# Patient Record
Sex: FEMALE | Race: WHITE | NOT HISPANIC OR LATINO | Employment: UNEMPLOYED | URBAN - NONMETROPOLITAN AREA
[De-identification: names, ages, dates, MRNs, and addresses within clinical notes are randomized per-mention and may not be internally consistent; named-entity substitution may affect disease eponyms.]

---

## 2017-01-01 ENCOUNTER — HOSPITAL ENCOUNTER (INPATIENT)
Facility: HOSPITAL | Age: 72
LOS: 15 days | Discharge: HOME | End: 2017-09-21
Attending: EMERGENCY MEDICINE | Admitting: INTERNAL MEDICINE

## 2017-01-01 ENCOUNTER — APPOINTMENT (OUTPATIENT)
Dept: NUCLEAR MEDICINE | Facility: HOSPITAL | Age: 72
End: 2017-01-01

## 2017-01-01 ENCOUNTER — ANESTHESIA (OUTPATIENT)
Dept: GASTROENTEROLOGY | Facility: HOSPITAL | Age: 72
End: 2017-01-01

## 2017-01-01 ENCOUNTER — APPOINTMENT (OUTPATIENT)
Dept: CARDIOLOGY | Facility: HOSPITAL | Age: 72
End: 2017-01-01
Attending: INTERNAL MEDICINE

## 2017-01-01 ENCOUNTER — ANESTHESIA EVENT (OUTPATIENT)
Dept: GASTROENTEROLOGY | Facility: HOSPITAL | Age: 72
End: 2017-01-01

## 2017-01-01 ENCOUNTER — APPOINTMENT (OUTPATIENT)
Dept: CT IMAGING | Facility: HOSPITAL | Age: 72
End: 2017-01-01

## 2017-01-01 ENCOUNTER — HOSPITAL ENCOUNTER (INPATIENT)
Facility: HOSPITAL | Age: 72
LOS: 11 days | Discharge: ANOTHER HOSPITAL | End: 2017-12-16
Attending: FAMILY MEDICINE | Admitting: FAMILY MEDICINE

## 2017-01-01 ENCOUNTER — APPOINTMENT (OUTPATIENT)
Dept: CARDIOLOGY | Facility: HOSPITAL | Age: 72
End: 2017-01-01

## 2017-01-01 ENCOUNTER — APPOINTMENT (OUTPATIENT)
Dept: GENERAL RADIOLOGY | Facility: HOSPITAL | Age: 72
End: 2017-01-01

## 2017-01-01 ENCOUNTER — HOSPITAL ENCOUNTER (EMERGENCY)
Facility: HOSPITAL | Age: 72
Discharge: HOME | End: 2017-12-31
Attending: FAMILY MEDICINE | Admitting: FAMILY MEDICINE

## 2017-01-01 VITALS
RESPIRATION RATE: 20 BRPM | SYSTOLIC BLOOD PRESSURE: 129 MMHG | TEMPERATURE: 99.3 F | WEIGHT: 130 LBS | OXYGEN SATURATION: 95 % | DIASTOLIC BLOOD PRESSURE: 77 MMHG | HEART RATE: 94 BPM | BODY MASS INDEX: 23.04 KG/M2 | HEIGHT: 63 IN

## 2017-01-01 VITALS
SYSTOLIC BLOOD PRESSURE: 152 MMHG | WEIGHT: 153 LBS | RESPIRATION RATE: 18 BRPM | DIASTOLIC BLOOD PRESSURE: 78 MMHG | HEART RATE: 66 BPM | HEIGHT: 63 IN | OXYGEN SATURATION: 97 % | BODY MASS INDEX: 27.11 KG/M2 | TEMPERATURE: 98.6 F

## 2017-01-01 VITALS
HEART RATE: 96 BPM | SYSTOLIC BLOOD PRESSURE: 138 MMHG | RESPIRATION RATE: 18 BRPM | BODY MASS INDEX: 22.55 KG/M2 | DIASTOLIC BLOOD PRESSURE: 71 MMHG | OXYGEN SATURATION: 95 % | TEMPERATURE: 98.6 F | WEIGHT: 127.3 LBS | HEIGHT: 63 IN

## 2017-01-01 DIAGNOSIS — Z74.09 IMPAIRED MOBILITY AND ADLS: ICD-10-CM

## 2017-01-01 DIAGNOSIS — R10.30 LOWER ABDOMINAL PAIN: Primary | ICD-10-CM

## 2017-01-01 DIAGNOSIS — Z95.828 PRESENCE OF IVC FILTER: ICD-10-CM

## 2017-01-01 DIAGNOSIS — R10.30 LOWER ABDOMINAL PAIN: ICD-10-CM

## 2017-01-01 DIAGNOSIS — R07.9 CHEST PAIN AT REST: ICD-10-CM

## 2017-01-01 DIAGNOSIS — E87.6 HYPOKALEMIA: ICD-10-CM

## 2017-01-01 DIAGNOSIS — Z74.09 IMPAIRED FUNCTIONAL MOBILITY, BALANCE, GAIT, AND ENDURANCE: ICD-10-CM

## 2017-01-01 DIAGNOSIS — R53.1 WEAKNESS: Primary | ICD-10-CM

## 2017-01-01 DIAGNOSIS — Z78.9 DECREASED ACTIVITIES OF DAILY LIVING (ADL): ICD-10-CM

## 2017-01-01 DIAGNOSIS — R77.8 ELEVATED TROPONIN: ICD-10-CM

## 2017-01-01 DIAGNOSIS — A04.72 C. DIFFICILE COLITIS: ICD-10-CM

## 2017-01-01 DIAGNOSIS — Z78.9 IMPAIRED MOBILITY AND ADLS: ICD-10-CM

## 2017-01-01 DIAGNOSIS — I82.5Z3 CHRONIC DEEP VEIN THROMBOSIS (DVT) OF DISTAL VEIN OF BOTH LOWER EXTREMITIES (HCC): Primary | ICD-10-CM

## 2017-01-01 DIAGNOSIS — R77.8 TROPONIN LEVEL ELEVATED: ICD-10-CM

## 2017-01-01 DIAGNOSIS — R07.9 CHEST PAIN, UNSPECIFIED TYPE: ICD-10-CM

## 2017-01-01 DIAGNOSIS — Z74.09 IMPAIRED PHYSICAL MOBILITY: ICD-10-CM

## 2017-01-01 DIAGNOSIS — I21.4 NSTEMI (NON-ST ELEVATED MYOCARDIAL INFARCTION) (HCC): ICD-10-CM

## 2017-01-01 DIAGNOSIS — K52.9 INFLAMMATORY BOWEL DISEASE: Chronic | ICD-10-CM

## 2017-01-01 DIAGNOSIS — K51.919 ULCERATIVE COLITIS WITH COMPLICATION, UNSPECIFIED LOCATION (HCC): ICD-10-CM

## 2017-01-01 DIAGNOSIS — D72.829 LEUKOCYTOSIS, UNSPECIFIED TYPE: ICD-10-CM

## 2017-01-01 LAB
ADV 40+41 DNA STL QL NAA+NON-PROBE: NOT DETECTED
ADV 40+41 DNA STL QL NAA+NON-PROBE: NOT DETECTED
ALBUMIN SERPL-MCNC: 2.2 G/DL (ref 3.4–4.8)
ALBUMIN SERPL-MCNC: 2.2 G/DL (ref 3.4–4.8)
ALBUMIN SERPL-MCNC: 2.3 G/DL (ref 3.4–4.8)
ALBUMIN SERPL-MCNC: 2.4 G/DL (ref 3.4–4.8)
ALBUMIN SERPL-MCNC: 2.5 G/DL (ref 3.4–4.8)
ALBUMIN SERPL-MCNC: 2.6 G/DL (ref 3.4–4.8)
ALBUMIN SERPL-MCNC: 2.7 G/DL (ref 3.4–4.8)
ALBUMIN SERPL-MCNC: 2.8 G/DL (ref 3.4–4.8)
ALBUMIN SERPL-MCNC: 2.8 G/DL (ref 3.4–4.8)
ALBUMIN SERPL-MCNC: 2.9 G/DL (ref 3.4–4.8)
ALBUMIN SERPL-MCNC: 2.9 G/DL (ref 3.4–4.8)
ALBUMIN SERPL-MCNC: 3 G/DL (ref 3.4–4.8)
ALBUMIN SERPL-MCNC: 3 G/DL (ref 3.4–4.8)
ALBUMIN SERPL-MCNC: 3.4 G/DL (ref 3.4–4.8)
ALBUMIN SERPL-MCNC: 3.4 G/DL (ref 3.4–4.8)
ALBUMIN SERPL-MCNC: 3.9 G/DL (ref 3.4–4.8)
ALBUMIN/GLOB SERPL: 0.8 G/DL (ref 1.1–1.8)
ALBUMIN/GLOB SERPL: 0.9 G/DL (ref 1.1–1.8)
ALBUMIN/GLOB SERPL: 1 G/DL (ref 1.1–1.8)
ALBUMIN/GLOB SERPL: 1.1 G/DL (ref 1.1–1.8)
ALBUMIN/GLOB SERPL: 1.2 G/DL (ref 1.1–1.8)
ALP SERPL-CCNC: 102 U/L (ref 38–126)
ALP SERPL-CCNC: 124 U/L (ref 38–126)
ALP SERPL-CCNC: 134 U/L (ref 38–126)
ALP SERPL-CCNC: 53 U/L (ref 38–126)
ALP SERPL-CCNC: 55 U/L (ref 38–126)
ALP SERPL-CCNC: 56 U/L (ref 38–126)
ALP SERPL-CCNC: 64 U/L (ref 38–126)
ALP SERPL-CCNC: 64 U/L (ref 38–126)
ALP SERPL-CCNC: 66 U/L (ref 38–126)
ALP SERPL-CCNC: 67 U/L (ref 38–126)
ALP SERPL-CCNC: 70 U/L (ref 38–126)
ALP SERPL-CCNC: 70 U/L (ref 38–126)
ALP SERPL-CCNC: 72 U/L (ref 38–126)
ALP SERPL-CCNC: 73 U/L (ref 38–126)
ALP SERPL-CCNC: 75 U/L (ref 38–126)
ALP SERPL-CCNC: 78 U/L (ref 38–126)
ALP SERPL-CCNC: 82 U/L (ref 38–126)
ALP SERPL-CCNC: 83 U/L (ref 38–126)
ALP SERPL-CCNC: 84 U/L (ref 38–126)
ALP SERPL-CCNC: 93 U/L (ref 38–126)
ALT SERPL W P-5'-P-CCNC: 18 U/L (ref 9–52)
ALT SERPL W P-5'-P-CCNC: 26 U/L (ref 9–52)
ALT SERPL W P-5'-P-CCNC: 28 U/L (ref 9–52)
ALT SERPL W P-5'-P-CCNC: 30 U/L (ref 9–52)
ALT SERPL W P-5'-P-CCNC: 32 U/L (ref 9–52)
ALT SERPL W P-5'-P-CCNC: 33 U/L (ref 9–52)
ALT SERPL W P-5'-P-CCNC: 34 U/L (ref 9–52)
ALT SERPL W P-5'-P-CCNC: 34 U/L (ref 9–52)
ALT SERPL W P-5'-P-CCNC: 35 U/L (ref 9–52)
ALT SERPL W P-5'-P-CCNC: 36 U/L (ref 9–52)
ALT SERPL W P-5'-P-CCNC: 37 U/L (ref 9–52)
ALT SERPL W P-5'-P-CCNC: 37 U/L (ref 9–52)
ALT SERPL W P-5'-P-CCNC: 39 U/L (ref 9–52)
ALT SERPL W P-5'-P-CCNC: 41 U/L (ref 9–52)
ANION GAP SERPL CALCULATED.3IONS-SCNC: 10 MMOL/L (ref 5–15)
ANION GAP SERPL CALCULATED.3IONS-SCNC: 10 MMOL/L (ref 5–15)
ANION GAP SERPL CALCULATED.3IONS-SCNC: 11 MMOL/L (ref 5–15)
ANION GAP SERPL CALCULATED.3IONS-SCNC: 12 MMOL/L (ref 5–15)
ANION GAP SERPL CALCULATED.3IONS-SCNC: 16 MMOL/L (ref 5–15)
ANION GAP SERPL CALCULATED.3IONS-SCNC: 4 MMOL/L (ref 5–15)
ANION GAP SERPL CALCULATED.3IONS-SCNC: 4 MMOL/L (ref 5–15)
ANION GAP SERPL CALCULATED.3IONS-SCNC: 5 MMOL/L (ref 5–15)
ANION GAP SERPL CALCULATED.3IONS-SCNC: 5 MMOL/L (ref 5–15)
ANION GAP SERPL CALCULATED.3IONS-SCNC: 6 MMOL/L (ref 5–15)
ANION GAP SERPL CALCULATED.3IONS-SCNC: 7 MMOL/L (ref 5–15)
ANION GAP SERPL CALCULATED.3IONS-SCNC: 8 MMOL/L (ref 5–15)
ANION GAP SERPL CALCULATED.3IONS-SCNC: 9 MMOL/L (ref 5–15)
ANISOCYTOSIS BLD QL: NORMAL
AST SERPL-CCNC: 15 U/L (ref 14–36)
AST SERPL-CCNC: 15 U/L (ref 14–36)
AST SERPL-CCNC: 17 U/L (ref 14–36)
AST SERPL-CCNC: 18 U/L (ref 14–36)
AST SERPL-CCNC: 19 U/L (ref 14–36)
AST SERPL-CCNC: 19 U/L (ref 14–36)
AST SERPL-CCNC: 20 U/L (ref 14–36)
AST SERPL-CCNC: 21 U/L (ref 14–36)
AST SERPL-CCNC: 22 U/L (ref 14–36)
AST SERPL-CCNC: 27 U/L (ref 14–36)
AST SERPL-CCNC: 29 U/L (ref 14–36)
AST SERPL-CCNC: 29 U/L (ref 14–36)
AST SERPL-CCNC: 32 U/L (ref 14–36)
AST SERPL-CCNC: 33 U/L (ref 14–36)
ASTRO TYP 1-8 RNA STL QL NAA+NON-PROBE: NOT DETECTED
ASTRO TYP 1-8 RNA STL QL NAA+NON-PROBE: NOT DETECTED
BACTERIA BLD CULT: ABNORMAL
BACTERIA SPEC AEROBE CULT: ABNORMAL
BACTERIA SPEC AEROBE CULT: NORMAL
BACTERIA UR QL AUTO: ABNORMAL /HPF
BACTERIA UR QL AUTO: ABNORMAL /HPF
BASOPHILS # BLD AUTO: 0 10*3/MM3 (ref 0–0.2)
BASOPHILS # BLD AUTO: 0.01 10*3/MM3 (ref 0–0.2)
BASOPHILS # BLD AUTO: 0.02 10*3/MM3 (ref 0–0.2)
BASOPHILS # BLD AUTO: 0.03 10*3/MM3 (ref 0–0.2)
BASOPHILS # BLD AUTO: 0.04 10*3/MM3 (ref 0–0.2)
BASOPHILS # BLD AUTO: 0.05 10*3/MM3 (ref 0–0.2)
BASOPHILS NFR BLD AUTO: 0 % (ref 0–2)
BASOPHILS NFR BLD AUTO: 0.1 % (ref 0–2)
BASOPHILS NFR BLD AUTO: 0.2 % (ref 0–2)
BASOPHILS NFR BLD AUTO: 0.2 % (ref 0–2)
BASOPHILS NFR BLD AUTO: 0.3 % (ref 0–2)
BASOPHILS NFR BLD AUTO: 0.4 % (ref 0–2)
BASOPHILS NFR BLD AUTO: 0.5 % (ref 0–2)
BASOPHILS NFR BLD AUTO: 0.5 % (ref 0–2)
BH CV ECHO MEAS - % IVS THICK: 3.1 %
BH CV ECHO MEAS - ACS: 2 CM
BH CV ECHO MEAS - AI DEC SLOPE: 90.9 CM/SEC^2
BH CV ECHO MEAS - AI MAX PG: 28.6 MMHG
BH CV ECHO MEAS - AI MAX VEL: 267.3 CM/SEC
BH CV ECHO MEAS - AI P1/2T: 861.7 MSEC
BH CV ECHO MEAS - AO MAX PG (FULL): 1.6 MMHG
BH CV ECHO MEAS - AO MAX PG: 8.1 MMHG
BH CV ECHO MEAS - AO MEAN PG (FULL): 1.8 MMHG
BH CV ECHO MEAS - AO MEAN PG: 4.5 MMHG
BH CV ECHO MEAS - AO ROOT AREA: 6.7 CM^2
BH CV ECHO MEAS - AO ROOT DIAM: 2.9 CM
BH CV ECHO MEAS - AO V2 MAX: 142 CM/SEC
BH CV ECHO MEAS - AO V2 MEAN: 98.8 CM/SEC
BH CV ECHO MEAS - AO V2 VTI: 19.8 CM
BH CV ECHO MEAS - AVA(I,A): 2.3 CM^2
BH CV ECHO MEAS - AVA(I,D): 2.3 CM^2
BH CV ECHO MEAS - AVA(V,A): 2.4 CM^2
BH CV ECHO MEAS - AVA(V,D): 2.4 CM^2
BH CV ECHO MEAS - EDV(CUBED): 75.4 ML
BH CV ECHO MEAS - EDV(TEICH): 79.7 ML
BH CV ECHO MEAS - EF(CUBED): 58.6 %
BH CV ECHO MEAS - EF(TEICH): 50.5 %
BH CV ECHO MEAS - ESV(CUBED): 31.2 ML
BH CV ECHO MEAS - ESV(TEICH): 39.4 ML
BH CV ECHO MEAS - FS: 25.5 %
BH CV ECHO MEAS - IVS/LVPW: 1.2
BH CV ECHO MEAS - IVSD: 1.2 CM
BH CV ECHO MEAS - IVSS: 1.3 CM
BH CV ECHO MEAS - LA DIMENSION: 3.2 CM
BH CV ECHO MEAS - LA/AO: 1.1
BH CV ECHO MEAS - LV MASS(C)D: 165.4 GRAMS
BH CV ECHO MEAS - LV MAX PG: 6.4 MMHG
BH CV ECHO MEAS - LV MEAN PG: 2.7 MMHG
BH CV ECHO MEAS - LV V1 MAX: 126.7 CM/SEC
BH CV ECHO MEAS - LV V1 MEAN: 73.6 CM/SEC
BH CV ECHO MEAS - LV V1 VTI: 16.6 CM
BH CV ECHO MEAS - LVIDD: 4.2 CM
BH CV ECHO MEAS - LVIDS: 3.1 CM
BH CV ECHO MEAS - LVOT AREA (M): 2.8 CM^2
BH CV ECHO MEAS - LVOT AREA: 2.7 CM^2
BH CV ECHO MEAS - LVOT DIAM: 1.9 CM
BH CV ECHO MEAS - LVPWD: 1 CM
BH CV ECHO MEAS - MR MAX PG: 54.1 MMHG
BH CV ECHO MEAS - MR MAX VEL: 366 CM/SEC
BH CV ECHO MEAS - MV A MAX VEL: 138.9 CM/SEC
BH CV ECHO MEAS - MV DEC SLOPE: 542.6 CM/SEC^2
BH CV ECHO MEAS - MV E MAX VEL: 94.6 CM/SEC
BH CV ECHO MEAS - MV E/A: 0.68
BH CV ECHO MEAS - MV MAX PG: 12.1 MMHG
BH CV ECHO MEAS - MV MEAN PG: 3.7 MMHG
BH CV ECHO MEAS - MV P1/2T MAX VEL: 104.5 CM/SEC
BH CV ECHO MEAS - MV P1/2T: 56.4 MSEC
BH CV ECHO MEAS - MV V2 MAX: 173.7 CM/SEC
BH CV ECHO MEAS - MV V2 MEAN: 85.5 CM/SEC
BH CV ECHO MEAS - MV V2 VTI: 18.7 CM
BH CV ECHO MEAS - MVA P1/2T LCG: 2.1 CM^2
BH CV ECHO MEAS - MVA(P1/2T): 3.9 CM^2
BH CV ECHO MEAS - MVA(VTI): 2.4 CM^2
BH CV ECHO MEAS - PA MAX PG: 8 MMHG
BH CV ECHO MEAS - PA V2 MAX: 141.2 CM/SEC
BH CV ECHO MEAS - RAP SYSTOLE: 10 MMHG
BH CV ECHO MEAS - RVDD: 3 CM
BH CV ECHO MEAS - RVSP: 38.2 MMHG
BH CV ECHO MEAS - SV(AO): 132.8 ML
BH CV ECHO MEAS - SV(CUBED): 44.2 ML
BH CV ECHO MEAS - SV(LVOT): 45.2 ML
BH CV ECHO MEAS - SV(TEICH): 40.3 ML
BH CV ECHO MEAS - TR MAX VEL: 265.5 CM/SEC
BH CV STRESS BP STAGE 1: NORMAL
BH CV STRESS COMMENTS STAGE 1: NORMAL
BH CV STRESS DOSE REGADENOSON STAGE 1: 0.4
BH CV STRESS DURATION MIN STAGE 1: 0
BH CV STRESS DURATION SEC STAGE 1: 10
BH CV STRESS HR STAGE 1: 111
BH CV STRESS PROTOCOL 1: NORMAL
BH CV STRESS RECOVERY BP: NORMAL MMHG
BH CV STRESS RECOVERY HR: 112 BPM
BH CV STRESS STAGE 1: 1
BILIRUB SERPL-MCNC: 0.2 MG/DL (ref 0.2–1.3)
BILIRUB SERPL-MCNC: 0.3 MG/DL (ref 0.2–1.3)
BILIRUB SERPL-MCNC: 0.4 MG/DL (ref 0.2–1.3)
BILIRUB SERPL-MCNC: 0.5 MG/DL (ref 0.2–1.3)
BILIRUB SERPL-MCNC: 0.5 MG/DL (ref 0.2–1.3)
BILIRUB SERPL-MCNC: 0.6 MG/DL (ref 0.2–1.3)
BILIRUB SERPL-MCNC: 0.7 MG/DL (ref 0.2–1.3)
BILIRUB SERPL-MCNC: 0.7 MG/DL (ref 0.2–1.3)
BILIRUB SERPL-MCNC: 0.9 MG/DL (ref 0.2–1.3)
BILIRUB UR QL STRIP: ABNORMAL
BILIRUB UR QL STRIP: ABNORMAL
BUN BLD-MCNC: 10 MG/DL (ref 7–21)
BUN BLD-MCNC: 10 MG/DL (ref 7–21)
BUN BLD-MCNC: 11 MG/DL (ref 7–21)
BUN BLD-MCNC: 12 MG/DL (ref 7–21)
BUN BLD-MCNC: 15 MG/DL (ref 7–21)
BUN BLD-MCNC: 16 MG/DL (ref 7–21)
BUN BLD-MCNC: 17 MG/DL (ref 7–21)
BUN BLD-MCNC: 17 MG/DL (ref 7–21)
BUN BLD-MCNC: 20 MG/DL (ref 7–21)
BUN BLD-MCNC: 3 MG/DL (ref 7–21)
BUN BLD-MCNC: 5 MG/DL (ref 7–21)
BUN BLD-MCNC: 5 MG/DL (ref 7–21)
BUN BLD-MCNC: 7 MG/DL (ref 7–21)
BUN BLD-MCNC: 7 MG/DL (ref 7–21)
BUN BLD-MCNC: 8 MG/DL (ref 7–21)
BUN BLD-MCNC: 9 MG/DL (ref 7–21)
BUN BLD-MCNC: 9 MG/DL (ref 7–21)
BUN/CREAT SERPL: 10 (ref 7–25)
BUN/CREAT SERPL: 10.1 (ref 7–25)
BUN/CREAT SERPL: 11.3 (ref 7–25)
BUN/CREAT SERPL: 13.6 (ref 7–25)
BUN/CREAT SERPL: 14.3 (ref 7–25)
BUN/CREAT SERPL: 15.6 (ref 7–25)
BUN/CREAT SERPL: 16.7 (ref 7–25)
BUN/CREAT SERPL: 17.7 (ref 7–25)
BUN/CREAT SERPL: 18.5 (ref 7–25)
BUN/CREAT SERPL: 18.5 (ref 7–25)
BUN/CREAT SERPL: 18.6 (ref 7–25)
BUN/CREAT SERPL: 20.8 (ref 7–25)
BUN/CREAT SERPL: 21.1 (ref 7–25)
BUN/CREAT SERPL: 22.2 (ref 7–25)
BUN/CREAT SERPL: 23.4 (ref 7–25)
BUN/CREAT SERPL: 23.9 (ref 7–25)
BUN/CREAT SERPL: 24.2 (ref 7–25)
BUN/CREAT SERPL: 26.3 (ref 7–25)
BUN/CREAT SERPL: 27.9 (ref 7–25)
BUN/CREAT SERPL: 28.3 (ref 7–25)
BUN/CREAT SERPL: 3.9 (ref 7–25)
BUN/CREAT SERPL: 5.8 (ref 7–25)
BUN/CREAT SERPL: 7.4 (ref 7–25)
BUN/CREAT SERPL: 7.6 (ref 7–25)
BUN/CREAT SERPL: 8.9 (ref 7–25)
BUN/CREAT SERPL: 9.3 (ref 7–25)
BUN/CREAT SERPL: 9.4 (ref 7–25)
C CAYETANENSIS DNA STL QL NAA+NON-PROBE: NOT DETECTED
C CAYETANENSIS DNA STL QL NAA+NON-PROBE: NOT DETECTED
C DIFF TOX GENS STL QL NAA+PROBE: NOT DETECTED
C DIFF TOX GENS STL QL NAA+PROBE: NOT DETECTED
C DIFF TOX GENS STL QL NAA+PROBE: POSITIVE
CALCIUM SPEC-SCNC: 7.3 MG/DL (ref 8.4–10.2)
CALCIUM SPEC-SCNC: 7.3 MG/DL (ref 8.4–10.2)
CALCIUM SPEC-SCNC: 7.5 MG/DL (ref 8.4–10.2)
CALCIUM SPEC-SCNC: 7.5 MG/DL (ref 8.4–10.2)
CALCIUM SPEC-SCNC: 7.6 MG/DL (ref 8.4–10.2)
CALCIUM SPEC-SCNC: 7.7 MG/DL (ref 8.4–10.2)
CALCIUM SPEC-SCNC: 7.7 MG/DL (ref 8.4–10.2)
CALCIUM SPEC-SCNC: 7.8 MG/DL (ref 8.4–10.2)
CALCIUM SPEC-SCNC: 7.9 MG/DL (ref 8.4–10.2)
CALCIUM SPEC-SCNC: 8 MG/DL (ref 8.4–10.2)
CALCIUM SPEC-SCNC: 8 MG/DL (ref 8.4–10.2)
CALCIUM SPEC-SCNC: 8.1 MG/DL (ref 8.4–10.2)
CALCIUM SPEC-SCNC: 8.2 MG/DL (ref 8.4–10.2)
CALCIUM SPEC-SCNC: 8.2 MG/DL (ref 8.4–10.2)
CALCIUM SPEC-SCNC: 8.3 MG/DL (ref 8.4–10.2)
CALCIUM SPEC-SCNC: 8.4 MG/DL (ref 8.4–10.2)
CALCIUM SPEC-SCNC: 8.5 MG/DL (ref 8.4–10.2)
CALCIUM SPEC-SCNC: 8.9 MG/DL (ref 8.4–10.2)
CALCIUM SPEC-SCNC: 8.9 MG/DL (ref 8.4–10.2)
CAMPY SP DNA.DIARRHEA STL QL NAA+PROBE: NOT DETECTED
CAMPY SP DNA.DIARRHEA STL QL NAA+PROBE: NOT DETECTED
CHLORIDE SERPL-SCNC: 100 MMOL/L (ref 95–110)
CHLORIDE SERPL-SCNC: 100 MMOL/L (ref 95–110)
CHLORIDE SERPL-SCNC: 101 MMOL/L (ref 95–110)
CHLORIDE SERPL-SCNC: 101 MMOL/L (ref 95–110)
CHLORIDE SERPL-SCNC: 102 MMOL/L (ref 95–110)
CHLORIDE SERPL-SCNC: 102 MMOL/L (ref 95–110)
CHLORIDE SERPL-SCNC: 104 MMOL/L (ref 95–110)
CHLORIDE SERPL-SCNC: 104 MMOL/L (ref 95–110)
CHLORIDE SERPL-SCNC: 105 MMOL/L (ref 95–110)
CHLORIDE SERPL-SCNC: 106 MMOL/L (ref 95–110)
CHLORIDE SERPL-SCNC: 106 MMOL/L (ref 95–110)
CHLORIDE SERPL-SCNC: 108 MMOL/L (ref 95–110)
CHLORIDE SERPL-SCNC: 109 MMOL/L (ref 95–110)
CHLORIDE SERPL-SCNC: 109 MMOL/L (ref 95–110)
CHLORIDE SERPL-SCNC: 110 MMOL/L (ref 95–110)
CHLORIDE SERPL-SCNC: 111 MMOL/L (ref 95–110)
CHLORIDE SERPL-SCNC: 111 MMOL/L (ref 95–110)
CHLORIDE SERPL-SCNC: 91 MMOL/L (ref 95–110)
CHLORIDE SERPL-SCNC: 93 MMOL/L (ref 95–110)
CHLORIDE SERPL-SCNC: 97 MMOL/L (ref 95–110)
CHLORIDE SERPL-SCNC: 98 MMOL/L (ref 95–110)
CHLORIDE SERPL-SCNC: 99 MMOL/L (ref 95–110)
CLARITY UR: CLEAR
CLARITY UR: CLEAR
CMV IGG SERPL IA-ACNC: >10 U/ML (ref 0–0.59)
CMV IGM SERPL IA-ACNC: <30 AU/ML (ref 0–29.9)
CO2 SERPL-SCNC: 20 MMOL/L (ref 22–31)
CO2 SERPL-SCNC: 21 MMOL/L (ref 22–31)
CO2 SERPL-SCNC: 21 MMOL/L (ref 22–31)
CO2 SERPL-SCNC: 22 MMOL/L (ref 22–31)
CO2 SERPL-SCNC: 22 MMOL/L (ref 22–31)
CO2 SERPL-SCNC: 23 MMOL/L (ref 22–31)
CO2 SERPL-SCNC: 24 MMOL/L (ref 22–31)
CO2 SERPL-SCNC: 25 MMOL/L (ref 22–31)
CO2 SERPL-SCNC: 26 MMOL/L (ref 22–31)
CO2 SERPL-SCNC: 27 MMOL/L (ref 22–31)
CO2 SERPL-SCNC: 28 MMOL/L (ref 22–31)
CO2 SERPL-SCNC: 29 MMOL/L (ref 22–31)
CO2 SERPL-SCNC: 32 MMOL/L (ref 22–31)
CO2 SERPL-SCNC: 33 MMOL/L (ref 22–31)
COLOR UR: ABNORMAL
COLOR UR: YELLOW
CREAT BLD-MCNC: 0.54 MG/DL (ref 0.5–1)
CREAT BLD-MCNC: 0.54 MG/DL (ref 0.5–1)
CREAT BLD-MCNC: 0.56 MG/DL (ref 0.5–1)
CREAT BLD-MCNC: 0.57 MG/DL (ref 0.5–1)
CREAT BLD-MCNC: 0.59 MG/DL (ref 0.5–1)
CREAT BLD-MCNC: 0.6 MG/DL (ref 0.5–1)
CREAT BLD-MCNC: 0.61 MG/DL (ref 0.5–1)
CREAT BLD-MCNC: 0.62 MG/DL (ref 0.5–1)
CREAT BLD-MCNC: 0.62 MG/DL (ref 0.5–1)
CREAT BLD-MCNC: 0.64 MG/DL (ref 0.5–1)
CREAT BLD-MCNC: 0.64 MG/DL (ref 0.5–1)
CREAT BLD-MCNC: 0.66 MG/DL (ref 0.5–1)
CREAT BLD-MCNC: 0.66 MG/DL (ref 0.5–1)
CREAT BLD-MCNC: 0.67 MG/DL (ref 0.5–1)
CREAT BLD-MCNC: 0.68 MG/DL (ref 0.5–1)
CREAT BLD-MCNC: 0.69 MG/DL (ref 0.5–1)
CREAT BLD-MCNC: 0.71 MG/DL (ref 0.5–1)
CREAT BLD-MCNC: 0.71 MG/DL (ref 0.5–1)
CREAT BLD-MCNC: 0.72 MG/DL (ref 0.5–1)
CREAT BLD-MCNC: 0.76 MG/DL (ref 0.5–1)
CREAT BLD-MCNC: 0.79 MG/DL (ref 0.5–1)
CREAT BLD-MCNC: 0.86 MG/DL (ref 0.5–1)
CREAT BLD-MCNC: 0.86 MG/DL (ref 0.5–1)
CREAT BLD-MCNC: 0.96 MG/DL (ref 0.5–1)
CREAT BLD-MCNC: 1.08 MG/DL (ref 0.5–1)
CREAT BLD-MCNC: 1.1 MG/DL (ref 0.5–1)
CREAT BLD-MCNC: 1.44 MG/DL (ref 0.5–1)
CRP SERPL-MCNC: 20.6 MG/DL (ref 0–1)
CRYPTOSP STL CULT: NOT DETECTED
CRYPTOSP STL CULT: NOT DETECTED
D-LACTATE SERPL-SCNC: 2.3 MMOL/L (ref 0.5–2)
D-LACTATE SERPL-SCNC: 2.3 MMOL/L (ref 0.5–2)
DEPRECATED RDW RBC AUTO: 43.8 FL (ref 36.4–46.3)
DEPRECATED RDW RBC AUTO: 45 FL (ref 36.4–46.3)
DEPRECATED RDW RBC AUTO: 45.1 FL (ref 36.4–46.3)
DEPRECATED RDW RBC AUTO: 45.9 FL (ref 36.4–46.3)
DEPRECATED RDW RBC AUTO: 45.9 FL (ref 36.4–46.3)
DEPRECATED RDW RBC AUTO: 46.2 FL (ref 36.4–46.3)
DEPRECATED RDW RBC AUTO: 46.6 FL (ref 36.4–46.3)
DEPRECATED RDW RBC AUTO: 47.4 FL (ref 36.4–46.3)
DEPRECATED RDW RBC AUTO: 47.5 FL (ref 36.4–46.3)
DEPRECATED RDW RBC AUTO: 47.8 FL (ref 36.4–46.3)
DEPRECATED RDW RBC AUTO: 47.8 FL (ref 36.4–46.3)
DEPRECATED RDW RBC AUTO: 48 FL (ref 36.4–46.3)
DEPRECATED RDW RBC AUTO: 48.1 FL (ref 36.4–46.3)
DEPRECATED RDW RBC AUTO: 48.4 FL (ref 36.4–46.3)
DEPRECATED RDW RBC AUTO: 48.4 FL (ref 36.4–46.3)
DEPRECATED RDW RBC AUTO: 49.3 FL (ref 36.4–46.3)
DEPRECATED RDW RBC AUTO: 49.7 FL (ref 36.4–46.3)
DEPRECATED RDW RBC AUTO: 49.8 FL (ref 36.4–46.3)
DEPRECATED RDW RBC AUTO: 49.9 FL (ref 36.4–46.3)
DEPRECATED RDW RBC AUTO: 50.4 FL (ref 36.4–46.3)
DEPRECATED RDW RBC AUTO: 51 FL (ref 36.4–46.3)
DEPRECATED RDW RBC AUTO: 51 FL (ref 36.4–46.3)
DEPRECATED RDW RBC AUTO: 51.3 FL (ref 36.4–46.3)
DEPRECATED RDW RBC AUTO: 51.5 FL (ref 36.4–46.3)
DEPRECATED RDW RBC AUTO: 51.6 FL (ref 36.4–46.3)
DEPRECATED RDW RBC AUTO: 51.7 FL (ref 36.4–46.3)
DEPRECATED RDW RBC AUTO: 53.2 FL (ref 36.4–46.3)
E COLI DNA SPEC QL NAA+PROBE: NOT DETECTED
E COLI DNA SPEC QL NAA+PROBE: NOT DETECTED
E HISTOLYT AG STL-ACNC: NOT DETECTED
E HISTOLYT AG STL-ACNC: NOT DETECTED
EAEC PAA PLAS AGGR+AATA ST NAA+NON-PRB: NOT DETECTED
EAEC PAA PLAS AGGR+AATA ST NAA+NON-PRB: NOT DETECTED
EC STX1+STX2 GENES STL QL NAA+NON-PROBE: NOT DETECTED
EC STX1+STX2 GENES STL QL NAA+NON-PROBE: NOT DETECTED
EOSINOPHIL # BLD AUTO: 0 10*3/MM3 (ref 0–0.7)
EOSINOPHIL # BLD AUTO: 0.01 10*3/MM3 (ref 0–0.7)
EOSINOPHIL # BLD AUTO: 0.02 10*3/MM3 (ref 0–0.7)
EOSINOPHIL # BLD AUTO: 0.04 10*3/MM3 (ref 0–0.7)
EOSINOPHIL # BLD AUTO: 0.08 10*3/MM3 (ref 0–0.7)
EOSINOPHIL # BLD AUTO: 0.1 10*3/MM3 (ref 0–0.7)
EOSINOPHIL # BLD AUTO: 0.11 10*3/MM3 (ref 0–0.7)
EOSINOPHIL # BLD AUTO: 0.14 10*3/MM3 (ref 0–0.7)
EOSINOPHIL # BLD AUTO: 0.21 10*3/MM3 (ref 0–0.7)
EOSINOPHIL # BLD AUTO: 0.24 10*3/MM3 (ref 0–0.7)
EOSINOPHIL # BLD AUTO: 0.25 10*3/MM3 (ref 0–0.7)
EOSINOPHIL NFR BLD AUTO: 0 % (ref 0–7)
EOSINOPHIL NFR BLD AUTO: 0.1 % (ref 0–7)
EOSINOPHIL NFR BLD AUTO: 0.2 % (ref 0–7)
EOSINOPHIL NFR BLD AUTO: 0.2 % (ref 0–7)
EOSINOPHIL NFR BLD AUTO: 0.3 % (ref 0–7)
EOSINOPHIL NFR BLD AUTO: 0.9 % (ref 0–7)
EOSINOPHIL NFR BLD AUTO: 1.3 % (ref 0–7)
EOSINOPHIL NFR BLD AUTO: 1.4 % (ref 0–7)
EOSINOPHIL NFR BLD AUTO: 1.9 % (ref 0–7)
EOSINOPHIL NFR BLD AUTO: 2.8 % (ref 0–7)
EOSINOPHIL NFR BLD AUTO: 3.1 % (ref 0–7)
EOSINOPHIL NFR BLD AUTO: 3.7 % (ref 0–7)
EPEC EAE GENE STL QL NAA+NON-PROBE: NOT DETECTED
EPEC EAE GENE STL QL NAA+NON-PROBE: NOT DETECTED
ERYTHROCYTE [DISTWIDTH] IN BLOOD BY AUTOMATED COUNT: 13.6 % (ref 11.5–14.5)
ERYTHROCYTE [DISTWIDTH] IN BLOOD BY AUTOMATED COUNT: 13.8 % (ref 11.5–14.5)
ERYTHROCYTE [DISTWIDTH] IN BLOOD BY AUTOMATED COUNT: 14 % (ref 11.5–14.5)
ERYTHROCYTE [DISTWIDTH] IN BLOOD BY AUTOMATED COUNT: 14.1 % (ref 11.5–14.5)
ERYTHROCYTE [DISTWIDTH] IN BLOOD BY AUTOMATED COUNT: 14.2 % (ref 11.5–14.5)
ERYTHROCYTE [DISTWIDTH] IN BLOOD BY AUTOMATED COUNT: 14.2 % (ref 11.5–14.5)
ERYTHROCYTE [DISTWIDTH] IN BLOOD BY AUTOMATED COUNT: 14.3 % (ref 11.5–14.5)
ERYTHROCYTE [DISTWIDTH] IN BLOOD BY AUTOMATED COUNT: 14.3 % (ref 11.5–14.5)
ERYTHROCYTE [DISTWIDTH] IN BLOOD BY AUTOMATED COUNT: 14.4 % (ref 11.5–14.5)
ERYTHROCYTE [DISTWIDTH] IN BLOOD BY AUTOMATED COUNT: 14.5 % (ref 11.5–14.5)
ERYTHROCYTE [DISTWIDTH] IN BLOOD BY AUTOMATED COUNT: 14.6 % (ref 11.5–14.5)
ERYTHROCYTE [DISTWIDTH] IN BLOOD BY AUTOMATED COUNT: 14.9 % (ref 11.5–14.5)
ERYTHROCYTE [DISTWIDTH] IN BLOOD BY AUTOMATED COUNT: 15 % (ref 11.5–14.5)
ERYTHROCYTE [DISTWIDTH] IN BLOOD BY AUTOMATED COUNT: 15.1 % (ref 11.5–14.5)
ERYTHROCYTE [DISTWIDTH] IN BLOOD BY AUTOMATED COUNT: 15.1 % (ref 11.5–14.5)
ERYTHROCYTE [DISTWIDTH] IN BLOOD BY AUTOMATED COUNT: 15.2 % (ref 11.5–14.5)
ERYTHROCYTE [DISTWIDTH] IN BLOOD BY AUTOMATED COUNT: 15.3 % (ref 11.5–14.5)
ERYTHROCYTE [DISTWIDTH] IN BLOOD BY AUTOMATED COUNT: 15.7 % (ref 11.5–14.5)
ERYTHROCYTE [DISTWIDTH] IN BLOOD BY AUTOMATED COUNT: 15.7 % (ref 11.5–14.5)
ERYTHROCYTE [SEDIMENTATION RATE] IN BLOOD: 47 MM/HR (ref 0–20)
ETEC LTA+ST1A+ST1B TOX ST NAA+NON-PROBE: NOT DETECTED
ETEC LTA+ST1A+ST1B TOX ST NAA+NON-PROBE: NOT DETECTED
FLUAV AG NPH QL: NEGATIVE
FLUBV AG NPH QL IA: NEGATIVE
G LAMBLIA DNA SPEC QL NAA+PROBE: NOT DETECTED
G LAMBLIA DNA SPEC QL NAA+PROBE: NOT DETECTED
GFR SERPL CREATININE-BSD FRML MDRD: 100 ML/MIN/1.73 (ref 39–90)
GFR SERPL CREATININE-BSD FRML MDRD: 104 ML/MIN/1.73 (ref 60–90)
GFR SERPL CREATININE-BSD FRML MDRD: 106 ML/MIN/1.73 (ref 60–90)
GFR SERPL CREATININE-BSD FRML MDRD: 111 ML/MIN/1.73 (ref 39–90)
GFR SERPL CREATININE-BSD FRML MDRD: 111 ML/MIN/1.73 (ref 60–90)
GFR SERPL CREATININE-BSD FRML MDRD: 36 ML/MIN/1.73 (ref 60–90)
GFR SERPL CREATININE-BSD FRML MDRD: 49 ML/MIN/1.73 (ref 39–90)
GFR SERPL CREATININE-BSD FRML MDRD: 50 ML/MIN/1.73 (ref 39–90)
GFR SERPL CREATININE-BSD FRML MDRD: 57 ML/MIN/1.73 (ref 60–90)
GFR SERPL CREATININE-BSD FRML MDRD: 65 ML/MIN/1.73 (ref 60–90)
GFR SERPL CREATININE-BSD FRML MDRD: 65 ML/MIN/1.73 (ref 60–90)
GFR SERPL CREATININE-BSD FRML MDRD: 72 ML/MIN/1.73
GFR SERPL CREATININE-BSD FRML MDRD: 75 ML/MIN/1.73 (ref 60–90)
GFR SERPL CREATININE-BSD FRML MDRD: 80 ML/MIN/1.73 (ref 39–90)
GFR SERPL CREATININE-BSD FRML MDRD: 81 ML/MIN/1.73 (ref 60–90)
GFR SERPL CREATININE-BSD FRML MDRD: 81 ML/MIN/1.73 (ref 60–90)
GFR SERPL CREATININE-BSD FRML MDRD: 84 ML/MIN/1.73 (ref 60–90)
GFR SERPL CREATININE-BSD FRML MDRD: 85 ML/MIN/1.73 (ref 39–90)
GFR SERPL CREATININE-BSD FRML MDRD: 87 ML/MIN/1.73 (ref 60–90)
GFR SERPL CREATININE-BSD FRML MDRD: 88 ML/MIN/1.73 (ref 39–90)
GFR SERPL CREATININE-BSD FRML MDRD: 88 ML/MIN/1.73 (ref 60–90)
GFR SERPL CREATININE-BSD FRML MDRD: 91 ML/MIN/1.73 (ref 39–90)
GFR SERPL CREATININE-BSD FRML MDRD: 91 ML/MIN/1.73 (ref 39–90)
GFR SERPL CREATININE-BSD FRML MDRD: 95 ML/MIN/1.73 (ref 39–90)
GFR SERPL CREATININE-BSD FRML MDRD: 95 ML/MIN/1.73 (ref 60–90)
GFR SERPL CREATININE-BSD FRML MDRD: 96 ML/MIN/1.73 (ref 39–90)
GFR SERPL CREATININE-BSD FRML MDRD: 98 ML/MIN/1.73 (ref 60–90)
GLOBULIN UR ELPH-MCNC: 2.6 GM/DL (ref 2.3–3.5)
GLOBULIN UR ELPH-MCNC: 2.6 GM/DL (ref 2.3–3.5)
GLOBULIN UR ELPH-MCNC: 2.7 GM/DL (ref 2.3–3.5)
GLOBULIN UR ELPH-MCNC: 2.8 GM/DL (ref 2.3–3.5)
GLOBULIN UR ELPH-MCNC: 2.9 GM/DL (ref 2.3–3.5)
GLOBULIN UR ELPH-MCNC: 3 GM/DL (ref 2.3–3.5)
GLOBULIN UR ELPH-MCNC: 3.2 GM/DL (ref 2.3–3.5)
GLOBULIN UR ELPH-MCNC: 3.6 GM/DL (ref 2.3–3.5)
GLOBULIN UR ELPH-MCNC: 3.7 GM/DL (ref 2.3–3.5)
GLOBULIN UR ELPH-MCNC: 4.1 GM/DL (ref 2.3–3.5)
GLUCOSE BLD-MCNC: 102 MG/DL (ref 60–100)
GLUCOSE BLD-MCNC: 108 MG/DL (ref 60–100)
GLUCOSE BLD-MCNC: 110 MG/DL (ref 60–100)
GLUCOSE BLD-MCNC: 112 MG/DL (ref 60–100)
GLUCOSE BLD-MCNC: 115 MG/DL (ref 60–100)
GLUCOSE BLD-MCNC: 115 MG/DL (ref 60–100)
GLUCOSE BLD-MCNC: 116 MG/DL (ref 60–100)
GLUCOSE BLD-MCNC: 120 MG/DL (ref 60–100)
GLUCOSE BLD-MCNC: 125 MG/DL (ref 60–100)
GLUCOSE BLD-MCNC: 130 MG/DL (ref 60–100)
GLUCOSE BLD-MCNC: 130 MG/DL (ref 60–100)
GLUCOSE BLD-MCNC: 133 MG/DL (ref 60–100)
GLUCOSE BLD-MCNC: 138 MG/DL (ref 60–100)
GLUCOSE BLD-MCNC: 150 MG/DL (ref 60–100)
GLUCOSE BLD-MCNC: 153 MG/DL (ref 60–100)
GLUCOSE BLD-MCNC: 157 MG/DL (ref 60–100)
GLUCOSE BLD-MCNC: 159 MG/DL (ref 60–100)
GLUCOSE BLD-MCNC: 162 MG/DL (ref 60–100)
GLUCOSE BLD-MCNC: 164 MG/DL (ref 60–100)
GLUCOSE BLD-MCNC: 183 MG/DL (ref 60–100)
GLUCOSE BLD-MCNC: 190 MG/DL (ref 60–100)
GLUCOSE BLD-MCNC: 84 MG/DL (ref 60–100)
GLUCOSE BLD-MCNC: 86 MG/DL (ref 60–100)
GLUCOSE BLD-MCNC: 88 MG/DL (ref 60–100)
GLUCOSE BLD-MCNC: 89 MG/DL (ref 60–100)
GLUCOSE BLD-MCNC: 96 MG/DL (ref 60–100)
GLUCOSE BLD-MCNC: 97 MG/DL (ref 60–100)
GLUCOSE UR STRIP-MCNC: NEGATIVE MG/DL
GLUCOSE UR STRIP-MCNC: NEGATIVE MG/DL
GRAM STN SPEC: ABNORMAL
HCT VFR BLD AUTO: 23.2 % (ref 35–45)
HCT VFR BLD AUTO: 24.4 % (ref 35–45)
HCT VFR BLD AUTO: 24.7 % (ref 35–45)
HCT VFR BLD AUTO: 25 % (ref 35–45)
HCT VFR BLD AUTO: 25 % (ref 35–45)
HCT VFR BLD AUTO: 25.7 % (ref 35–45)
HCT VFR BLD AUTO: 26.8 % (ref 35–45)
HCT VFR BLD AUTO: 26.8 % (ref 35–45)
HCT VFR BLD AUTO: 27.2 % (ref 35–45)
HCT VFR BLD AUTO: 27.5 % (ref 35–45)
HCT VFR BLD AUTO: 28 % (ref 35–45)
HCT VFR BLD AUTO: 28.3 % (ref 35–45)
HCT VFR BLD AUTO: 29.3 % (ref 35–45)
HCT VFR BLD AUTO: 29.3 % (ref 35–45)
HCT VFR BLD AUTO: 29.5 % (ref 35–45)
HCT VFR BLD AUTO: 29.7 % (ref 35–45)
HCT VFR BLD AUTO: 30.2 % (ref 35–45)
HCT VFR BLD AUTO: 30.5 % (ref 35–45)
HCT VFR BLD AUTO: 30.6 % (ref 35–45)
HCT VFR BLD AUTO: 31.1 % (ref 35–45)
HCT VFR BLD AUTO: 31.3 % (ref 35–45)
HCT VFR BLD AUTO: 32.3 % (ref 35–45)
HCT VFR BLD AUTO: 33 % (ref 35–45)
HCT VFR BLD AUTO: 35.2 % (ref 35–45)
HCT VFR BLD AUTO: 35.4 % (ref 35–45)
HCT VFR BLD AUTO: 35.5 % (ref 35–45)
HCT VFR BLD AUTO: 36.7 % (ref 35–45)
HCV AB SER DONR QL: NEGATIVE
HGB BLD-MCNC: 10.2 G/DL (ref 12–15.5)
HGB BLD-MCNC: 10.3 G/DL (ref 12–15.5)
HGB BLD-MCNC: 10.4 G/DL (ref 12–15.5)
HGB BLD-MCNC: 10.6 G/DL (ref 12–15.5)
HGB BLD-MCNC: 10.8 G/DL (ref 12–15.5)
HGB BLD-MCNC: 10.8 G/DL (ref 12–15.5)
HGB BLD-MCNC: 11.5 G/DL (ref 12–15.5)
HGB BLD-MCNC: 11.6 G/DL (ref 12–15.5)
HGB BLD-MCNC: 12.1 G/DL (ref 12–15.5)
HGB BLD-MCNC: 12.4 G/DL (ref 12–15.5)
HGB BLD-MCNC: 7.6 G/DL (ref 12–15.5)
HGB BLD-MCNC: 7.8 G/DL (ref 12–15.5)
HGB BLD-MCNC: 8 G/DL (ref 12–15.5)
HGB BLD-MCNC: 8.2 G/DL (ref 12–15.5)
HGB BLD-MCNC: 8.3 G/DL (ref 12–15.5)
HGB BLD-MCNC: 8.4 G/DL (ref 12–15.5)
HGB BLD-MCNC: 8.6 G/DL (ref 12–15.5)
HGB BLD-MCNC: 9 G/DL (ref 12–15.5)
HGB BLD-MCNC: 9 G/DL (ref 12–15.5)
HGB BLD-MCNC: 9.1 G/DL (ref 12–15.5)
HGB BLD-MCNC: 9.1 G/DL (ref 12–15.5)
HGB BLD-MCNC: 9.4 G/DL (ref 12–15.5)
HGB BLD-MCNC: 9.5 G/DL (ref 12–15.5)
HGB BLD-MCNC: 9.8 G/DL (ref 12–15.5)
HGB BLD-MCNC: 9.9 G/DL (ref 12–15.5)
HGB UR QL STRIP.AUTO: ABNORMAL
HGB UR QL STRIP.AUTO: ABNORMAL
HOLD SPECIMEN: NORMAL
HYALINE CASTS UR QL AUTO: ABNORMAL /LPF
HYALINE CASTS UR QL AUTO: ABNORMAL /LPF
HYPOCHROMIA BLD QL: NORMAL
IMM GRANULOCYTES # BLD: 0.07 10*3/MM3 (ref 0–0.02)
IMM GRANULOCYTES # BLD: 0.09 10*3/MM3 (ref 0–0.02)
IMM GRANULOCYTES # BLD: 0.1 10*3/MM3 (ref 0–0.02)
IMM GRANULOCYTES # BLD: 0.1 10*3/MM3 (ref 0–0.02)
IMM GRANULOCYTES # BLD: 0.11 10*3/MM3 (ref 0–0.02)
IMM GRANULOCYTES # BLD: 0.14 10*3/MM3 (ref 0–0.02)
IMM GRANULOCYTES # BLD: 0.14 10*3/MM3 (ref 0–0.02)
IMM GRANULOCYTES # BLD: 0.15 10*3/MM3 (ref 0–0.02)
IMM GRANULOCYTES # BLD: 0.18 10*3/MM3 (ref 0–0.02)
IMM GRANULOCYTES # BLD: 0.19 10*3/MM3 (ref 0–0.02)
IMM GRANULOCYTES # BLD: 0.21 10*3/MM3 (ref 0–0.02)
IMM GRANULOCYTES # BLD: 0.24 10*3/MM3 (ref 0–0.02)
IMM GRANULOCYTES # BLD: 0.29 10*3/MM3 (ref 0–0.02)
IMM GRANULOCYTES # BLD: 0.76 10*3/MM3 (ref 0–0.02)
IMM GRANULOCYTES NFR BLD: 0.7 % (ref 0–0.5)
IMM GRANULOCYTES NFR BLD: 0.8 % (ref 0–0.5)
IMM GRANULOCYTES NFR BLD: 0.8 % (ref 0–0.5)
IMM GRANULOCYTES NFR BLD: 0.9 % (ref 0–0.5)
IMM GRANULOCYTES NFR BLD: 1 % (ref 0–0.5)
IMM GRANULOCYTES NFR BLD: 1.2 % (ref 0–0.5)
IMM GRANULOCYTES NFR BLD: 1.4 % (ref 0–0.5)
IMM GRANULOCYTES NFR BLD: 1.5 % (ref 0–0.5)
IMM GRANULOCYTES NFR BLD: 1.7 % (ref 0–0.5)
IMM GRANULOCYTES NFR BLD: 1.9 % (ref 0–0.5)
IMM GRANULOCYTES NFR BLD: 2.1 % (ref 0–0.5)
IMM GRANULOCYTES NFR BLD: 2.2 % (ref 0–0.5)
IMM GRANULOCYTES NFR BLD: 2.3 % (ref 0–0.5)
IMM GRANULOCYTES NFR BLD: 2.6 % (ref 0–0.5)
IMM GRANULOCYTES NFR BLD: 3.5 % (ref 0–0.5)
IMM GRANULOCYTES NFR BLD: 3.7 % (ref 0–0.5)
IMM GRANULOCYTES NFR BLD: 4.3 % (ref 0–0.5)
IRON 24H UR-MRATE: 91 MCG/DL (ref 37–170)
IRON SATN MFR SERPL: 39 % (ref 15–50)
KETONES UR QL STRIP: ABNORMAL
KETONES UR QL STRIP: ABNORMAL
LAB AP CASE REPORT: NORMAL
LEUKOCYTE ESTERASE UR QL STRIP.AUTO: ABNORMAL
LEUKOCYTE ESTERASE UR QL STRIP.AUTO: ABNORMAL
LIPASE SERPL-CCNC: 42 U/L (ref 23–300)
LIPASE SERPL-CCNC: 65 U/L (ref 23–300)
LV EF NUC BP: 81 %
LYMPHOCYTES # BLD AUTO: 0.54 10*3/MM3 (ref 0.6–4.2)
LYMPHOCYTES # BLD AUTO: 0.65 10*3/MM3 (ref 0.6–4.2)
LYMPHOCYTES # BLD AUTO: 0.69 10*3/MM3 (ref 0.6–4.2)
LYMPHOCYTES # BLD AUTO: 0.69 10*3/MM3 (ref 0.6–4.2)
LYMPHOCYTES # BLD AUTO: 0.72 10*3/MM3 (ref 0.6–4.2)
LYMPHOCYTES # BLD AUTO: 0.75 10*3/MM3 (ref 0.6–4.2)
LYMPHOCYTES # BLD AUTO: 0.89 10*3/MM3 (ref 0.6–4.2)
LYMPHOCYTES # BLD AUTO: 0.93 10*3/MM3 (ref 0.6–4.2)
LYMPHOCYTES # BLD AUTO: 0.95 10*3/MM3 (ref 0.6–4.2)
LYMPHOCYTES # BLD AUTO: 1.06 10*3/MM3 (ref 0.6–4.2)
LYMPHOCYTES # BLD AUTO: 1.12 10*3/MM3 (ref 0.6–4.2)
LYMPHOCYTES # BLD AUTO: 1.15 10*3/MM3 (ref 0.6–4.2)
LYMPHOCYTES # BLD AUTO: 1.17 10*3/MM3 (ref 0.6–4.2)
LYMPHOCYTES # BLD AUTO: 1.21 10*3/MM3 (ref 0.6–4.2)
LYMPHOCYTES # BLD AUTO: 1.26 10*3/MM3 (ref 0.6–4.2)
LYMPHOCYTES # BLD AUTO: 1.56 10*3/MM3 (ref 0.6–4.2)
LYMPHOCYTES # BLD AUTO: 1.57 10*3/MM3 (ref 0.6–4.2)
LYMPHOCYTES # BLD AUTO: 1.62 10*3/MM3 (ref 0.6–4.2)
LYMPHOCYTES # BLD AUTO: 2.54 10*3/MM3 (ref 0.6–4.2)
LYMPHOCYTES NFR BLD AUTO: 12.2 % (ref 10–50)
LYMPHOCYTES NFR BLD AUTO: 12.8 % (ref 10–50)
LYMPHOCYTES NFR BLD AUTO: 14 % (ref 10–50)
LYMPHOCYTES NFR BLD AUTO: 15.8 % (ref 10–50)
LYMPHOCYTES NFR BLD AUTO: 16.7 % (ref 10–50)
LYMPHOCYTES NFR BLD AUTO: 17 % (ref 10–50)
LYMPHOCYTES NFR BLD AUTO: 17.8 % (ref 10–50)
LYMPHOCYTES NFR BLD AUTO: 19.4 % (ref 10–50)
LYMPHOCYTES NFR BLD AUTO: 20.4 % (ref 10–50)
LYMPHOCYTES NFR BLD AUTO: 20.8 % (ref 10–50)
LYMPHOCYTES NFR BLD AUTO: 5.8 % (ref 10–50)
LYMPHOCYTES NFR BLD AUTO: 7 % (ref 10–50)
LYMPHOCYTES NFR BLD AUTO: 8.7 % (ref 10–50)
LYMPHOCYTES NFR BLD AUTO: 9.1 % (ref 10–50)
LYMPHOCYTES NFR BLD AUTO: 9.1 % (ref 10–50)
LYMPHOCYTES NFR BLD AUTO: 9.3 % (ref 10–50)
LYMPHOCYTES NFR BLD AUTO: 9.6 % (ref 10–50)
LYMPHOCYTES NFR BLD AUTO: 9.7 % (ref 10–50)
LYMPHOCYTES NFR BLD AUTO: 9.9 % (ref 10–50)
Lab: NORMAL
MAGNESIUM SERPL-MCNC: 1.7 MG/DL (ref 1.6–2.3)
MAGNESIUM SERPL-MCNC: 1.8 MG/DL (ref 1.6–2.3)
MAGNESIUM SERPL-MCNC: 1.8 MG/DL (ref 1.6–2.3)
MAGNESIUM SERPL-MCNC: 1.9 MG/DL (ref 1.6–2.3)
MAGNESIUM SERPL-MCNC: 2 MG/DL (ref 1.6–2.3)
MAGNESIUM SERPL-MCNC: 2.1 MG/DL (ref 1.6–2.3)
MAGNESIUM SERPL-MCNC: 2.3 MG/DL (ref 1.6–2.3)
MAXIMAL PREDICTED HEART RATE: 148 BPM
MAXIMAL PREDICTED HEART RATE: 148 BPM
MCH RBC QN AUTO: 29.2 PG (ref 26.5–34)
MCH RBC QN AUTO: 29.2 PG (ref 26.5–34)
MCH RBC QN AUTO: 29.3 PG (ref 26.5–34)
MCH RBC QN AUTO: 29.5 PG (ref 26.5–34)
MCH RBC QN AUTO: 29.5 PG (ref 26.5–34)
MCH RBC QN AUTO: 29.6 PG (ref 26.5–34)
MCH RBC QN AUTO: 29.7 PG (ref 26.5–34)
MCH RBC QN AUTO: 29.7 PG (ref 26.5–34)
MCH RBC QN AUTO: 29.8 PG (ref 26.5–34)
MCH RBC QN AUTO: 29.8 PG (ref 26.5–34)
MCH RBC QN AUTO: 29.9 PG (ref 26.5–34)
MCH RBC QN AUTO: 30 PG (ref 26.5–34)
MCH RBC QN AUTO: 30.1 PG (ref 26.5–34)
MCH RBC QN AUTO: 30.2 PG (ref 26.5–34)
MCH RBC QN AUTO: 30.3 PG (ref 26.5–34)
MCH RBC QN AUTO: 30.4 PG (ref 26.5–34)
MCH RBC QN AUTO: 30.4 PG (ref 26.5–34)
MCH RBC QN AUTO: 30.5 PG (ref 26.5–34)
MCH RBC QN AUTO: 30.7 PG (ref 26.5–34)
MCHC RBC AUTO-ENTMCNC: 31.6 G/DL (ref 31.4–36)
MCHC RBC AUTO-ENTMCNC: 31.8 G/DL (ref 31.4–36)
MCHC RBC AUTO-ENTMCNC: 31.9 G/DL (ref 31.4–36)
MCHC RBC AUTO-ENTMCNC: 32 G/DL (ref 31.4–36)
MCHC RBC AUTO-ENTMCNC: 32.4 G/DL (ref 31.4–36)
MCHC RBC AUTO-ENTMCNC: 32.5 G/DL (ref 31.4–36)
MCHC RBC AUTO-ENTMCNC: 32.5 G/DL (ref 31.4–36)
MCHC RBC AUTO-ENTMCNC: 32.7 G/DL (ref 31.4–36)
MCHC RBC AUTO-ENTMCNC: 32.7 G/DL (ref 31.4–36)
MCHC RBC AUTO-ENTMCNC: 32.8 G/DL (ref 31.4–36)
MCHC RBC AUTO-ENTMCNC: 32.9 G/DL (ref 31.4–36)
MCHC RBC AUTO-ENTMCNC: 33 G/DL (ref 31.4–36)
MCHC RBC AUTO-ENTMCNC: 33.1 G/DL (ref 31.4–36)
MCHC RBC AUTO-ENTMCNC: 33.2 G/DL (ref 31.4–36)
MCHC RBC AUTO-ENTMCNC: 33.3 G/DL (ref 31.4–36)
MCHC RBC AUTO-ENTMCNC: 33.3 G/DL (ref 31.4–36)
MCHC RBC AUTO-ENTMCNC: 33.6 G/DL (ref 31.4–36)
MCHC RBC AUTO-ENTMCNC: 33.8 G/DL (ref 31.4–36)
MCHC RBC AUTO-ENTMCNC: 33.9 G/DL (ref 31.4–36)
MCHC RBC AUTO-ENTMCNC: 34 G/DL (ref 31.4–36)
MCHC RBC AUTO-ENTMCNC: 34.1 G/DL (ref 31.4–36)
MCHC RBC AUTO-ENTMCNC: 34.1 G/DL (ref 31.4–36)
MCHC RBC AUTO-ENTMCNC: 34.7 G/DL (ref 31.4–36)
MCV RBC AUTO: 87.6 FL (ref 80–98)
MCV RBC AUTO: 88.4 FL (ref 80–98)
MCV RBC AUTO: 88.7 FL (ref 80–98)
MCV RBC AUTO: 88.9 FL (ref 80–98)
MCV RBC AUTO: 89 FL (ref 80–98)
MCV RBC AUTO: 89.4 FL (ref 80–98)
MCV RBC AUTO: 89.5 FL (ref 80–98)
MCV RBC AUTO: 90.2 FL (ref 80–98)
MCV RBC AUTO: 90.3 FL (ref 80–98)
MCV RBC AUTO: 90.7 FL (ref 80–98)
MCV RBC AUTO: 90.9 FL (ref 80–98)
MCV RBC AUTO: 91.1 FL (ref 80–98)
MCV RBC AUTO: 91.2 FL (ref 80–98)
MCV RBC AUTO: 91.3 FL (ref 80–98)
MCV RBC AUTO: 91.7 FL (ref 80–98)
MCV RBC AUTO: 91.8 FL (ref 80–98)
MCV RBC AUTO: 91.8 FL (ref 80–98)
MCV RBC AUTO: 91.9 FL (ref 80–98)
MCV RBC AUTO: 91.9 FL (ref 80–98)
MCV RBC AUTO: 92.1 FL (ref 80–98)
MCV RBC AUTO: 92.2 FL (ref 80–98)
MCV RBC AUTO: 92.2 FL (ref 80–98)
MCV RBC AUTO: 92.3 FL (ref 80–98)
MCV RBC AUTO: 92.4 FL (ref 80–98)
MCV RBC AUTO: 92.5 FL (ref 80–98)
MONOCYTES # BLD AUTO: 0.12 10*3/MM3 (ref 0–0.9)
MONOCYTES # BLD AUTO: 0.19 10*3/MM3 (ref 0–0.9)
MONOCYTES # BLD AUTO: 0.22 10*3/MM3 (ref 0–0.9)
MONOCYTES # BLD AUTO: 0.3 10*3/MM3 (ref 0–0.9)
MONOCYTES # BLD AUTO: 0.37 10*3/MM3 (ref 0–0.9)
MONOCYTES # BLD AUTO: 0.4 10*3/MM3 (ref 0–0.9)
MONOCYTES # BLD AUTO: 0.45 10*3/MM3 (ref 0–0.9)
MONOCYTES # BLD AUTO: 0.46 10*3/MM3 (ref 0–0.9)
MONOCYTES # BLD AUTO: 0.48 10*3/MM3 (ref 0–0.9)
MONOCYTES # BLD AUTO: 0.49 10*3/MM3 (ref 0–0.9)
MONOCYTES # BLD AUTO: 0.49 10*3/MM3 (ref 0–0.9)
MONOCYTES # BLD AUTO: 0.64 10*3/MM3 (ref 0–0.9)
MONOCYTES # BLD AUTO: 0.64 10*3/MM3 (ref 0–0.9)
MONOCYTES # BLD AUTO: 0.66 10*3/MM3 (ref 0–0.9)
MONOCYTES # BLD AUTO: 0.67 10*3/MM3 (ref 0–0.9)
MONOCYTES # BLD AUTO: 0.71 10*3/MM3 (ref 0–0.9)
MONOCYTES # BLD AUTO: 0.91 10*3/MM3 (ref 0–0.9)
MONOCYTES # BLD AUTO: 1.01 10*3/MM3 (ref 0–0.9)
MONOCYTES # BLD AUTO: 1.5 10*3/MM3 (ref 0–0.9)
MONOCYTES NFR BLD AUTO: 1.8 % (ref 0–12)
MONOCYTES NFR BLD AUTO: 10.3 % (ref 0–12)
MONOCYTES NFR BLD AUTO: 11.3 % (ref 0–12)
MONOCYTES NFR BLD AUTO: 12.1 % (ref 0–12)
MONOCYTES NFR BLD AUTO: 2.4 % (ref 0–12)
MONOCYTES NFR BLD AUTO: 2.6 % (ref 0–12)
MONOCYTES NFR BLD AUTO: 3.4 % (ref 0–12)
MONOCYTES NFR BLD AUTO: 3.8 % (ref 0–12)
MONOCYTES NFR BLD AUTO: 4 % (ref 0–12)
MONOCYTES NFR BLD AUTO: 4.7 % (ref 0–12)
MONOCYTES NFR BLD AUTO: 5 % (ref 0–12)
MONOCYTES NFR BLD AUTO: 6.4 % (ref 0–12)
MONOCYTES NFR BLD AUTO: 7 % (ref 0–12)
MONOCYTES NFR BLD AUTO: 7.1 % (ref 0–12)
MONOCYTES NFR BLD AUTO: 7.3 % (ref 0–12)
MONOCYTES NFR BLD AUTO: 7.6 % (ref 0–12)
MONOCYTES NFR BLD AUTO: 7.7 % (ref 0–12)
MONOCYTES NFR BLD AUTO: 8.1 % (ref 0–12)
MONOCYTES NFR BLD AUTO: 9.3 % (ref 0–12)
NEUTROPHILS # BLD AUTO: 11.78 10*3/MM3 (ref 2–8.6)
NEUTROPHILS # BLD AUTO: 11.79 10*3/MM3 (ref 2–8.6)
NEUTROPHILS # BLD AUTO: 14.68 10*3/MM3 (ref 2–8.6)
NEUTROPHILS # BLD AUTO: 3.74 10*3/MM3 (ref 2–8.6)
NEUTROPHILS # BLD AUTO: 3.97 10*3/MM3 (ref 2–8.6)
NEUTROPHILS # BLD AUTO: 4.32 10*3/MM3 (ref 2–8.6)
NEUTROPHILS # BLD AUTO: 4.8 10*3/MM3 (ref 2–8.6)
NEUTROPHILS # BLD AUTO: 5.29 10*3/MM3 (ref 2–8.6)
NEUTROPHILS # BLD AUTO: 5.68 10*3/MM3 (ref 2–8.6)
NEUTROPHILS # BLD AUTO: 6 10*3/MM3 (ref 2–8.6)
NEUTROPHILS # BLD AUTO: 6.08 10*3/MM3 (ref 2–8.6)
NEUTROPHILS # BLD AUTO: 6.23 10*3/MM3 (ref 2–8.6)
NEUTROPHILS # BLD AUTO: 6.26 10*3/MM3 (ref 2–8.6)
NEUTROPHILS # BLD AUTO: 6.67 10*3/MM3 (ref 2–8.6)
NEUTROPHILS # BLD AUTO: 6.78 10*3/MM3 (ref 2–8.6)
NEUTROPHILS # BLD AUTO: 6.89 10*3/MM3 (ref 2–8.6)
NEUTROPHILS # BLD AUTO: 7.11 10*3/MM3 (ref 2–8.6)
NEUTROPHILS # BLD AUTO: 7.46 10*3/MM3 (ref 2–8.6)
NEUTROPHILS # BLD AUTO: 8.36 10*3/MM3 (ref 2–8.6)
NEUTROPHILS NFR BLD AUTO: 64.2 % (ref 37–80)
NEUTROPHILS NFR BLD AUTO: 66.6 % (ref 37–80)
NEUTROPHILS NFR BLD AUTO: 68 % (ref 37–80)
NEUTROPHILS NFR BLD AUTO: 69.1 % (ref 37–80)
NEUTROPHILS NFR BLD AUTO: 69.7 % (ref 37–80)
NEUTROPHILS NFR BLD AUTO: 71 % (ref 37–80)
NEUTROPHILS NFR BLD AUTO: 72.5 % (ref 37–80)
NEUTROPHILS NFR BLD AUTO: 73 % (ref 37–80)
NEUTROPHILS NFR BLD AUTO: 75.8 % (ref 37–80)
NEUTROPHILS NFR BLD AUTO: 77.8 % (ref 37–80)
NEUTROPHILS NFR BLD AUTO: 81.1 % (ref 37–80)
NEUTROPHILS NFR BLD AUTO: 82.2 % (ref 37–80)
NEUTROPHILS NFR BLD AUTO: 84.2 % (ref 37–80)
NEUTROPHILS NFR BLD AUTO: 84.3 % (ref 37–80)
NEUTROPHILS NFR BLD AUTO: 86.2 % (ref 37–80)
NEUTROPHILS NFR BLD AUTO: 86.4 % (ref 37–80)
NEUTROPHILS NFR BLD AUTO: 86.6 % (ref 37–80)
NEUTROPHILS NFR BLD AUTO: 88.7 % (ref 37–80)
NEUTROPHILS NFR BLD AUTO: 90.2 % (ref 37–80)
NITRITE UR QL STRIP: NEGATIVE
NITRITE UR QL STRIP: NEGATIVE
NOROVIRUS GI+II RNA STL QL NAA+NON-PROBE: NOT DETECTED
NOROVIRUS GI+II RNA STL QL NAA+NON-PROBE: NOT DETECTED
NRBC BLD MANUAL-RTO: 0 /100 WBC (ref 0–0)
OSMOLALITY UR: 504 MOSM/KG (ref 38–1400)
P SHIGELLOIDES DNA STL QL NAA+NON-PROBE: NOT DETECTED
P SHIGELLOIDES DNA STL QL NAA+NON-PROBE: NOT DETECTED
PATH REPORT.FINAL DX SPEC: NORMAL
PATH REPORT.GROSS SPEC: NORMAL
PERCENT MAX PREDICTED HR: 81.08 %
PH UR STRIP.AUTO: 5.5 [PH] (ref 5–9)
PH UR STRIP.AUTO: 5.5 [PH] (ref 5–9)
PLATELET # BLD AUTO: 116 10*3/MM3 (ref 150–450)
PLATELET # BLD AUTO: 140 10*3/MM3 (ref 150–450)
PLATELET # BLD AUTO: 140 10*3/MM3 (ref 150–450)
PLATELET # BLD AUTO: 149 10*3/MM3 (ref 150–450)
PLATELET # BLD AUTO: 157 10*3/MM3 (ref 150–450)
PLATELET # BLD AUTO: 163 10*3/MM3 (ref 150–450)
PLATELET # BLD AUTO: 172 10*3/MM3 (ref 150–450)
PLATELET # BLD AUTO: 173 10*3/MM3 (ref 150–450)
PLATELET # BLD AUTO: 180 10*3/MM3 (ref 150–450)
PLATELET # BLD AUTO: 181 10*3/MM3 (ref 150–450)
PLATELET # BLD AUTO: 186 10*3/MM3 (ref 150–450)
PLATELET # BLD AUTO: 187 10*3/MM3 (ref 150–450)
PLATELET # BLD AUTO: 206 10*3/MM3 (ref 150–450)
PLATELET # BLD AUTO: 210 10*3/MM3 (ref 150–450)
PLATELET # BLD AUTO: 214 10*3/MM3 (ref 150–450)
PLATELET # BLD AUTO: 216 10*3/MM3 (ref 150–450)
PLATELET # BLD AUTO: 219 10*3/MM3 (ref 150–450)
PLATELET # BLD AUTO: 226 10*3/MM3 (ref 150–450)
PLATELET # BLD AUTO: 229 10*3/MM3 (ref 150–450)
PLATELET # BLD AUTO: 236 10*3/MM3 (ref 150–450)
PLATELET # BLD AUTO: 247 10*3/MM3 (ref 150–450)
PLATELET # BLD AUTO: 248 10*3/MM3 (ref 150–450)
PLATELET # BLD AUTO: 261 10*3/MM3 (ref 150–450)
PLATELET # BLD AUTO: 263 10*3/MM3 (ref 150–450)
PLATELET # BLD AUTO: 270 10*3/MM3 (ref 150–450)
PLATELET # BLD AUTO: 273 10*3/MM3 (ref 150–450)
PLATELET # BLD AUTO: 313 10*3/MM3 (ref 150–450)
PMV BLD AUTO: 10 FL (ref 8–12)
PMV BLD AUTO: 10.1 FL (ref 8–12)
PMV BLD AUTO: 8.4 FL (ref 8–12)
PMV BLD AUTO: 8.9 FL (ref 8–12)
PMV BLD AUTO: 9 FL (ref 8–12)
PMV BLD AUTO: 9.1 FL (ref 8–12)
PMV BLD AUTO: 9.2 FL (ref 8–12)
PMV BLD AUTO: 9.2 FL (ref 8–12)
PMV BLD AUTO: 9.3 FL (ref 8–12)
PMV BLD AUTO: 9.4 FL (ref 8–12)
PMV BLD AUTO: 9.5 FL (ref 8–12)
PMV BLD AUTO: 9.6 FL (ref 8–12)
PMV BLD AUTO: 9.7 FL (ref 8–12)
PMV BLD AUTO: 9.7 FL (ref 8–12)
PMV BLD AUTO: 9.8 FL (ref 8–12)
POLYCHROMASIA BLD QL SMEAR: NORMAL
POTASSIUM BLD-SCNC: 2.5 MMOL/L (ref 3.5–5.1)
POTASSIUM BLD-SCNC: 2.6 MMOL/L (ref 3.5–5.1)
POTASSIUM BLD-SCNC: 3.1 MMOL/L (ref 3.5–5.1)
POTASSIUM BLD-SCNC: 3.1 MMOL/L (ref 3.5–5.1)
POTASSIUM BLD-SCNC: 3.2 MMOL/L (ref 3.5–5.1)
POTASSIUM BLD-SCNC: 3.2 MMOL/L (ref 3.5–5.1)
POTASSIUM BLD-SCNC: 3.3 MMOL/L (ref 3.5–5.1)
POTASSIUM BLD-SCNC: 3.3 MMOL/L (ref 3.5–5.1)
POTASSIUM BLD-SCNC: 3.4 MMOL/L (ref 3.5–5.1)
POTASSIUM BLD-SCNC: 3.4 MMOL/L (ref 3.5–5.1)
POTASSIUM BLD-SCNC: 3.5 MMOL/L (ref 3.5–5.1)
POTASSIUM BLD-SCNC: 3.7 MMOL/L (ref 3.5–5.1)
POTASSIUM BLD-SCNC: 3.7 MMOL/L (ref 3.5–5.1)
POTASSIUM BLD-SCNC: 3.8 MMOL/L (ref 3.5–5.1)
POTASSIUM BLD-SCNC: 3.9 MMOL/L (ref 3.5–5.1)
POTASSIUM BLD-SCNC: 3.9 MMOL/L (ref 3.5–5.1)
POTASSIUM BLD-SCNC: 4 MMOL/L (ref 3.5–5.1)
POTASSIUM BLD-SCNC: 4.2 MMOL/L (ref 3.5–5.1)
POTASSIUM BLD-SCNC: 4.2 MMOL/L (ref 3.5–5.1)
POTASSIUM BLD-SCNC: 4.3 MMOL/L (ref 3.5–5.1)
POTASSIUM BLD-SCNC: 4.3 MMOL/L (ref 3.5–5.1)
POTASSIUM BLD-SCNC: 4.4 MMOL/L (ref 3.5–5.1)
POTASSIUM BLD-SCNC: 4.5 MMOL/L (ref 3.5–5.1)
POTASSIUM BLD-SCNC: 4.6 MMOL/L (ref 3.5–5.1)
POTASSIUM BLD-SCNC: 4.7 MMOL/L (ref 3.5–5.1)
POTASSIUM BLD-SCNC: 4.9 MMOL/L (ref 3.5–5.1)
PROT SERPL-MCNC: 4.9 G/DL (ref 6.3–8.6)
PROT SERPL-MCNC: 4.9 G/DL (ref 6.3–8.6)
PROT SERPL-MCNC: 5.1 G/DL (ref 6.3–8.6)
PROT SERPL-MCNC: 5.2 G/DL (ref 6.3–8.6)
PROT SERPL-MCNC: 5.2 G/DL (ref 6.3–8.6)
PROT SERPL-MCNC: 5.3 G/DL (ref 6.3–8.6)
PROT SERPL-MCNC: 5.4 G/DL (ref 6.3–8.6)
PROT SERPL-MCNC: 5.4 G/DL (ref 6.3–8.6)
PROT SERPL-MCNC: 5.6 G/DL (ref 6.3–8.6)
PROT SERPL-MCNC: 5.8 G/DL (ref 6.3–8.6)
PROT SERPL-MCNC: 6 G/DL (ref 6.3–8.6)
PROT SERPL-MCNC: 6.1 G/DL (ref 6.3–8.6)
PROT SERPL-MCNC: 7 G/DL (ref 6.3–8.6)
PROT SERPL-MCNC: 7.1 G/DL (ref 6.3–8.6)
PROT SERPL-MCNC: 8 G/DL (ref 6.3–8.6)
PROT UR QL STRIP: ABNORMAL
PROT UR QL STRIP: ABNORMAL
RBC # BLD AUTO: 2.53 10*6/MM3 (ref 3.77–5.16)
RBC # BLD AUTO: 2.64 10*6/MM3 (ref 3.77–5.16)
RBC # BLD AUTO: 2.68 10*6/MM3 (ref 3.77–5.16)
RBC # BLD AUTO: 2.74 10*6/MM3 (ref 3.77–5.16)
RBC # BLD AUTO: 2.75 10*6/MM3 (ref 3.77–5.16)
RBC # BLD AUTO: 2.8 10*6/MM3 (ref 3.77–5.16)
RBC # BLD AUTO: 2.95 10*6/MM3 (ref 3.77–5.16)
RBC # BLD AUTO: 2.97 10*6/MM3 (ref 3.77–5.16)
RBC # BLD AUTO: 3.02 10*6/MM3 (ref 3.77–5.16)
RBC # BLD AUTO: 3.02 10*6/MM3 (ref 3.77–5.16)
RBC # BLD AUTO: 3.08 10*6/MM3 (ref 3.77–5.16)
RBC # BLD AUTO: 3.13 10*6/MM3 (ref 3.77–5.16)
RBC # BLD AUTO: 3.18 10*6/MM3 (ref 3.77–5.16)
RBC # BLD AUTO: 3.19 10*6/MM3 (ref 3.77–5.16)
RBC # BLD AUTO: 3.21 10*6/MM3 (ref 3.77–5.16)
RBC # BLD AUTO: 3.29 10*6/MM3 (ref 3.77–5.16)
RBC # BLD AUTO: 3.29 10*6/MM3 (ref 3.77–5.16)
RBC # BLD AUTO: 3.39 10*6/MM3 (ref 3.77–5.16)
RBC # BLD AUTO: 3.44 10*6/MM3 (ref 3.77–5.16)
RBC # BLD AUTO: 3.48 10*6/MM3 (ref 3.77–5.16)
RBC # BLD AUTO: 3.52 10*6/MM3 (ref 3.77–5.16)
RBC # BLD AUTO: 3.56 10*6/MM3 (ref 3.77–5.16)
RBC # BLD AUTO: 3.59 10*6/MM3 (ref 3.77–5.16)
RBC # BLD AUTO: 3.9 10*6/MM3 (ref 3.77–5.16)
RBC # BLD AUTO: 3.92 10*6/MM3 (ref 3.77–5.16)
RBC # BLD AUTO: 3.97 10*6/MM3 (ref 3.77–5.16)
RBC # BLD AUTO: 4.13 10*6/MM3 (ref 3.77–5.16)
RBC # UR: ABNORMAL /HPF
RBC # UR: ABNORMAL /HPF
REF LAB TEST METHOD: ABNORMAL
REF LAB TEST METHOD: ABNORMAL
RV RNA STL NAA+PROBE: NOT DETECTED
RV RNA STL NAA+PROBE: NOT DETECTED
SALMONELLA DNA SPEC QL NAA+PROBE: NOT DETECTED
SALMONELLA DNA SPEC QL NAA+PROBE: NOT DETECTED
SAPO I+II+IV+V RNA STL QL NAA+NON-PROBE: NOT DETECTED
SAPO I+II+IV+V RNA STL QL NAA+NON-PROBE: NOT DETECTED
SHIGELLA SP+EIEC IPAH ST NAA+NON-PROBE: NOT DETECTED
SHIGELLA SP+EIEC IPAH ST NAA+NON-PROBE: NOT DETECTED
SMALL PLATELETS BLD QL SMEAR: ADEQUATE
SODIUM BLD-SCNC: 128 MMOL/L (ref 137–145)
SODIUM BLD-SCNC: 129 MMOL/L (ref 137–145)
SODIUM BLD-SCNC: 133 MMOL/L (ref 137–145)
SODIUM BLD-SCNC: 135 MMOL/L (ref 137–145)
SODIUM BLD-SCNC: 135 MMOL/L (ref 137–145)
SODIUM BLD-SCNC: 136 MMOL/L (ref 137–145)
SODIUM BLD-SCNC: 136 MMOL/L (ref 137–145)
SODIUM BLD-SCNC: 137 MMOL/L (ref 137–145)
SODIUM BLD-SCNC: 138 MMOL/L (ref 137–145)
SODIUM BLD-SCNC: 139 MMOL/L (ref 137–145)
SODIUM BLD-SCNC: 139 MMOL/L (ref 137–145)
SODIUM BLD-SCNC: 140 MMOL/L (ref 137–145)
SODIUM BLD-SCNC: 140 MMOL/L (ref 137–145)
SODIUM BLD-SCNC: 141 MMOL/L (ref 137–145)
SODIUM UR-SCNC: 28 MMOL/L (ref 30–90)
SP GR UR STRIP: 1.03 (ref 1–1.03)
SP GR UR STRIP: 1.07 (ref 1–1.03)
SQUAMOUS #/AREA URNS HPF: ABNORMAL /HPF
SQUAMOUS #/AREA URNS HPF: ABNORMAL /HPF
STRESS BASELINE BP: NORMAL MMHG
STRESS BASELINE HR: 103 BPM
STRESS PERCENT HR: 95 %
STRESS POST ESTIMATED WORKLOAD: 1 METS
STRESS POST PEAK BP: NORMAL MMHG
STRESS POST PEAK HR: 120 BPM
STRESS TARGET HR: 126 BPM
STRESS TARGET HR: 126 BPM
TIBC SERPL-MCNC: 233 MCG/DL (ref 265–497)
TOXIC GRANULATION: NORMAL
TROPONIN I SERPL-MCNC: 0.06 NG/ML
TROPONIN I SERPL-MCNC: 0.06 NG/ML
TROPONIN I SERPL-MCNC: 0.07 NG/ML
TROPONIN I SERPL-MCNC: 0.2 NG/ML
TSH SERPL DL<=0.05 MIU/L-ACNC: 0.52 MIU/ML (ref 0.46–4.68)
URATE SERPL-MCNC: 4.9 MG/DL (ref 2.5–8.5)
UROBILINOGEN UR QL STRIP: ABNORMAL
UROBILINOGEN UR QL STRIP: ABNORMAL
V CHOLERAE DNA SPEC QL NAA+PROBE: NOT DETECTED
V CHOLERAE DNA SPEC QL NAA+PROBE: NOT DETECTED
VIBRIO DNA SPEC NAA+PROBE: NOT DETECTED
VIBRIO DNA SPEC NAA+PROBE: NOT DETECTED
WBC NRBC COR # BLD: 10.64 10*3/MM3 (ref 3.2–9.8)
WBC NRBC COR # BLD: 10.97 10*3/MM3 (ref 3.2–9.8)
WBC NRBC COR # BLD: 13.29 10*3/MM3 (ref 3.2–9.8)
WBC NRBC COR # BLD: 16.12 10*3/MM3 (ref 3.2–9.8)
WBC NRBC COR # BLD: 17.84 10*3/MM3 (ref 3.2–9.8)
WBC NRBC COR # BLD: 5.69 10*3/MM3 (ref 3.2–9.8)
WBC NRBC COR # BLD: 5.83 10*3/MM3 (ref 3.2–9.8)
WBC NRBC COR # BLD: 6.48 10*3/MM3 (ref 3.2–9.8)
WBC NRBC COR # BLD: 6.58 10*3/MM3 (ref 3.2–9.8)
WBC NRBC COR # BLD: 6.76 10*3/MM3 (ref 3.2–9.8)
WBC NRBC COR # BLD: 7.21 10*3/MM3 (ref 3.2–9.8)
WBC NRBC COR # BLD: 7.66 10*3/MM3 (ref 3.2–9.8)
WBC NRBC COR # BLD: 7.92 10*3/MM3 (ref 3.2–9.8)
WBC NRBC COR # BLD: 7.95 10*3/MM3 (ref 3.2–9.8)
WBC NRBC COR # BLD: 7.97 10*3/MM3 (ref 3.2–9.8)
WBC NRBC COR # BLD: 8.04 10*3/MM3 (ref 3.2–9.8)
WBC NRBC COR # BLD: 8.24 10*3/MM3 (ref 3.2–9.8)
WBC NRBC COR # BLD: 8.27 10*3/MM3 (ref 3.2–9.8)
WBC NRBC COR # BLD: 8.38 10*3/MM3 (ref 3.2–9.8)
WBC NRBC COR # BLD: 8.71 10*3/MM3 (ref 3.2–9.8)
WBC NRBC COR # BLD: 8.83 10*3/MM3 (ref 3.2–9.8)
WBC NRBC COR # BLD: 8.9 10*3/MM3 (ref 3.2–9.8)
WBC NRBC COR # BLD: 9.02 10*3/MM3 (ref 3.2–9.8)
WBC NRBC COR # BLD: 9.15 10*3/MM3 (ref 3.2–9.8)
WBC NRBC COR # BLD: 9.19 10*3/MM3 (ref 3.2–9.8)
WBC NRBC COR # BLD: 9.27 10*3/MM3 (ref 3.2–9.8)
WBC NRBC COR # BLD: 9.42 10*3/MM3 (ref 3.2–9.8)
WBC UR QL AUTO: ABNORMAL /HPF
WBC UR QL AUTO: ABNORMAL /HPF
WHOLE BLOOD HOLD SPECIMEN: NORMAL
YERSINIA STL CULT: NOT DETECTED
YERSINIA STL CULT: NOT DETECTED

## 2017-01-01 PROCEDURE — 85025 COMPLETE CBC W/AUTO DIFF WBC: CPT | Performed by: INTERNAL MEDICINE

## 2017-01-01 PROCEDURE — 80048 BASIC METABOLIC PNL TOTAL CA: CPT | Performed by: INTERNAL MEDICINE

## 2017-01-01 PROCEDURE — 97116 GAIT TRAINING THERAPY: CPT

## 2017-01-01 PROCEDURE — 25010000002 METHYLPREDNISOLONE PER 40 MG: Performed by: INTERNAL MEDICINE

## 2017-01-01 PROCEDURE — 97110 THERAPEUTIC EXERCISES: CPT

## 2017-01-01 PROCEDURE — 97530 THERAPEUTIC ACTIVITIES: CPT

## 2017-01-01 PROCEDURE — G8987 SELF CARE CURRENT STATUS: HCPCS

## 2017-01-01 PROCEDURE — 84132 ASSAY OF SERUM POTASSIUM: CPT | Performed by: INTERNAL MEDICINE

## 2017-01-01 PROCEDURE — 25010000002 VANCOMYCIN PER 500 MG: Performed by: INTERNAL MEDICINE

## 2017-01-01 PROCEDURE — 25010000002 MIDAZOLAM PER 1 MG: Performed by: INTERNAL MEDICINE

## 2017-01-01 PROCEDURE — 85027 COMPLETE CBC AUTOMATED: CPT | Performed by: FAMILY MEDICINE

## 2017-01-01 PROCEDURE — 80053 COMPREHEN METABOLIC PANEL: CPT | Performed by: FAMILY MEDICINE

## 2017-01-01 PROCEDURE — 87804 INFLUENZA ASSAY W/OPTIC: CPT | Performed by: PHYSICIAN ASSISTANT

## 2017-01-01 PROCEDURE — 87086 URINE CULTURE/COLONY COUNT: CPT | Performed by: PHYSICIAN ASSISTANT

## 2017-01-01 PROCEDURE — 87493 C DIFF AMPLIFIED PROBE: CPT | Performed by: EMERGENCY MEDICINE

## 2017-01-01 PROCEDURE — 86803 HEPATITIS C AB TEST: CPT | Performed by: INTERNAL MEDICINE

## 2017-01-01 PROCEDURE — 25010000003 POTASSIUM CHLORIDE 10 MEQ/100ML SOLUTION: Performed by: EMERGENCY MEDICINE

## 2017-01-01 PROCEDURE — 25010000002 METHYLPREDNISOLONE PER 125 MG: Performed by: INTERNAL MEDICINE

## 2017-01-01 PROCEDURE — 99284 EMERGENCY DEPT VISIT MOD MDM: CPT

## 2017-01-01 PROCEDURE — 25010000002 LEVOFLOXACIN PER 250 MG: Performed by: INTERNAL MEDICINE

## 2017-01-01 PROCEDURE — 84132 ASSAY OF SERUM POTASSIUM: CPT | Performed by: HOSPITALIST

## 2017-01-01 PROCEDURE — 87999 UNLISTED MICROBIOLOGY PX: CPT | Performed by: FAMILY MEDICINE

## 2017-01-01 PROCEDURE — 97535 SELF CARE MNGMENT TRAINING: CPT

## 2017-01-01 PROCEDURE — 83935 ASSAY OF URINE OSMOLALITY: CPT | Performed by: FAMILY MEDICINE

## 2017-01-01 PROCEDURE — G8978 MOBILITY CURRENT STATUS: HCPCS

## 2017-01-01 PROCEDURE — 87999 UNLISTED MICROBIOLOGY PX: CPT | Performed by: EMERGENCY MEDICINE

## 2017-01-01 PROCEDURE — 83540 ASSAY OF IRON: CPT | Performed by: INTERNAL MEDICINE

## 2017-01-01 PROCEDURE — 84550 ASSAY OF BLOOD/URIC ACID: CPT | Performed by: INTERNAL MEDICINE

## 2017-01-01 PROCEDURE — 88305 TISSUE EXAM BY PATHOLOGIST: CPT | Performed by: PATHOLOGY

## 2017-01-01 PROCEDURE — 83735 ASSAY OF MAGNESIUM: CPT | Performed by: EMERGENCY MEDICINE

## 2017-01-01 PROCEDURE — 36415 COLL VENOUS BLD VENIPUNCTURE: CPT

## 2017-01-01 PROCEDURE — 80053 COMPREHEN METABOLIC PANEL: CPT | Performed by: INTERNAL MEDICINE

## 2017-01-01 PROCEDURE — 0DBN8ZX EXCISION OF SIGMOID COLON, VIA NATURAL OR ARTIFICIAL OPENING ENDOSCOPIC, DIAGNOSTIC: ICD-10-PCS | Performed by: INTERNAL MEDICINE

## 2017-01-01 PROCEDURE — 83735 ASSAY OF MAGNESIUM: CPT | Performed by: INTERNAL MEDICINE

## 2017-01-01 PROCEDURE — 25010000002 INFLUENZA VAC SPLIT QUAD SUSPENSION: Performed by: INTERNAL MEDICINE

## 2017-01-01 PROCEDURE — G0378 HOSPITAL OBSERVATION PER HR: HCPCS

## 2017-01-01 PROCEDURE — 87040 BLOOD CULTURE FOR BACTERIA: CPT | Performed by: EMERGENCY MEDICINE

## 2017-01-01 PROCEDURE — 84300 ASSAY OF URINE SODIUM: CPT | Performed by: FAMILY MEDICINE

## 2017-01-01 PROCEDURE — 25010000002 MORPHINE PER 10 MG: Performed by: INTERNAL MEDICINE

## 2017-01-01 PROCEDURE — 97166 OT EVAL MOD COMPLEX 45 MIN: CPT

## 2017-01-01 PROCEDURE — 81001 URINALYSIS AUTO W/SCOPE: CPT | Performed by: PHYSICIAN ASSISTANT

## 2017-01-01 PROCEDURE — 86140 C-REACTIVE PROTEIN: CPT | Performed by: INTERNAL MEDICINE

## 2017-01-01 PROCEDURE — 97162 PT EVAL MOD COMPLEX 30 MIN: CPT

## 2017-01-01 PROCEDURE — 86644 CMV ANTIBODY: CPT | Performed by: INTERNAL MEDICINE

## 2017-01-01 PROCEDURE — 87040 BLOOD CULTURE FOR BACTERIA: CPT | Performed by: PHYSICIAN ASSISTANT

## 2017-01-01 PROCEDURE — 93005 ELECTROCARDIOGRAM TRACING: CPT | Performed by: PHYSICIAN ASSISTANT

## 2017-01-01 PROCEDURE — 84443 ASSAY THYROID STIM HORMONE: CPT | Performed by: INTERNAL MEDICINE

## 2017-01-01 PROCEDURE — 80053 COMPREHEN METABOLIC PANEL: CPT | Performed by: PHYSICIAN ASSISTANT

## 2017-01-01 PROCEDURE — G0009 ADMIN PNEUMOCOCCAL VACCINE: HCPCS | Performed by: INTERNAL MEDICINE

## 2017-01-01 PROCEDURE — 81001 URINALYSIS AUTO W/SCOPE: CPT | Performed by: EMERGENCY MEDICINE

## 2017-01-01 PROCEDURE — 25010000002 LEVOFLOXACIN PER 250 MG: Performed by: EMERGENCY MEDICINE

## 2017-01-01 PROCEDURE — A9500 TC99M SESTAMIBI: HCPCS | Performed by: INTERNAL MEDICINE

## 2017-01-01 PROCEDURE — 25810000003 SODIUM CHLORIDE 0.9 % WITH KCL 20 MEQ 20-0.9 MEQ/L-% SOLUTION: Performed by: INTERNAL MEDICINE

## 2017-01-01 PROCEDURE — G8979 MOBILITY GOAL STATUS: HCPCS

## 2017-01-01 PROCEDURE — 87150 DNA/RNA AMPLIFIED PROBE: CPT | Performed by: EMERGENCY MEDICINE

## 2017-01-01 PROCEDURE — 25010000002 VANCOMYCIN PER 500 MG: Performed by: FAMILY MEDICINE

## 2017-01-01 PROCEDURE — 84295 ASSAY OF SERUM SODIUM: CPT | Performed by: INTERNAL MEDICINE

## 2017-01-01 PROCEDURE — 25010000002 HYDRALAZINE PER 20 MG: Performed by: INTERNAL MEDICINE

## 2017-01-01 PROCEDURE — 83690 ASSAY OF LIPASE: CPT | Performed by: PHYSICIAN ASSISTANT

## 2017-01-01 PROCEDURE — 74176 CT ABD & PELVIS W/O CONTRAST: CPT

## 2017-01-01 PROCEDURE — 25010000002 PNEUMOCOCCAL VAC POLYVALENT PER 0.5 ML: Performed by: INTERNAL MEDICINE

## 2017-01-01 PROCEDURE — 99232 SBSQ HOSP IP/OBS MODERATE 35: CPT | Performed by: NURSE PRACTITIONER

## 2017-01-01 PROCEDURE — 25010000002 OCTREOTIDE PER 25 MCG: Performed by: INTERNAL MEDICINE

## 2017-01-01 PROCEDURE — 25010000003 POTASSIUM CHLORIDE 10 MEQ/100ML SOLUTION: Performed by: INTERNAL MEDICINE

## 2017-01-01 PROCEDURE — G8988 SELF CARE GOAL STATUS: HCPCS

## 2017-01-01 PROCEDURE — 25010000002 METHYLPREDNISOLONE PER 40 MG: Performed by: FAMILY MEDICINE

## 2017-01-01 PROCEDURE — 97165 OT EVAL LOW COMPLEX 30 MIN: CPT

## 2017-01-01 PROCEDURE — 74177 CT ABD & PELVIS W/CONTRAST: CPT

## 2017-01-01 PROCEDURE — 90732 PPSV23 VACC 2 YRS+ SUBQ/IM: CPT | Performed by: INTERNAL MEDICINE

## 2017-01-01 PROCEDURE — 83605 ASSAY OF LACTIC ACID: CPT | Performed by: PHYSICIAN ASSISTANT

## 2017-01-01 PROCEDURE — 84484 ASSAY OF TROPONIN QUANT: CPT | Performed by: FAMILY MEDICINE

## 2017-01-01 PROCEDURE — 83735 ASSAY OF MAGNESIUM: CPT | Performed by: FAMILY MEDICINE

## 2017-01-01 PROCEDURE — 25010000002 PROPOFOL 10 MG/ML EMULSION: Performed by: NURSE ANESTHETIST, CERTIFIED REGISTERED

## 2017-01-01 PROCEDURE — 86645 CMV ANTIBODY IGM: CPT | Performed by: INTERNAL MEDICINE

## 2017-01-01 PROCEDURE — 85025 COMPLETE CBC W/AUTO DIFF WBC: CPT | Performed by: EMERGENCY MEDICINE

## 2017-01-01 PROCEDURE — 85651 RBC SED RATE NONAUTOMATED: CPT | Performed by: INTERNAL MEDICINE

## 2017-01-01 PROCEDURE — 0 TECHNETIUM SESTAMIBI: Performed by: INTERNAL MEDICINE

## 2017-01-01 PROCEDURE — 87086 URINE CULTURE/COLONY COUNT: CPT | Performed by: EMERGENCY MEDICINE

## 2017-01-01 PROCEDURE — 85007 BL SMEAR W/DIFF WBC COUNT: CPT | Performed by: INTERNAL MEDICINE

## 2017-01-01 PROCEDURE — 78452 HT MUSCLE IMAGE SPECT MULT: CPT

## 2017-01-01 PROCEDURE — 25010000002 REGADENOSON 0.4 MG/5ML SOLUTION: Performed by: INTERNAL MEDICINE

## 2017-01-01 PROCEDURE — 71010 HC CHEST PA OR AP: CPT

## 2017-01-01 PROCEDURE — 80053 COMPREHEN METABOLIC PANEL: CPT | Performed by: EMERGENCY MEDICINE

## 2017-01-01 PROCEDURE — 84484 ASSAY OF TROPONIN QUANT: CPT | Performed by: PHYSICIAN ASSISTANT

## 2017-01-01 PROCEDURE — 25010000002 ONDANSETRON PER 1 MG: Performed by: EMERGENCY MEDICINE

## 2017-01-01 PROCEDURE — 93306 TTE W/DOPPLER COMPLETE: CPT

## 2017-01-01 PROCEDURE — 93312 ECHO TRANSESOPHAGEAL: CPT

## 2017-01-01 PROCEDURE — 83550 IRON BINDING TEST: CPT | Performed by: INTERNAL MEDICINE

## 2017-01-01 PROCEDURE — 93017 CV STRESS TEST TRACING ONLY: CPT

## 2017-01-01 PROCEDURE — 90682 RIV4 VACC RECOMBINANT DNA IM: CPT | Performed by: INTERNAL MEDICINE

## 2017-01-01 PROCEDURE — 88305 TISSUE EXAM BY PATHOLOGIST: CPT | Performed by: INTERNAL MEDICINE

## 2017-01-01 PROCEDURE — 84132 ASSAY OF SERUM POTASSIUM: CPT | Performed by: FAMILY MEDICINE

## 2017-01-01 PROCEDURE — 83690 ASSAY OF LIPASE: CPT | Performed by: EMERGENCY MEDICINE

## 2017-01-01 PROCEDURE — 0 IOPAMIDOL 61 % SOLUTION: Performed by: EMERGENCY MEDICINE

## 2017-01-01 PROCEDURE — 93325 DOPPLER ECHO COLOR FLOW MAPG: CPT

## 2017-01-01 PROCEDURE — 93005 ELECTROCARDIOGRAM TRACING: CPT | Performed by: FAMILY MEDICINE

## 2017-01-01 PROCEDURE — 93010 ELECTROCARDIOGRAM REPORT: CPT | Performed by: INTERNAL MEDICINE

## 2017-01-01 PROCEDURE — 25010000002 MIDAZOLAM PER 1 MG: Performed by: NURSE ANESTHETIST, CERTIFIED REGISTERED

## 2017-01-01 PROCEDURE — 85025 COMPLETE CBC W/AUTO DIFF WBC: CPT | Performed by: PHYSICIAN ASSISTANT

## 2017-01-01 PROCEDURE — G0008 ADMIN INFLUENZA VIRUS VAC: HCPCS | Performed by: INTERNAL MEDICINE

## 2017-01-01 PROCEDURE — 87086 URINE CULTURE/COLONY COUNT: CPT | Performed by: FAMILY MEDICINE

## 2017-01-01 RX ORDER — OCTREOTIDE ACETATE 100 UG/ML
100 INJECTION, SOLUTION INTRAVENOUS; SUBCUTANEOUS 3 TIMES DAILY
Status: DISCONTINUED | OUTPATIENT
Start: 2017-01-01 | End: 2017-01-01 | Stop reason: RX

## 2017-01-01 RX ORDER — METHYLPREDNISOLONE SODIUM SUCCINATE 40 MG/ML
40 INJECTION, POWDER, LYOPHILIZED, FOR SOLUTION INTRAMUSCULAR; INTRAVENOUS EVERY 6 HOURS
Status: DISCONTINUED | OUTPATIENT
Start: 2017-01-01 | End: 2017-01-01

## 2017-01-01 RX ORDER — POTASSIUM CHLORIDE 750 MG/1
40 CAPSULE, EXTENDED RELEASE ORAL ONCE
Status: COMPLETED | OUTPATIENT
Start: 2017-01-01 | End: 2017-01-01

## 2017-01-01 RX ORDER — MESALAMINE 400 MG/1
400 CAPSULE, DELAYED RELEASE ORAL 3 TIMES DAILY
Status: DISCONTINUED | OUTPATIENT
Start: 2017-01-01 | End: 2017-01-01

## 2017-01-01 RX ORDER — HYDROCODONE BITARTRATE AND ACETAMINOPHEN 5; 325 MG/1; MG/1
1 TABLET ORAL EVERY 6 HOURS PRN
Qty: 300 TABLET | Refills: 0 | Status: SHIPPED | OUTPATIENT
Start: 2017-01-01 | End: 2017-01-01

## 2017-01-01 RX ORDER — HYDROCODONE BITARTRATE AND ACETAMINOPHEN 5; 325 MG/1; MG/1
1 TABLET ORAL EVERY 6 HOURS PRN
Status: DISPENSED | OUTPATIENT
Start: 2017-01-01 | End: 2017-01-01

## 2017-01-01 RX ORDER — FERROUS SULFATE TAB EC 324 MG (65 MG FE EQUIVALENT) 324 (65 FE) MG
324 TABLET DELAYED RESPONSE ORAL 2 TIMES DAILY WITH MEALS
Qty: 60 TABLET | Refills: 1 | Status: SHIPPED | OUTPATIENT
Start: 2017-01-01

## 2017-01-01 RX ORDER — METRONIDAZOLE 500 MG/1
500 TABLET ORAL EVERY 8 HOURS SCHEDULED
Status: DISCONTINUED | OUTPATIENT
Start: 2017-01-01 | End: 2017-01-01

## 2017-01-01 RX ORDER — L. ACIDOPHILUS/L.BULGARICUS 1MM CELL
1 TABLET ORAL 3 TIMES DAILY
Qty: 90 TABLET | Refills: 1 | Status: SHIPPED | OUTPATIENT
Start: 2017-01-01

## 2017-01-01 RX ORDER — POTASSIUM CHLORIDE 750 MG/1
40 CAPSULE, EXTENDED RELEASE ORAL AS NEEDED
Status: DISCONTINUED | OUTPATIENT
Start: 2017-01-01 | End: 2017-01-01 | Stop reason: HOSPADM

## 2017-01-01 RX ORDER — POTASSIUM CHLORIDE 7.45 MG/ML
10 INJECTION INTRAVENOUS ONCE
Status: DISCONTINUED | OUTPATIENT
Start: 2017-01-01 | End: 2017-01-01

## 2017-01-01 RX ORDER — MIDAZOLAM HYDROCHLORIDE 1 MG/ML
INJECTION INTRAMUSCULAR; INTRAVENOUS AS NEEDED
Status: DISCONTINUED | OUTPATIENT
Start: 2017-01-01 | End: 2017-01-01 | Stop reason: SURG

## 2017-01-01 RX ORDER — L.ACID,PARA/B.BIFIDUM/S.THERM 8B CELL
1 CAPSULE ORAL DAILY
Status: DISCONTINUED | OUTPATIENT
Start: 2017-01-01 | End: 2017-01-01 | Stop reason: SDUPTHER

## 2017-01-01 RX ORDER — POTASSIUM CHLORIDE 1.5 G/1.77G
40 POWDER, FOR SOLUTION ORAL AS NEEDED
Status: DISCONTINUED | OUTPATIENT
Start: 2017-01-01 | End: 2017-01-01 | Stop reason: HOSPADM

## 2017-01-01 RX ORDER — HYDRALAZINE HYDROCHLORIDE 20 MG/ML
10 INJECTION INTRAMUSCULAR; INTRAVENOUS EVERY 6 HOURS PRN
Status: DISCONTINUED | OUTPATIENT
Start: 2017-01-01 | End: 2017-01-01 | Stop reason: HOSPADM

## 2017-01-01 RX ORDER — SODIUM CHLORIDE 0.9 % (FLUSH) 0.9 %
1-10 SYRINGE (ML) INJECTION AS NEEDED
Status: DISCONTINUED | OUTPATIENT
Start: 2017-01-01 | End: 2017-01-01 | Stop reason: HOSPADM

## 2017-01-01 RX ORDER — MESALAMINE 400 MG/1
400 CAPSULE, DELAYED RELEASE ORAL 3 TIMES DAILY
Qty: 90 CAPSULE | Refills: 1 | Status: SHIPPED | OUTPATIENT
Start: 2017-01-01

## 2017-01-01 RX ORDER — MAGNESIUM SULFATE HEPTAHYDRATE 40 MG/ML
4 INJECTION, SOLUTION INTRAVENOUS AS NEEDED
Status: DISCONTINUED | OUTPATIENT
Start: 2017-01-01 | End: 2017-01-01 | Stop reason: HOSPADM

## 2017-01-01 RX ORDER — MESALAMINE 400 MG/1
800 CAPSULE, DELAYED RELEASE ORAL 3 TIMES DAILY
Status: DISCONTINUED | OUTPATIENT
Start: 2017-01-01 | End: 2017-01-01 | Stop reason: HOSPADM

## 2017-01-01 RX ORDER — METHYLPREDNISOLONE SODIUM SUCCINATE 40 MG/ML
20 INJECTION, POWDER, LYOPHILIZED, FOR SOLUTION INTRAMUSCULAR; INTRAVENOUS EVERY 6 HOURS
Status: DISCONTINUED | OUTPATIENT
Start: 2017-01-01 | End: 2017-01-01

## 2017-01-01 RX ORDER — PROPOFOL 10 MG/ML
VIAL (ML) INTRAVENOUS AS NEEDED
Status: DISCONTINUED | OUTPATIENT
Start: 2017-01-01 | End: 2017-01-01 | Stop reason: SURG

## 2017-01-01 RX ORDER — FERROUS SULFATE TAB EC 324 MG (65 MG FE EQUIVALENT) 324 (65 FE) MG
324 TABLET DELAYED RESPONSE ORAL 2 TIMES DAILY WITH MEALS
Status: DISCONTINUED | OUTPATIENT
Start: 2017-01-01 | End: 2017-01-01 | Stop reason: HOSPADM

## 2017-01-01 RX ORDER — METRONIDAZOLE 500 MG/1
500 TABLET ORAL EVERY 6 HOURS SCHEDULED
Status: DISCONTINUED | OUTPATIENT
Start: 2017-01-01 | End: 2017-01-01

## 2017-01-01 RX ORDER — FAMOTIDINE 40 MG/1
40 TABLET, FILM COATED ORAL DAILY
Status: DISCONTINUED | OUTPATIENT
Start: 2017-01-01 | End: 2017-01-01 | Stop reason: HOSPADM

## 2017-01-01 RX ORDER — PREDNISONE 20 MG/1
20 TABLET ORAL 2 TIMES DAILY
Qty: 60 TABLET | Refills: 1 | Status: SHIPPED | OUTPATIENT
Start: 2017-01-01

## 2017-01-01 RX ORDER — HYDROCODONE BITARTRATE AND ACETAMINOPHEN 5; 325 MG/1; MG/1
1 TABLET ORAL EVERY 6 HOURS PRN
Status: DISCONTINUED | OUTPATIENT
Start: 2017-01-01 | End: 2017-01-01 | Stop reason: HOSPADM

## 2017-01-01 RX ORDER — POTASSIUM CHLORIDE 7.45 MG/ML
10 INJECTION INTRAVENOUS ONCE
Status: COMPLETED | OUTPATIENT
Start: 2017-01-01 | End: 2017-01-01

## 2017-01-01 RX ORDER — LOPERAMIDE HYDROCHLORIDE 2 MG/1
2 CAPSULE ORAL 4 TIMES DAILY PRN
Status: DISCONTINUED | OUTPATIENT
Start: 2017-01-01 | End: 2017-01-01

## 2017-01-01 RX ORDER — TRAMADOL HYDROCHLORIDE 50 MG/1
50 TABLET ORAL EVERY 6 HOURS PRN
COMMUNITY

## 2017-01-01 RX ORDER — HYDROCODONE BITARTRATE AND ACETAMINOPHEN 10; 325 MG/1; MG/1
1 TABLET ORAL EVERY 6 HOURS PRN
Status: DISPENSED | OUTPATIENT
Start: 2017-01-01 | End: 2017-01-01

## 2017-01-01 RX ORDER — SODIUM CHLORIDE AND POTASSIUM CHLORIDE 150; 900 MG/100ML; MG/100ML
75 INJECTION, SOLUTION INTRAVENOUS CONTINUOUS
Status: DISCONTINUED | OUTPATIENT
Start: 2017-01-01 | End: 2017-01-01

## 2017-01-01 RX ORDER — SODIUM CHLORIDE 9 MG/ML
50 INJECTION, SOLUTION INTRAVENOUS CONTINUOUS
Status: DISCONTINUED | OUTPATIENT
Start: 2017-01-01 | End: 2017-01-01

## 2017-01-01 RX ORDER — MAGNESIUM SULFATE HEPTAHYDRATE 40 MG/ML
2 INJECTION, SOLUTION INTRAVENOUS AS NEEDED
Status: DISCONTINUED | OUTPATIENT
Start: 2017-01-01 | End: 2017-01-01 | Stop reason: HOSPADM

## 2017-01-01 RX ORDER — SODIUM CHLORIDE AND POTASSIUM CHLORIDE 150; 900 MG/100ML; MG/100ML
100 INJECTION, SOLUTION INTRAVENOUS CONTINUOUS
Status: DISCONTINUED | OUTPATIENT
Start: 2017-01-01 | End: 2017-01-01

## 2017-01-01 RX ORDER — ONDANSETRON 2 MG/ML
4 INJECTION INTRAMUSCULAR; INTRAVENOUS ONCE
Status: COMPLETED | OUTPATIENT
Start: 2017-01-01 | End: 2017-01-01

## 2017-01-01 RX ORDER — METHYLPREDNISOLONE SODIUM SUCCINATE 125 MG/2ML
60 INJECTION, POWDER, LYOPHILIZED, FOR SOLUTION INTRAMUSCULAR; INTRAVENOUS DAILY
Status: DISCONTINUED | OUTPATIENT
Start: 2017-01-01 | End: 2017-01-01 | Stop reason: HOSPADM

## 2017-01-01 RX ORDER — 0.9 % SODIUM CHLORIDE 0.9 %
10 VIAL (ML) INJECTION AS NEEDED
Status: DISCONTINUED | OUTPATIENT
Start: 2017-01-01 | End: 2017-01-01 | Stop reason: HOSPADM

## 2017-01-01 RX ORDER — METHYLPREDNISOLONE SODIUM SUCCINATE 125 MG/2ML
60 INJECTION, POWDER, LYOPHILIZED, FOR SOLUTION INTRAMUSCULAR; INTRAVENOUS EVERY 8 HOURS
Status: DISCONTINUED | OUTPATIENT
Start: 2017-01-01 | End: 2017-01-01

## 2017-01-01 RX ORDER — NALOXONE HCL 0.4 MG/ML
0.4 VIAL (ML) INJECTION
Status: DISCONTINUED | OUTPATIENT
Start: 2017-01-01 | End: 2017-01-01 | Stop reason: CLARIF

## 2017-01-01 RX ORDER — DIPHENOXYLATE HYDROCHLORIDE AND ATROPINE SULFATE 2.5; .025 MG/1; MG/1
1 TABLET ORAL
Status: DISCONTINUED | OUTPATIENT
Start: 2017-01-01 | End: 2017-01-01

## 2017-01-01 RX ORDER — METHYLPREDNISOLONE SODIUM SUCCINATE 40 MG/ML
20 INJECTION, POWDER, LYOPHILIZED, FOR SOLUTION INTRAMUSCULAR; INTRAVENOUS EVERY 12 HOURS
Status: DISCONTINUED | OUTPATIENT
Start: 2017-01-01 | End: 2017-01-01 | Stop reason: HOSPADM

## 2017-01-01 RX ORDER — PANTOPRAZOLE SODIUM 40 MG/1
40 TABLET, DELAYED RELEASE ORAL
Status: DISCONTINUED | OUTPATIENT
Start: 2017-01-01 | End: 2017-01-01

## 2017-01-01 RX ORDER — SODIUM CHLORIDE 9 MG/ML
25 INJECTION, SOLUTION INTRAVENOUS CONTINUOUS
Status: DISCONTINUED | OUTPATIENT
Start: 2017-01-01 | End: 2017-01-01

## 2017-01-01 RX ORDER — METHYLPREDNISOLONE SODIUM SUCCINATE 40 MG/ML
40 INJECTION, POWDER, LYOPHILIZED, FOR SOLUTION INTRAMUSCULAR; INTRAVENOUS DAILY
Status: DISCONTINUED | OUTPATIENT
Start: 2017-01-01 | End: 2017-01-01

## 2017-01-01 RX ORDER — L. ACIDOPHILUS/L.BULGARICUS 1MM CELL
1 TABLET ORAL 3 TIMES DAILY
Status: DISCONTINUED | OUTPATIENT
Start: 2017-01-01 | End: 2017-01-01 | Stop reason: HOSPADM

## 2017-01-01 RX ORDER — MESALAMINE 400 MG/1
400 CAPSULE, DELAYED RELEASE ORAL 3 TIMES DAILY
Status: DISCONTINUED | OUTPATIENT
Start: 2017-01-01 | End: 2017-01-01 | Stop reason: HOSPADM

## 2017-01-01 RX ORDER — HYDROMORPHONE HCL 110MG/55ML
0.5 PATIENT CONTROLLED ANALGESIA SYRINGE INTRAVENOUS
Status: ACTIVE | OUTPATIENT
Start: 2017-01-01 | End: 2017-01-01

## 2017-01-01 RX ORDER — ASPIRIN 81 MG/1
324 TABLET, CHEWABLE ORAL ONCE
Status: COMPLETED | OUTPATIENT
Start: 2017-01-01 | End: 2017-01-01

## 2017-01-01 RX ORDER — MORPHINE SULFATE 1 MG/ML
2 INJECTION, SOLUTION EPIDURAL; INTRATHECAL; INTRAVENOUS
Status: DISPENSED | OUTPATIENT
Start: 2017-01-01 | End: 2017-01-01

## 2017-01-01 RX ORDER — SODIUM CHLORIDE 9 MG/ML
1000 INJECTION, SOLUTION INTRAVENOUS ONCE
Status: COMPLETED | OUTPATIENT
Start: 2017-01-01 | End: 2017-01-01

## 2017-01-01 RX ORDER — FAMOTIDINE 40 MG/1
40 TABLET, FILM COATED ORAL DAILY
Qty: 30 TABLET | Refills: 1 | Status: SHIPPED | OUTPATIENT
Start: 2017-01-01

## 2017-01-01 RX ORDER — HYDROCODONE BITARTRATE AND ACETAMINOPHEN 7.5; 325 MG/1; MG/1
1 TABLET ORAL EVERY 6 HOURS PRN
Status: DISCONTINUED | OUTPATIENT
Start: 2017-01-01 | End: 2017-01-01 | Stop reason: HOSPADM

## 2017-01-01 RX ORDER — MIDAZOLAM HYDROCHLORIDE 1 MG/ML
INJECTION INTRAMUSCULAR; INTRAVENOUS
Status: COMPLETED | OUTPATIENT
Start: 2017-01-01 | End: 2017-01-01

## 2017-01-01 RX ORDER — SODIUM CHLORIDE 0.9 % (FLUSH) 0.9 %
10 SYRINGE (ML) INJECTION AS NEEDED
Status: DISCONTINUED | OUTPATIENT
Start: 2017-01-01 | End: 2017-01-01 | Stop reason: HOSPADM

## 2017-01-01 RX ORDER — METHYLPREDNISOLONE SODIUM SUCCINATE 125 MG/2ML
60 INJECTION, POWDER, LYOPHILIZED, FOR SOLUTION INTRAMUSCULAR; INTRAVENOUS EVERY 6 HOURS
Status: DISCONTINUED | OUTPATIENT
Start: 2017-01-01 | End: 2017-01-01

## 2017-01-01 RX ORDER — MEGESTROL ACETATE 20 MG/1
20 TABLET ORAL DAILY
Status: DISCONTINUED | OUTPATIENT
Start: 2017-01-01 | End: 2017-01-01

## 2017-01-01 RX ORDER — ACETAMINOPHEN 325 MG/1
650 TABLET ORAL EVERY 4 HOURS PRN
Status: DISCONTINUED | OUTPATIENT
Start: 2017-01-01 | End: 2017-01-01 | Stop reason: HOSPADM

## 2017-01-01 RX ORDER — METHYLPREDNISOLONE SODIUM SUCCINATE 40 MG/ML
20 INJECTION, POWDER, LYOPHILIZED, FOR SOLUTION INTRAMUSCULAR; INTRAVENOUS EVERY 8 HOURS
Status: DISCONTINUED | OUTPATIENT
Start: 2017-01-01 | End: 2017-01-01

## 2017-01-01 RX ORDER — LEVOFLOXACIN 5 MG/ML
500 INJECTION, SOLUTION INTRAVENOUS EVERY 24 HOURS
Status: DISCONTINUED | OUTPATIENT
Start: 2017-01-01 | End: 2017-01-01

## 2017-01-01 RX ORDER — OCTREOTIDE ACETATE 50 UG/ML
50 INJECTION, SOLUTION INTRAVENOUS; SUBCUTANEOUS 3 TIMES DAILY
Status: DISCONTINUED | OUTPATIENT
Start: 2017-01-01 | End: 2017-01-01

## 2017-01-01 RX ORDER — NALOXONE HCL 0.4 MG/ML
0.4 VIAL (ML) INJECTION
Status: DISCONTINUED | OUTPATIENT
Start: 2017-01-01 | End: 2017-01-01 | Stop reason: HOSPADM

## 2017-01-01 RX ORDER — POTASSIUM CHLORIDE 7.45 MG/ML
10 INJECTION INTRAVENOUS
Status: DISCONTINUED | OUTPATIENT
Start: 2017-01-01 | End: 2017-01-01 | Stop reason: HOSPADM

## 2017-01-01 RX ORDER — HYDRALAZINE HYDROCHLORIDE 20 MG/ML
10 INJECTION INTRAMUSCULAR; INTRAVENOUS EVERY 6 HOURS PRN
Status: DISCONTINUED | OUTPATIENT
Start: 2017-01-01 | End: 2017-01-01

## 2017-01-01 RX ORDER — ONDANSETRON 4 MG/1
4 TABLET, FILM COATED ORAL EVERY 6 HOURS PRN
Status: DISCONTINUED | OUTPATIENT
Start: 2017-01-01 | End: 2017-01-01 | Stop reason: HOSPADM

## 2017-01-01 RX ORDER — OCTREOTIDE ACETATE 50 UG/ML
100 INJECTION, SOLUTION INTRAVENOUS; SUBCUTANEOUS 3 TIMES DAILY
Status: DISCONTINUED | OUTPATIENT
Start: 2017-01-01 | End: 2017-01-01 | Stop reason: HOSPADM

## 2017-01-01 RX ORDER — METRONIDAZOLE 500 MG/1
500 TABLET ORAL EVERY 8 HOURS
Status: COMPLETED | OUTPATIENT
Start: 2017-01-01 | End: 2017-01-01

## 2017-01-01 RX ORDER — METHYLPREDNISOLONE SODIUM SUCCINATE 40 MG/ML
40 INJECTION, POWDER, LYOPHILIZED, FOR SOLUTION INTRAMUSCULAR; INTRAVENOUS EVERY 12 HOURS
Status: DISCONTINUED | OUTPATIENT
Start: 2017-01-01 | End: 2017-01-01

## 2017-01-01 RX ORDER — DIPHENOXYLATE HYDROCHLORIDE AND ATROPINE SULFATE 2.5; .025 MG/1; MG/1
1 TABLET ORAL
Status: DISCONTINUED | OUTPATIENT
Start: 2017-01-01 | End: 2017-01-01 | Stop reason: HOSPADM

## 2017-01-01 RX ADMIN — DIPHENOXYLATE HYDROCHLORIDE AND ATROPINE SULFATE 1 TABLET: 2.5; .025 TABLET ORAL at 06:17

## 2017-01-01 RX ADMIN — LACTOBACILLUS TAB 1 TABLET: TAB at 16:42

## 2017-01-01 RX ADMIN — MESALAMINE 800 MG: 400 CAPSULE, DELAYED RELEASE ORAL at 21:52

## 2017-01-01 RX ADMIN — HYDROCODONE BITARTRATE AND ACETAMINOPHEN 1 TABLET: 5; 325 TABLET ORAL at 08:50

## 2017-01-01 RX ADMIN — POTASSIUM CHLORIDE 10 MEQ: 7.46 INJECTION, SOLUTION INTRAVENOUS at 19:54

## 2017-01-01 RX ADMIN — MESALAMINE 400 MG: 400 CAPSULE, DELAYED RELEASE ORAL at 18:47

## 2017-01-01 RX ADMIN — PNEUMOCOCCAL VACCINE POLYVALENT 0.5 ML
25; 25; 25; 25; 25; 25; 25; 25; 25; 25; 25; 25; 25; 25; 25; 25; 25; 25; 25; 25; 25; 25; 25 INJECTION, SOLUTION INTRAMUSCULAR; SUBCUTANEOUS at 08:12

## 2017-01-01 RX ADMIN — HYDROCODONE BITARTRATE AND ACETAMINOPHEN 1 TABLET: 10; 325 TABLET ORAL at 17:32

## 2017-01-01 RX ADMIN — HYDROCODONE BITARTRATE AND ACETAMINOPHEN 1 TABLET: 10; 325 TABLET ORAL at 14:08

## 2017-01-01 RX ADMIN — METRONIDAZOLE 500 MG: 500 SOLUTION INTRAVENOUS at 01:03

## 2017-01-01 RX ADMIN — METRONIDAZOLE 500 MG: 500 INJECTION, SOLUTION INTRAVENOUS at 12:46

## 2017-01-01 RX ADMIN — HYDROCODONE BITARTRATE AND ACETAMINOPHEN 1 TABLET: 10; 325 TABLET ORAL at 18:46

## 2017-01-01 RX ADMIN — METHYLPREDNISOLONE SODIUM SUCCINATE 60 MG: 125 INJECTION, POWDER, FOR SOLUTION INTRAMUSCULAR; INTRAVENOUS at 16:13

## 2017-01-01 RX ADMIN — METHYLPREDNISOLONE SODIUM SUCCINATE 40 MG: 40 INJECTION, POWDER, FOR SOLUTION INTRAMUSCULAR; INTRAVENOUS at 12:45

## 2017-01-01 RX ADMIN — HYDROCODONE BITARTRATE AND ACETAMINOPHEN 1 TABLET: 10; 325 TABLET ORAL at 08:37

## 2017-01-01 RX ADMIN — HYDROCODONE BITARTRATE AND ACETAMINOPHEN 1 TABLET: 10; 325 TABLET ORAL at 00:17

## 2017-01-01 RX ADMIN — MESALAMINE 800 MG: 400 CAPSULE, DELAYED RELEASE ORAL at 15:41

## 2017-01-01 RX ADMIN — IOPAMIDOL 80 ML: 612 INJECTION, SOLUTION INTRAVENOUS at 11:53

## 2017-01-01 RX ADMIN — LEVOFLOXACIN 500 MG: 5 INJECTION, SOLUTION INTRAVENOUS at 21:13

## 2017-01-01 RX ADMIN — OCTREOTIDE ACETATE 100 MCG: 100 INJECTION, SOLUTION INTRAVENOUS; SUBCUTANEOUS at 17:29

## 2017-01-01 RX ADMIN — INFLUENZA A VIRUS A/MICHIGAN/45/2015 X-275 (H1N1) ANTIGEN (FORMALDEHYDE INACTIVATED), INFLUENZA A VIRUS A/HONG KONG/4801/2014 X-263B (H3N2) ANTIGEN (FORMALDEHYDE INACTIVATED), INFLUENZA B VIRUS B/PHUKET/3073/2013 ANTIGEN (FORMALDEHYDE INACTIVATED), AND INFLUENZA B VIRUS B/BRISBANE/60/2008 ANTIGEN (FORMALDEHYDE INACTIVATED) 0.5 ML: 15; 15; 15; 15 INJECTION, SUSPENSION INTRAMUSCULAR at 08:11

## 2017-01-01 RX ADMIN — DIPHENOXYLATE HYDROCHLORIDE AND ATROPINE SULFATE 1 TABLET: 2.5; .025 TABLET ORAL at 14:08

## 2017-01-01 RX ADMIN — POTASSIUM CHLORIDE 40 MEQ: 750 CAPSULE, EXTENDED RELEASE ORAL at 19:10

## 2017-01-01 RX ADMIN — METRONIDAZOLE 500 MG: 500 SOLUTION INTRAVENOUS at 15:31

## 2017-01-01 RX ADMIN — METRONIDAZOLE 500 MG: 500 INJECTION, SOLUTION INTRAVENOUS at 21:00

## 2017-01-01 RX ADMIN — LACTOBACILLUS TAB 1 TABLET: TAB at 21:45

## 2017-01-01 RX ADMIN — LACTOBACILLUS TAB 1 TABLET: TAB at 08:10

## 2017-01-01 RX ADMIN — POTASSIUM CHLORIDE 40 MEQ: 750 CAPSULE, EXTENDED RELEASE ORAL at 11:13

## 2017-01-01 RX ADMIN — MESALAMINE 800 MG: 400 CAPSULE, DELAYED RELEASE ORAL at 17:11

## 2017-01-01 RX ADMIN — DIPHENOXYLATE HYDROCHLORIDE AND ATROPINE SULFATE 1 TABLET: 2.5; .025 TABLET ORAL at 11:59

## 2017-01-01 RX ADMIN — VANCOMYCIN HYDROCHLORIDE 250 MG: 1 INJECTION, POWDER, LYOPHILIZED, FOR SOLUTION INTRAVENOUS at 00:17

## 2017-01-01 RX ADMIN — LACTOBACILLUS TAB 1 TABLET: TAB at 09:41

## 2017-01-01 RX ADMIN — LACTOBACILLUS TAB 1 TABLET: TAB at 20:55

## 2017-01-01 RX ADMIN — METHYLPREDNISOLONE SODIUM SUCCINATE 60 MG: 125 INJECTION, POWDER, FOR SOLUTION INTRAMUSCULAR; INTRAVENOUS at 08:28

## 2017-01-01 RX ADMIN — METHYLPREDNISOLONE SODIUM SUCCINATE 60 MG: 125 INJECTION, POWDER, FOR SOLUTION INTRAMUSCULAR; INTRAVENOUS at 22:01

## 2017-01-01 RX ADMIN — METRONIDAZOLE 500 MG: 500 TABLET ORAL at 11:54

## 2017-01-01 RX ADMIN — HYDROCODONE BITARTRATE AND ACETAMINOPHEN 1 TABLET: 10; 325 TABLET ORAL at 10:22

## 2017-01-01 RX ADMIN — SODIUM CHLORIDE 50 ML/HR: 900 INJECTION, SOLUTION INTRAVENOUS at 11:45

## 2017-01-01 RX ADMIN — HYDROCODONE BITARTRATE AND ACETAMINOPHEN 1 TABLET: 5; 325 TABLET ORAL at 18:41

## 2017-01-01 RX ADMIN — MEGESTROL ACETATE 20 MG: 20 TABLET ORAL at 08:10

## 2017-01-01 RX ADMIN — METHYLPREDNISOLONE SODIUM SUCCINATE 40 MG: 40 INJECTION, POWDER, FOR SOLUTION INTRAMUSCULAR; INTRAVENOUS at 06:00

## 2017-01-01 RX ADMIN — SODIUM CHLORIDE 1000 ML: 900 INJECTION, SOLUTION INTRAVENOUS at 20:55

## 2017-01-01 RX ADMIN — MESALAMINE 400 MG: 400 CAPSULE, DELAYED RELEASE ORAL at 21:29

## 2017-01-01 RX ADMIN — HYDROCODONE BITARTRATE AND ACETAMINOPHEN 1 TABLET: 5; 325 TABLET ORAL at 05:56

## 2017-01-01 RX ADMIN — VANCOMYCIN HYDROCHLORIDE 125 MG: 1 INJECTION, POWDER, LYOPHILIZED, FOR SOLUTION INTRAVENOUS at 05:44

## 2017-01-01 RX ADMIN — MESALAMINE 800 MG: 400 CAPSULE, DELAYED RELEASE ORAL at 21:11

## 2017-01-01 RX ADMIN — METHYLPREDNISOLONE SODIUM SUCCINATE 40 MG: 40 INJECTION, POWDER, FOR SOLUTION INTRAMUSCULAR; INTRAVENOUS at 17:36

## 2017-01-01 RX ADMIN — MEGESTROL ACETATE 20 MG: 20 TABLET ORAL at 08:00

## 2017-01-01 RX ADMIN — METRONIDAZOLE 500 MG: 500 TABLET ORAL at 14:40

## 2017-01-01 RX ADMIN — DIPHENOXYLATE HYDROCHLORIDE AND ATROPINE SULFATE 1 TABLET: 2.5; .025 TABLET ORAL at 15:15

## 2017-01-01 RX ADMIN — HYDROCODONE BITARTRATE AND ACETAMINOPHEN 1 TABLET: 10; 325 TABLET ORAL at 22:04

## 2017-01-01 RX ADMIN — HYDROCODONE BITARTRATE AND ACETAMINOPHEN 1 TABLET: 10; 325 TABLET ORAL at 15:52

## 2017-01-01 RX ADMIN — DIPHENOXYLATE HYDROCHLORIDE AND ATROPINE SULFATE 1 TABLET: 2.5; .025 TABLET ORAL at 22:07

## 2017-01-01 RX ADMIN — OCTREOTIDE ACETATE 100 MCG: 100 INJECTION, SOLUTION INTRAVENOUS; SUBCUTANEOUS at 09:34

## 2017-01-01 RX ADMIN — METRONIDAZOLE 500 MG: 500 TABLET ORAL at 14:13

## 2017-01-01 RX ADMIN — HYDRALAZINE HYDROCHLORIDE 10 MG: 20 INJECTION INTRAMUSCULAR; INTRAVENOUS at 05:06

## 2017-01-01 RX ADMIN — DIPHENOXYLATE HYDROCHLORIDE AND ATROPINE SULFATE 1 TABLET: 2.5; .025 TABLET ORAL at 03:18

## 2017-01-01 RX ADMIN — METRONIDAZOLE 500 MG: 500 SOLUTION INTRAVENOUS at 09:42

## 2017-01-01 RX ADMIN — METHYLPREDNISOLONE SODIUM SUCCINATE 60 MG: 125 INJECTION, POWDER, FOR SOLUTION INTRAMUSCULAR; INTRAVENOUS at 05:44

## 2017-01-01 RX ADMIN — METRONIDAZOLE 500 MG: 500 TABLET ORAL at 01:02

## 2017-01-01 RX ADMIN — LACTOBACILLUS TAB 1 TABLET: TAB at 15:55

## 2017-01-01 RX ADMIN — OCTREOTIDE ACETATE 100 MCG: 100 INJECTION, SOLUTION INTRAVENOUS; SUBCUTANEOUS at 21:11

## 2017-01-01 RX ADMIN — METHYLPREDNISOLONE SODIUM SUCCINATE 20 MG: 40 INJECTION, POWDER, FOR SOLUTION INTRAMUSCULAR; INTRAVENOUS at 12:18

## 2017-01-01 RX ADMIN — SODIUM CHLORIDE 50 ML/HR: 900 INJECTION, SOLUTION INTRAVENOUS at 16:47

## 2017-01-01 RX ADMIN — VANCOMYCIN HYDROCHLORIDE 250 MG: 1 INJECTION, POWDER, LYOPHILIZED, FOR SOLUTION INTRAVENOUS at 18:12

## 2017-01-01 RX ADMIN — METHYLPREDNISOLONE SODIUM SUCCINATE 40 MG: 40 INJECTION, POWDER, FOR SOLUTION INTRAMUSCULAR; INTRAVENOUS at 00:22

## 2017-01-01 RX ADMIN — LACTOBACILLUS TAB 1 TABLET: TAB at 08:00

## 2017-01-01 RX ADMIN — HYDROCODONE BITARTRATE AND ACETAMINOPHEN 1 TABLET: 10; 325 TABLET ORAL at 17:12

## 2017-01-01 RX ADMIN — METHYLPREDNISOLONE SODIUM SUCCINATE 40 MG: 40 INJECTION, POWDER, FOR SOLUTION INTRAMUSCULAR; INTRAVENOUS at 17:48

## 2017-01-01 RX ADMIN — SODIUM CHLORIDE 100 ML/HR: 9 INJECTION, SOLUTION INTRAVENOUS at 20:36

## 2017-01-01 RX ADMIN — HYDROCODONE BITARTRATE AND ACETAMINOPHEN 1 TABLET: 10; 325 TABLET ORAL at 11:59

## 2017-01-01 RX ADMIN — DIPHENOXYLATE HYDROCHLORIDE AND ATROPINE SULFATE 1 TABLET: 2.5; .025 TABLET ORAL at 09:36

## 2017-01-01 RX ADMIN — METRONIDAZOLE 500 MG: 500 SOLUTION INTRAVENOUS at 08:56

## 2017-01-01 RX ADMIN — VANCOMYCIN HYDROCHLORIDE 250 MG: 1 INJECTION, POWDER, LYOPHILIZED, FOR SOLUTION INTRAVENOUS at 18:00

## 2017-01-01 RX ADMIN — MESALAMINE 400 MG: 400 CAPSULE, DELAYED RELEASE ORAL at 20:17

## 2017-01-01 RX ADMIN — ASPIRIN 81 MG 324 MG: 81 TABLET ORAL at 12:57

## 2017-01-01 RX ADMIN — OCTREOTIDE ACETATE 100 MCG: 100 INJECTION, SOLUTION INTRAVENOUS; SUBCUTANEOUS at 21:35

## 2017-01-01 RX ADMIN — METRONIDAZOLE 500 MG: 500 TABLET ORAL at 21:10

## 2017-01-01 RX ADMIN — OCTREOTIDE ACETATE 50 MCG: 50 INJECTION, SOLUTION INTRAVENOUS; SUBCUTANEOUS at 10:00

## 2017-01-01 RX ADMIN — MESALAMINE 800 MG: 400 CAPSULE, DELAYED RELEASE ORAL at 21:26

## 2017-01-01 RX ADMIN — OCTREOTIDE ACETATE 100 MCG: 100 INJECTION, SOLUTION INTRAVENOUS; SUBCUTANEOUS at 09:56

## 2017-01-01 RX ADMIN — VANCOMYCIN HYDROCHLORIDE 125 MG: 1 INJECTION, POWDER, LYOPHILIZED, FOR SOLUTION INTRAVENOUS at 11:45

## 2017-01-01 RX ADMIN — MEGESTROL ACETATE 20 MG: 20 TABLET ORAL at 17:50

## 2017-01-01 RX ADMIN — DIPHENOXYLATE HYDROCHLORIDE AND ATROPINE SULFATE 1 TABLET: 2.5; .025 TABLET ORAL at 22:16

## 2017-01-01 RX ADMIN — POTASSIUM CHLORIDE AND SODIUM CHLORIDE 100 ML/HR: 900; 150 INJECTION, SOLUTION INTRAVENOUS at 02:37

## 2017-01-01 RX ADMIN — METRONIDAZOLE 500 MG: 500 TABLET ORAL at 12:17

## 2017-01-01 RX ADMIN — DIPHENOXYLATE HYDROCHLORIDE AND ATROPINE SULFATE 1 TABLET: 2.5; .025 TABLET ORAL at 16:53

## 2017-01-01 RX ADMIN — LACTOBACILLUS TAB 1 TABLET: TAB at 08:56

## 2017-01-01 RX ADMIN — ACETAMINOPHEN 650 MG: 325 TABLET ORAL at 21:48

## 2017-01-01 RX ADMIN — LACTOBACILLUS TAB 1 TABLET: TAB at 18:36

## 2017-01-01 RX ADMIN — METRONIDAZOLE 500 MG: 500 INJECTION, SOLUTION INTRAVENOUS at 06:11

## 2017-01-01 RX ADMIN — HYDROCODONE BITARTRATE AND ACETAMINOPHEN 1 TABLET: 10; 325 TABLET ORAL at 11:32

## 2017-01-01 RX ADMIN — MESALAMINE 800 MG: 400 CAPSULE, DELAYED RELEASE ORAL at 09:33

## 2017-01-01 RX ADMIN — DIPHENOXYLATE HYDROCHLORIDE AND ATROPINE SULFATE 1 TABLET: 2.5; .025 TABLET ORAL at 00:50

## 2017-01-01 RX ADMIN — VANCOMYCIN HYDROCHLORIDE 1250 MG: 1 INJECTION, POWDER, LYOPHILIZED, FOR SOLUTION INTRAVENOUS at 02:36

## 2017-01-01 RX ADMIN — METHYLPREDNISOLONE SODIUM SUCCINATE 20 MG: 40 INJECTION, POWDER, FOR SOLUTION INTRAMUSCULAR; INTRAVENOUS at 01:02

## 2017-01-01 RX ADMIN — FAMOTIDINE 40 MG: 40 TABLET ORAL at 08:44

## 2017-01-01 RX ADMIN — METHYLPREDNISOLONE SODIUM SUCCINATE 60 MG: 125 INJECTION, POWDER, FOR SOLUTION INTRAMUSCULAR; INTRAVENOUS at 05:25

## 2017-01-01 RX ADMIN — METRONIDAZOLE 500 MG: 500 TABLET ORAL at 21:13

## 2017-01-01 RX ADMIN — METRONIDAZOLE 500 MG: 500 TABLET ORAL at 05:44

## 2017-01-01 RX ADMIN — HYDROCODONE BITARTRATE AND ACETAMINOPHEN 1 TABLET: 5; 325 TABLET ORAL at 12:41

## 2017-01-01 RX ADMIN — METHYLPREDNISOLONE SODIUM SUCCINATE 20 MG: 40 INJECTION, POWDER, FOR SOLUTION INTRAMUSCULAR; INTRAVENOUS at 06:14

## 2017-01-01 RX ADMIN — FAMOTIDINE 40 MG: 40 TABLET ORAL at 08:30

## 2017-01-01 RX ADMIN — VANCOMYCIN HYDROCHLORIDE 125 MG: 1 INJECTION, POWDER, LYOPHILIZED, FOR SOLUTION INTRAVENOUS at 05:42

## 2017-01-01 RX ADMIN — METHYLPREDNISOLONE SODIUM SUCCINATE 40 MG: 40 INJECTION, POWDER, FOR SOLUTION INTRAMUSCULAR; INTRAVENOUS at 12:16

## 2017-01-01 RX ADMIN — DIPHENOXYLATE HYDROCHLORIDE AND ATROPINE SULFATE 1 TABLET: 2.5; .025 TABLET ORAL at 17:16

## 2017-01-01 RX ADMIN — MEGESTROL ACETATE 20 MG: 20 TABLET ORAL at 08:44

## 2017-01-01 RX ADMIN — OCTREOTIDE ACETATE 100 MCG: 100 INJECTION, SOLUTION INTRAVENOUS; SUBCUTANEOUS at 20:32

## 2017-01-01 RX ADMIN — DIPHENOXYLATE HYDROCHLORIDE AND ATROPINE SULFATE 1 TABLET: 2.5; .025 TABLET ORAL at 21:27

## 2017-01-01 RX ADMIN — FERROUS SULFATE TAB EC 324 MG (65 MG FE EQUIVALENT) 324 MG: 324 (65 FE) TABLET DELAYED RESPONSE at 10:18

## 2017-01-01 RX ADMIN — MESALAMINE 400 MG: 400 CAPSULE, DELAYED RELEASE ORAL at 15:55

## 2017-01-01 RX ADMIN — METHYLPREDNISOLONE SODIUM SUCCINATE 40 MG: 40 INJECTION, POWDER, FOR SOLUTION INTRAMUSCULAR; INTRAVENOUS at 06:12

## 2017-01-01 RX ADMIN — LEVOFLOXACIN 500 MG: 5 INJECTION, SOLUTION INTRAVENOUS at 21:46

## 2017-01-01 RX ADMIN — METRONIDAZOLE 500 MG: 500 TABLET ORAL at 13:10

## 2017-01-01 RX ADMIN — MESALAMINE 800 MG: 400 CAPSULE, DELAYED RELEASE ORAL at 21:57

## 2017-01-01 RX ADMIN — OCTREOTIDE ACETATE 100 MCG: 100 INJECTION, SOLUTION INTRAVENOUS; SUBCUTANEOUS at 20:44

## 2017-01-01 RX ADMIN — HYDRALAZINE HYDROCHLORIDE 10 MG: 20 INJECTION INTRAMUSCULAR; INTRAVENOUS at 03:43

## 2017-01-01 RX ADMIN — VANCOMYCIN HYDROCHLORIDE 250 MG: 1 INJECTION, POWDER, LYOPHILIZED, FOR SOLUTION INTRAVENOUS at 12:46

## 2017-01-01 RX ADMIN — METRONIDAZOLE 500 MG: 500 INJECTION, SOLUTION INTRAVENOUS at 04:40

## 2017-01-01 RX ADMIN — HYDROCODONE BITARTRATE AND ACETAMINOPHEN 1 TABLET: 5; 325 TABLET ORAL at 06:09

## 2017-01-01 RX ADMIN — MESALAMINE 800 MG: 400 CAPSULE, DELAYED RELEASE ORAL at 17:29

## 2017-01-01 RX ADMIN — METHYLPREDNISOLONE SODIUM SUCCINATE 40 MG: 40 INJECTION, POWDER, FOR SOLUTION INTRAMUSCULAR; INTRAVENOUS at 18:30

## 2017-01-01 RX ADMIN — METRONIDAZOLE 500 MG: 500 INJECTION, SOLUTION INTRAVENOUS at 21:21

## 2017-01-01 RX ADMIN — ACETAMINOPHEN 650 MG: 325 TABLET ORAL at 20:57

## 2017-01-01 RX ADMIN — HYDROCODONE BITARTRATE AND ACETAMINOPHEN 1 TABLET: 5; 325 TABLET ORAL at 15:53

## 2017-01-01 RX ADMIN — METHYLPREDNISOLONE SODIUM SUCCINATE 60 MG: 125 INJECTION, POWDER, FOR SOLUTION INTRAMUSCULAR; INTRAVENOUS at 08:40

## 2017-01-01 RX ADMIN — Medication 2 MG: at 18:11

## 2017-01-01 RX ADMIN — VANCOMYCIN HYDROCHLORIDE 250 MG: 1 INJECTION, POWDER, LYOPHILIZED, FOR SOLUTION INTRAVENOUS at 17:02

## 2017-01-01 RX ADMIN — LACTOBACILLUS TAB 1 TABLET: TAB at 08:30

## 2017-01-01 RX ADMIN — MEGESTROL ACETATE 20 MG: 20 TABLET ORAL at 08:19

## 2017-01-01 RX ADMIN — MEGESTROL ACETATE 20 MG: 20 TABLET ORAL at 09:41

## 2017-01-01 RX ADMIN — DIPHENOXYLATE HYDROCHLORIDE AND ATROPINE SULFATE 1 TABLET: 2.5; .025 TABLET ORAL at 14:07

## 2017-01-01 RX ADMIN — SODIUM CHLORIDE 1000 ML: 9 INJECTION, SOLUTION INTRAVENOUS at 17:27

## 2017-01-01 RX ADMIN — Medication 2 MG: at 14:47

## 2017-01-01 RX ADMIN — HYDROCODONE BITARTRATE AND ACETAMINOPHEN 1 TABLET: 5; 325 TABLET ORAL at 21:29

## 2017-01-01 RX ADMIN — POTASSIUM CHLORIDE 10 MEQ: 7.46 INJECTION, SOLUTION INTRAVENOUS at 23:17

## 2017-01-01 RX ADMIN — METHYLPREDNISOLONE SODIUM SUCCINATE 60 MG: 125 INJECTION, POWDER, FOR SOLUTION INTRAMUSCULAR; INTRAVENOUS at 23:12

## 2017-01-01 RX ADMIN — DIPHENOXYLATE HYDROCHLORIDE AND ATROPINE SULFATE 1 TABLET: 2.5; .025 TABLET ORAL at 17:29

## 2017-01-01 RX ADMIN — METHYLPREDNISOLONE SODIUM SUCCINATE 40 MG: 40 INJECTION, POWDER, FOR SOLUTION INTRAMUSCULAR; INTRAVENOUS at 06:07

## 2017-01-01 RX ADMIN — ONDANSETRON 4 MG: 2 INJECTION INTRAMUSCULAR; INTRAVENOUS at 19:53

## 2017-01-01 RX ADMIN — METHYLPREDNISOLONE SODIUM SUCCINATE 40 MG: 40 INJECTION, POWDER, FOR SOLUTION INTRAMUSCULAR; INTRAVENOUS at 06:10

## 2017-01-01 RX ADMIN — LACTOBACILLUS TAB 1 TABLET: TAB at 21:29

## 2017-01-01 RX ADMIN — DIPHENOXYLATE HYDROCHLORIDE AND ATROPINE SULFATE 1 TABLET: 2.5; .025 TABLET ORAL at 17:34

## 2017-01-01 RX ADMIN — METRONIDAZOLE 500 MG: 500 TABLET ORAL at 17:16

## 2017-01-01 RX ADMIN — VANCOMYCIN HYDROCHLORIDE 250 MG: 1 INJECTION, POWDER, LYOPHILIZED, FOR SOLUTION INTRAVENOUS at 06:14

## 2017-01-01 RX ADMIN — SODIUM CHLORIDE 50 ML/HR: 900 INJECTION, SOLUTION INTRAVENOUS at 20:57

## 2017-01-01 RX ADMIN — OCTREOTIDE ACETATE 100 MCG: 50 INJECTION, SOLUTION INTRAVENOUS; SUBCUTANEOUS at 08:29

## 2017-01-01 RX ADMIN — HYDROCODONE BITARTRATE AND ACETAMINOPHEN 1 TABLET: 5; 325 TABLET ORAL at 05:06

## 2017-01-01 RX ADMIN — METHYLPREDNISOLONE SODIUM SUCCINATE 40 MG: 40 INJECTION, POWDER, FOR SOLUTION INTRAMUSCULAR; INTRAVENOUS at 18:13

## 2017-01-01 RX ADMIN — HYDROCODONE BITARTRATE AND ACETAMINOPHEN 1 TABLET: 5; 325 TABLET ORAL at 17:18

## 2017-01-01 RX ADMIN — HYDROCODONE BITARTRATE AND ACETAMINOPHEN 1 TABLET: 10; 325 TABLET ORAL at 22:31

## 2017-01-01 RX ADMIN — VANCOMYCIN HYDROCHLORIDE 250 MG: 1 INJECTION, POWDER, LYOPHILIZED, FOR SOLUTION INTRAVENOUS at 18:18

## 2017-01-01 RX ADMIN — METHYLPREDNISOLONE SODIUM SUCCINATE 60 MG: 125 INJECTION, POWDER, FOR SOLUTION INTRAMUSCULAR; INTRAVENOUS at 05:52

## 2017-01-01 RX ADMIN — VANCOMYCIN HYDROCHLORIDE 250 MG: 1 INJECTION, POWDER, LYOPHILIZED, FOR SOLUTION INTRAVENOUS at 06:25

## 2017-01-01 RX ADMIN — METRONIDAZOLE 500 MG: 500 TABLET ORAL at 17:17

## 2017-01-01 RX ADMIN — MESALAMINE 800 MG: 400 CAPSULE, DELAYED RELEASE ORAL at 22:07

## 2017-01-01 RX ADMIN — VANCOMYCIN HYDROCHLORIDE 125 MG: 1 INJECTION, POWDER, LYOPHILIZED, FOR SOLUTION INTRAVENOUS at 22:37

## 2017-01-01 RX ADMIN — POTASSIUM CHLORIDE 40 MEQ: 750 CAPSULE, EXTENDED RELEASE ORAL at 08:10

## 2017-01-01 RX ADMIN — METHYLPREDNISOLONE SODIUM SUCCINATE 60 MG: 125 INJECTION, POWDER, FOR SOLUTION INTRAMUSCULAR; INTRAVENOUS at 09:34

## 2017-01-01 RX ADMIN — METHYLPREDNISOLONE SODIUM SUCCINATE 40 MG: 40 INJECTION, POWDER, FOR SOLUTION INTRAMUSCULAR; INTRAVENOUS at 11:54

## 2017-01-01 RX ADMIN — VANCOMYCIN HYDROCHLORIDE 250 MG: 1 INJECTION, POWDER, LYOPHILIZED, FOR SOLUTION INTRAVENOUS at 06:12

## 2017-01-01 RX ADMIN — POTASSIUM CHLORIDE AND SODIUM CHLORIDE 100 ML/HR: 900; 150 INJECTION, SOLUTION INTRAVENOUS at 13:22

## 2017-01-01 RX ADMIN — METRONIDAZOLE 500 MG: 500 TABLET ORAL at 23:25

## 2017-01-01 RX ADMIN — METRONIDAZOLE 500 MG: 500 TABLET ORAL at 11:45

## 2017-01-01 RX ADMIN — METRONIDAZOLE 500 MG: 500 INJECTION, SOLUTION INTRAVENOUS at 21:29

## 2017-01-01 RX ADMIN — TECHNETIUM TC-99M SESTAMIBI 1 DOSE: 1 INJECTION INTRAVENOUS at 09:55

## 2017-01-01 RX ADMIN — FAMOTIDINE 40 MG: 40 TABLET ORAL at 08:51

## 2017-01-01 RX ADMIN — MESALAMINE 400 MG: 400 CAPSULE, DELAYED RELEASE ORAL at 21:27

## 2017-01-01 RX ADMIN — POTASSIUM CHLORIDE 40 MEQ: 750 CAPSULE, EXTENDED RELEASE ORAL at 21:45

## 2017-01-01 RX ADMIN — METHYLPREDNISOLONE SODIUM SUCCINATE 40 MG: 40 INJECTION, POWDER, FOR SOLUTION INTRAMUSCULAR; INTRAVENOUS at 00:58

## 2017-01-01 RX ADMIN — Medication 2 MG: at 20:36

## 2017-01-01 RX ADMIN — HYDROCODONE BITARTRATE AND ACETAMINOPHEN 1 TABLET: 5; 325 TABLET ORAL at 16:42

## 2017-01-01 RX ADMIN — LACTOBACILLUS TAB 1 TABLET: TAB at 17:29

## 2017-01-01 RX ADMIN — FAMOTIDINE 40 MG: 40 TABLET ORAL at 22:07

## 2017-01-01 RX ADMIN — METHYLPREDNISOLONE SODIUM SUCCINATE 40 MG: 40 INJECTION, POWDER, FOR SOLUTION INTRAMUSCULAR; INTRAVENOUS at 18:18

## 2017-01-01 RX ADMIN — VANCOMYCIN HYDROCHLORIDE 250 MG: 1 INJECTION, POWDER, LYOPHILIZED, FOR SOLUTION INTRAVENOUS at 00:57

## 2017-01-01 RX ADMIN — MESALAMINE 800 MG: 400 CAPSULE, DELAYED RELEASE ORAL at 16:35

## 2017-01-01 RX ADMIN — LACTOBACILLUS TAB 1 TABLET: TAB at 20:44

## 2017-01-01 RX ADMIN — ACETAMINOPHEN 650 MG: 325 TABLET ORAL at 22:22

## 2017-01-01 RX ADMIN — METHYLPREDNISOLONE SODIUM SUCCINATE 20 MG: 40 INJECTION, POWDER, FOR SOLUTION INTRAMUSCULAR; INTRAVENOUS at 06:00

## 2017-01-01 RX ADMIN — MESALAMINE 400 MG: 400 CAPSULE, DELAYED RELEASE ORAL at 17:14

## 2017-01-01 RX ADMIN — VANCOMYCIN HYDROCHLORIDE 250 MG: 1 INJECTION, POWDER, LYOPHILIZED, FOR SOLUTION INTRAVENOUS at 00:28

## 2017-01-01 RX ADMIN — METHYLPREDNISOLONE SODIUM SUCCINATE 40 MG: 40 INJECTION, POWDER, FOR SOLUTION INTRAMUSCULAR; INTRAVENOUS at 00:38

## 2017-01-01 RX ADMIN — ACETAMINOPHEN 650 MG: 325 TABLET ORAL at 23:52

## 2017-01-01 RX ADMIN — HYDROCODONE BITARTRATE AND ACETAMINOPHEN 1 TABLET: 10; 325 TABLET ORAL at 08:39

## 2017-01-01 RX ADMIN — METHYLPREDNISOLONE SODIUM SUCCINATE 60 MG: 125 INJECTION, POWDER, FOR SOLUTION INTRAMUSCULAR; INTRAVENOUS at 10:25

## 2017-01-01 RX ADMIN — METHYLPREDNISOLONE SODIUM SUCCINATE 60 MG: 125 INJECTION, POWDER, FOR SOLUTION INTRAMUSCULAR; INTRAVENOUS at 15:45

## 2017-01-01 RX ADMIN — DIPHENOXYLATE HYDROCHLORIDE AND ATROPINE SULFATE 1 TABLET: 2.5; .025 TABLET ORAL at 16:19

## 2017-01-01 RX ADMIN — FAMOTIDINE 40 MG: 40 TABLET ORAL at 08:56

## 2017-01-01 RX ADMIN — POTASSIUM CHLORIDE AND SODIUM CHLORIDE 100 ML/HR: 900; 150 INJECTION, SOLUTION INTRAVENOUS at 01:29

## 2017-01-01 RX ADMIN — METRONIDAZOLE 500 MG: 500 TABLET ORAL at 22:24

## 2017-01-01 RX ADMIN — METHYLPREDNISOLONE SODIUM SUCCINATE 40 MG: 40 INJECTION, POWDER, FOR SOLUTION INTRAMUSCULAR; INTRAVENOUS at 00:28

## 2017-01-01 RX ADMIN — METHYLPREDNISOLONE SODIUM SUCCINATE 60 MG: 125 INJECTION, POWDER, FOR SOLUTION INTRAMUSCULAR; INTRAVENOUS at 08:33

## 2017-01-01 RX ADMIN — ACETAMINOPHEN 650 MG: 325 TABLET ORAL at 08:28

## 2017-01-01 RX ADMIN — METRONIDAZOLE 500 MG: 500 TABLET ORAL at 23:48

## 2017-01-01 RX ADMIN — HYDROCODONE BITARTRATE AND ACETAMINOPHEN 1 TABLET: 5; 325 TABLET ORAL at 20:02

## 2017-01-01 RX ADMIN — LACTOBACILLUS TAB 1 TABLET: TAB at 20:57

## 2017-01-01 RX ADMIN — MEGESTROL ACETATE 20 MG: 20 TABLET ORAL at 08:28

## 2017-01-01 RX ADMIN — MESALAMINE 800 MG: 400 CAPSULE, DELAYED RELEASE ORAL at 17:26

## 2017-01-01 RX ADMIN — OCTREOTIDE ACETATE 100 MCG: 50 INJECTION, SOLUTION INTRAVENOUS; SUBCUTANEOUS at 17:28

## 2017-01-01 RX ADMIN — METRONIDAZOLE 500 MG: 500 INJECTION, SOLUTION INTRAVENOUS at 12:16

## 2017-01-01 RX ADMIN — ACETAMINOPHEN 650 MG: 325 TABLET ORAL at 21:27

## 2017-01-01 RX ADMIN — POTASSIUM CHLORIDE 40 MEQ: 750 CAPSULE, EXTENDED RELEASE ORAL at 00:56

## 2017-01-01 RX ADMIN — FAMOTIDINE 40 MG: 40 TABLET ORAL at 09:13

## 2017-01-01 RX ADMIN — POTASSIUM CHLORIDE 40 MEQ: 1.5 POWDER, FOR SOLUTION ORAL at 18:07

## 2017-01-01 RX ADMIN — VANCOMYCIN HYDROCHLORIDE 250 MG: 1 INJECTION, POWDER, LYOPHILIZED, FOR SOLUTION INTRAVENOUS at 00:38

## 2017-01-01 RX ADMIN — ACETAMINOPHEN 650 MG: 325 TABLET ORAL at 21:36

## 2017-01-01 RX ADMIN — VANCOMYCIN HYDROCHLORIDE 250 MG: 1 INJECTION, POWDER, LYOPHILIZED, FOR SOLUTION INTRAVENOUS at 06:00

## 2017-01-01 RX ADMIN — VANCOMYCIN HYDROCHLORIDE 125 MG: 1 INJECTION, POWDER, LYOPHILIZED, FOR SOLUTION INTRAVENOUS at 23:49

## 2017-01-01 RX ADMIN — VANCOMYCIN HYDROCHLORIDE 250 MG: 1 INJECTION, POWDER, LYOPHILIZED, FOR SOLUTION INTRAVENOUS at 00:58

## 2017-01-01 RX ADMIN — METRONIDAZOLE 500 MG: 500 TABLET ORAL at 13:52

## 2017-01-01 RX ADMIN — OCTREOTIDE ACETATE 100 MCG: 100 INJECTION, SOLUTION INTRAVENOUS; SUBCUTANEOUS at 08:39

## 2017-01-01 RX ADMIN — HYDRALAZINE HYDROCHLORIDE 10 MG: 20 INJECTION INTRAMUSCULAR; INTRAVENOUS at 20:45

## 2017-01-01 RX ADMIN — VANCOMYCIN HYDROCHLORIDE 250 MG: 1 INJECTION, POWDER, LYOPHILIZED, FOR SOLUTION INTRAVENOUS at 12:51

## 2017-01-01 RX ADMIN — VANCOMYCIN HYDROCHLORIDE 250 MG: 1 INJECTION, POWDER, LYOPHILIZED, FOR SOLUTION INTRAVENOUS at 06:01

## 2017-01-01 RX ADMIN — SODIUM CHLORIDE 100 ML/HR: 9 INJECTION, SOLUTION INTRAVENOUS at 16:37

## 2017-01-01 RX ADMIN — METRONIDAZOLE 500 MG: 500 SOLUTION INTRAVENOUS at 16:38

## 2017-01-01 RX ADMIN — SODIUM CHLORIDE 50 ML/HR: 900 INJECTION, SOLUTION INTRAVENOUS at 16:28

## 2017-01-01 RX ADMIN — METHYLPREDNISOLONE SODIUM SUCCINATE 40 MG: 40 INJECTION, POWDER, FOR SOLUTION INTRAMUSCULAR; INTRAVENOUS at 17:13

## 2017-01-01 RX ADMIN — POTASSIUM CHLORIDE 40 MEQ: 750 CAPSULE, EXTENDED RELEASE ORAL at 13:14

## 2017-01-01 RX ADMIN — VANCOMYCIN HYDROCHLORIDE 250 MG: 1 INJECTION, POWDER, LYOPHILIZED, FOR SOLUTION INTRAVENOUS at 13:24

## 2017-01-01 RX ADMIN — METRONIDAZOLE 500 MG: 500 TABLET ORAL at 05:42

## 2017-01-01 RX ADMIN — METHYLPREDNISOLONE SODIUM SUCCINATE 40 MG: 40 INJECTION, POWDER, FOR SOLUTION INTRAMUSCULAR; INTRAVENOUS at 05:56

## 2017-01-01 RX ADMIN — DIPHENOXYLATE HYDROCHLORIDE AND ATROPINE SULFATE 1 TABLET: 2.5; .025 TABLET ORAL at 23:26

## 2017-01-01 RX ADMIN — VANCOMYCIN HYDROCHLORIDE 250 MG: 1 INJECTION, POWDER, LYOPHILIZED, FOR SOLUTION INTRAVENOUS at 17:30

## 2017-01-01 RX ADMIN — FAMOTIDINE 40 MG: 40 TABLET ORAL at 08:27

## 2017-01-01 RX ADMIN — POTASSIUM CHLORIDE 40 MEQ: 750 CAPSULE, EXTENDED RELEASE ORAL at 12:17

## 2017-01-01 RX ADMIN — MESALAMINE 800 MG: 400 CAPSULE, DELAYED RELEASE ORAL at 20:12

## 2017-01-01 RX ADMIN — DIPHENOXYLATE HYDROCHLORIDE AND ATROPINE SULFATE 1 TABLET: 2.5; .025 TABLET ORAL at 17:13

## 2017-01-01 RX ADMIN — METHYLPREDNISOLONE SODIUM SUCCINATE 40 MG: 40 INJECTION, POWDER, FOR SOLUTION INTRAMUSCULAR; INTRAVENOUS at 11:35

## 2017-01-01 RX ADMIN — MESALAMINE 400 MG: 400 CAPSULE, DELAYED RELEASE ORAL at 10:56

## 2017-01-01 RX ADMIN — METRONIDAZOLE 500 MG: 500 TABLET ORAL at 05:37

## 2017-01-01 RX ADMIN — Medication 2 MG: at 16:08

## 2017-01-01 RX ADMIN — HYDROCODONE BITARTRATE AND ACETAMINOPHEN 1 TABLET: 5; 325 TABLET ORAL at 12:59

## 2017-01-01 RX ADMIN — POTASSIUM CHLORIDE 40 MEQ: 750 CAPSULE, EXTENDED RELEASE ORAL at 15:53

## 2017-01-01 RX ADMIN — ACETAMINOPHEN 650 MG: 325 TABLET ORAL at 22:15

## 2017-01-01 RX ADMIN — FAMOTIDINE 40 MG: 40 TABLET ORAL at 09:33

## 2017-01-01 RX ADMIN — METRONIDAZOLE 500 MG: 500 INJECTION, SOLUTION INTRAVENOUS at 03:56

## 2017-01-01 RX ADMIN — MESALAMINE 800 MG: 400 CAPSULE, DELAYED RELEASE ORAL at 08:33

## 2017-01-01 RX ADMIN — MESALAMINE 800 MG: 400 CAPSULE, DELAYED RELEASE ORAL at 08:27

## 2017-01-01 RX ADMIN — LEVOFLOXACIN 500 MG: 5 INJECTION, SOLUTION INTRAVENOUS at 19:26

## 2017-01-01 RX ADMIN — METRONIDAZOLE 500 MG: 500 INJECTION, SOLUTION INTRAVENOUS at 04:31

## 2017-01-01 RX ADMIN — OCTREOTIDE ACETATE 100 MCG: 100 INJECTION, SOLUTION INTRAVENOUS; SUBCUTANEOUS at 10:34

## 2017-01-01 RX ADMIN — METHYLPREDNISOLONE SODIUM SUCCINATE 20 MG: 40 INJECTION, POWDER, FOR SOLUTION INTRAMUSCULAR; INTRAVENOUS at 05:56

## 2017-01-01 RX ADMIN — SODIUM CHLORIDE 50 ML/HR: 900 INJECTION, SOLUTION INTRAVENOUS at 15:35

## 2017-01-01 RX ADMIN — OCTREOTIDE ACETATE 100 MCG: 100 INJECTION, SOLUTION INTRAVENOUS; SUBCUTANEOUS at 17:16

## 2017-01-01 RX ADMIN — METHYLPREDNISOLONE SODIUM SUCCINATE 60 MG: 125 INJECTION, POWDER, FOR SOLUTION INTRAMUSCULAR; INTRAVENOUS at 22:36

## 2017-01-01 RX ADMIN — DIPHENOXYLATE HYDROCHLORIDE AND ATROPINE SULFATE 1 TABLET: 2.5; .025 TABLET ORAL at 08:33

## 2017-01-01 RX ADMIN — MESALAMINE 400 MG: 400 CAPSULE, DELAYED RELEASE ORAL at 20:57

## 2017-01-01 RX ADMIN — LACTOBACILLUS TAB 1 TABLET: TAB at 21:20

## 2017-01-01 RX ADMIN — MESALAMINE 400 MG: 400 CAPSULE, DELAYED RELEASE ORAL at 20:44

## 2017-01-01 RX ADMIN — METHYLPREDNISOLONE SODIUM SUCCINATE 60 MG: 125 INJECTION, POWDER, FOR SOLUTION INTRAMUSCULAR; INTRAVENOUS at 09:45

## 2017-01-01 RX ADMIN — MESALAMINE 400 MG: 400 CAPSULE, DELAYED RELEASE ORAL at 20:55

## 2017-01-01 RX ADMIN — ACETAMINOPHEN 650 MG: 325 TABLET ORAL at 13:25

## 2017-01-01 RX ADMIN — OCTREOTIDE ACETATE 100 MCG: 100 INJECTION, SOLUTION INTRAVENOUS; SUBCUTANEOUS at 21:26

## 2017-01-01 RX ADMIN — HYDROCODONE BITARTRATE AND ACETAMINOPHEN 1 TABLET: 10; 325 TABLET ORAL at 22:23

## 2017-01-01 RX ADMIN — METRONIDAZOLE 500 MG: 500 TABLET ORAL at 05:59

## 2017-01-01 RX ADMIN — FAMOTIDINE 40 MG: 40 TABLET ORAL at 14:07

## 2017-01-01 RX ADMIN — FAMOTIDINE 40 MG: 40 TABLET ORAL at 10:00

## 2017-01-01 RX ADMIN — VANCOMYCIN HYDROCHLORIDE 250 MG: 1 INJECTION, POWDER, LYOPHILIZED, FOR SOLUTION INTRAVENOUS at 12:16

## 2017-01-01 RX ADMIN — HYDROCODONE BITARTRATE AND ACETAMINOPHEN 1 TABLET: 5; 325 TABLET ORAL at 22:48

## 2017-01-01 RX ADMIN — METRONIDAZOLE 500 MG: 500 INJECTION, SOLUTION INTRAVENOUS at 15:38

## 2017-01-01 RX ADMIN — MESALAMINE 400 MG: 400 CAPSULE, DELAYED RELEASE ORAL at 08:48

## 2017-01-01 RX ADMIN — DIPHENOXYLATE HYDROCHLORIDE AND ATROPINE SULFATE 1 TABLET: 2.5; .025 TABLET ORAL at 20:35

## 2017-01-01 RX ADMIN — LACTOBACILLUS TAB 1 TABLET: TAB at 08:44

## 2017-01-01 RX ADMIN — HYDROCODONE BITARTRATE AND ACETAMINOPHEN 1 TABLET: 10; 325 TABLET ORAL at 07:28

## 2017-01-01 RX ADMIN — SODIUM CHLORIDE 100 ML/HR: 9 INJECTION, SOLUTION INTRAVENOUS at 21:21

## 2017-01-01 RX ADMIN — VANCOMYCIN HYDROCHLORIDE 125 MG: 1 INJECTION, POWDER, LYOPHILIZED, FOR SOLUTION INTRAVENOUS at 05:59

## 2017-01-01 RX ADMIN — DIPHENOXYLATE HYDROCHLORIDE AND ATROPINE SULFATE 1 TABLET: 2.5; .025 TABLET ORAL at 15:31

## 2017-01-01 RX ADMIN — MESALAMINE 800 MG: 400 CAPSULE, DELAYED RELEASE ORAL at 15:52

## 2017-01-01 RX ADMIN — MESALAMINE 800 MG: 400 CAPSULE, DELAYED RELEASE ORAL at 11:53

## 2017-01-01 RX ADMIN — HYDROCODONE BITARTRATE AND ACETAMINOPHEN 1 TABLET: 5; 325 TABLET ORAL at 12:51

## 2017-01-01 RX ADMIN — POTASSIUM CHLORIDE 10 MEQ: 7.46 INJECTION, SOLUTION INTRAVENOUS at 02:31

## 2017-01-01 RX ADMIN — POTASSIUM CHLORIDE 10 MEQ: 7.46 INJECTION, SOLUTION INTRAVENOUS at 01:29

## 2017-01-01 RX ADMIN — VANCOMYCIN HYDROCHLORIDE 125 MG: 1 INJECTION, POWDER, LYOPHILIZED, FOR SOLUTION INTRAVENOUS at 17:17

## 2017-01-01 RX ADMIN — HYDROCODONE BITARTRATE AND ACETAMINOPHEN 1 TABLET: 5; 325 TABLET ORAL at 20:44

## 2017-01-01 RX ADMIN — HYDROCODONE BITARTRATE AND ACETAMINOPHEN 1 TABLET: 10; 325 TABLET ORAL at 00:55

## 2017-01-01 RX ADMIN — MESALAMINE 800 MG: 400 CAPSULE, DELAYED RELEASE ORAL at 20:32

## 2017-01-01 RX ADMIN — MESALAMINE 800 MG: 400 CAPSULE, DELAYED RELEASE ORAL at 16:19

## 2017-01-01 RX ADMIN — DIPHENOXYLATE HYDROCHLORIDE AND ATROPINE SULFATE 1 TABLET: 2.5; .025 TABLET ORAL at 12:02

## 2017-01-01 RX ADMIN — METRONIDAZOLE 500 MG: 500 INJECTION, SOLUTION INTRAVENOUS at 12:42

## 2017-01-01 RX ADMIN — VANCOMYCIN HYDROCHLORIDE 125 MG: 1 INJECTION, POWDER, LYOPHILIZED, FOR SOLUTION INTRAVENOUS at 12:17

## 2017-01-01 RX ADMIN — HYDROCODONE BITARTRATE AND ACETAMINOPHEN 1 TABLET: 10; 325 TABLET ORAL at 04:14

## 2017-01-01 RX ADMIN — MESALAMINE 800 MG: 400 CAPSULE, DELAYED RELEASE ORAL at 10:01

## 2017-01-01 RX ADMIN — METRONIDAZOLE 500 MG: 500 INJECTION, SOLUTION INTRAVENOUS at 11:36

## 2017-01-01 RX ADMIN — METRONIDAZOLE 500 MG: 500 TABLET ORAL at 22:37

## 2017-01-01 RX ADMIN — FAMOTIDINE 40 MG: 40 TABLET ORAL at 08:33

## 2017-01-01 RX ADMIN — MESALAMINE 400 MG: 400 CAPSULE, DELAYED RELEASE ORAL at 08:00

## 2017-01-01 RX ADMIN — METHYLPREDNISOLONE SODIUM SUCCINATE 40 MG: 40 INJECTION, POWDER, FOR SOLUTION INTRAMUSCULAR; INTRAVENOUS at 17:47

## 2017-01-01 RX ADMIN — FAMOTIDINE 40 MG: 40 TABLET ORAL at 08:00

## 2017-01-01 RX ADMIN — OCTREOTIDE ACETATE 100 MCG: 100 INJECTION, SOLUTION INTRAVENOUS; SUBCUTANEOUS at 17:27

## 2017-01-01 RX ADMIN — METRONIDAZOLE 500 MG: 500 INJECTION, SOLUTION INTRAVENOUS at 20:17

## 2017-01-01 RX ADMIN — LOPERAMIDE HYDROCHLORIDE 2 MG: 2 CAPSULE ORAL at 15:53

## 2017-01-01 RX ADMIN — METRONIDAZOLE 500 MG: 500 SOLUTION INTRAVENOUS at 00:47

## 2017-01-01 RX ADMIN — MESALAMINE 800 MG: 400 CAPSULE, DELAYED RELEASE ORAL at 09:00

## 2017-01-01 RX ADMIN — LACTOBACILLUS TAB 1 TABLET: TAB at 20:18

## 2017-01-01 RX ADMIN — OCTREOTIDE ACETATE 100 MCG: 100 INJECTION, SOLUTION INTRAVENOUS; SUBCUTANEOUS at 16:37

## 2017-01-01 RX ADMIN — MESALAMINE 400 MG: 400 CAPSULE, DELAYED RELEASE ORAL at 08:30

## 2017-01-01 RX ADMIN — METRONIDAZOLE 500 MG: 500 TABLET ORAL at 06:01

## 2017-01-01 RX ADMIN — DIPHENOXYLATE HYDROCHLORIDE AND ATROPINE SULFATE 1 TABLET: 2.5; .025 TABLET ORAL at 10:22

## 2017-01-01 RX ADMIN — DIPHENOXYLATE HYDROCHLORIDE AND ATROPINE SULFATE 1 TABLET: 2.5; .025 TABLET ORAL at 14:19

## 2017-01-01 RX ADMIN — VANCOMYCIN HYDROCHLORIDE 250 MG: 1 INJECTION, POWDER, LYOPHILIZED, FOR SOLUTION INTRAVENOUS at 00:18

## 2017-01-01 RX ADMIN — LACTOBACILLUS TAB 1 TABLET: TAB at 17:14

## 2017-01-01 RX ADMIN — DIPHENOXYLATE HYDROCHLORIDE AND ATROPINE SULFATE 1 TABLET: 2.5; .025 TABLET ORAL at 22:13

## 2017-01-01 RX ADMIN — HYDROCODONE BITARTRATE AND ACETAMINOPHEN 1 TABLET: 10; 325 TABLET ORAL at 09:36

## 2017-01-01 RX ADMIN — HYDROCODONE BITARTRATE AND ACETAMINOPHEN 1 TABLET: 5; 325 TABLET ORAL at 16:49

## 2017-01-01 RX ADMIN — MESALAMINE 800 MG: 400 CAPSULE, DELAYED RELEASE ORAL at 09:42

## 2017-01-01 RX ADMIN — SODIUM CHLORIDE 50 ML/HR: 900 INJECTION, SOLUTION INTRAVENOUS at 08:50

## 2017-01-01 RX ADMIN — ACETAMINOPHEN 650 MG: 325 TABLET ORAL at 00:51

## 2017-01-01 RX ADMIN — METRONIDAZOLE 500 MG: 500 SOLUTION INTRAVENOUS at 16:13

## 2017-01-01 RX ADMIN — METRONIDAZOLE 500 MG: 500 INJECTION, SOLUTION INTRAVENOUS at 20:56

## 2017-01-01 RX ADMIN — MESALAMINE 400 MG: 400 CAPSULE, DELAYED RELEASE ORAL at 17:30

## 2017-01-01 RX ADMIN — METHYLPREDNISOLONE SODIUM SUCCINATE 40 MG: 40 INJECTION, POWDER, FOR SOLUTION INTRAMUSCULAR; INTRAVENOUS at 22:17

## 2017-01-01 RX ADMIN — VANCOMYCIN HYDROCHLORIDE 250 MG: 1 INJECTION, POWDER, LYOPHILIZED, FOR SOLUTION INTRAVENOUS at 12:41

## 2017-01-01 RX ADMIN — FAMOTIDINE 40 MG: 40 TABLET ORAL at 08:10

## 2017-01-01 RX ADMIN — SODIUM CHLORIDE 50 ML/HR: 900 INJECTION, SOLUTION INTRAVENOUS at 16:57

## 2017-01-01 RX ADMIN — ACETAMINOPHEN 650 MG: 325 TABLET ORAL at 00:22

## 2017-01-01 RX ADMIN — METRONIDAZOLE 500 MG: 500 INJECTION, SOLUTION INTRAVENOUS at 21:46

## 2017-01-01 RX ADMIN — MESALAMINE 800 MG: 400 CAPSULE, DELAYED RELEASE ORAL at 22:16

## 2017-01-01 RX ADMIN — METHYLPREDNISOLONE SODIUM SUCCINATE 40 MG: 40 INJECTION, POWDER, FOR SOLUTION INTRAMUSCULAR; INTRAVENOUS at 12:41

## 2017-01-01 RX ADMIN — MESALAMINE 800 MG: 400 CAPSULE, DELAYED RELEASE ORAL at 08:56

## 2017-01-01 RX ADMIN — MESALAMINE 800 MG: 400 CAPSULE, DELAYED RELEASE ORAL at 08:03

## 2017-01-01 RX ADMIN — HYDROCODONE BITARTRATE AND ACETAMINOPHEN 1 TABLET: 10; 325 TABLET ORAL at 16:19

## 2017-01-01 RX ADMIN — METHYLPREDNISOLONE SODIUM SUCCINATE 60 MG: 125 INJECTION, POWDER, FOR SOLUTION INTRAMUSCULAR; INTRAVENOUS at 22:15

## 2017-01-01 RX ADMIN — METHYLPREDNISOLONE SODIUM SUCCINATE 60 MG: 125 INJECTION, POWDER, FOR SOLUTION INTRAMUSCULAR; INTRAVENOUS at 09:56

## 2017-01-01 RX ADMIN — HYDROCODONE BITARTRATE AND ACETAMINOPHEN 1 TABLET: 5; 325 TABLET ORAL at 06:01

## 2017-01-01 RX ADMIN — METHYLPREDNISOLONE SODIUM SUCCINATE 40 MG: 40 INJECTION, POWDER, FOR SOLUTION INTRAMUSCULAR; INTRAVENOUS at 00:18

## 2017-01-01 RX ADMIN — MESALAMINE 400 MG: 400 CAPSULE, DELAYED RELEASE ORAL at 08:19

## 2017-01-01 RX ADMIN — HYDROCODONE BITARTRATE AND ACETAMINOPHEN 1 TABLET: 10; 325 TABLET ORAL at 08:12

## 2017-01-01 RX ADMIN — SODIUM CHLORIDE 50 ML/HR: 900 INJECTION, SOLUTION INTRAVENOUS at 20:04

## 2017-01-01 RX ADMIN — LIDOCAINE HYDROCHLORIDE 10 ML: 20 SOLUTION ORAL; TOPICAL at 08:59

## 2017-01-01 RX ADMIN — SODIUM CHLORIDE 100 ML/HR: 9 INJECTION, SOLUTION INTRAVENOUS at 01:02

## 2017-01-01 RX ADMIN — POTASSIUM CHLORIDE 40 MEQ: 750 CAPSULE, EXTENDED RELEASE ORAL at 09:49

## 2017-01-01 RX ADMIN — VANCOMYCIN HYDROCHLORIDE 250 MG: 1 INJECTION, POWDER, LYOPHILIZED, FOR SOLUTION INTRAVENOUS at 00:11

## 2017-01-01 RX ADMIN — METRONIDAZOLE 500 MG: 500 TABLET ORAL at 18:02

## 2017-01-01 RX ADMIN — BENZOCAINE, BUTAMBEN, AND TETRACAINE HYDROCHLORIDE 1 SPRAY: .028; .004; .004 AEROSOL, SPRAY TOPICAL at 09:00

## 2017-01-01 RX ADMIN — FAMOTIDINE 40 MG: 40 TABLET ORAL at 08:48

## 2017-01-01 RX ADMIN — FAMOTIDINE 40 MG: 40 TABLET ORAL at 08:40

## 2017-01-01 RX ADMIN — DIPHENOXYLATE HYDROCHLORIDE AND ATROPINE SULFATE 1 TABLET: 2.5; .025 TABLET ORAL at 22:22

## 2017-01-01 RX ADMIN — METRONIDAZOLE 500 MG: 500 TABLET ORAL at 09:36

## 2017-01-01 RX ADMIN — DIPHENOXYLATE HYDROCHLORIDE AND ATROPINE SULFATE 1 TABLET: 2.5; .025 TABLET ORAL at 17:42

## 2017-01-01 RX ADMIN — OCTREOTIDE ACETATE 100 MCG: 100 INJECTION, SOLUTION INTRAVENOUS; SUBCUTANEOUS at 21:52

## 2017-01-01 RX ADMIN — MEGESTROL ACETATE 20 MG: 20 TABLET ORAL at 10:03

## 2017-01-01 RX ADMIN — LACTOBACILLUS TAB 1 TABLET: TAB at 21:27

## 2017-01-01 RX ADMIN — METHYLPREDNISOLONE SODIUM SUCCINATE 20 MG: 40 INJECTION, POWDER, FOR SOLUTION INTRAMUSCULAR; INTRAVENOUS at 17:30

## 2017-01-01 RX ADMIN — VANCOMYCIN HYDROCHLORIDE 250 MG: 1 INJECTION, POWDER, LYOPHILIZED, FOR SOLUTION INTRAVENOUS at 06:07

## 2017-01-01 RX ADMIN — DIPHENOXYLATE HYDROCHLORIDE AND ATROPINE SULFATE 1 TABLET: 2.5; .025 TABLET ORAL at 10:02

## 2017-01-01 RX ADMIN — DIPHENOXYLATE HYDROCHLORIDE AND ATROPINE SULFATE 1 TABLET: 2.5; .025 TABLET ORAL at 04:14

## 2017-01-01 RX ADMIN — DIPHENOXYLATE HYDROCHLORIDE AND ATROPINE SULFATE 1 TABLET: 2.5; .025 TABLET ORAL at 14:50

## 2017-01-01 RX ADMIN — POTASSIUM CHLORIDE 40 MEQ: 750 CAPSULE, EXTENDED RELEASE ORAL at 08:40

## 2017-01-01 RX ADMIN — SODIUM CHLORIDE 100 ML/HR: 9 INJECTION, SOLUTION INTRAVENOUS at 10:21

## 2017-01-01 RX ADMIN — MESALAMINE 400 MG: 400 CAPSULE, DELAYED RELEASE ORAL at 21:45

## 2017-01-01 RX ADMIN — METHYLPREDNISOLONE SODIUM SUCCINATE 20 MG: 40 INJECTION, POWDER, FOR SOLUTION INTRAMUSCULAR; INTRAVENOUS at 16:38

## 2017-01-01 RX ADMIN — HYDROCODONE BITARTRATE AND ACETAMINOPHEN 1 TABLET: 5; 325 TABLET ORAL at 09:49

## 2017-01-01 RX ADMIN — MESALAMINE 400 MG: 400 CAPSULE, DELAYED RELEASE ORAL at 08:56

## 2017-01-01 RX ADMIN — HYDROCODONE BITARTRATE AND ACETAMINOPHEN 1 TABLET: 5; 325 TABLET ORAL at 10:34

## 2017-01-01 RX ADMIN — POTASSIUM CHLORIDE 40 MEQ: 750 CAPSULE, EXTENDED RELEASE ORAL at 15:37

## 2017-01-01 RX ADMIN — VANCOMYCIN HYDROCHLORIDE 125 MG: 1 INJECTION, POWDER, LYOPHILIZED, FOR SOLUTION INTRAVENOUS at 22:25

## 2017-01-01 RX ADMIN — LACTOBACILLUS TAB 1 TABLET: TAB at 21:37

## 2017-01-01 RX ADMIN — HYDROCODONE BITARTRATE AND ACETAMINOPHEN 1 TABLET: 10; 325 TABLET ORAL at 15:31

## 2017-01-01 RX ADMIN — METHYLPREDNISOLONE SODIUM SUCCINATE 60 MG: 125 INJECTION, POWDER, FOR SOLUTION INTRAMUSCULAR; INTRAVENOUS at 10:34

## 2017-01-01 RX ADMIN — MESALAMINE 400 MG: 400 CAPSULE, DELAYED RELEASE ORAL at 08:10

## 2017-01-01 RX ADMIN — SODIUM CHLORIDE 50 ML/HR: 900 INJECTION, SOLUTION INTRAVENOUS at 19:53

## 2017-01-01 RX ADMIN — OCTREOTIDE ACETATE 100 MCG: 100 INJECTION, SOLUTION INTRAVENOUS; SUBCUTANEOUS at 17:11

## 2017-01-01 RX ADMIN — FAMOTIDINE 40 MG: 40 TABLET ORAL at 08:19

## 2017-01-01 RX ADMIN — OCTREOTIDE ACETATE 50 MCG: 50 INJECTION, SOLUTION INTRAVENOUS; SUBCUTANEOUS at 16:49

## 2017-01-01 RX ADMIN — PROPOFOL 50 MG: 10 INJECTION, EMULSION INTRAVENOUS at 16:54

## 2017-01-01 RX ADMIN — HYDROCODONE BITARTRATE AND ACETAMINOPHEN 1 TABLET: 10; 325 TABLET ORAL at 22:22

## 2017-01-01 RX ADMIN — MIDAZOLAM 0.5 MG: 1 INJECTION INTRAMUSCULAR; INTRAVENOUS at 09:14

## 2017-01-01 RX ADMIN — MEGESTROL ACETATE 20 MG: 20 TABLET ORAL at 08:56

## 2017-01-01 RX ADMIN — VANCOMYCIN HYDROCHLORIDE 250 MG: 1 INJECTION, POWDER, LYOPHILIZED, FOR SOLUTION INTRAVENOUS at 01:01

## 2017-01-01 RX ADMIN — MESALAMINE 800 MG: 400 CAPSULE, DELAYED RELEASE ORAL at 17:17

## 2017-01-01 RX ADMIN — FAMOTIDINE 40 MG: 40 TABLET ORAL at 09:37

## 2017-01-01 RX ADMIN — OCTREOTIDE ACETATE 100 MCG: 100 INJECTION, SOLUTION INTRAVENOUS; SUBCUTANEOUS at 08:33

## 2017-01-01 RX ADMIN — MIDAZOLAM 1 MG: 1 INJECTION INTRAMUSCULAR; INTRAVENOUS at 16:50

## 2017-01-01 RX ADMIN — VANCOMYCIN HYDROCHLORIDE 250 MG: 1 INJECTION, POWDER, LYOPHILIZED, FOR SOLUTION INTRAVENOUS at 18:36

## 2017-01-01 RX ADMIN — SODIUM CHLORIDE 100 ML/HR: 9 INJECTION, SOLUTION INTRAVENOUS at 07:19

## 2017-01-01 RX ADMIN — ACETAMINOPHEN 650 MG: 325 TABLET ORAL at 20:28

## 2017-01-01 RX ADMIN — FAMOTIDINE 40 MG: 40 TABLET ORAL at 09:41

## 2017-01-01 RX ADMIN — MESALAMINE 800 MG: 400 CAPSULE, DELAYED RELEASE ORAL at 21:35

## 2017-01-01 RX ADMIN — HYDROCODONE BITARTRATE AND ACETAMINOPHEN 1 TABLET: 10; 325 TABLET ORAL at 18:12

## 2017-01-01 RX ADMIN — DIPHENOXYLATE HYDROCHLORIDE AND ATROPINE SULFATE 1 TABLET: 2.5; .025 TABLET ORAL at 12:42

## 2017-01-01 RX ADMIN — POTASSIUM CHLORIDE 10 MEQ: 7.46 INJECTION, SOLUTION INTRAVENOUS at 03:39

## 2017-01-01 RX ADMIN — POTASSIUM CHLORIDE 10 MEQ: 7.46 INJECTION, SOLUTION INTRAVENOUS at 00:23

## 2017-01-01 RX ADMIN — DIPHENOXYLATE HYDROCHLORIDE AND ATROPINE SULFATE 1 TABLET: 2.5; .025 TABLET ORAL at 08:40

## 2017-01-01 RX ADMIN — LACTOBACILLUS TAB 1 TABLET: TAB at 16:56

## 2017-01-01 RX ADMIN — FAMOTIDINE 40 MG: 40 TABLET ORAL at 10:02

## 2017-01-01 RX ADMIN — METHYLPREDNISOLONE SODIUM SUCCINATE 40 MG: 40 INJECTION, POWDER, FOR SOLUTION INTRAMUSCULAR; INTRAVENOUS at 15:38

## 2017-01-01 RX ADMIN — LACTOBACILLUS TAB 1 TABLET: TAB at 17:30

## 2017-01-01 RX ADMIN — HYDROCODONE BITARTRATE AND ACETAMINOPHEN 1 TABLET: 5; 325 TABLET ORAL at 06:26

## 2017-01-01 RX ADMIN — MESALAMINE 400 MG: 400 CAPSULE, DELAYED RELEASE ORAL at 16:38

## 2017-01-01 RX ADMIN — REGADENOSON 0.4 MG: 0.08 INJECTION, SOLUTION INTRAVENOUS at 09:55

## 2017-01-01 RX ADMIN — HYDROCODONE BITARTRATE AND ACETAMINOPHEN 1 TABLET: 7.5; 325 TABLET ORAL at 15:41

## 2017-01-01 RX ADMIN — VANCOMYCIN HYDROCHLORIDE 250 MG: 1 INJECTION, POWDER, LYOPHILIZED, FOR SOLUTION INTRAVENOUS at 12:17

## 2017-01-01 RX ADMIN — POTASSIUM CHLORIDE 40 MEQ: 750 CAPSULE, EXTENDED RELEASE ORAL at 16:29

## 2017-01-01 RX ADMIN — TECHNETIUM TC-99M SESTAMIBI 1 DOSE: 1 INJECTION INTRAVENOUS at 08:20

## 2017-01-01 RX ADMIN — VANCOMYCIN HYDROCHLORIDE 250 MG: 1 INJECTION, POWDER, LYOPHILIZED, FOR SOLUTION INTRAVENOUS at 06:09

## 2017-01-01 RX ADMIN — SODIUM CHLORIDE 10 ML: 9 INJECTION, SOLUTION INTRAMUSCULAR; INTRAVENOUS; SUBCUTANEOUS at 09:55

## 2017-01-01 RX ADMIN — ACETAMINOPHEN 650 MG: 325 TABLET ORAL at 21:16

## 2017-01-01 RX ADMIN — POTASSIUM CHLORIDE 40 MEQ: 750 CAPSULE, EXTENDED RELEASE ORAL at 04:40

## 2017-01-01 RX ADMIN — MESALAMINE 800 MG: 400 CAPSULE, DELAYED RELEASE ORAL at 15:31

## 2017-01-01 RX ADMIN — METRONIDAZOLE 500 MG: 500 INJECTION, SOLUTION INTRAVENOUS at 13:15

## 2017-01-01 RX ADMIN — VANCOMYCIN HYDROCHLORIDE 125 MG: 1 INJECTION, POWDER, LYOPHILIZED, FOR SOLUTION INTRAVENOUS at 17:50

## 2017-01-01 RX ADMIN — MESALAMINE 800 MG: 400 CAPSULE, DELAYED RELEASE ORAL at 20:44

## 2017-01-01 RX ADMIN — MESALAMINE 400 MG: 400 CAPSULE, DELAYED RELEASE ORAL at 16:56

## 2017-01-01 RX ADMIN — METHYLPREDNISOLONE SODIUM SUCCINATE 60 MG: 125 INJECTION, POWDER, FOR SOLUTION INTRAMUSCULAR; INTRAVENOUS at 09:58

## 2017-01-01 RX ADMIN — VANCOMYCIN HYDROCHLORIDE 250 MG: 1 INJECTION, POWDER, LYOPHILIZED, FOR SOLUTION INTRAVENOUS at 17:31

## 2017-01-01 RX ADMIN — MESALAMINE 800 MG: 400 CAPSULE, DELAYED RELEASE ORAL at 21:21

## 2017-01-01 RX ADMIN — LACTOBACILLUS TAB 1 TABLET: TAB at 08:19

## 2017-01-01 RX ADMIN — MESALAMINE 800 MG: 400 CAPSULE, DELAYED RELEASE ORAL at 08:40

## 2017-01-01 RX ADMIN — DIPHENOXYLATE HYDROCHLORIDE AND ATROPINE SULFATE 1 TABLET: 2.5; .025 TABLET ORAL at 08:12

## 2017-01-01 RX ADMIN — METRONIDAZOLE 500 MG: 500 INJECTION, SOLUTION INTRAVENOUS at 05:06

## 2017-01-01 RX ADMIN — HYDROCODONE BITARTRATE AND ACETAMINOPHEN 1 TABLET: 5; 325 TABLET ORAL at 18:42

## 2017-01-01 RX ADMIN — HYDROCODONE BITARTRATE AND ACETAMINOPHEN 1 TABLET: 10; 325 TABLET ORAL at 21:27

## 2017-01-01 RX ADMIN — METHYLPREDNISOLONE SODIUM SUCCINATE 40 MG: 40 INJECTION, POWDER, FOR SOLUTION INTRAMUSCULAR; INTRAVENOUS at 05:51

## 2017-01-01 RX ADMIN — SODIUM CHLORIDE 50 ML/HR: 9 INJECTION, SOLUTION INTRAVENOUS at 11:54

## 2017-01-01 RX ADMIN — PROPOFOL 20 MG: 10 INJECTION, EMULSION INTRAVENOUS at 16:57

## 2017-01-01 RX ADMIN — METRONIDAZOLE 500 MG: 500 INJECTION, SOLUTION INTRAVENOUS at 05:19

## 2017-01-01 RX ADMIN — VANCOMYCIN HYDROCHLORIDE 250 MG: 1 INJECTION, POWDER, LYOPHILIZED, FOR SOLUTION INTRAVENOUS at 05:56

## 2017-01-01 RX ADMIN — OCTREOTIDE ACETATE 50 MCG: 50 INJECTION, SOLUTION INTRAVENOUS; SUBCUTANEOUS at 21:47

## 2017-01-01 RX ADMIN — Medication 2 MG: at 07:59

## 2017-01-01 RX ADMIN — DIPHENOXYLATE HYDROCHLORIDE AND ATROPINE SULFATE 1 TABLET: 2.5; .025 TABLET ORAL at 21:55

## 2017-01-01 RX ADMIN — DIPHENOXYLATE HYDROCHLORIDE AND ATROPINE SULFATE 1 TABLET: 2.5; .025 TABLET ORAL at 20:48

## 2017-01-01 RX ADMIN — MESALAMINE 800 MG: 400 CAPSULE, DELAYED RELEASE ORAL at 21:47

## 2017-01-01 RX ADMIN — HYDROCODONE BITARTRATE AND ACETAMINOPHEN 1 TABLET: 5; 325 TABLET ORAL at 06:14

## 2017-01-01 RX ADMIN — SODIUM CHLORIDE 50 ML/HR: 900 INJECTION, SOLUTION INTRAVENOUS at 18:37

## 2017-01-01 RX ADMIN — VANCOMYCIN HYDROCHLORIDE 125 MG: 1 INJECTION, POWDER, LYOPHILIZED, FOR SOLUTION INTRAVENOUS at 18:01

## 2017-01-01 RX ADMIN — METHYLPREDNISOLONE SODIUM SUCCINATE 60 MG: 125 INJECTION, POWDER, FOR SOLUTION INTRAMUSCULAR; INTRAVENOUS at 10:01

## 2017-01-01 RX ADMIN — DIPHENOXYLATE HYDROCHLORIDE AND ATROPINE SULFATE 1 TABLET: 2.5; .025 TABLET ORAL at 20:36

## 2017-01-01 RX ADMIN — Medication 2 MG: at 10:40

## 2017-01-01 RX ADMIN — MESALAMINE 400 MG: 400 CAPSULE, DELAYED RELEASE ORAL at 17:29

## 2017-01-01 RX ADMIN — MESALAMINE 800 MG: 400 CAPSULE, DELAYED RELEASE ORAL at 15:45

## 2017-01-01 RX ADMIN — SODIUM CHLORIDE 1000 ML: 900 INJECTION, SOLUTION INTRAVENOUS at 11:10

## 2017-01-01 RX ADMIN — HYDROCODONE BITARTRATE AND ACETAMINOPHEN 1 TABLET: 5; 325 TABLET ORAL at 06:10

## 2017-01-01 RX ADMIN — METHYLPREDNISOLONE SODIUM SUCCINATE 20 MG: 40 INJECTION, POWDER, FOR SOLUTION INTRAMUSCULAR; INTRAVENOUS at 17:32

## 2017-01-01 RX ADMIN — OCTREOTIDE ACETATE 100 MCG: 100 INJECTION, SOLUTION INTRAVENOUS; SUBCUTANEOUS at 16:19

## 2017-01-01 RX ADMIN — VANCOMYCIN HYDROCHLORIDE 250 MG: 1 INJECTION, POWDER, LYOPHILIZED, FOR SOLUTION INTRAVENOUS at 06:10

## 2017-01-01 RX ADMIN — METHYLPREDNISOLONE SODIUM SUCCINATE 60 MG: 125 INJECTION, POWDER, FOR SOLUTION INTRAMUSCULAR; INTRAVENOUS at 06:11

## 2017-01-01 RX ADMIN — HYDROCODONE BITARTRATE AND ACETAMINOPHEN 1 TABLET: 10; 325 TABLET ORAL at 13:40

## 2017-01-01 RX ADMIN — FAMOTIDINE 40 MG: 40 TABLET ORAL at 09:00

## 2017-01-01 RX ADMIN — POTASSIUM CHLORIDE 40 MEQ: 750 CAPSULE, EXTENDED RELEASE ORAL at 17:29

## 2017-01-01 RX ADMIN — METHYLPREDNISOLONE SODIUM SUCCINATE 40 MG: 40 INJECTION, POWDER, FOR SOLUTION INTRAMUSCULAR; INTRAVENOUS at 06:09

## 2017-01-01 RX ADMIN — FAMOTIDINE 40 MG: 40 TABLET ORAL at 08:29

## 2017-01-01 RX ADMIN — METRONIDAZOLE 500 MG: 500 INJECTION, SOLUTION INTRAVENOUS at 20:54

## 2017-01-01 RX ADMIN — VANCOMYCIN HYDROCHLORIDE 250 MG: 1 INJECTION, POWDER, LYOPHILIZED, FOR SOLUTION INTRAVENOUS at 00:22

## 2017-01-01 RX ADMIN — VANCOMYCIN HYDROCHLORIDE 125 MG: 1 INJECTION, POWDER, LYOPHILIZED, FOR SOLUTION INTRAVENOUS at 17:05

## 2017-01-01 RX ADMIN — HYDROCODONE BITARTRATE AND ACETAMINOPHEN 1 TABLET: 5; 325 TABLET ORAL at 12:18

## 2017-01-01 RX ADMIN — METRONIDAZOLE 500 MG: 500 SOLUTION INTRAVENOUS at 15:45

## 2017-01-01 RX ADMIN — MESALAMINE 800 MG: 400 CAPSULE, DELAYED RELEASE ORAL at 17:12

## 2017-01-01 RX ADMIN — DIPHENOXYLATE HYDROCHLORIDE AND ATROPINE SULFATE 1 TABLET: 2.5; .025 TABLET ORAL at 12:24

## 2017-01-01 RX ADMIN — DIPHENOXYLATE HYDROCHLORIDE AND ATROPINE SULFATE 1 TABLET: 2.5; .025 TABLET ORAL at 04:13

## 2017-01-01 RX ADMIN — ACETAMINOPHEN 650 MG: 325 TABLET ORAL at 21:21

## 2017-01-01 RX ADMIN — SODIUM CHLORIDE 100 ML/HR: 9 INJECTION, SOLUTION INTRAVENOUS at 07:38

## 2017-01-01 RX ADMIN — VANCOMYCIN HYDROCHLORIDE 125 MG: 1 INJECTION, POWDER, LYOPHILIZED, FOR SOLUTION INTRAVENOUS at 12:59

## 2017-01-01 RX ADMIN — METRONIDAZOLE 500 MG: 500 SOLUTION INTRAVENOUS at 01:42

## 2017-01-01 RX ADMIN — MESALAMINE 800 MG: 400 CAPSULE, DELAYED RELEASE ORAL at 16:13

## 2017-01-01 RX ADMIN — METHYLPREDNISOLONE SODIUM SUCCINATE 40 MG: 40 INJECTION, POWDER, FOR SOLUTION INTRAMUSCULAR; INTRAVENOUS at 19:57

## 2017-01-01 RX ADMIN — HYDROCODONE BITARTRATE AND ACETAMINOPHEN 1 TABLET: 10; 325 TABLET ORAL at 15:49

## 2017-01-01 RX ADMIN — VANCOMYCIN HYDROCHLORIDE 250 MG: 1 INJECTION, POWDER, LYOPHILIZED, FOR SOLUTION INTRAVENOUS at 11:14

## 2017-01-01 RX ADMIN — MIDAZOLAM 1 MG: 1 INJECTION INTRAMUSCULAR; INTRAVENOUS at 09:08

## 2017-01-01 RX ADMIN — VANCOMYCIN HYDROCHLORIDE 250 MG: 1 INJECTION, POWDER, LYOPHILIZED, FOR SOLUTION INTRAVENOUS at 17:18

## 2017-01-01 RX ADMIN — LACTOBACILLUS TAB 1 TABLET: TAB at 08:48

## 2017-01-01 RX ADMIN — METRONIDAZOLE 500 MG: 500 INJECTION, SOLUTION INTRAVENOUS at 03:46

## 2017-01-01 RX ADMIN — HYDROCODONE BITARTRATE AND ACETAMINOPHEN 1 TABLET: 10; 325 TABLET ORAL at 22:06

## 2017-01-01 RX ADMIN — HYDROCODONE BITARTRATE AND ACETAMINOPHEN 1 TABLET: 10; 325 TABLET ORAL at 12:02

## 2017-01-01 RX ADMIN — METRONIDAZOLE 500 MG: 500 SOLUTION INTRAVENOUS at 08:04

## 2017-09-06 PROBLEM — R10.30 LOWER ABDOMINAL PAIN: Status: ACTIVE | Noted: 2017-01-01

## 2017-09-06 NOTE — ED PROVIDER NOTES
Subjective   HPI Comments: 71 yo female with PMH of [place]  Presents to X 2 weeks ago with 2 days of vomiting. She has been having diarrhea 5 to 10 times per day. Tolerates fluids, no solid food. LLQ pain X 2 weeks ago pain last for minutes.  It worse before diarrhea and better afterwards.  No chest pain no SOB, no fevers no chills. +back pain. Sees streaks of blood on stool. Follows up with Dr. Morales not taking mediations.       History provided by:  Patient   used: No        Review of Systems   Respiratory: Negative.    Cardiovascular: Negative.    Gastrointestinal: Positive for abdominal pain, diarrhea, nausea and vomiting.   Genitourinary: Negative.    Hematological: Negative.    All other systems reviewed and are negative.      No past medical history on file.    Allergies   Allergen Reactions   • Aspirin        No past surgical history on file.    No family history on file.    Social History     Social History   • Marital status:      Spouse name: N/A   • Number of children: N/A   • Years of education: N/A     Social History Main Topics   • Smoking status: Former Smoker   • Smokeless tobacco: Never Used   • Alcohol use Not on file   • Drug use: Not on file   • Sexual activity: Not on file     Other Topics Concern   • Not on file     Social History Narrative   • No narrative on file           Objective   Physical Exam   Constitutional: She is oriented to person, place, and time. She appears well-developed and well-nourished. No distress.   HENT:   Head: Normocephalic and atraumatic.   Eyes: Conjunctivae and EOM are normal. Pupils are equal, round, and reactive to light. Right eye exhibits no discharge. Left eye exhibits no discharge. No scleral icterus.   Neck: Normal range of motion. Neck supple. No JVD present. No tracheal deviation present. No thyromegaly present.   Cardiovascular: Normal rate, regular rhythm and normal heart sounds.  Exam reveals no gallop and no friction rub.     No murmur heard.  Pulmonary/Chest: No stridor.   Abdominal: Soft. She exhibits no distension and no mass. There is tenderness. There is guarding. There is no rebound. No hernia.   LLQ tenderness    Musculoskeletal: Normal range of motion. She exhibits no edema, tenderness or deformity.   Lymphadenopathy:     She has no cervical adenopathy.   Neurological: She is alert and oriented to person, place, and time. She has normal reflexes. She displays normal reflexes. No cranial nerve deficit. She exhibits normal muscle tone. Coordination normal.   Skin: Skin is warm. No rash noted. She is not diaphoretic. No erythema. No pallor.   Psychiatric: She has a normal mood and affect.   Nursing note and vitals reviewed.      Procedures         ED Course  ED Course                  MDM  Number of Diagnoses or Management Options     Amount and/or Complexity of Data Reviewed  Clinical lab tests: ordered and reviewed  Tests in the radiology section of CPT®: ordered and reviewed  Tests in the medicine section of CPT®: ordered and reviewed  Discussion of test results with the performing providers: yes  Decide to obtain previous medical records or to obtain history from someone other than the patient: yes  Obtain history from someone other than the patient: yes  Review and summarize past medical records: yes  Discuss the patient with other providers: yes  Independent visualization of images, tracings, or specimens: yes    Risk of Complications, Morbidity, and/or Mortality  Presenting problems: moderate  Diagnostic procedures: moderate  Management options: moderate    Patient Progress  Patient progress: improved      Final diagnoses:   None            Clifford Velazquez MD  09/06/17 1702       Clifford Velazquez MD  09/06/17 9448

## 2017-09-06 NOTE — ED TRIAGE NOTES
Pt was seen at [facility] office today & instructed to come here to be seen b/c she was dehydrated

## 2017-09-07 PROBLEM — E87.6 HYPOKALEMIA: Status: RESOLVED | Noted: 2017-01-01 | Resolved: 2017-01-01

## 2017-09-07 PROBLEM — E87.6 HYPOKALEMIA: Status: ACTIVE | Noted: 2017-01-01

## 2017-09-07 PROBLEM — A04.72 C. DIFFICILE COLITIS: Status: ACTIVE | Noted: 2017-01-01

## 2017-09-07 PROBLEM — K52.9 INFLAMMATORY BOWEL DISEASE: Chronic | Status: ACTIVE | Noted: 2017-01-01

## 2017-09-07 NOTE — PLAN OF CARE
Problem: Patient Care Overview (Adult)  Goal: Plan of Care Review  Outcome: Ongoing (interventions implemented as appropriate)    09/07/17 1446   Coping/Psychosocial Response Interventions   Plan Of Care Reviewed With patient   Patient Care Overview   Progress improving   Outcome Evaluation   Outcome Summary/Follow up Plan VSS, no complaints of Nausea, tolerating diet, and pain controlled       Goal: Adult Individualization and Mutuality  Outcome: Ongoing (interventions implemented as appropriate)  Goal: Discharge Needs Assessment  Outcome: Ongoing (interventions implemented as appropriate)    Problem: Fluid Volume Deficit (Adult)  Goal: Identify Related Risk Factors and Signs and Symptoms  Outcome: Ongoing (interventions implemented as appropriate)  Goal: Fluid/Electrolyte Balance  Outcome: Ongoing (interventions implemented as appropriate)  Goal: Comfort/Well Being  Outcome: Ongoing (interventions implemented as appropriate)

## 2017-09-07 NOTE — PAYOR COMM NOTE
"Kira Borrero (72 y.o. Female)     Date of Birth Social Security Number Address Home Phone MRN    1945  [address] [place], [place] [id number] [phone] [id number]    Zoroastrianism Marital Status          None        Admission Date Admission Type Admitting Provider Attending Provider Department, Room/Bed    9/6/17 Emergency Echendu, MD Magdalena Strange Anthony W, MD [address], [facility]/1    Discharge Date Discharge Disposition Discharge Destination                      Attending Provider: Atif Nichols MD     Allergies:  Aspirin    Isolation:  Contact   Infection:  None   Code Status:  FULL    Ht:  63\" (160 cm)   Wt:  135 lb 8 oz (61.5 kg)    Admission Cmt:  None   Principal Problem:  None                Active Insurance as of 9/6/2017     Primary Coverage     Payor Plan Insurance Group Employer/Plan Group    MEDICARE MEDICARE A & B      Payor Plan Address Payor Plan Phone Number Effective From Effective To    [address] [phone] 4/1/2010     [place] [address]       Subscriber Name Subscriber Birth Date Member ID       KIRA BORRERO GRETEL 1945 [id number]           Secondary Coverage     Payor Plan Insurance Group Employer/Plan Group    [employer] [place]ICAID      Payor Plan Address Payor Plan Phone Number Effective From Effective To    [address] [address] [phone] 9/6/2017     [place] [address]       Subscriber Name Subscriber Birth Date Member ID       KIRA BORRERO GRETEL 1945 [id number]                 Emergency Contacts      (Rel.) Home Phone Work Phone Mobile Phone    Abdi Borrero (Son) [phone] -- --        Janee Sierra RN [facility]  [phone] phone  [phone] fax       History & Physical      Atif Nichols MD at 9/6/2017  9:00 PM                [facility]  INPATIENT HISTORY AND PHYSICAL       Patient Care Team:  Nish Morales DO as PCP - " General (Gastroenterology)    Chief complaint   Chief Complaint   Patient presents with   • Abdominal Pain   • Diarrhea   • Vomiting       Subjective     Patient is a 72-year-old female with a history of inflammatory bowel disease (Crohn's disease) who presents to emergency department due to 2-3 week history of progressively worsening bloody diarrhea associated with nausea, vomiting and left lower quadrant pain.  She denies fever, chills, chest pain or shortness of air.  Her appetite has been poor as a result and she now feels weak.  She denies any recent use of antibiotics.  The symptoms got worse within the past 2 days hence the patient was seen initially at an urgent care and referred to the emergency department.    On triage she was hemodynamically stable.  Blood work showed leukocytosis with white count of 16,000 with left shift.  Hemoglobin was 12 and potassium 2.6.  She had CT scan of the abdomen and pelvis that showed colonic thickening with inflammatory changes.    Patient was reportedly diagnosed with Crohn's disease by Dr. Morales about 3 years ago.  She was prescribed Humira but declined to take the medication secondary to associated side effects.  She is scheduled to see Dr. Morales for follow-up in early October 2017.    Family and social history: Patient lives alone and is able to take care of activities of daily living.  She denies history of alcohol or tobacco use.  Denies family history of inflammatory bowel disease but does admit to family history of stroke and coronary artery disease.  Review of Systems   Review of Systems   Constitutional: Positive for appetite change. Negative for chills, diaphoresis, fatigue and fever.   HENT: Negative for congestion, ear discharge, ear pain, facial swelling, hearing loss, nosebleeds, postnasal drip, rhinorrhea, sneezing, sore throat, tinnitus, trouble swallowing and voice change.    Eyes: Negative for photophobia, discharge, redness, itching and visual  disturbance.   Respiratory: Negative for cough, choking, chest tightness, shortness of breath, wheezing and stridor.    Cardiovascular: Negative for chest pain, palpitations and leg swelling.   Gastrointestinal: Positive for abdominal pain, blood in stool, diarrhea, nausea and vomiting. Negative for abdominal distention, anal bleeding, constipation and rectal pain.   Endocrine: Negative for cold intolerance, heat intolerance, polydipsia, polyphagia and polyuria.   Genitourinary: Negative for decreased urine volume, difficulty urinating, dysuria, enuresis, flank pain, frequency, hematuria, pelvic pain and urgency.   Musculoskeletal: Negative for arthralgias, back pain, gait problem, joint swelling, myalgias, neck pain and neck stiffness.   Skin: Negative for color change, pallor, rash and wound.   Neurological: Positive for weakness. Negative for dizziness, tremors, seizures, syncope, facial asymmetry, speech difficulty, light-headedness, numbness and headaches.   Hematological: Does not bruise/bleed easily.   Psychiatric/Behavioral: Negative for agitation, behavioral problems, confusion, hallucinations, sleep disturbance and suicidal ideas. The patient is not nervous/anxious.        History  No past medical history on file.  No past surgical history on file.  No family history on file.  Social History   Substance Use Topics   • Smoking status: Former Smoker   • Smokeless tobacco: Never Used   • Alcohol use Not on file       (Not in a hospital admission)  Allergies:  Aspirin  Prior to Admission medications    Not on File       Objective     Vital Signs    Temp:  [98.7 °F (37.1 °C)-99.1 °F (37.3 °C)] 99.1 °F (37.3 °C)  Heart Rate:  [] 91  Resp:  [16-18] 18  BP: (117-144)/(62-84) 119/62    Physical Exam:      Physical Exam   Constitutional: She is oriented to person, place, and time. She appears well-developed and well-nourished. She is cooperative. No distress.   HENT:   Head: Normocephalic and atraumatic.    Right Ear: External ear normal.   Left Ear: External ear normal.   Nose: Nose normal.   Mouth/Throat: Oropharynx is clear and moist.   Eyes: Conjunctivae and EOM are normal. Pupils are equal, round, and reactive to light.   Neck: Normal range of motion. Neck supple. No JVD present. No thyromegaly present.   Cardiovascular: Normal rate, regular rhythm, normal heart sounds and intact distal pulses.  Exam reveals no gallop and no friction rub.    No murmur heard.  Pulmonary/Chest: Effort normal and breath sounds normal. No stridor. No respiratory distress. She has no wheezes. She has no rales. She exhibits no tenderness.   Abdominal: Soft. Bowel sounds are normal. She exhibits no distension and no mass. There is no hepatosplenomegaly. There is tenderness in the left lower quadrant. There is no rigidity, no rebound, no guarding and no CVA tenderness. No hernia.   Musculoskeletal: Normal range of motion. She exhibits no edema, tenderness or deformity.   Neurological: She is alert and oriented to person, place, and time. She has normal reflexes. No cranial nerve deficit. She exhibits normal muscle tone. Coordination normal.   Skin: Skin is warm and dry. No rash noted. She is not diaphoretic. No erythema. No pallor.   Psychiatric: She has a normal mood and affect. Her behavior is normal. Judgment and thought content normal.   Nursing note and vitals reviewed.      Results Review:       Results from last 7 days  Lab Units 09/06/17  1715   SODIUM mmol/L 133*   POTASSIUM mmol/L 2.6*   CHLORIDE mmol/L 93*   CO2 mmol/L 24.0   BUN mg/dL 11   CREATININE mg/dL 1.44*   GLUCOSE mg/dL 133*   CALCIUM mg/dL 8.9   BILIRUBIN mg/dL 0.7   ALK PHOS U/L 134*   ALT (SGPT) U/L 18   AST (SGOT) U/L 27         Results from last 7 days  Lab Units 09/06/17  1715   MAGNESIUM mg/dL 2.0         Results from last 7 days  Lab Units 09/06/17  1715   WBC 10*3/mm3 16.12*   HEMOGLOBIN g/dL 12.4   HEMATOCRIT % 36.7   PLATELETS 10*3/mm3 313            Imaging Results (last 7 days)     Procedure Component Value Units Date/Time    CT Abdomen Pelvis Without Contrast [[id number]] Collected:  09/06/17 1929     Updated:  09/06/17 2008    Narrative:         EXAMINATION:  Computed Tomography           REGION:    Abdomen / Pelvis                   INDICATION:    Diarrhea, left lower quadrant pain      HISTORY:  BENJAMIN. IMAGING:    none          TECHNIQUE:      - reconstructions:    axial, coronal, sagittal      - contrast:      oral:  no ;   intravenous:  no     - Please note:        - Lack of IV contrast limits assessment of solid organ  parenchyma, urinary system, or vascular structures.        - Lack of oral contrast limits assessment of GI tract  structures.          This exam was performed according to the departmental  dose-optimization program which includes automated exposure  control, adjustment of the mA and/or kV according to patient size  and/or use of iterative reconstruction technique.                COMMENTS:    THORAX (INFERIOR):  The lung bases are clear.     The pleura is without fluid or a mass.     The heart size is normal size and there is no pericardial fluid.    ABDOMEN:  Limited assessment of the solid organ parenchyma is grossly  unremarkable demonstrating no evidence of organomegaly.    Enlarged gallbladder.  Limited assessment of the viscera demonstrates overall normal  caliber, however, suggests the presence of circumferential wall  thickening with the distal ascending colon, transverse colon, and  descending colon.    No evidence of free fluid or free intraperitoneal air.     The osseous structures are grossly unremarkable for age.    RETROPERITONEUM:  Limited assessment of the kidneys demonstrates overall normal  size.     Limited assessment of the ureters demonstrates normal caliber and  course.    The adrenal glands are of normal size and contour.     No gross evidence of significant retroperitoneal adenopathy.  The vascular structures are  grossly within normal limits for age.    PELVIS:  Limited assessment of the viscera is grossly unremarkable  demonstrating normal caliber bowel loops.     No evidence of free fluid or free intraperitoneal air.     The osseous structures are grossly unremarkable for age.     The vascular structures are grossly within normal limits for age.            .          Impression:       CONCLUSION:     1. Limited examination due to the lack of intravenous and oral  contrast.        2. There are normal caliber bowel loops however there is  suggestion of circumferential wall thickening associated with the  distal ascending colon, transverse colon, and majority of  descending colon. This degree of wall thickening suggests a  diffuse inflammatory process.  3. The gallbladder is enlarged, and nearly hydropic. Correlation  with ultrasound suggested.                            Electronically signed by:  CYNDIE Simpson MD  9/6/2017 8:07 PM  CDT Workstation: [id number]          Assessment/Plan   1.  Exacerbation of inflammatory bowel disease: Patient will be admitted and commenced on broad-spectrum antibiotics, IV hydration and empiric steroids.  C-reactive protein and ESR will be checked and GI will be consulted.  Stool will be sent for GI panel.  2.  Hypokalemia: Patient had potassium repletion in the emergency department and this will be continued and monitored.  3.  She'll be placed on GI and DVT prophylaxis.  4.  Further treatment plans or diagnostic studies as hospital course dictates.        I discussed the patients findings and my recommendations with patient.     Atif Nichols MD  09/06/17  9:00 PM        Total Time Spent: 55 minutes    EMR Dragon/Transcription disclaimer:   Much of this encounter note is an electronic transcription/translation of spoken language to printed text. The electronic translation of spoken language may permit erroneous, or at times, nonsensical words or phrases to be inadvertently  "transcribed; Although I have reviewed the note for such errors, some may still exist.                 Electronically signed by Atif Nichols MD at 9/6/2017  9:13 PM        Hospital Medications (active)       Dose Frequency Start End    acetaminophen (TYLENOL) tablet 650 mg 650 mg Every 4 Hours PRN 9/6/2017     Sig - Route: Take 2 tablets by mouth Every 4 (Four) Hours As Needed for Mild Pain . - Oral    famotidine (PEPCID) tablet 40 mg 40 mg Daily 9/7/2017     Sig - Route: Take 1 tablet by mouth Daily. - Oral    HYDROmorphone (DILAUDID) injection 0.5 mg 0.5 mg Every 2 Hours PRN 9/7/2017 9/17/2017    Sig - Route: Infuse 0.25 mL into a venous catheter Every 2 (Two) Hours As Needed for Severe Pain . - Intravenous    Linked Group 1:  \"And\" Linked Group Details        influenza vac split quad (FLUZONE QUADRIVALENT) IM suspension 0.5 mL 0.5 mL During Hospitalization 9/6/2017     Sig - Route: Inject 0.5 mL into the shoulder, thigh, or buttocks During Hospitalization for Immunization. - Intramuscular    levoFLOXacin (LEVAQUIN) 500 mg/100 mL D5W (premix) (LEVAQUIN) 500 mg 500 mg Every 24 Hours 9/6/2017     Sig - Route: Infuse 100 mL into a venous catheter Daily. - Intravenous    metroNIDAZOLE (FLAGYL) IVPB 500 mg 500 mg Once 9/6/2017 9/6/2017    Sig - Route: Infuse 100 mL into a venous catheter 1 (One) Time. - Intravenous    metroNIDAZOLE (FLAGYL) tablet 500 mg 500 mg Every 8 Hours Scheduled 9/7/2017     Sig - Route: Take 1 tablet by mouth Every 8 (Eight) Hours. - Oral    naloxone (NARCAN) injection 0.4 mg 0.4 mg Every 5 Minutes PRN 9/7/2017     Sig - Route: Infuse 1 mL into a venous catheter Every 5 (Five) Minutes As Needed for Respiratory Depression. - Intravenous    Linked Group 1:  \"And\" Linked Group Details        ondansetron (ZOFRAN) injection 4 mg 4 mg Once 9/6/2017 9/6/2017    Sig - Route: Infuse 2 mL into a venous catheter 1 (One) Time. - Intravenous    ondansetron (ZOFRAN) tablet 4 mg 4 mg Every 6 Hours PRN " "9/6/2017     Sig - Route: Take 1 tablet by mouth Every 6 (Six) Hours As Needed for Nausea or Vomiting. - Oral    pneumococcal polysaccharide 23-valent (PNEUMOVAX-23) vaccine 0.5 mL 0.5 mL During Hospitalization 9/6/2017     Sig - Route: Inject 0.5 mL into the shoulder, thigh, or buttocks During Hospitalization for Immunization. - Intramuscular    potassium chloride (KLOR-CON) packet 40 mEq 40 mEq As Needed 9/6/2017     Sig - Route: Take 40 mEq by mouth As Needed (potassium replacement, see admin instructions). - Oral    Linked Group 2:  \"Or\" Linked Group Details        potassium chloride (MICRO-K) CR capsule 40 mEq 40 mEq As Needed 9/6/2017     Sig - Route: Take 4 capsules by mouth As Needed (potassium replacement.  see admin instructions). - Oral    Linked Group 2:  \"Or\" Linked Group Details        potassium chloride 10 mEq in 100 mL IVPB 10 mEq Once 9/6/2017 9/6/2017    Sig - Route: Infuse 100 mL into a venous catheter 1 (One) Time. - Intravenous    potassium chloride 10 mEq in 100 mL IVPB 10 mEq Every 1 Hour PRN 9/6/2017     Sig - Route: Infuse 100 mL into a venous catheter Every 1 (One) Hour As Needed (potassium protocol PERIPHERAL - see admin instructions). - Intravenous    Linked Group 2:  \"Or\" Linked Group Details        sodium chloride 0.9 % bolus 1,000 mL 1,000 mL Once 9/6/2017 9/6/2017    Sig - Route: Infuse 1,000 mL into a venous catheter 1 (One) Time. - Intravenous    sodium chloride 0.9 % bolus 1,000 mL 1,000 mL Once 9/6/2017 9/6/2017    Sig - Route: Infuse 1,000 mL into a venous catheter 1 (One) Time. - Intravenous    sodium chloride 0.9 % flush 1-10 mL 1-10 mL As Needed 9/6/2017     Sig - Route: Infuse 1-10 mL into a venous catheter As Needed for Line Care. - Intravenous    sodium chloride 0.9 % with KCl 20 mEq/L infusion 100 mL/hr Continuous 9/6/2017     Sig - Route: Infuse 100 mL/hr into a venous catheter Continuous. - Intravenous    HYDROmorphone (DILAUDID) injection 0.5 mg (Discontinued) 0.5 mg " "Every 2 Hours PRN 9/6/2017 9/7/2017    Sig - Route: Infuse 0.5 mL into a venous catheter Every 2 (Two) Hours As Needed for Severe Pain . - Intravenous    Reason for Discontinue: Formulary change    Linked Group 3:  \"And\" Linked Group Details        methylPREDNISolone sodium succinate (SOLU-Medrol) injection 60 mg (Discontinued) 60 mg Every 8 Hours 9/6/2017 9/7/2017    Sig - Route: Infuse 0.96 mL into a venous catheter Every 8 (Eight) Hours. - Intravenous    metroNIDAZOLE (FLAGYL) IVPB 500 mg (Discontinued) 500 mg Every 8 Hours 9/6/2017 9/7/2017    Sig - Route: Infuse 100 mL into a venous catheter Every 8 (Eight) Hours. - Intravenous    naloxone (NARCAN) injection 0.4 mg (Discontinued) 0.4 mg Every 5 Minutes PRN 9/6/2017 9/7/2017    Sig - Route: Infuse 1 mL into a venous catheter Every 5 (Five) Minutes As Needed for Respiratory Depression. - Intravenous    Reason for Discontinue: Formulary change    Linked Group 3:  \"And\" Linked Group Details        potassium chloride 10 mEq in 100 mL IVPB (Discontinued) 10 mEq Once 9/6/2017 9/6/2017    Sig - Route: Infuse 100 mL into a venous catheter 1 (One) Time. - Intravenous    potassium chloride 10 mEq in 100 mL IVPB (Discontinued) 10 mEq Once 9/6/2017 9/6/2017    Sig - Route: Infuse 100 mL into a venous catheter 1 (One) Time. - Intravenous          Physician Progress Notes (last 24 hours) (Notes from 9/6/2017  8:46 AM through 9/7/2017  8:46 AM)     No notes of this type exist for this encounter.        Consult Notes (last 24 hours) (Notes from 9/6/2017  8:47 AM through 9/7/2017  8:47 AM)     No notes of this type exist for this encounter.        "

## 2017-09-07 NOTE — PROGRESS NOTES
MEDICINE DAILY PROGRESS NOTE  NAME: Damaris Sánchez  : 1945  MRN: [id number]     LOS: 1 day     PROVIDER OF SERVICE: Chaz Beckett MD    Chief Complaint: C. difficile colitis    Subjective:     Interval History:  History taken from: patient chart family RN  Patient doing better today than last night.  Still has complaints of diarrhea.  C. Diff positive.      Review of Systems:   Review of Systems   Constitutional: Positive for appetite change. Negative for activity change, fatigue and fever.   HENT: Negative for ear pain and sore throat.    Eyes: Negative for pain and visual disturbance.   Respiratory: Negative for cough and shortness of breath.    Cardiovascular: Negative for chest pain and palpitations.   Gastrointestinal: Positive for abdominal pain, diarrhea and nausea. Negative for constipation.   Endocrine: Negative for cold intolerance and heat intolerance.   Genitourinary: Negative for difficulty urinating and dysuria.   Musculoskeletal: Negative for arthralgias and gait problem.   Skin: Negative for color change and rash.   Neurological: Positive for weakness. Negative for dizziness and headaches.   Hematological: Negative for adenopathy. Does not bruise/bleed easily.   Psychiatric/Behavioral: Negative for agitation, confusion and sleep disturbance.       Objective:     Vital Signs  Vitals:    17 0100 17 0315 17 0827 17 1100   BP:  112/56  122/62   BP Location:  Left arm  Left arm   Patient Position:  Lying  Lying   Pulse: 81 83 83 92   Resp:  18  18   Temp:  97.8 °F (36.6 °C)  98.5 °F (36.9 °C)   TempSrc:  Oral  Oral   SpO2:  92%  92%   Weight:       Height:           Physical Exam  Physical Exam   Constitutional: She is oriented to person, place, and time. She appears well-developed and well-nourished. No distress.   HENT:   Head: Normocephalic and atraumatic.   Right Ear: External ear normal.   Left Ear: External ear normal.   Nose: Nose normal.   Eyes: Conjunctivae and EOM  are normal. Pupils are equal, round, and reactive to light. Right eye exhibits no discharge. Left eye exhibits no discharge. No scleral icterus.   Neck: Normal range of motion. Neck supple.   Cardiovascular: Normal rate, regular rhythm, normal heart sounds and intact distal pulses.  Exam reveals no gallop and no friction rub.    No murmur heard.  Pulmonary/Chest: Effort normal and breath sounds normal. No respiratory distress. She has no wheezes. She has no rales. She exhibits no tenderness.   Abdominal: Soft. Bowel sounds are normal. She exhibits no distension and no mass. There is no tenderness. There is no rebound and no guarding.   Musculoskeletal: Normal range of motion.   Neurological: She is alert and oriented to person, place, and time.   Skin: Skin is warm and dry. No rash noted. She is not diaphoretic. No erythema. No pallor.   Psychiatric: She has a normal mood and affect. Her behavior is normal.   Nursing note and vitals reviewed.      Medication Review    Current Facility-Administered Medications:   •  acetaminophen (TYLENOL) tablet 650 mg, 650 mg, Oral, Q4H PRN, Atif Nichols MD, 650 mg at 09/07/17 1325  •  famotidine (PEPCID) tablet 40 mg, 40 mg, Oral, Daily, Atif Nichols MD, 40 mg at 09/07/17 0913  •  HYDROmorphone (DILAUDID) injection 0.5 mg, 0.5 mg, Intravenous, Q2H PRN **AND** naloxone (NARCAN) injection 0.4 mg, 0.4 mg, Intravenous, Q5 Min PRN, Atif Nichols MD  •  influenza vac split quad (FLUZONE QUADRIVALENT) IM suspension 0.5 mL, 0.5 mL, Intramuscular, During Hospitalization, Atif Nichols MD  •  levoFLOXacin (LEVAQUIN) 500 mg/100 mL D5W (premix) (LEVAQUIN) 500 mg, 500 mg, Intravenous, Q24H, Clifford Velazquez MD, 500 mg at 09/06/17 2146  •  metroNIDAZOLE (FLAGYL) tablet 500 mg, 500 mg, Oral, Q8H, Atif Nichols MD  •  ondansetron (ZOFRAN) tablet 4 mg, 4 mg, Oral, Q6H PRN, Atif Nichols MD  •  pneumococcal polysaccharide 23-valent (PNEUMOVAX-23) vaccine 0.5 mL, 0.5 mL,  Intramuscular, During Hospitalization, Atif Nichols MD  •  potassium chloride (MICRO-K) CR capsule 40 mEq, 40 mEq, Oral, PRN **OR** potassium chloride (KLOR-CON) packet 40 mEq, 40 mEq, Oral, PRN **OR** potassium chloride 10 mEq in 100 mL IVPB, 10 mEq, Intravenous, Q1H PRN, Atif Nichols MD, Stopped at 09/07/17 0454  •  sodium chloride 0.9 % flush 1-10 mL, 1-10 mL, Intravenous, PRN, Atif Nichols MD  •  sodium chloride 0.9 % with KCl 20 mEq/L infusion, 100 mL/hr, Intravenous, Continuous, Atif Nichols MD, Last Rate: 100 mL/hr at 09/07/17 1322, 100 mL/hr at 09/07/17 1322     Diagnostic Data    Lab Results (last 24 hours)     Procedure Component Value Units Date/Time    CBC & Differential [[id number]] Collected:  09/06/17 1715    Specimen:  Blood Updated:  09/06/17 1727    Narrative:       The following orders were created for panel order CBC & Differential.  Procedure                               Abnormality         Status                     ---------                               -----------         ------                     CBC Auto Differential[[id number]]         Abnormal            Final result                 Please view results for these tests on the individual orders.    CBC Auto Differential [[id number]]  (Abnormal) Collected:  09/06/17 1715    Specimen:  Blood Updated:  09/06/17 1727     WBC 16.12 (H) 10*3/mm3      RBC 4.13 10*6/mm3      Hemoglobin 12.4 g/dL      Hematocrit 36.7 %      MCV 88.9 fL      MCH 30.0 pg      MCHC 33.8 g/dL      RDW 13.8 %      RDW-SD 45.0 fl      MPV 9.4 fL      Platelets 313 10*3/mm3      Neutrophil % 73.0 %      Lymphocyte % 15.8 %      Monocyte % 9.3 %      Eosinophil % 0.9 %      Basophil % 0.3 %      Immature Grans % 0.7 (H) %      Neutrophils, Absolute 11.78 (H) 10*3/mm3      Lymphocytes, Absolute 2.54 10*3/mm3      Monocytes, Absolute 1.50 (H) 10*3/mm3      Eosinophils, Absolute 0.14 10*3/mm3      Basophils, Absolute 0.05 10*3/mm3      Immature Grans,  Absolute 0.11 (H) 10*3/mm3     Lipase [[id number]]  (Normal) Collected:  09/06/17 1715    Specimen:  Blood Updated:  09/06/17 1736     Lipase 42 U/L     Comprehensive Metabolic Panel [[id number]]  (Abnormal) Collected:  09/06/17 1715    Specimen:  Blood Updated:  09/06/17 1740     Glucose 133 (H) mg/dL      BUN 11 mg/dL      Creatinine 1.44 (H) mg/dL      Sodium 133 (L) mmol/L      Potassium 2.6 (C) mmol/L      Chloride 93 (L) mmol/L      CO2 24.0 mmol/L      Calcium 8.9 mg/dL      Total Protein 8.0 g/dL      Albumin 3.90 g/dL      ALT (SGPT) 18 U/L      AST (SGOT) 27 U/L      Alkaline Phosphatase 134 (H) U/L      Total Bilirubin 0.7 mg/dL      eGFR Non African Amer 36 (L) mL/min/1.73      Globulin 4.1 (H) gm/dL      A/G Ratio 1.0 (L) g/dL      BUN/Creatinine Ratio 7.6     Anion Gap 16.0 (H) mmol/L     Narrative:       The MDRD GFR formula is only valid for adults with stable renal function between ages 18 and 70.    Magnesium [[id number]]  (Normal) Collected:  09/06/17 1715    Specimen:  Blood Updated:  09/06/17 1755     Magnesium 2.0 mg/dL     Urinalysis With / Culture If Indicated [[id number]]  (Abnormal) Collected:  09/06/17 2103    Specimen:  Urine from Urine, Clean Catch Updated:  09/06/17 2126     Color, UA Dark Yellow     Appearance, UA Clear     pH, UA 5.5     Specific Gravity, UA 1.029     Glucose, UA Negative     Ketones, UA Trace (A)     Bilirubin, UA Small (1+) (A)     Blood, UA Large (3+) (A)     Protein,  mg/dL (2+) (A)     Leuk Esterase, UA Trace (A)     Nitrite, UA Negative     Urobilinogen, UA 0.2 E.U./dL    Urinalysis, Microscopic Only [[id number]]  (Abnormal) Collected:  09/06/17 2103    Specimen:  Urine from Urine, Clean Catch Updated:  09/06/17 2145     RBC, UA 13-20 (A) /HPF      WBC, UA 6-12 (A) /HPF      Bacteria, UA 2+ (A) /HPF      Squamous Epithelial Cells, UA 6-12 (A) /HPF      Hyaline Casts, UA Too Numerous to Count /LPF      Methodology Manual Light Microscopy    CBC & Differential  [[id number]] Collected:  09/06/17 2253    Specimen:  Blood Updated:  09/06/17 2258    Narrative:       The following orders were created for panel order CBC & Differential.  Procedure                               Abnormality         Status                     ---------                               -----------         ------                     CBC Auto Differential[[id number]]        Abnormal            Final result                 Please view results for these tests on the individual orders.    CBC Auto Differential [[id number]]  (Abnormal) Collected:  09/06/17 2253    Specimen:  Blood Updated:  09/06/17 2258     WBC 8.83 10*3/mm3      RBC 3.48 (L) 10*6/mm3      Hemoglobin 10.4 (L) g/dL      Hematocrit 30.5 (L) %      MCV 87.6 fL      MCH 29.9 pg      MCHC 34.1 g/dL      RDW 13.6 %      RDW-SD 43.8 fl      MPV 9.4 fL      Platelets 210 10*3/mm3      Neutrophil % 68.0 %      Lymphocyte % 17.8 %      Monocyte % 10.3 %      Eosinophil % 2.8 %      Basophil % 0.3 %      Immature Grans % 0.8 (H) %      Neutrophils, Absolute 6.00 10*3/mm3      Lymphocytes, Absolute 1.57 10*3/mm3      Monocytes, Absolute 0.91 (H) 10*3/mm3      Eosinophils, Absolute 0.25 10*3/mm3      Basophils, Absolute 0.03 10*3/mm3      Immature Grans, Absolute 0.07 (H) 10*3/mm3     Basic Metabolic Panel [[id number]]  (Abnormal) Collected:  09/06/17 2253    Specimen:  Blood Updated:  09/06/17 2319     Glucose 115 (H) mg/dL      BUN 11 mg/dL      Creatinine 1.10 (H) mg/dL      Sodium 133 (L) mmol/L      Potassium 2.5 (C) mmol/L      Chloride 99 mmol/L      CO2 25.0 mmol/L      Calcium 8.0 (L) mg/dL      eGFR Non African Amer 49 mL/min/1.73      BUN/Creatinine Ratio 10.0     Anion Gap 9.0 mmol/L     Narrative:       The MDRD GFR formula is only valid for adults with stable renal function between ages 18 and 70.    C-reactive Protein [[id number]]  (Abnormal) Collected:  09/06/17 2253    Specimen:  Blood Updated:  09/06/17 2327     C-Reactive Protein 20.60 (H)  mg/dL     Sedimentation Rate [[id number]]  (Abnormal) Collected:  09/06/17 2253    Specimen:  Blood Updated:  09/07/17 0000     Sed Rate 47 (H) mm/hr     CBC & Differential [[id number]] Collected:  09/07/17 0519    Specimen:  Blood Updated:  09/07/17 0609    Narrative:       The following orders were created for panel order CBC & Differential.  Procedure                               Abnormality         Status                     ---------                               -----------         ------                     CBC Auto Differential[[id number]]        Abnormal            Final result                 Please view results for these tests on the individual orders.    CBC Auto Differential [[id number]]  (Abnormal) Collected:  09/07/17 0519    Specimen:  Blood Updated:  09/07/17 0609     WBC 7.21 10*3/mm3      RBC 3.52 (L) 10*6/mm3      Hemoglobin 10.8 (L) g/dL      Hematocrit 31.1 (L) %      MCV 88.4 fL      MCH 30.7 pg      MCHC 34.7 g/dL      RDW 14.0 %      RDW-SD 45.1 fl      MPV 9.1 fL      Platelets 247 10*3/mm3      Neutrophil % 86.4 (H) %      Lymphocyte % 9.6 (L) %      Monocyte % 2.6 %      Eosinophil % 0.3 %      Basophil % 0.1 %      Immature Grans % 1.0 (H) %      Neutrophils, Absolute 6.23 10*3/mm3      Lymphocytes, Absolute 0.69 10*3/mm3      Monocytes, Absolute 0.19 10*3/mm3      Eosinophils, Absolute 0.02 10*3/mm3      Basophils, Absolute 0.01 10*3/mm3      Immature Grans, Absolute 0.07 (H) 10*3/mm3     Urine Culture [[id number]]  (Normal) Collected:  09/06/17 2103    Specimen:  Urine from Urine, Clean Catch Updated:  09/07/17 0627     Urine Culture Culture in progress    Basic Metabolic Panel [[id number]]  (Abnormal) Collected:  09/07/17 0519    Specimen:  Blood Updated:  09/07/17 0632     Glucose 150 (H) mg/dL      BUN 9 mg/dL      Creatinine 0.96 mg/dL      Sodium 137 mmol/L      Potassium 4.3 mmol/L      Chloride 105 mmol/L      CO2 22.0 mmol/L      Calcium 8.4 mg/dL      eGFR Non  Amer 57 (L)  mL/min/1.73      BUN/Creatinine Ratio 9.4     Anion Gap 10.0 mmol/L     Narrative:       The MDRD GFR formula is only valid for adults with stable renal function between ages 18 and 70.    Clostridium Difficile Toxin [[id number]] Collected:  09/07/17 0512    Specimen:  Stool from Per Rectum Updated:  09/07/17 0720    Narrative:       The following orders were created for panel order Clostridium Difficile Toxin.  Procedure                               Abnormality         Status                     ---------                               -----------         ------                     Clostridium Difficile Tox...[[id number]]                      Final result                 Please view results for these tests on the individual orders.    Clostridium Difficile Toxin, PCR [[id number]] Collected:  09/07/17 0512    Specimen:  Stool from Per Rectum Updated:  09/07/17 0720     C. Difficile Toxins by PCR Positive      contact precautions requested         Narrative:       This test is performed by utilizing real time polymerace chain recation (PCR).     Blood Culture [[id number]]  (Normal) Collected:  09/06/17 2109    Specimen:  Blood from Arm, Left Updated:  09/07/17 1001     Blood Culture No growth at less than 24 hours    Blood Culture [[id number]]  (Normal) Collected:  09/06/17 2252    Specimen:  Blood from Arm, Left Updated:  09/07/17 1101     Blood Culture No growth at less than 24 hours    Potassium [[id number]]  (Normal) Collected:  09/07/17 1004    Specimen:  Blood Updated:  09/07/17 1126     Potassium 4.0 mmol/L     Extra Tubes [[id number]] Collected:  09/07/17 1108    Specimen:  Blood from Blood, Venous Line Updated:  09/07/17 1301    Narrative:       The following orders were created for panel order Extra Tubes.  Procedure                               Abnormality         Status                     ---------                               -----------         ------                     Light Blue Top[[id number]]                                    Final result               Lavender Top[[id number]]                                     Final result                 Please view results for these tests on the individual orders.    Light Blue Top [[id number]] Collected:  09/07/17 1108    Specimen:  Blood Updated:  09/07/17 1301     Extra Tube hold for add-on      Auto resulted       Lavender Top [[id number]] Collected:  09/07/17 1108    Specimen:  Blood Updated:  09/07/17 1301     Extra Tube hold for add-on      Auto resulted             I reviewed the patient's new clinical results.    Assessment/Plan:     Active Hospital Problems (** Indicates Principal Problem)    Diagnosis Date Noted   • **C. difficile colitis [A04.7] 09/07/2017 09/07/17.  C diff positive.  Currently on oral flagyl.  Monitor.    Microbiology Results (last 10 days)     Procedure Component Value - Date/Time    Clostridium Difficile Toxin [[id number]] Collected:  09/07/17 0512    Lab Status:  Final result Specimen:  Stool from Per Rectum Updated:  09/07/17 0720    Narrative:       The following orders were created for panel order Clostridium Difficile Toxin.  Procedure                               Abnormality         Status                     ---------                               -----------         ------                     Clostridium Difficile Tox...[[id number]]                      Final result                 Please view results for these tests on the individual orders.    Clostridium Difficile Toxin, PCR [[id number]] Collected:  09/07/17 0512    Lab Status:  Final result Specimen:  Stool from Per Rectum Updated:  09/07/17 0720     C. Difficile Toxins by PCR Positive      contact precautions requested         Narrative:       This test is performed by utilizing real time polymerace chain recation (PCR).     Blood Culture [[id number]]  (Normal) Collected:  09/06/17 2252    Lab Status:  Preliminary result Specimen:  Blood from Arm, Left Updated:  09/07/17 1101     Blood Culture  No growth at less than 24 hours    Blood Culture [[id number]]  (Normal) Collected:  09/06/17 2109    Lab Status:  Preliminary result Specimen:  Blood from Arm, Left Updated:  09/07/17 1001     Blood Culture No growth at less than 24 hours    Urine Culture [[id number]]  (Normal) Collected:  09/06/17 2103    Lab Status:  Preliminary result Specimen:  Urine from Urine, Clean Catch Updated:  09/07/17 0627     Urine Culture Culture in progress             • Inflammatory bowel disease [K63.89] 09/07/2017     Per patient Chron's disease.  Monitor.     • Lower abdominal pain [R10.30] 09/06/2017 09/07/17.  C diff colitis.  By mouth flagyl.    Imaging Results (last 24 hours)     Procedure Component Value Units Date/Time    CT Abdomen Pelvis Without Contrast [[id number]] Collected:  09/06/17 1929     Updated:  09/06/17 2008    Narrative:         EXAMINATION:  Computed Tomography           REGION:    Abdomen / Pelvis                   INDICATION:    Diarrhea, left lower quadrant pain      HISTORY:  BENJAMIN. IMAGING:    none          TECHNIQUE:      - reconstructions:    axial, coronal, sagittal      - contrast:      oral:  no ;   intravenous:  no     - Please note:        - Lack of IV contrast limits assessment of solid organ  parenchyma, urinary system, or vascular structures.        - Lack of oral contrast limits assessment of GI tract  structures.          This exam was performed according to the departmental  dose-optimization program which includes automated exposure  control, adjustment of the mA and/or kV according to patient size  and/or use of iterative reconstruction technique.                COMMENTS:    THORAX (INFERIOR):  The lung bases are clear.     The pleura is without fluid or a mass.     The heart size is normal size and there is no pericardial fluid.    ABDOMEN:  Limited assessment of the solid organ parenchyma is grossly  unremarkable demonstrating no evidence of organomegaly.    Enlarged gallbladder.  Limited  assessment of the viscera demonstrates overall normal  caliber, however, suggests the presence of circumferential wall  thickening with the distal ascending colon, transverse colon, and  descending colon.    No evidence of free fluid or free intraperitoneal air.     The osseous structures are grossly unremarkable for age.    RETROPERITONEUM:  Limited assessment of the kidneys demonstrates overall normal  size.     Limited assessment of the ureters demonstrates normal caliber and  course.    The adrenal glands are of normal size and contour.     No gross evidence of significant retroperitoneal adenopathy.  The vascular structures are grossly within normal limits for age.    PELVIS:  Limited assessment of the viscera is grossly unremarkable  demonstrating normal caliber bowel loops.     No evidence of free fluid or free intraperitoneal air.     The osseous structures are grossly unremarkable for age.     The vascular structures are grossly within normal limits for age.            .          Impression:       CONCLUSION:     1. Limited examination due to the lack of intravenous and oral  contrast.        2. There are normal caliber bowel loops however there is  suggestion of circumferential wall thickening associated with the  distal ascending colon, transverse colon, and majority of  descending colon. This degree of wall thickening suggests a  diffuse inflammatory process.  3. The gallbladder is enlarged, and nearly hydropic. Correlation  with ultrasound suggested.                            Electronically signed by:  CYNDIE Simpson MD  9/6/2017 8:07 PM  CDT Workstation: [id number]                Resolved Hospital Problems    Diagnosis Date Noted Date Resolved   • Hypokalemia [E87.6] 09/07/2017 09/07/2017       Will monitor patient's hospital course and adjust treatment as hospital course dictates.    DVT prophylaxis: SCD/TEDs  Code status is Full Code    Plan for disposition:Where: home and When:  3-5  days      Time:           This document has been electronically signed by Chaz Beckett MD on September 7, 2017 2:06 PM

## 2017-09-07 NOTE — H&P
[facility] Medicine Services  INPATIENT HISTORY AND PHYSICAL       Patient Care Team:  Nish Morales DO as PCP - General (Gastroenterology)    Chief complaint   Chief Complaint   Patient presents with   • Abdominal Pain   • Diarrhea   • Vomiting       Subjective     Patient is a 72-year-old female with a history of inflammatory bowel disease (Crohn's disease) who presents to emergency department due to 2-3 week history of progressively worsening bloody diarrhea associated with nausea, vomiting and left lower quadrant pain.  She denies fever, chills, chest pain or shortness of air.  Her appetite has been poor as a result and she now feels weak.  She denies any recent use of antibiotics.  The symptoms got worse within the past 2 days hence the patient was seen initially at an urgent care and referred to the emergency department.    On triage she was hemodynamically stable.  Blood work showed leukocytosis with white count of 16,000 with left shift.  Hemoglobin was 12 and potassium 2.6.  She had CT scan of the abdomen and pelvis that showed colonic thickening with inflammatory changes.    Patient was reportedly diagnosed with Crohn's disease by Dr. Morales about 3 years ago.  She was prescribed Humira but declined to take the medication secondary to associated side effects.  She is scheduled to see Dr. Morales for follow-up in early October 2017.    Family and social history: Patient lives alone and is able to take care of activities of daily living.  She denies history of alcohol or tobacco use.  Denies family history of inflammatory bowel disease but does admit to family history of stroke and coronary artery disease.  Review of Systems   Review of Systems   Constitutional: Positive for appetite change. Negative for chills, diaphoresis, fatigue and fever.   HENT: Negative for congestion, ear discharge, ear pain, facial swelling, hearing loss, nosebleeds, postnasal drip,  rhinorrhea, sneezing, sore throat, tinnitus, trouble swallowing and voice change.    Eyes: Negative for photophobia, discharge, redness, itching and visual disturbance.   Respiratory: Negative for cough, choking, chest tightness, shortness of breath, wheezing and stridor.    Cardiovascular: Negative for chest pain, palpitations and leg swelling.   Gastrointestinal: Positive for abdominal pain, blood in stool, diarrhea, nausea and vomiting. Negative for abdominal distention, anal bleeding, constipation and rectal pain.   Endocrine: Negative for cold intolerance, heat intolerance, polydipsia, polyphagia and polyuria.   Genitourinary: Negative for decreased urine volume, difficulty urinating, dysuria, enuresis, flank pain, frequency, hematuria, pelvic pain and urgency.   Musculoskeletal: Negative for arthralgias, back pain, gait problem, joint swelling, myalgias, neck pain and neck stiffness.   Skin: Negative for color change, pallor, rash and wound.   Neurological: Positive for weakness. Negative for dizziness, tremors, seizures, syncope, facial asymmetry, speech difficulty, light-headedness, numbness and headaches.   Hematological: Does not bruise/bleed easily.   Psychiatric/Behavioral: Negative for agitation, behavioral problems, confusion, hallucinations, sleep disturbance and suicidal ideas. The patient is not nervous/anxious.        History  No past medical history on file.  No past surgical history on file.  No family history on file.  Social History   Substance Use Topics   • Smoking status: Former Smoker   • Smokeless tobacco: Never Used   • Alcohol use Not on file       (Not in a hospital admission)  Allergies:  Aspirin  Prior to Admission medications    Not on File       Objective     Vital Signs    Temp:  [98.7 °F (37.1 °C)-99.1 °F (37.3 °C)] 99.1 °F (37.3 °C)  Heart Rate:  [] 91  Resp:  [16-18] 18  BP: (117-144)/(62-84) 119/62    Physical Exam:      Physical Exam   Constitutional: She is oriented to  person, place, and time. She appears well-developed and well-nourished. She is cooperative. No distress.   HENT:   Head: Normocephalic and atraumatic.   Right Ear: External ear normal.   Left Ear: External ear normal.   Nose: Nose normal.   Mouth/Throat: Oropharynx is clear and moist.   Eyes: Conjunctivae and EOM are normal. Pupils are equal, round, and reactive to light.   Neck: Normal range of motion. Neck supple. No JVD present. No thyromegaly present.   Cardiovascular: Normal rate, regular rhythm, normal heart sounds and intact distal pulses.  Exam reveals no gallop and no friction rub.    No murmur heard.  Pulmonary/Chest: Effort normal and breath sounds normal. No stridor. No respiratory distress. She has no wheezes. She has no rales. She exhibits no tenderness.   Abdominal: Soft. Bowel sounds are normal. She exhibits no distension and no mass. There is no hepatosplenomegaly. There is tenderness in the left lower quadrant. There is no rigidity, no rebound, no guarding and no CVA tenderness. No hernia.   Musculoskeletal: Normal range of motion. She exhibits no edema, tenderness or deformity.   Neurological: She is alert and oriented to person, place, and time. She has normal reflexes. No cranial nerve deficit. She exhibits normal muscle tone. Coordination normal.   Skin: Skin is warm and dry. No rash noted. She is not diaphoretic. No erythema. No pallor.   Psychiatric: She has a normal mood and affect. Her behavior is normal. Judgment and thought content normal.   Nursing note and vitals reviewed.      Results Review:       Results from last 7 days  Lab Units 09/06/17  1715   SODIUM mmol/L 133*   POTASSIUM mmol/L 2.6*   CHLORIDE mmol/L 93*   CO2 mmol/L 24.0   BUN mg/dL 11   CREATININE mg/dL 1.44*   GLUCOSE mg/dL 133*   CALCIUM mg/dL 8.9   BILIRUBIN mg/dL 0.7   ALK PHOS U/L 134*   ALT (SGPT) U/L 18   AST (SGOT) U/L 27         Results from last 7 days  Lab Units 09/06/17  1715   MAGNESIUM mg/dL 2.0          Results from last 7 days  Lab Units 09/06/17  1715   WBC 10*3/mm3 16.12*   HEMOGLOBIN g/dL 12.4   HEMATOCRIT % 36.7   PLATELETS 10*3/mm3 313           Imaging Results (last 7 days)     Procedure Component Value Units Date/Time    CT Abdomen Pelvis Without Contrast [[id number]] Collected:  09/06/17 1929     Updated:  09/06/17 2008    Narrative:         EXAMINATION:  Computed Tomography           REGION:    Abdomen / Pelvis                   INDICATION:    Diarrhea, left lower quadrant pain      HISTORY:  BENJAMIN. IMAGING:    none          TECHNIQUE:      - reconstructions:    axial, coronal, sagittal      - contrast:      oral:  no ;   intravenous:  no     - Please note:        - Lack of IV contrast limits assessment of solid organ  parenchyma, urinary system, or vascular structures.        - Lack of oral contrast limits assessment of GI tract  structures.          This exam was performed according to the departmental  dose-optimization program which includes automated exposure  control, adjustment of the mA and/or kV according to patient size  and/or use of iterative reconstruction technique.                COMMENTS:    THORAX (INFERIOR):  The lung bases are clear.     The pleura is without fluid or a mass.     The heart size is normal size and there is no pericardial fluid.    ABDOMEN:  Limited assessment of the solid organ parenchyma is grossly  unremarkable demonstrating no evidence of organomegaly.    Enlarged gallbladder.  Limited assessment of the viscera demonstrates overall normal  caliber, however, suggests the presence of circumferential wall  thickening with the distal ascending colon, transverse colon, and  descending colon.    No evidence of free fluid or free intraperitoneal air.     The osseous structures are grossly unremarkable for age.    RETROPERITONEUM:  Limited assessment of the kidneys demonstrates overall normal  size.     Limited assessment of the ureters demonstrates normal caliber  and  course.    The adrenal glands are of normal size and contour.     No gross evidence of significant retroperitoneal adenopathy.  The vascular structures are grossly within normal limits for age.    PELVIS:  Limited assessment of the viscera is grossly unremarkable  demonstrating normal caliber bowel loops.     No evidence of free fluid or free intraperitoneal air.     The osseous structures are grossly unremarkable for age.     The vascular structures are grossly within normal limits for age.            .          Impression:       CONCLUSION:     1. Limited examination due to the lack of intravenous and oral  contrast.        2. There are normal caliber bowel loops however there is  suggestion of circumferential wall thickening associated with the  distal ascending colon, transverse colon, and majority of  descending colon. This degree of wall thickening suggests a  diffuse inflammatory process.  3. The gallbladder is enlarged, and nearly hydropic. Correlation  with ultrasound suggested.                            Electronically signed by:  CYNDIE Simpson MD  9/6/2017 8:07 PM  CDT Workstation: [id number]          Assessment/Plan   1.  Exacerbation of inflammatory bowel disease: Patient will be admitted and commenced on broad-spectrum antibiotics, IV hydration and empiric steroids.  C-reactive protein and ESR will be checked and GI will be consulted.  Stool will be sent for GI panel.  2.  Hypokalemia: Patient had potassium repletion in the emergency department and this will be continued and monitored.  3.  She'll be placed on GI and DVT prophylaxis.  4.  Further treatment plans or diagnostic studies as hospital course dictates.        I discussed the patients findings and my recommendations with patient.     Atif Nichols MD  09/06/17  9:00 PM        Total Time Spent: 55 minutes    EMR Dragon/Transcription disclaimer:   Much of this encounter note is an electronic transcription/translation of spoken  language to printed text. The electronic translation of spoken language may permit erroneous, or at times, nonsensical words or phrases to be inadvertently transcribed; Although I have reviewed the note for such errors, some may still exist.

## 2017-09-07 NOTE — PLAN OF CARE
Problem: Patient Care Overview (Adult)  Goal: Plan of Care Review  Outcome: Ongoing (interventions implemented as appropriate)    09/06/17 2224   Coping/Psychosocial Response Interventions   Plan Of Care Reviewed With patient;family   Patient Care Overview   Progress no change   Outcome Evaluation   Outcome Summary/Follow up Plan pt new admission, vss, nausea controlled at this time, only complains of headache       Goal: Adult Individualization and Mutuality  Outcome: Ongoing (interventions implemented as appropriate)  Goal: Discharge Needs Assessment  Outcome: Ongoing (interventions implemented as appropriate)    Problem: Fluid Volume Deficit (Adult)  Goal: Identify Related Risk Factors and Signs and Symptoms  Outcome: Ongoing (interventions implemented as appropriate)  Goal: Fluid/Electrolyte Balance  Outcome: Ongoing (interventions implemented as appropriate)  Goal: Comfort/Well Being  Outcome: Ongoing (interventions implemented as appropriate)

## 2017-09-07 NOTE — PLAN OF CARE
Problem: Patient Care Overview (Adult)  Goal: Plan of Care Review  Outcome: Ongoing (interventions implemented as appropriate)    09/07/17 0426   Coping/Psychosocial Response Interventions   Plan Of Care Reviewed With patient   Patient Care Overview   Progress no change   Outcome Evaluation   Outcome Summary/Follow up Plan new admit, VSS, nausea controlled with meds, pain is controlled with meds       Goal: Adult Individualization and Mutuality  Outcome: Ongoing (interventions implemented as appropriate)  Goal: Discharge Needs Assessment  Outcome: Ongoing (interventions implemented as appropriate)    Problem: Fluid Volume Deficit (Adult)  Goal: Identify Related Risk Factors and Signs and Symptoms  Outcome: Ongoing (interventions implemented as appropriate)  Goal: Fluid/Electrolyte Balance  Outcome: Ongoing (interventions implemented as appropriate)  Goal: Comfort/Well Being  Outcome: Ongoing (interventions implemented as appropriate)

## 2017-09-08 PROBLEM — B95.62 BACTEREMIA DUE TO METHICILLIN RESISTANT STAPHYLOCOCCUS AUREUS: Status: ACTIVE | Noted: 2017-01-01

## 2017-09-08 PROBLEM — R78.81 BACTEREMIA DUE TO METHICILLIN RESISTANT STAPHYLOCOCCUS AUREUS: Status: ACTIVE | Noted: 2017-01-01

## 2017-09-08 NOTE — NURSING NOTE
I called Dr Nichols and let him know we were having a hard time getting an IV on her. He said that we should call CCU or ER and see if they could send someone to try. I tried twice, Stepdown nurse tried 2 times, and CCU nurse tried 3 times, all with no success. We are currently getting having an ER nurse to come and try. We confirmed that there is no one in the building to do an EJ and no one certified to do a midline at this time.

## 2017-09-08 NOTE — PROGRESS NOTES
MEDICINE DAILY PROGRESS NOTE  NAME: Damaris Sánchez  : 1945  MRN: [id number]     LOS: 2 days     PROVIDER OF SERVICE: Chaz Beckett MD    Chief Complaint: C. difficile colitis    Subjective:     Interval History:  History taken from: patient chart family RN  Patient again feeling better today than yesterday and much improved since admission.  Desires to have more than just liquids to eat.  Patient is still having loose stools, but improved since admission.  MRSA on PCR for blood culture.  Switched to vancomycin and will zayda culture.    Review of Systems:   Review of Systems   Constitutional: Positive for appetite change. Negative for activity change, fatigue and fever.   HENT: Negative for ear pain and sore throat.    Eyes: Negative for pain and visual disturbance.   Respiratory: Negative for cough and shortness of breath.    Cardiovascular: Negative for chest pain and palpitations.   Gastrointestinal: Positive for abdominal pain, diarrhea and nausea. Negative for constipation.   Endocrine: Negative for cold intolerance and heat intolerance.   Genitourinary: Negative for difficulty urinating and dysuria.   Musculoskeletal: Negative for arthralgias and gait problem.   Skin: Negative for color change and rash.   Neurological: Positive for weakness. Negative for dizziness and headaches.   Hematological: Negative for adenopathy. Does not bruise/bleed easily.   Psychiatric/Behavioral: Negative for agitation, confusion and sleep disturbance.       Objective:     Vital Signs  Vitals:    17 2349 17 0000 17 0400 17 0714   BP: 119/68  136/65    BP Location: Left arm  Right arm    Patient Position: Lying  Lying    Pulse: 76 74 76 86   Resp: 18  18    Temp: 97.3 °F (36.3 °C)  97.5 °F (36.4 °C)    TempSrc: Oral  Oral    SpO2: 96%  95%    Weight:       Height:           Physical Exam  Physical Exam   Constitutional: She is oriented to person, place, and time. She appears well-developed and  well-nourished. No distress.   HENT:   Head: Normocephalic and atraumatic.   Right Ear: External ear normal.   Left Ear: External ear normal.   Nose: Nose normal.   Eyes: Conjunctivae and EOM are normal. Pupils are equal, round, and reactive to light. Right eye exhibits no discharge. Left eye exhibits no discharge. No scleral icterus.   Neck: Normal range of motion. Neck supple.   Cardiovascular: Normal rate, regular rhythm, normal heart sounds and intact distal pulses.  Exam reveals no gallop and no friction rub.    No murmur heard.  Pulmonary/Chest: Effort normal and breath sounds normal. No respiratory distress. She has no wheezes. She has no rales. She exhibits no tenderness.   Abdominal: Soft. Bowel sounds are normal. She exhibits no distension and no mass. There is no tenderness. There is no rebound and no guarding.   Musculoskeletal: Normal range of motion.   Neurological: She is alert and oriented to person, place, and time.   Skin: Skin is warm and dry. No rash noted. She is not diaphoretic. No erythema. No pallor.   Psychiatric: She has a normal mood and affect. Her behavior is normal.   Nursing note and vitals reviewed.      Medication Review    Current Facility-Administered Medications:   •  acetaminophen (TYLENOL) tablet 650 mg, 650 mg, Oral, Q4H PRN, Atif Nichols MD, 650 mg at 09/07/17 2121  •  famotidine (PEPCID) tablet 40 mg, 40 mg, Oral, Daily, Atif Nichols MD, 40 mg at 09/08/17 0851  •  HYDROcodone-acetaminophen (NORCO) 5-325 MG per tablet 1 tablet, 1 tablet, Oral, Q6H PRN, Chaz Beckett MD  •  HYDROmorphone (DILAUDID) injection 0.5 mg, 0.5 mg, Intravenous, Q2H PRN **AND** naloxone (NARCAN) injection 0.4 mg, 0.4 mg, Intravenous, Q5 Min PRN, Atif Nichols MD  •  influenza vac split quad (FLUZONE QUADRIVALENT) IM suspension 0.5 mL, 0.5 mL, Intramuscular, During Hospitalization, Atif Nichols MD  •  metroNIDAZOLE (FLAGYL) tablet 500 mg, 500 mg, Oral, Q8H, Atif Nichols MD,  500 mg at 09/08/17 0601  •  ondansetron (ZOFRAN) tablet 4 mg, 4 mg, Oral, Q6H PRN, Atif Nichols MD  •  Pharmacy to dose vancomycin, , Does not apply, Continuous PRN, Atif Nichols MD  •  pneumococcal polysaccharide 23-valent (PNEUMOVAX-23) vaccine 0.5 mL, 0.5 mL, Intramuscular, During Hospitalization, Atif Nichols MD  •  potassium chloride (MICRO-K) CR capsule 40 mEq, 40 mEq, Oral, PRN **OR** potassium chloride (KLOR-CON) packet 40 mEq, 40 mEq, Oral, PRN **OR** potassium chloride 10 mEq in 100 mL IVPB, 10 mEq, Intravenous, Q1H PRN, Atif Nichols MD, Stopped at 09/07/17 0454  •  sodium chloride 0.9 % flush 1-10 mL, 1-10 mL, Intravenous, PRN, Atif Nichols MD  •  sodium chloride 0.9 % with KCl 20 mEq/L infusion, 100 mL/hr, Intravenous, Continuous, Atif Nichols MD, Last Rate: 100 mL/hr at 09/08/17 0237, 100 mL/hr at 09/08/17 0237     Diagnostic Data    Lab Results (last 24 hours)     Procedure Component Value Units Date/Time    Extra Tubes [[id number]] Collected:  09/07/17 1108    Specimen:  Blood from Blood, Venous Line Updated:  09/07/17 1301    Narrative:       The following orders were created for panel order Extra Tubes.  Procedure                               Abnormality         Status                     ---------                               -----------         ------                     Light Blue Top[[id number]]                                   Final result               Lavender Top[[id number]]                                     Final result                 Please view results for these tests on the individual orders.    Light Blue Top [[id number]] Collected:  09/07/17 1108    Specimen:  Blood Updated:  09/07/17 1301     Extra Tube hold for add-on      Auto resulted       Lavender Top [[id number]] Collected:  09/07/17 1108    Specimen:  Blood Updated:  09/07/17 1301     Extra Tube hold for add-on      Auto resulted       Blood Culture [[id number]]  (Normal) Collected:  09/06/17  2109    Specimen:  Blood from Arm, Left Updated:  09/07/17 2201     Blood Culture No growth at 24 hours    Blood Culture [[id number]]  (Normal) Collected:  09/06/17 2252    Specimen:  Blood from Arm, Left Updated:  09/07/17 2304     Blood Culture No growth at 24 hours    Blood Culture ID, PCR [[id number]]  (Abnormal) Collected:  09/06/17 2109    Specimen:  Blood from Arm, Left Updated:  09/07/17 2346     BCID, PCR Staphylococcus aureus. mecA (methicillin resistance gene) detected. Identification by BCID PCR. (C)    Narrative:         Sensitivity to Follow    Urine Culture [[id number]] Collected:  09/06/17 2103    Specimen:  Urine from Urine, Clean Catch Updated:  09/08/17 0605     Urine Culture Mixed Culture      Mixed Culture    Narrative:         Specimen contains mixed organisms of questionable pathogenicity which indicates contamination with commensal sam.  Further identification is unlikely to provide clinically useful information.  Suggest recollection.    CBC & Differential [[id number]] Collected:  09/08/17 0539    Specimen:  Blood Updated:  09/08/17 0713    Narrative:       The following orders were created for panel order CBC & Differential.  Procedure                               Abnormality         Status                     ---------                               -----------         ------                     CBC Auto Differential[[id number]]        Abnormal            Final result                 Please view results for these tests on the individual orders.    CBC Auto Differential [[id number]]  (Abnormal) Collected:  09/08/17 0539    Specimen:  Blood Updated:  09/08/17 0713     WBC 8.38 10*3/mm3      RBC 3.02 (L) 10*6/mm3      Hemoglobin 9.1 (L) g/dL      Hematocrit 26.8 (L) %      MCV 88.7 fL      MCH 30.1 pg      MCHC 34.0 g/dL      RDW 14.2 %      RDW-SD 46.2 fl      MPV 9.1 fL      Platelets 216 10*3/mm3      Neutrophil % 72.5 %      Lymphocyte % 14.0 %      Monocyte % 12.1 (H) %      Eosinophil % 0.1  %      Basophil % 0.1 %      Immature Grans % 1.2 (H) %      Neutrophils, Absolute 6.08 10*3/mm3      Lymphocytes, Absolute 1.17 10*3/mm3      Monocytes, Absolute 1.01 (H) 10*3/mm3      Eosinophils, Absolute 0.01 10*3/mm3      Basophils, Absolute 0.01 10*3/mm3      Immature Grans, Absolute 0.10 (H) 10*3/mm3      nRBC 0.0 /100 WBC     Basic Metabolic Panel [[id number]]  (Abnormal) Collected:  09/08/17 0539    Specimen:  Blood Updated:  09/08/17 0718     Glucose 102 (H) mg/dL      BUN 8 mg/dL      Creatinine 0.86 mg/dL      Sodium 139 mmol/L      Potassium 4.0 mmol/L      Chloride 111 (H) mmol/L      CO2 21.0 (L) mmol/L      Calcium 8.4 mg/dL      eGFR Non African Amer 65 mL/min/1.73      BUN/Creatinine Ratio 9.3     Anion Gap 7.0 mmol/L     Narrative:       The MDRD GFR formula is only valid for adults with stable renal function between ages 18 and 70.    Clostridium Difficile Toxin, PCR [[id number]] Collected:  09/07/17 0512    Specimen:  Stool from Per Rectum Updated:  09/08/17 1035     C. Difficile Toxins by PCR Positive      contact precautions requested         Narrative:       This test is performed by utilizing real time polymerace chain recation (PCR).     Clostridium Difficile Toxin [[id number]] Collected:  09/07/17 0512    Specimen:  Stool from Per Rectum Updated:  09/08/17 1035    Narrative:       The following orders were created for panel order Clostridium Difficile Toxin.  Procedure                               Abnormality         Status                     ---------                               -----------         ------                     Clostridium Difficile Tox...[[id number]]                      Edited Result - FINAL        Please view results for these tests on the individual orders.          I reviewed the patient's new clinical results.    Assessment/Plan:     Active Hospital Problems (** Indicates Principal Problem)    Diagnosis Date Noted   • **C. difficile colitis [A04.7] 09/07/2017      09/08/17.  Continue flagyl.  GI consulted.  Discussed.  Will see patient.  Await recommendations.    09/07/17.  C diff positive.  Currently on oral flagyl.  Monitor.    Microbiology Results (last 10 days)     Procedure Component Value - Date/Time    Clostridium Difficile Toxin [[id number]] Collected:  09/07/17 0512    Lab Status:  Final result Specimen:  Stool from Per Rectum Updated:  09/07/17 0720    Narrative:       The following orders were created for panel order Clostridium Difficile Toxin.  Procedure                               Abnormality         Status                     ---------                               -----------         ------                     Clostridium Difficile Tox...[[id number]]                      Final result                 Please view results for these tests on the individual orders.    Clostridium Difficile Toxin, PCR [[id number]] Collected:  09/07/17 0512    Lab Status:  Final result Specimen:  Stool from Per Rectum Updated:  09/07/17 0720     C. Difficile Toxins by PCR Positive      contact precautions requested         Narrative:       This test is performed by utilizing real time polymerace chain recation (PCR).     Blood Culture [[id number]]  (Normal) Collected:  09/06/17 2252    Lab Status:  Preliminary result Specimen:  Blood from Arm, Left Updated:  09/07/17 1101     Blood Culture No growth at less than 24 hours    Blood Culture [[id number]]  (Normal) Collected:  09/06/17 2109    Lab Status:  Preliminary result Specimen:  Blood from Arm, Left Updated:  09/07/17 1001     Blood Culture No growth at less than 24 hours    Urine Culture [[id number]]  (Normal) Collected:  09/06/17 2103    Lab Status:  Preliminary result Specimen:  Urine from Urine, Clean Catch Updated:  09/07/17 0627     Urine Culture Culture in progress             • Bacteremia due to methicillin resistant Staphylococcus aureus [R78.81] 09/08/2017 09/08/17.  PCR showed MRSA staph.  Awaiting final culture.  Started on  intravenous vancomycin today.  Follow culture.  If final culture positive will get ECHO.     • Inflammatory bowel disease [K63.89] 09/07/2017     Per patient Chron's disease.  Monitor.     • Lower abdominal pain [R10.30] 09/06/2017 09/08/17.  Improved.  Plan to feed today.    09/07/17.  C diff colitis.  By mouth flagyl.    Imaging Results (last 24 hours)     Procedure Component Value Units Date/Time    CT Abdomen Pelvis Without Contrast [[id number]] Collected:  09/06/17 1929     Updated:  09/06/17 2008    Narrative:         EXAMINATION:  Computed Tomography           REGION:    Abdomen / Pelvis                   INDICATION:    Diarrhea, left lower quadrant pain      HISTORY:  BENJAMIN. IMAGING:    none          TECHNIQUE:      - reconstructions:    axial, coronal, sagittal      - contrast:      oral:  no ;   intravenous:  no     - Please note:        - Lack of IV contrast limits assessment of solid organ  parenchyma, urinary system, or vascular structures.        - Lack of oral contrast limits assessment of GI tract  structures.          This exam was performed according to the departmental  dose-optimization program which includes automated exposure  control, adjustment of the mA and/or kV according to patient size  and/or use of iterative reconstruction technique.                COMMENTS:    THORAX (INFERIOR):  The lung bases are clear.     The pleura is without fluid or a mass.     The heart size is normal size and there is no pericardial fluid.    ABDOMEN:  Limited assessment of the solid organ parenchyma is grossly  unremarkable demonstrating no evidence of organomegaly.    Enlarged gallbladder.  Limited assessment of the viscera demonstrates overall normal  caliber, however, suggests the presence of circumferential wall  thickening with the distal ascending colon, transverse colon, and  descending colon.    No evidence of free fluid or free intraperitoneal air.     The osseous structures are grossly unremarkable  for age.    RETROPERITONEUM:  Limited assessment of the kidneys demonstrates overall normal  size.     Limited assessment of the ureters demonstrates normal caliber and  course.    The adrenal glands are of normal size and contour.     No gross evidence of significant retroperitoneal adenopathy.  The vascular structures are grossly within normal limits for age.    PELVIS:  Limited assessment of the viscera is grossly unremarkable  demonstrating normal caliber bowel loops.     No evidence of free fluid or free intraperitoneal air.     The osseous structures are grossly unremarkable for age.     The vascular structures are grossly within normal limits for age.            .          Impression:       CONCLUSION:     1. Limited examination due to the lack of intravenous and oral  contrast.        2. There are normal caliber bowel loops however there is  suggestion of circumferential wall thickening associated with the  distal ascending colon, transverse colon, and majority of  descending colon. This degree of wall thickening suggests a  diffuse inflammatory process.  3. The gallbladder is enlarged, and nearly hydropic. Correlation  with ultrasound suggested.                            Electronically signed by:  CYNDIE Simpson MD  9/6/2017 8:07 PM  CDT Workstation: [id number]                Resolved Hospital Problems    Diagnosis Date Noted Date Resolved   • Hypokalemia [E87.6] 09/07/2017 09/07/2017   Abdominal pain improved.  Diarrhea improving.  GI to see today.  Await recommendations.  PO flagyl.  Vancomycin for MRSA bacteremia on PCR from blood culture.    Will monitor patient's hospital course and adjust treatment as hospital course dictates.    DVT prophylaxis: SCD/TEDs  Code status is Full Code    Plan for disposition:Where: home and When:  3-5 days      Time:           This document has been electronically signed by Chaz Beckett MD on September 8, 2017 8:52 AM

## 2017-09-08 NOTE — CONSULTS
" [facility] Gastroenterology   [address] [place] [address]. [id number]  T- [phone]   F - [phone]     GASTROENTEROLOGY CONSULT NOTE            SUBJECTIVE:   9/8/2017    Name: Damaris Sánchez  DOD: 1945    REASON FOR CONSULT: C. Diff Colitis     Chief Complaint:     Chief Complaint   Patient presents with   • Abdominal Pain   • Diarrhea   • Vomiting       Subjective     Patient is 72 y.o. female presents with C. Diff Colitis.Reports she is a patient of Dr. Morales.  States in 2014 she began having issues with diarrhea underwent a colonoscopy and was found to have Crohn's disease.  Dr. Morales recommended her to start Humira patient reports she decided against that due to fear of side effects and has not followed up with him since 2014.  States she has follow-up with him scheduled in October 2017.  States her diarrhea had been controllable about 4-5 episodes of variable stool.  Until Started having severe pain two weeks ago and severe bloody diarrhea. Her normal bowel habits are 4-5 episodes of \"peicey, fragmented, not diarrhea stool\" States blood is bright red with intermittent clots. Reports pain has improved today. Has had 3 loose bowel movements today with no blood. Pain is relieved by bowel movement. Has tolerated liquid diet. States she began to have nausea and vomiting and sought medical attention at that time.  She denies any fevers however white count was elevated to 16,000 upon admission.  Since antibiotics has decreased consistently.  She reports fatigue and malaise.  Reports history of bleeding ulcers in duodenum in 1994, No issues since that time. She has maintained on PPI.    ROS/HISTORY/ CURRENT MEDICATIONS/OBJECTIVE/VS/PE:   Review of Systems:   Review of Systems   Constitutional: Positive for appetite change and fatigue. Negative for activity change, chills, diaphoresis, fever and unexpected weight change.   HENT: Negative for sore throat and trouble swallowing.  "   Respiratory: Negative for shortness of breath.    Gastrointestinal: Positive for abdominal pain, blood in stool and diarrhea. Negative for abdominal distention, anal bleeding, constipation, nausea, rectal pain and vomiting.   Musculoskeletal: Negative for arthralgias.   Skin: Negative for pallor.   Neurological: Negative for light-headedness.       History:   No past medical history on file.  No past surgical history on file.  No family history on file.  Social History   Substance Use Topics   • Smoking status: Former Smoker   • Smokeless tobacco: Never Used   • Alcohol use Not on file     No prescriptions prior to admission.     Allergies:  Aspirin    I have reviewed the patients medical history, surgical history and family history in the available medical record system.     Current Medications:     Current Facility-Administered Medications   Medication Dose Route Frequency Provider Last Rate Last Dose   • acetaminophen (TYLENOL) tablet 650 mg  650 mg Oral Q4H PRN Atif Nichols MD   650 mg at 09/07/17 2121   • famotidine (PEPCID) tablet 40 mg  40 mg Oral Daily Atif Nichols MD   40 mg at 09/08/17 0851   • HYDROcodone-acetaminophen (NORCO) 5-325 MG per tablet 1 tablet  1 tablet Oral Q6H PRN Chaz Beckett MD       • HYDROmorphone (DILAUDID) injection 0.5 mg  0.5 mg Intravenous Q2H PRN Atif Nichols MD        And   • naloxone (NARCAN) injection 0.4 mg  0.4 mg Intravenous Q5 Min PRN Atif Nichols MD       • influenza vac split quad (FLUZONE QUADRIVALENT) IM suspension 0.5 mL  0.5 mL Intramuscular During Hospitalization Atif Nichols MD       • metroNIDAZOLE (FLAGYL) tablet 500 mg  500 mg Oral Q8H Atif Nichols MD   500 mg at 09/08/17 0601   • ondansetron (ZOFRAN) tablet 4 mg  4 mg Oral Q6H PRN Atif Nichols MD       • Pharmacy to dose vancomycin   Does not apply Continuous PRN Atif Nichols MD       • pneumococcal polysaccharide 23-valent (PNEUMOVAX-23) vaccine 0.5 mL  0.5 mL  Intramuscular During Hospitalization Atif Nichols MD       • potassium chloride (MICRO-K) CR capsule 40 mEq  40 mEq Oral PRN Atif Nichols MD        Or   • potassium chloride (KLOR-CON) packet 40 mEq  40 mEq Oral PRN Atif Nichols MD        Or   • potassium chloride 10 mEq in 100 mL IVPB  10 mEq Intravenous Q1H PRN Atif Nichols MD   Stopped at 09/07/17 0454   • sodium chloride 0.9 % flush 1-10 mL  1-10 mL Intravenous PRN Atif Nichols MD       • sodium chloride 0.9 % with KCl 20 mEq/L infusion  100 mL/hr Intravenous Continuous Atif Nichols  mL/hr at 09/08/17 0237 100 mL/hr at 09/08/17 0237       Objective     Physical Exam:   Temp:  [96.6 °F (35.9 °C)-97.9 °F (36.6 °C)] 97.5 °F (36.4 °C)  Heart Rate:  [74-93] 86  Resp:  [18] 18  BP: (112-136)/(58-68) 136/65      Physical Exam   Constitutional: She is oriented to person, place, and time. She appears well-developed and well-nourished. She is cooperative. No distress.   HENT:   Head: Normocephalic and atraumatic.   Neck: Normal range of motion. Neck supple. No thyromegaly present.   Cardiovascular: Normal rate, regular rhythm and normal heart sounds.    Pulmonary/Chest: Effort normal and breath sounds normal. She has no wheezes. She has no rhonchi. She has no rales.   Abdominal: Soft. Normal appearance and bowel sounds are normal. She exhibits no shifting dullness and no distension. There is no hepatosplenomegaly. There is tenderness in the suprapubic area and left lower quadrant. There is no rigidity and no guarding. No hernia.   Lymphadenopathy:     She has no cervical adenopathy.   Neurological: She is alert and oriented to person, place, and time.   Skin: Skin is warm, dry and intact. No rash noted. No pallor.   Psychiatric: She has a normal mood and affect. Her speech is normal.                                                                          Results Review:     Lab Results   Component Value Date    WBC 8.38  09/08/2017    WBC 7.21 09/07/2017    WBC 8.83 09/06/2017    HGB 9.1 (L) 09/08/2017    HGB 10.8 (L) 09/07/2017    HGB 10.4 (L) 09/06/2017    HCT 26.8 (L) 09/08/2017    HCT 31.1 (L) 09/07/2017    HCT 30.5 (L) 09/06/2017     09/08/2017     09/07/2017     09/06/2017       Results from last 7 days  Lab Units 09/06/17  1715   ALK PHOS U/L 134*   ALT (SGPT) U/L 18   AST (SGOT) U/L 27       Results from last 7 days  Lab Units 09/06/17  1715   BILIRUBIN mg/dL 0.7   ALK PHOS U/L 134*     Lipase   Date Value Ref Range Status   09/06/2017 42 23 - 300 U/L Final     No results found for: INR  Blood Culture   Date Value Ref Range Status   09/06/2017 No growth at 24 hours  Preliminary   09/06/2017 No growth at 24 hours  Preliminary       Radiology Review:  Imaging Results (last 72 hours)     Procedure Component Value Units Date/Time    CT Abdomen Pelvis Without Contrast [[id number]] Collected:  09/06/17 1929     Updated:  09/06/17 2008    Narrative:         EXAMINATION:  Computed Tomography           REGION:    Abdomen / Pelvis                   INDICATION:    Diarrhea, left lower quadrant pain      HISTORY:  BENJAMIN. IMAGING:    none          TECHNIQUE:      - reconstructions:    axial, coronal, sagittal      - contrast:      oral:  no ;   intravenous:  no     - Please note:        - Lack of IV contrast limits assessment of solid organ  parenchyma, urinary system, or vascular structures.        - Lack of oral contrast limits assessment of GI tract  structures.          This exam was performed according to the departmental  dose-optimization program which includes automated exposure  control, adjustment of the mA and/or kV according to patient size  and/or use of iterative reconstruction technique.                COMMENTS:    THORAX (INFERIOR):  The lung bases are clear.     The pleura is without fluid or a mass.     The heart size is normal size and there is no pericardial fluid.    ABDOMEN:  Limited assessment of  the solid organ parenchyma is grossly  unremarkable demonstrating no evidence of organomegaly.    Enlarged gallbladder.  Limited assessment of the viscera demonstrates overall normal  caliber, however, suggests the presence of circumferential wall  thickening with the distal ascending colon, transverse colon, and  descending colon.    No evidence of free fluid or free intraperitoneal air.     The osseous structures are grossly unremarkable for age.    RETROPERITONEUM:  Limited assessment of the kidneys demonstrates overall normal  size.     Limited assessment of the ureters demonstrates normal caliber and  course.    The adrenal glands are of normal size and contour.     No gross evidence of significant retroperitoneal adenopathy.  The vascular structures are grossly within normal limits for age.    PELVIS:  Limited assessment of the viscera is grossly unremarkable  demonstrating normal caliber bowel loops.     No evidence of free fluid or free intraperitoneal air.     The osseous structures are grossly unremarkable for age.     The vascular structures are grossly within normal limits for age.            .          Impression:       CONCLUSION:     1. Limited examination due to the lack of intravenous and oral  contrast.        2. There are normal caliber bowel loops however there is  suggestion of circumferential wall thickening associated with the  distal ascending colon, transverse colon, and majority of  descending colon. This degree of wall thickening suggests a  diffuse inflammatory process.  3. The gallbladder is enlarged, and nearly hydropic. Correlation  with ultrasound suggested.                            Electronically signed by:  CYNDIE Simpson MD  9/6/2017 8:07 PM  CDT Workstation: [id number]           I reviewed the patient's new clinical results.  I reviewed the patient's new imaging results and agree with the interpretation.     ASSESSMENT/PLAN:   ASSESSMENT: Patient with C. Diff colitis and  history of crohns     PLAN: Continue Flagyl and symptomatic support. Follow hemoglobin and hematocrit and transfuse as indicated per primary team.  Do not recommend emergent endoscopy at this time as she is improving clinically.  She may need endoscopy in the future if symptoms persist or worsen.  We'll discuss case with Dr. Morales upon his return on Monday.       Chary Huerta, DESTINY  09/08/17  11:01 AM

## 2017-09-08 NOTE — PROGRESS NOTES
"Pharmacokinetics by Pharmacy - Vancomycin    Damaris Sánchez is a 72 y.o. female   [Ht: 63\" (160 cm); Wt: 135 lb 8 oz (61.5 kg)]    Estimated Creatinine Clearance: 57.4 mL/min (by C-G formula based on Cr of 0.86).   Lab Results   Component Value Date    CREATININE 0.86 09/08/2017    CREATININE 0.96 09/07/2017    CREATININE 1.10 (H) 09/06/2017      Lab Results   Component Value Date    WBC 8.38 09/08/2017    WBC 7.21 09/07/2017    WBC 8.83 09/06/2017      Temp Readings from Last 1 Encounters:   09/08/17 97.8 °F (36.6 °C)       Indication for use: Empiric     Baseline culture results:  Microbiology Results (last 10 days)       Procedure Component Value - Date/Time      Clostridium Difficile Toxin [[id number]] Collected:  09/07/17 0512    Lab Status:  Edited Result - FINAL Specimen:  Stool from Per Rectum Updated:  09/08/17 1219    Narrative:       The following orders were created for panel order Clostridium Difficile Toxin.  Procedure                               Abnormality         Status                     ---------                               -----------         ------                     Clostridium Difficile Tox...[[id number]]  Abnormal            Edited Result - FINAL        Please view results for these tests on the individual orders.    Clostridium Difficile Toxin, PCR [[id number]]  (Abnormal) Collected:  09/07/17 0512    Lab Status:  Edited Result - FINAL Specimen:  Stool from Per Rectum Updated:  09/08/17 1219     C. Difficile Toxins by PCR Positive (A)      contact precautions requested         Narrative:       This test is performed by utilizing real time polymerace chain recation (PCR).     Blood Culture [[id number]]  (Normal) Collected:  09/06/17 2252    Lab Status:  Preliminary result Specimen:  Blood from Arm, Left Updated:  09/07/17 2304     Blood Culture No growth at 24 hours    Blood Culture [[id number]]  (Normal) Collected:  09/06/17 2109    Lab Status:  Preliminary result Specimen:  Blood from Arm, Left " Updated:  09/07/17 2201     Blood Culture No growth at 24 hours    Blood Culture ID, PCR [[id number]]  (Abnormal) Collected:  09/06/17 2109    Lab Status:  Final result Specimen:  Blood from Arm, Left Updated:  09/07/17 2346     BCID, PCR Staphylococcus aureus. mecA (methicillin resistance gene) detected. Identification by BCID PCR. (C)    Narrative:         Sensitivity to Follow    Urine Culture [[id number]] Collected:  09/06/17 2103    Lab Status:  Final result Specimen:  Urine from Urine, Clean Catch Updated:  09/08/17 0605     Urine Culture Mixed Culture      Mixed Culture    Narrative:         Specimen contains mixed organisms of questionable pathogenicity which indicates contamination with commensal sam.  Further identification is unlikely to provide clinically useful information.  Suggest recollection.               Assessment/Plan  Initiated Vancomycin 1000 mg IVPB Q24H. Will order Vancomycin peak/trough level with 3rd dose.  Received vancomycin 1250 mg IV last night at 0236  Pharmacy will monitor renal function and adjust dose accordingly.    Kae Gamboa RPH  09/08/17 1:09 PM

## 2017-09-08 NOTE — PLAN OF CARE
Problem: Patient Care Overview (Adult)  Goal: Plan of Care Review  Outcome: Ongoing (interventions implemented as appropriate)    09/08/17 0103   Coping/Psychosocial Response Interventions   Plan Of Care Reviewed With patient   Patient Care Overview   Progress no change   Outcome Evaluation   Outcome Summary/Follow up Plan VSS, contact precautions placed for newly diagnosed MRSA added to spore precautions, still having diarrhea        Goal: Adult Individualization and Mutuality  Outcome: Ongoing (interventions implemented as appropriate)  Goal: Discharge Needs Assessment  Outcome: Ongoing (interventions implemented as appropriate)    Problem: Fluid Volume Deficit (Adult)  Goal: Identify Related Risk Factors and Signs and Symptoms  Outcome: Ongoing (interventions implemented as appropriate)  Goal: Fluid/Electrolyte Balance  Outcome: Ongoing (interventions implemented as appropriate)  Goal: Comfort/Well Being  Outcome: Ongoing (interventions implemented as appropriate)

## 2017-09-09 PROBLEM — R78.81 BACTEREMIA DUE TO METHICILLIN RESISTANT STAPHYLOCOCCUS AUREUS: Status: RESOLVED | Noted: 2017-01-01 | Resolved: 2017-01-01

## 2017-09-09 PROBLEM — B95.62 BACTEREMIA DUE TO METHICILLIN RESISTANT STAPHYLOCOCCUS AUREUS: Status: RESOLVED | Noted: 2017-01-01 | Resolved: 2017-01-01

## 2017-09-09 NOTE — PLAN OF CARE
Problem: Fluid Volume Deficit (Adult)  Goal: Identify Related Risk Factors and Signs and Symptoms  Outcome: Ongoing (interventions implemented as appropriate)  Goal: Fluid/Electrolyte Balance  Outcome: Ongoing (interventions implemented as appropriate)  Goal: Comfort/Well Being  Outcome: Ongoing (interventions implemented as appropriate)

## 2017-09-09 NOTE — PROGRESS NOTES
MEDICINE DAILY PROGRESS NOTE  NAME: Damaris Sánchez  : 1945  MRN: [id number]     LOS: 3 days     PROVIDER OF SERVICE: Chaz Beckett MD    Chief Complaint: C. difficile colitis    Subjective:     Interval History:  History taken from: patient chart family RN  Patient feeling the same as yesterday.  Stools have more color today.  Feels improved overall from admission.    Review of Systems:   Review of Systems   Constitutional: Positive for appetite change. Negative for activity change, fatigue and fever.   HENT: Negative for ear pain and sore throat.    Eyes: Negative for pain and visual disturbance.   Respiratory: Negative for cough and shortness of breath.    Cardiovascular: Negative for chest pain and palpitations.   Gastrointestinal: Positive for abdominal pain, diarrhea and nausea. Negative for constipation.   Endocrine: Negative for cold intolerance and heat intolerance.   Genitourinary: Negative for difficulty urinating and dysuria.   Musculoskeletal: Negative for arthralgias and gait problem.   Skin: Negative for color change and rash.   Neurological: Positive for weakness. Negative for dizziness and headaches.   Hematological: Negative for adenopathy. Does not bruise/bleed easily.   Psychiatric/Behavioral: Negative for agitation, confusion and sleep disturbance.       Objective:     Vital Signs  Vitals:    17 0018 17 0300 17 0721 17 0900   BP:  158/78  142/70   BP Location:  Right arm     Patient Position:  Lying     Pulse: 80 81 82 78   Resp:  18  18   Temp:    97.4 °F (36.3 °C)   TempSrc:  Oral     SpO2:  97%  98%   Weight:       Height:           Physical Exam  Physical Exam   Constitutional: She is oriented to person, place, and time. She appears well-developed and well-nourished. No distress.   HENT:   Head: Normocephalic and atraumatic.   Right Ear: External ear normal.   Left Ear: External ear normal.   Nose: Nose normal.   Eyes: Conjunctivae and EOM are normal. Pupils  are equal, round, and reactive to light. Right eye exhibits no discharge. Left eye exhibits no discharge. No scleral icterus.   Neck: Normal range of motion. Neck supple.   Cardiovascular: Normal rate, regular rhythm, normal heart sounds and intact distal pulses.  Exam reveals no gallop and no friction rub.    No murmur heard.  Pulmonary/Chest: Effort normal and breath sounds normal. No respiratory distress. She has no wheezes. She has no rales. She exhibits no tenderness.   Abdominal: Soft. Bowel sounds are normal. She exhibits no distension and no mass. There is no tenderness. There is no rebound and no guarding.   Musculoskeletal: Normal range of motion.   Neurological: She is alert and oriented to person, place, and time.   Skin: Skin is warm and dry. No rash noted. She is not diaphoretic. No erythema. No pallor.   Psychiatric: She has a normal mood and affect. Her behavior is normal.   Nursing note and vitals reviewed.      Medication Review    Current Facility-Administered Medications:   •  acetaminophen (TYLENOL) tablet 650 mg, 650 mg, Oral, Q4H PRN, Atif Nichols MD, 650 mg at 09/08/17 2352  •  famotidine (PEPCID) tablet 40 mg, 40 mg, Oral, Daily, Atif Nichols MD, 40 mg at 09/09/17 0819  •  HYDROcodone-acetaminophen (NORCO) 5-325 MG per tablet 1 tablet, 1 tablet, Oral, Q6H PRN, Chaz Beckett MD  •  HYDROmorphone (DILAUDID) injection 0.5 mg, 0.5 mg, Intravenous, Q2H PRN **AND** naloxone (NARCAN) injection 0.4 mg, 0.4 mg, Intravenous, Q5 Min PRN, Atif Nichols MD  •  influenza vac split quad (FLUZONE QUADRIVALENT) IM suspension 0.5 mL, 0.5 mL, Intramuscular, During Hospitalization, Atif Nichols MD  •  metroNIDAZOLE (FLAGYL) tablet 500 mg, 500 mg, Oral, Q8H, Atif Nichols MD, 500 mg at 09/09/17 0537  •  ondansetron (ZOFRAN) tablet 4 mg, 4 mg, Oral, Q6H PRN, Atif Nichols MD  •  pneumococcal polysaccharide 23-valent (PNEUMOVAX-23) vaccine 0.5 mL, 0.5 mL, Intramuscular, During  Hospitalization, Atif Nichols MD  •  potassium chloride (MICRO-K) CR capsule 40 mEq, 40 mEq, Oral, PRN **OR** potassium chloride (KLOR-CON) packet 40 mEq, 40 mEq, Oral, PRN **OR** potassium chloride 10 mEq in 100 mL IVPB, 10 mEq, Intravenous, Q1H PRN, Atif Nichols MD, Stopped at 09/07/17 0454  •  sodium chloride 0.9 % flush 1-10 mL, 1-10 mL, Intravenous, PRN, Atif Nichols MD     Diagnostic Data    Lab Results (last 24 hours)     Procedure Component Value Units Date/Time    Clostridium Difficile Toxin [[id number]] Collected:  09/07/17 0512    Specimen:  Stool from Per Rectum Updated:  09/08/17 1219    Narrative:       The following orders were created for panel order Clostridium Difficile Toxin.  Procedure                               Abnormality         Status                     ---------                               -----------         ------                     Clostridium Difficile Tox...[[id number]]  Abnormal            Edited Result - FINAL        Please view results for these tests on the individual orders.    Clostridium Difficile Toxin, PCR [[id number]]  (Abnormal) Collected:  09/07/17 0512    Specimen:  Stool from Per Rectum Updated:  09/08/17 1219     C. Difficile Toxins by PCR Positive (A)      contact precautions requested         Narrative:       This test is performed by utilizing real time polymerace chain recation (PCR).     Blood Culture ID, PCR [[id number]]  (Abnormal) Collected:  09/06/17 2109    Specimen:  Blood from Arm, Left Updated:  09/08/17 1706     BCID, PCR Staphylococcus species, not aureus. mecA (methicillin resistance gene) detected. Identification by BCID PCR. (C)      Corrected result. Previous result was Staphylococcus aureus. mecA (methicillin resistance gene) detected. Identification by BCID PCR. on 9/7/2017 at 2346 CDT       Narrative:       Corrected report    Sensitivity to Follow    Blood Culture [[id number]]  (Abnormal) Collected:  09/06/17 2109    Specimen:  Blood  from Arm, Left Updated:  09/08/17 1709     Blood Culture Staphylococcus, coagulase negative (A)      Consistent with contamination at time of collection.  Susceptibility not indicated          Gram Stain Result Aerobic Bottle Gram positive cocci in pairs      1 OF 3 POSITIVE       Blood Culture [[id number]]  (Normal) Collected:  09/06/17 2252    Specimen:  Blood from Arm, Left Updated:  09/08/17 2303     Blood Culture No growth at 2 days    CBC & Differential [[id number]] Collected:  09/09/17 0616    Specimen:  Blood Updated:  09/09/17 0634    Narrative:       The following orders were created for panel order CBC & Differential.  Procedure                               Abnormality         Status                     ---------                               -----------         ------                     CBC Auto Differential[[id number]]        Abnormal            Final result                 Please view results for these tests on the individual orders.    CBC Auto Differential [[id number]]  (Abnormal) Collected:  09/09/17 0616    Specimen:  Blood Updated:  09/09/17 0634     WBC 5.83 10*3/mm3      RBC 3.29 (L) 10*6/mm3      Hemoglobin 9.9 (L) g/dL      Hematocrit 29.7 (L) %      MCV 90.3 fL      MCH 30.1 pg      MCHC 33.3 g/dL      RDW 14.0 %      RDW-SD 46.6 (H) fl      MPV 9.2 fL      Platelets 173 10*3/mm3      Neutrophil % 64.2 %      Lymphocyte % 20.8 %      Monocyte % 11.3 %      Eosinophil % 1.9 %      Basophil % 0.3 %      Immature Grans % 1.5 (H) %      Neutrophils, Absolute 3.74 10*3/mm3      Lymphocytes, Absolute 1.21 10*3/mm3      Monocytes, Absolute 0.66 10*3/mm3      Eosinophils, Absolute 0.11 10*3/mm3      Basophils, Absolute 0.02 10*3/mm3      Immature Grans, Absolute 0.09 (H) 10*3/mm3     Basic Metabolic Panel [[id number]]  (Abnormal) Collected:  09/09/17 0616    Specimen:  Blood Updated:  09/09/17 0650     Glucose 84 mg/dL      BUN 5 (L) mg/dL      Creatinine 0.86 mg/dL      Sodium 140 mmol/L      Potassium  3.8 mmol/L      Chloride 109 mmol/L      CO2 23.0 mmol/L      Calcium 8.1 (L) mg/dL      eGFR Non African Amer 65 mL/min/1.73      BUN/Creatinine Ratio 5.8 (L)     Anion Gap 8.0 mmol/L     Narrative:       The MDRD GFR formula is only valid for adults with stable renal function between ages 18 and 70.          I reviewed the patient's new clinical results.    Assessment/Plan:     Active Hospital Problems (** Indicates Principal Problem)    Diagnosis Date Noted   • **C. difficile colitis [A04.7] 09/07/2017 09/09/17.  Flagyl day 3.  Monitor for improvement.  Add by mouth vancomycin as needed.    09/08/17.  Continue flagyl.  GI consulted.  Discussed.  Will see patient.  Await recommendations.    09/07/17.  C diff positive.  Currently on oral flagyl.  Monitor.    Microbiology Results (last 10 days)     Procedure Component Value - Date/Time    Clostridium Difficile Toxin [[id number]] Collected:  09/07/17 0512    Lab Status:  Final result Specimen:  Stool from Per Rectum Updated:  09/07/17 0720    Narrative:       The following orders were created for panel order Clostridium Difficile Toxin.  Procedure                               Abnormality         Status                     ---------                               -----------         ------                     Clostridium Difficile Tox...[[id number]]                      Final result                 Please view results for these tests on the individual orders.    Clostridium Difficile Toxin, PCR [[id number]] Collected:  09/07/17 0512    Lab Status:  Final result Specimen:  Stool from Per Rectum Updated:  09/07/17 0720     C. Difficile Toxins by PCR Positive      contact precautions requested         Narrative:       This test is performed by utilizing real time polymerace chain recation (PCR).     Blood Culture [[id number]]  (Normal) Collected:  09/06/17 2252    Lab Status:  Preliminary result Specimen:  Blood from Arm, Left Updated:  09/07/17 1101     Blood Culture No  growth at less than 24 hours    Blood Culture [[id number]]  (Normal) Collected:  09/06/17 2109    Lab Status:  Preliminary result Specimen:  Blood from Arm, Left Updated:  09/07/17 1001     Blood Culture No growth at less than 24 hours    Urine Culture [[id number]]  (Normal) Collected:  09/06/17 2103    Lab Status:  Preliminary result Specimen:  Urine from Urine, Clean Catch Updated:  09/07/17 0627     Urine Culture Culture in progress             • Inflammatory bowel disease [K63.89] 09/07/2017     Per patient Chron's disease.  Monitor.     • Lower abdominal pain [R10.30] 09/06/2017 09/08/17.  Improved.  Plan to feed today.    09/07/17.  C diff colitis.  By mouth flagyl.    Imaging Results (last 24 hours)     Procedure Component Value Units Date/Time    CT Abdomen Pelvis Without Contrast [[id number]] Collected:  09/06/17 1929     Updated:  09/06/17 2008    Narrative:         EXAMINATION:  Computed Tomography           REGION:    Abdomen / Pelvis                   INDICATION:    Diarrhea, left lower quadrant pain      HISTORY:  BENJAMIN. IMAGING:    none          TECHNIQUE:      - reconstructions:    axial, coronal, sagittal      - contrast:      oral:  no ;   intravenous:  no     - Please note:        - Lack of IV contrast limits assessment of solid organ  parenchyma, urinary system, or vascular structures.        - Lack of oral contrast limits assessment of GI tract  structures.          This exam was performed according to the departmental  dose-optimization program which includes automated exposure  control, adjustment of the mA and/or kV according to patient size  and/or use of iterative reconstruction technique.                COMMENTS:    THORAX (INFERIOR):  The lung bases are clear.     The pleura is without fluid or a mass.     The heart size is normal size and there is no pericardial fluid.    ABDOMEN:  Limited assessment of the solid organ parenchyma is grossly  unremarkable demonstrating no evidence of  organomegaly.    Enlarged gallbladder.  Limited assessment of the viscera demonstrates overall normal  caliber, however, suggests the presence of circumferential wall  thickening with the distal ascending colon, transverse colon, and  descending colon.    No evidence of free fluid or free intraperitoneal air.     The osseous structures are grossly unremarkable for age.    RETROPERITONEUM:  Limited assessment of the kidneys demonstrates overall normal  size.     Limited assessment of the ureters demonstrates normal caliber and  course.    The adrenal glands are of normal size and contour.     No gross evidence of significant retroperitoneal adenopathy.  The vascular structures are grossly within normal limits for age.    PELVIS:  Limited assessment of the viscera is grossly unremarkable  demonstrating normal caliber bowel loops.     No evidence of free fluid or free intraperitoneal air.     The osseous structures are grossly unremarkable for age.     The vascular structures are grossly within normal limits for age.            .          Impression:       CONCLUSION:     1. Limited examination due to the lack of intravenous and oral  contrast.        2. There are normal caliber bowel loops however there is  suggestion of circumferential wall thickening associated with the  distal ascending colon, transverse colon, and majority of  descending colon. This degree of wall thickening suggests a  diffuse inflammatory process.  3. The gallbladder is enlarged, and nearly hydropic. Correlation  with ultrasound suggested.                            Electronically signed by:  CYNDIE Simpson MD  9/6/2017 8:07 PM  CDT Workstation: [id number]                Resolved Hospital Problems    Diagnosis Date Noted Date Resolved   • Bacteremia due to methicillin resistant Staphylococcus aureus [R78.81] 09/08/2017 09/09/2017 09/09/17.  Contaminant.    09/08/17.  PCR showed MRSA staph.  Awaiting final culture.  Started on  intravenous vancomycin today.  Follow culture.  If final culture positive will get ECHO.     • Hypokalemia [E87.6] 09/07/2017 09/07/2017     MRSA in blood culture a contaminant.  Stopped IV vanc yesterday.  Still on PO flagyl.  Stools have more color and are not water.  Symptomatic improvement.  Still having lots of stools though.  Advanced diet yesterday.    Will monitor patient's hospital course and adjust treatment as hospital course dictates.    DVT prophylaxis: SCD/TEDs  Code status is Full Code    Plan for disposition:Where: home and When:  3-5 days      Time:           This document has been electronically signed by Chaz Beckett MD on September 9, 2017 10:39 AM

## 2017-09-10 NOTE — PROGRESS NOTES
MEDICINE DAILY PROGRESS NOTE  NAME: Damaris Sánchez  : 1945  MRN: [id number]     LOS: 4 days     PROVIDER OF SERVICE: Chaz Beckett MD    Chief Complaint: C. difficile colitis    Subjective:     Interval History:  History taken from: patient chart family RN  Patient not feeling much better today.  Still feels markedly improved since admission.  Having BM's most every time she goes to the restroom.  Stools have some color and are not water, but not much difference since yesterday.  Added PO vanc yesterday.    Review of Systems:   Review of Systems   Constitutional: Positive for appetite change. Negative for activity change, fatigue and fever.   HENT: Negative for ear pain and sore throat.    Eyes: Negative for pain and visual disturbance.   Respiratory: Negative for cough and shortness of breath.    Cardiovascular: Negative for chest pain and palpitations.   Gastrointestinal: Positive for abdominal pain, diarrhea and nausea. Negative for constipation.   Endocrine: Negative for cold intolerance and heat intolerance.   Genitourinary: Negative for difficulty urinating and dysuria.   Musculoskeletal: Negative for arthralgias and gait problem.   Skin: Negative for color change and rash.   Neurological: Positive for weakness. Negative for dizziness and headaches.   Hematological: Negative for adenopathy. Does not bruise/bleed easily.   Psychiatric/Behavioral: Negative for agitation, confusion and sleep disturbance.       Objective:     Vital Signs  Vitals:    17 2300 09/10/17 0258 09/10/17 0723 09/10/17 0833   BP:  155/79  144/69   BP Location:  Right arm     Patient Position:  Lying     Pulse: 97 95 93 89   Resp:  18  18   Temp:  100.1 °F (37.8 °C)  99.4 °F (37.4 °C)   TempSrc:  Oral     SpO2:  94%  96%   Weight:       Height:           Physical Exam  Physical Exam   Constitutional: She is oriented to person, place, and time. She appears well-developed and well-nourished. No distress.   HENT:   Head:  Normocephalic and atraumatic.   Right Ear: External ear normal.   Left Ear: External ear normal.   Nose: Nose normal.   Eyes: Conjunctivae and EOM are normal. Pupils are equal, round, and reactive to light. Right eye exhibits no discharge. Left eye exhibits no discharge. No scleral icterus.   Neck: Normal range of motion. Neck supple.   Cardiovascular: Normal rate, regular rhythm, normal heart sounds and intact distal pulses.  Exam reveals no gallop and no friction rub.    No murmur heard.  Pulmonary/Chest: Effort normal and breath sounds normal. No respiratory distress. She has no wheezes. She has no rales. She exhibits no tenderness.   Abdominal: Soft. Bowel sounds are normal. She exhibits no distension and no mass. There is no tenderness. There is no rebound and no guarding.   Musculoskeletal: Normal range of motion.   Neurological: She is alert and oriented to person, place, and time.   Skin: Skin is warm and dry. No rash noted. She is not diaphoretic. No erythema. No pallor.   Psychiatric: She has a normal mood and affect. Her behavior is normal.   Nursing note and vitals reviewed.      Medication Review    Current Facility-Administered Medications:   •  acetaminophen (TYLENOL) tablet 650 mg, 650 mg, Oral, Q4H PRN, Atif Nichols MD, 650 mg at 09/10/17 0828  •  famotidine (PEPCID) tablet 40 mg, 40 mg, Oral, Daily, Atif Nichols MD, 40 mg at 09/10/17 0829  •  hydrALAZINE (APRESOLINE) injection 10 mg, 10 mg, Intravenous, Q6H PRN, Chaz Beckett MD  •  HYDROcodone-acetaminophen (NORCO) 5-325 MG per tablet 1 tablet, 1 tablet, Oral, Q6H PRN, Chaz Beckett MD, 1 tablet at 09/09/17 1649  •  HYDROmorphone (DILAUDID) injection 0.5 mg, 0.5 mg, Intravenous, Q2H PRN **AND** naloxone (NARCAN) injection 0.4 mg, 0.4 mg, Intravenous, Q5 Min PRN, Atif Nichols MD  •  influenza vac split quad (FLUZONE QUADRIVALENT) IM suspension 0.5 mL, 0.5 mL, Intramuscular, During Hospitalization, Atif Nichols MD  •   megestrol (MEGACE) tablet 20 mg, 20 mg, Oral, Daily, Chaz Beckett MD, 20 mg at 09/10/17 0828  •  metroNIDAZOLE (FLAGYL) tablet 500 mg, 500 mg, Oral, Q8H, Atif Nichols MD, 500 mg at 09/10/17 0542  •  ondansetron (ZOFRAN) tablet 4 mg, 4 mg, Oral, Q6H PRN, Atif Nichols MD  •  pneumococcal polysaccharide 23-valent (PNEUMOVAX-23) vaccine 0.5 mL, 0.5 mL, Intramuscular, During Hospitalization, Atif Nichols MD  •  potassium chloride (MICRO-K) CR capsule 40 mEq, 40 mEq, Oral, PRN **OR** potassium chloride (KLOR-CON) packet 40 mEq, 40 mEq, Oral, PRN **OR** potassium chloride 10 mEq in 100 mL IVPB, 10 mEq, Intravenous, Q1H PRN, Atif Nichols MD, Stopped at 09/07/17 0454  •  sodium chloride 0.9 % flush 1-10 mL, 1-10 mL, Intravenous, PRN, Atif Nichols MD  •  vancomycin (VANCOCIN) 50 mg/ml oral solution 125 mg, 125 mg, Oral, Q6H, Chaz Beckett MD, 125 mg at 09/10/17 0542     Diagnostic Data    Lab Results (last 24 hours)     Procedure Component Value Units Date/Time    Blood Culture [[id number]]  (Normal) Collected:  09/06/17 2252    Specimen:  Blood from Arm, Left Updated:  09/09/17 2302     Blood Culture No growth at 3 days    CBC & Differential [[id number]] Collected:  09/10/17 0600    Specimen:  Blood Updated:  09/10/17 0648    Narrative:       The following orders were created for panel order CBC & Differential.  Procedure                               Abnormality         Status                     ---------                               -----------         ------                     CBC Auto Differential[[id number]]        Abnormal            Final result                 Please view results for these tests on the individual orders.    CBC Auto Differential [[id number]]  (Abnormal) Collected:  09/10/17 0600    Specimen:  Blood Updated:  09/10/17 0648     WBC 7.66 10*3/mm3      RBC 3.90 10*6/mm3      Hemoglobin 11.6 (L) g/dL      Hematocrit 35.2 %      MCV 90.3 fL      MCH 29.7 pg      MCHC 33.0 g/dL       RDW 14.0 %      RDW-SD 45.9 fl      MPV 9.4 fL      Platelets 226 10*3/mm3      Neutrophil % 69.1 %      Lymphocyte % 20.4 %      Monocyte % 6.4 %      Eosinophil % 1.3 %      Basophil % 0.5 %      Immature Grans % 2.3 (H) %      Neutrophils, Absolute 5.29 10*3/mm3      Lymphocytes, Absolute 1.56 10*3/mm3      Monocytes, Absolute 0.49 10*3/mm3      Eosinophils, Absolute 0.10 10*3/mm3      Basophils, Absolute 0.04 10*3/mm3      Immature Grans, Absolute 0.18 (H) 10*3/mm3     Basic Metabolic Panel [[id number]]  (Abnormal) Collected:  09/10/17 0600    Specimen:  Blood Updated:  09/10/17 0722     Glucose 96 mg/dL      BUN 3 (L) mg/dL      Creatinine 0.76 mg/dL      Sodium 138 mmol/L      Potassium 3.1 (L) mmol/L      Chloride 101 mmol/L      CO2 26.0 mmol/L      Calcium 8.3 (L) mg/dL      eGFR Non African Amer 75 mL/min/1.73      BUN/Creatinine Ratio 3.9 (L)     Anion Gap 11.0 mmol/L     Narrative:       The MDRD GFR formula is only valid for adults with stable renal function between ages 18 and 70.    Magnesium [[id number]]  (Normal) Collected:  09/10/17 0600    Specimen:  Blood Updated:  09/10/17 0915     Magnesium 1.9 mg/dL           I reviewed the patient's new clinical results.    Assessment/Plan:     Active Hospital Problems (** Indicates Principal Problem)    Diagnosis Date Noted   • **C. difficile colitis [A04.7] 09/07/2017     09/10/17.  Flagyl day 4.  Oral vancomycin day 2.  Stools slowing some, monitor.  Discuss with GI tomorrow.    09/09/17.  Flagyl day 3.  Monitor for improvement.  Add by mouth vancomycin as needed.    09/08/17.  Continue flagyl.  GI consulted.  Discussed.  Will see patient.  Await recommendations.    09/07/17.  C diff positive.  Currently on oral flagyl.  Monitor.    Microbiology Results (last 10 days)     Procedure Component Value - Date/Time    Clostridium Difficile Toxin [[id number]] Collected:  09/07/17 0512    Lab Status:  Final result Specimen:  Stool from Per Rectum Updated:   09/07/17 0720    Narrative:       The following orders were created for panel order Clostridium Difficile Toxin.  Procedure                               Abnormality         Status                     ---------                               -----------         ------                     Clostridium Difficile Tox...[[id number]]                      Final result                 Please view results for these tests on the individual orders.    Clostridium Difficile Toxin, PCR [[id number]] Collected:  09/07/17 0512    Lab Status:  Final result Specimen:  Stool from Per Rectum Updated:  09/07/17 0720     C. Difficile Toxins by PCR Positive      contact precautions requested         Narrative:       This test is performed by utilizing real time polymerace chain recation (PCR).     Blood Culture [[id number]]  (Normal) Collected:  09/06/17 2252    Lab Status:  Preliminary result Specimen:  Blood from Arm, Left Updated:  09/07/17 1101     Blood Culture No growth at less than 24 hours    Blood Culture [[id number]]  (Normal) Collected:  09/06/17 2109    Lab Status:  Preliminary result Specimen:  Blood from Arm, Left Updated:  09/07/17 1001     Blood Culture No growth at less than 24 hours    Urine Culture [[id number]]  (Normal) Collected:  09/06/17 2103    Lab Status:  Preliminary result Specimen:  Urine from Urine, Clean Catch Updated:  09/07/17 0627     Urine Culture Culture in progress             • Inflammatory bowel disease [K63.89] 09/07/2017     Per patient Chron's disease.  Monitor.     • Hypokalemia [E87.6] 09/07/2017     09/10/17.  Low again today.  Magnesium normal.  Replace.  Recheck.    Results from last 7 days  Lab Units 09/10/17  0600 09/09/17  0616 09/08/17  0539 09/07/17  1004 09/07/17  0519  09/06/17  1715   POTASSIUM mmol/L 3.1* 3.8 4.0 4.0 4.3  < > 2.6*   MAGNESIUM mg/dL 1.9  --   --   --   --   --  2.0   < > = values in this interval not displayed.        • Lower abdominal pain [R10.30] 09/06/2017     09/10/17.   Again.  Better today.    09/08/17.  Improved.  Plan to feed today.    09/07/17.  C diff colitis.  By mouth flagyl.    Imaging Results (last 24 hours)     Procedure Component Value Units Date/Time    CT Abdomen Pelvis Without Contrast [[id number]] Collected:  09/06/17 1929     Updated:  09/06/17 2008    Narrative:         EXAMINATION:  Computed Tomography           REGION:    Abdomen / Pelvis                   INDICATION:    Diarrhea, left lower quadrant pain      HISTORY:  BENJAMIN. IMAGING:    none          TECHNIQUE:      - reconstructions:    axial, coronal, sagittal      - contrast:      oral:  no ;   intravenous:  no     - Please note:        - Lack of IV contrast limits assessment of solid organ  parenchyma, urinary system, or vascular structures.        - Lack of oral contrast limits assessment of GI tract  structures.          This exam was performed according to the departmental  dose-optimization program which includes automated exposure  control, adjustment of the mA and/or kV according to patient size  and/or use of iterative reconstruction technique.                COMMENTS:    THORAX (INFERIOR):  The lung bases are clear.     The pleura is without fluid or a mass.     The heart size is normal size and there is no pericardial fluid.    ABDOMEN:  Limited assessment of the solid organ parenchyma is grossly  unremarkable demonstrating no evidence of organomegaly.    Enlarged gallbladder.  Limited assessment of the viscera demonstrates overall normal  caliber, however, suggests the presence of circumferential wall  thickening with the distal ascending colon, transverse colon, and  descending colon.    No evidence of free fluid or free intraperitoneal air.     The osseous structures are grossly unremarkable for age.    RETROPERITONEUM:  Limited assessment of the kidneys demonstrates overall normal  size.     Limited assessment of the ureters demonstrates normal caliber and  course.    The adrenal glands are of  normal size and contour.     No gross evidence of significant retroperitoneal adenopathy.  The vascular structures are grossly within normal limits for age.    PELVIS:  Limited assessment of the viscera is grossly unremarkable  demonstrating normal caliber bowel loops.     No evidence of free fluid or free intraperitoneal air.     The osseous structures are grossly unremarkable for age.     The vascular structures are grossly within normal limits for age.            .          Impression:       CONCLUSION:     1. Limited examination due to the lack of intravenous and oral  contrast.        2. There are normal caliber bowel loops however there is  suggestion of circumferential wall thickening associated with the  distal ascending colon, transverse colon, and majority of  descending colon. This degree of wall thickening suggests a  diffuse inflammatory process.  3. The gallbladder is enlarged, and nearly hydropic. Correlation  with ultrasound suggested.                            Electronically signed by:  CYNDIE Simpson MD  9/6/2017 8:07 PM  CDT Workstation: [id number]                Resolved Hospital Problems    Diagnosis Date Noted Date Resolved   • Bacteremia due to methicillin resistant Staphylococcus aureus [R78.81] 09/08/2017 09/09/2017 09/09/17.  Contaminant.    09/08/17.  PCR showed MRSA staph.  Awaiting final culture.  Started on intravenous vancomycin today.  Follow culture.  If final culture positive will get ECHO.       Patient tolerating regular diet.  On PO flagyl day 4, PO vanc day 2.      Will monitor patient's hospital course and adjust treatment as hospital course dictates.    DVT prophylaxis: SCD/TEDs  Code status is Full Code    Plan for disposition:Where: home and When:  3-5 days      Time:           This document has been electronically signed by Chaz Beckett MD on September 10, 2017 10:30 AM

## 2017-09-11 NOTE — PROGRESS NOTES
[facility]  INPATIENT PROGRESS NOTE     LOS: 5 days   Patient Care Team:  Nish Morales DO as PCP - General (Gastroenterology)    Chief Complaint:  C. difficile colitis      Subjective     Interval History:     Patient Complaints: Patient seen and examined. No events noted. Feels well.    History taken from: Patient.    Review of Systems:    Review of Systems   Constitutional: Positive for appetite change. Negative for activity change, fatigue and fever.   HENT: Negative for ear pain and sore throat.    Eyes: Negative for pain and visual disturbance.   Respiratory: Negative for cough and shortness of breath.    Cardiovascular: Negative for chest pain and palpitations.   Gastrointestinal: Positive for abdominal pain, diarrhea and nausea. Negative for constipation.   Endocrine: Negative for cold intolerance and heat intolerance.   Genitourinary: Negative for difficulty urinating and dysuria.   Musculoskeletal: Negative for arthralgias and gait problem.   Skin: Negative for color change and rash.   Neurological: Positive for weakness. Negative for dizziness and headaches.   Hematological: Negative for adenopathy. Does not bruise/bleed easily.   Psychiatric/Behavioral: Negative for agitation, confusion and sleep disturbance.         Objective     Vital Signs  Temp:  [97.6 °F (36.4 °C)-99.9 °F (37.7 °C)] 99.9 °F (37.7 °C)  Heart Rate:  [] 93  Resp:  [18] 18  BP: (119-140)/(60-72) 140/61    Physical Exam:   Physical Exam   Constitutional: She is oriented to person, place, and time. She appears well-developed and well-nourished. No distress.   HENT:   Head: Normocephalic and atraumatic.   Right Ear: External ear normal.   Left Ear: External ear normal.   Nose: Nose normal.   Eyes: Conjunctivae and EOM are normal. Pupils are equal, round, and reactive to light. Right eye exhibits no discharge. Left eye exhibits no discharge. No scleral icterus.   Neck:  Normal range of motion. Neck supple.   Cardiovascular: Normal rate, regular rhythm, normal heart sounds and intact distal pulses.  Exam reveals no gallop and no friction rub.    No murmur heard.  Pulmonary/Chest: Effort normal and breath sounds normal. No respiratory distress. She has no wheezes. She has no rales. She exhibits no tenderness.   Abdominal: Soft. Bowel sounds are normal. She exhibits no distension and no mass. There is no tenderness. There is no rebound and no guarding.   Musculoskeletal: Normal range of motion.   Neurological: She is alert and oriented to person, place, and time.   Skin: Skin is warm and dry. No rash noted. She is not diaphoretic. No erythema. No pallor.   Psychiatric: She has a normal mood and affect. Her behavior is normal.   Nursing note and vitals reviewed.         Results Review:       Results from last 7 days  Lab Units 09/11/17  0553 09/10/17  0600 09/09/17  0616 09/08/17  0539 09/07/17  1004 09/07/17  0519 09/06/17  2253 09/06/17  1715   SODIUM mmol/L 137 138 140 139  --  137 133* 133*   POTASSIUM mmol/L 3.1* 3.1* 3.8 4.0 4.0 4.3 2.5* 2.6*   CHLORIDE mmol/L 104 101 109 111*  --  105 99 93*   CO2 mmol/L 27.0 26.0 23.0 21.0*  --  22.0 25.0 24.0   BUN mg/dL 5* 3* 5* 8  --  9 11 11   CREATININE mg/dL 0.68 0.76 0.86 0.86  --  0.96 1.10* 1.44*   GLUCOSE mg/dL 97 96 84 102*  --  150* 115* 133*   CALCIUM mg/dL 7.9* 8.3* 8.1* 8.4  --  8.4 8.0* 8.9   BILIRUBIN mg/dL  --   --   --   --   --   --   --  0.7   ALK PHOS U/L  --   --   --   --   --   --   --  134*   ALT (SGPT) U/L  --   --   --   --   --   --   --  18   AST (SGOT) U/L  --   --   --   --   --   --   --  27         Results from last 7 days  Lab Units 09/10/17  0600 09/06/17  1715   MAGNESIUM mg/dL 1.9 2.0         Results from last 7 days  Lab Units 09/11/17  0553 09/10/17  0600 09/09/17  0616 09/08/17  0539 09/07/17  0519 09/06/17  2253 09/06/17  1715   WBC 10*3/mm3 5.69 7.66 5.83 8.38 7.21 8.83 16.12*   HEMOGLOBIN g/dL 10.2*  11.6* 9.9* 9.1* 10.8* 10.4* 12.4   HEMATOCRIT % 30.6* 35.2 29.7* 26.8* 31.1* 30.5* 36.7   PLATELETS 10*3/mm3 157 226 173 216 247 210 313       No results found for: CKTOTAL, CKMB, CKMBINDEX, TROPONINI, TROPONINT    CO2   Date Value Ref Range Status   09/11/2017 27.0 22.0 - 31.0 mmol/L Final              Imaging Results (last 7 days)     Procedure Component Value Units Date/Time    CT Abdomen Pelvis Without Contrast [[id number]] Collected:  09/06/17 1929     Updated:  09/06/17 2008    Narrative:         EXAMINATION:  Computed Tomography           REGION:    Abdomen / Pelvis                   INDICATION:    Diarrhea, left lower quadrant pain      HISTORY:  BENJAMIN. IMAGING:    none          TECHNIQUE:      - reconstructions:    axial, coronal, sagittal      - contrast:      oral:  no ;   intravenous:  no     - Please note:        - Lack of IV contrast limits assessment of solid organ  parenchyma, urinary system, or vascular structures.        - Lack of oral contrast limits assessment of GI tract  structures.          This exam was performed according to the departmental  dose-optimization program which includes automated exposure  control, adjustment of the mA and/or kV according to patient size  and/or use of iterative reconstruction technique.                COMMENTS:    THORAX (INFERIOR):  The lung bases are clear.     The pleura is without fluid or a mass.     The heart size is normal size and there is no pericardial fluid.    ABDOMEN:  Limited assessment of the solid organ parenchyma is grossly  unremarkable demonstrating no evidence of organomegaly.    Enlarged gallbladder.  Limited assessment of the viscera demonstrates overall normal  caliber, however, suggests the presence of circumferential wall  thickening with the distal ascending colon, transverse colon, and  descending colon.    No evidence of free fluid or free intraperitoneal air.     The osseous structures are grossly unremarkable for  age.    RETROPERITONEUM:  Limited assessment of the kidneys demonstrates overall normal  size.     Limited assessment of the ureters demonstrates normal caliber and  course.    The adrenal glands are of normal size and contour.     No gross evidence of significant retroperitoneal adenopathy.  The vascular structures are grossly within normal limits for age.    PELVIS:  Limited assessment of the viscera is grossly unremarkable  demonstrating normal caliber bowel loops.     No evidence of free fluid or free intraperitoneal air.     The osseous structures are grossly unremarkable for age.     The vascular structures are grossly within normal limits for age.            .          Impression:       CONCLUSION:     1. Limited examination due to the lack of intravenous and oral  contrast.        2. There are normal caliber bowel loops however there is  suggestion of circumferential wall thickening associated with the  distal ascending colon, transverse colon, and majority of  descending colon. This degree of wall thickening suggests a  diffuse inflammatory process.  3. The gallbladder is enlarged, and nearly hydropic. Correlation  with ultrasound suggested.                            Electronically signed by:  CYNDIE Simpson MD  9/6/2017 8:07 PM  CDT Workstation: [id number]           Blood Culture   Date Value Ref Range Status   09/06/2017 No growth at 4 days  Preliminary     BCID, PCR   Date Value Ref Range Status   09/06/2017 (C) No organism detected by BCID PCR. Corrected    Staphylococcus species, not aureus. mecA (methicillin resistance gene) detected. Identification by BCID PCR.     Comment:     Corrected result. Previous result was Staphylococcus aureus. mecA (methicillin resistance gene) detected. Identification by BCID PCR. on 9/7/2017 at 2346 CDT                 Urine Culture   Date Value Ref Range Status   09/10/2017 Culture in progress  Preliminary           Medication Review:   Current  Facility-Administered Medications   Medication Dose Route Frequency Provider Last Rate Last Dose   • acetaminophen (TYLENOL) tablet 650 mg  650 mg Oral Q4H PRN Atif Nichols MD   650 mg at 09/10/17 2028   • famotidine (PEPCID) tablet 40 mg  40 mg Oral Daily Atif Nichols MD   40 mg at 09/11/17 0810   • hydrALAZINE (APRESOLINE) injection 10 mg  10 mg Intravenous Q6H PRN Chaz Beckett MD       • HYDROcodone-acetaminophen (NORCO) 5-325 MG per tablet 1 tablet  1 tablet Oral Q6H PRN Chaz Beckett MD   1 tablet at 09/09/17 1649   • HYDROmorphone (DILAUDID) injection 0.5 mg  0.5 mg Intravenous Q2H PRN Atif Nichols MD        And   • naloxone (NARCAN) injection 0.4 mg  0.4 mg Intravenous Q5 Min PRN Atif Nichols MD       • influenza vac split quad (FLUZONE QUADRIVALENT) IM suspension 0.5 mL  0.5 mL Intramuscular During Hospitalization Atif Nichols MD       • megestrol (MEGACE) tablet 20 mg  20 mg Oral Daily Chaz Beckett MD   20 mg at 09/11/17 0810   • metroNIDAZOLE (FLAGYL) tablet 500 mg  500 mg Oral Q8H Atif Nichols MD   500 mg at 09/11/17 0544   • ondansetron (ZOFRAN) tablet 4 mg  4 mg Oral Q6H PRN Atif Nichols MD       • pneumococcal polysaccharide 23-valent (PNEUMOVAX-23) vaccine 0.5 mL  0.5 mL Intramuscular During Hospitalization Atif Nichols MD       • potassium chloride (MICRO-K) CR capsule 40 mEq  40 mEq Oral PRN Atif Nichols MD   40 mEq at 09/11/17 0810    Or   • potassium chloride (KLOR-CON) packet 40 mEq  40 mEq Oral PRN Atif Nichols MD        Or   • potassium chloride 10 mEq in 100 mL IVPB  10 mEq Intravenous Q1H PRN Atif Nichols MD   Stopped at 09/07/17 0454   • sodium chloride 0.9 % flush 1-10 mL  1-10 mL Intravenous PRN Atif Nichols MD       • sodium chloride 0.9 % infusion  50 mL/hr Intravenous Continuous Chaz Beckett MD 50 mL/hr at 09/10/17 1953 50 mL/hr at 09/10/17 1953   • vancomycin (VANCOCIN) 50 mg/ml oral solution 125 mg  125 mg Oral  Q6H Chaz Beckett MD   125 mg at 09/11/17 0544         Assessment/Plan     Principal Problem:    C. difficile colitis  Active Problems:    Lower abdominal pain    Inflammatory bowel disease    Hypokalemia          -Continue with Abx.  -PT/OT as tolerated.  -DVT and GI prophylaxis.  -D/c planning in progress.            This document has been electronically signed by Hattie Barrera MD on September 11, 2017 10:17 AM        EM[facility] Dragon/Transcription disclaimer:   Dictated utilizing Dragon dictation.

## 2017-09-11 NOTE — PLAN OF CARE
Problem: Patient Care Overview (Adult)  Goal: Plan of Care Review  Outcome: Ongoing (interventions implemented as appropriate)    09/11/17 1380   Coping/Psychosocial Response Interventions   Plan Of Care Reviewed With patient   Patient Care Overview   Progress no change   Outcome Evaluation   Outcome Summary/Follow up Plan zunilda lopez complete. Tolerating puree diet and nector thick liquids. Denies pain. Vitals stable.        Goal: Adult Individualization and Mutuality  Outcome: Ongoing (interventions implemented as appropriate)  Goal: Discharge Needs Assessment  Outcome: Ongoing (interventions implemented as appropriate)    Problem: Fluid Volume Deficit (Adult)  Goal: Identify Related Risk Factors and Signs and Symptoms  Outcome: Ongoing (interventions implemented as appropriate)  Goal: Fluid/Electrolyte Balance  Outcome: Ongoing (interventions implemented as appropriate)  Goal: Comfort/Well Being  Outcome: Ongoing (interventions implemented as appropriate)

## 2017-09-11 NOTE — PROGRESS NOTES
SUBJECTIVE:   9/11/2017  Chief Complaint:     Subjective      Patient is 72 y.o. female seen for follow up of C. difficile colitis.  Ports she is having continual dark stool described as diarrhea multiple times per day.  States she has no appetite or interest in regular diet.  Tolerate clears with no nausea or vomiting.  Reports lower abdominal pain.  Currently on oral vancomycin and Flagyl.     CURRENT MEDICATIONS/OBJECTIVE/VS/PE:     Current Medications:     Current Facility-Administered Medications   Medication Dose Route Frequency Provider Last Rate Last Dose   • acetaminophen (TYLENOL) tablet 650 mg  650 mg Oral Q4H PRN Atif Nichols MD   650 mg at 09/10/17 2028   • famotidine (PEPCID) tablet 40 mg  40 mg Oral Daily Atif Nichols MD   40 mg at 09/11/17 0810   • hydrALAZINE (APRESOLINE) injection 10 mg  10 mg Intravenous Q6H PRN Chaz Beckett MD       • HYDROcodone-acetaminophen (NORCO) 5-325 MG per tablet 1 tablet  1 tablet Oral Q6H PRN Chaz Beckett MD   1 tablet at 09/11/17 1218   • HYDROmorphone (DILAUDID) injection 0.5 mg  0.5 mg Intravenous Q2H PRN Atif Nichols MD        And   • naloxone (NARCAN) injection 0.4 mg  0.4 mg Intravenous Q5 Min PRN Atif Nichols MD       • influenza vac split quad (FLUZONE QUADRIVALENT) IM suspension 0.5 mL  0.5 mL Intramuscular During Hospitalization Atif Nichols MD       • megestrol (MEGACE) tablet 20 mg  20 mg Oral Daily Chaz Beckett MD   20 mg at 09/11/17 0810   • metroNIDAZOLE (FLAGYL) tablet 500 mg  500 mg Oral Q6H Hattie Barrera MD   500 mg at 09/11/17 1217   • ondansetron (ZOFRAN) tablet 4 mg  4 mg Oral Q6H PRN Atif Nichols MD       • pneumococcal polysaccharide 23-valent (PNEUMOVAX-23) vaccine 0.5 mL  0.5 mL Intramuscular During Hospitalization Atif Nichols MD       • potassium chloride (MICRO-K) CR capsule 40 mEq  40 mEq Oral PRN Atif Nichols MD   40 mEq at 09/11/17 1629    Or   • potassium chloride  (KLOR-CON) packet 40 mEq  40 mEq Oral PRN Atif Nichols MD        Or   • potassium chloride 10 mEq in 100 mL IVPB  10 mEq Intravenous Q1H PRN Atif Nichols MD   Stopped at 09/07/17 0454   • sodium chloride 0.9 % flush 1-10 mL  1-10 mL Intravenous PRN Atif Nichols MD       • sodium chloride 0.9 % infusion  50 mL/hr Intravenous Continuous Chaz Beckett MD 50 mL/hr at 09/11/17 1628 50 mL/hr at 09/11/17 1628   • vancomycin (VANCOCIN) 50 mg/ml oral solution 125 mg  125 mg Oral Q6H Chaz Beckett MD   125 mg at 09/11/17 1217       Objective     Physical Exam:   Temp:  [98 °F (36.7 °C)-99.9 °F (37.7 °C)] 98 °F (36.7 °C)  Heart Rate:  [] 88  Resp:  [18] 18  BP: (119-140)/(60-80) 137/72     Physical Exam:  General Appearance:    Alert, cooperative, in no acute distress   Head:    Normocephalic, without obvious abnormality, atraumatic                       Lungs:     Clear to auscultation,respirations regular, even and                   unlabored    Heart:    Regular rhythm and normal rate, normal S1 and S2, no            murmur, no gallop, no rub, no click       Abdomen:     Normal bowel sounds, no masses, no organomegaly, soft        non-tender, non-distended, no guarding, no rebound                 tenderness                       Neurologic:   Cranial nerves 2 - 12 grossly intact, sensation intact      Results Review:     Lab Results (last 24 hours)     Procedure Component Value Units Date/Time    Gastrointestinal Panel, PCR [[id number]]  (Normal) Collected:  09/10/17 1925    Specimen:  Stool from Per Rectum Updated:  09/10/17 2141     Campylobacter Not Detected     Clostridium difficile (toxin A/B) Not Detected     Plesiomonas shigelloides Not Detected     Salmonella Not Detected     Vibrio Not Detected     Vibrio cholerae Not Detected     Yersinia enterocolitica Not Detected     Enteroaggregative E. coli (EAEC) Not Detected     Enteropathogenic E. coli (EPEC) Not Detected     Enterotoxigenic E.  coli (ETEC) lt/st Not Detected     Shiga-like toxin-producing E. coli (STEC) stx1/stx2 Not Detected     E. coli O157 Not Detected     Shigella/Enteroinvasive E. coli (EIEC) Not Detected     Cryptosporidium Not Detected     Cyclospora cayetanensis Not Detected     Entamoeba histolytica Not Detected     Giardia lamblia Not Detected     Adenovirus F40/41 Not Detected     Astrovirus Not Detected     Norovirus GI/GII Not Detected     Rotavirus A Not Detected     Sapovirus (I, II, IV or V) Not Detected    Narrative:       Testing performed by multiplex PCR system.    Blood Culture [[id number]]  (Normal) Collected:  09/06/17 2252    Specimen:  Blood from Arm, Left Updated:  09/10/17 2302     Blood Culture No growth at 4 days    CBC & Differential [[id number]] Collected:  09/11/17 0553    Specimen:  Blood Updated:  09/11/17 0612    Narrative:       The following orders were created for panel order CBC & Differential.  Procedure                               Abnormality         Status                     ---------                               -----------         ------                     CBC Auto Differential[[id number]]        Abnormal            Final result                 Please view results for these tests on the individual orders.    CBC Auto Differential [[id number]]  (Abnormal) Collected:  09/11/17 0553    Specimen:  Blood Updated:  09/11/17 0612     WBC 5.69 10*3/mm3      RBC 3.44 (L) 10*6/mm3      Hemoglobin 10.2 (L) g/dL      Hematocrit 30.6 (L) %      MCV 89.0 fL      MCH 29.7 pg      MCHC 33.3 g/dL      RDW 14.1 %      RDW-SD 45.9 fl      MPV 9.0 fL      Platelets 157 10*3/mm3      Neutrophil % 69.7 %      Lymphocyte % 16.7 %      Monocyte % 8.1 %      Eosinophil % 1.4 %      Basophil % 0.4 %      Immature Grans % 3.7 (H) %      Neutrophils, Absolute 3.97 10*3/mm3      Lymphocytes, Absolute 0.95 10*3/mm3      Monocytes, Absolute 0.46 10*3/mm3      Eosinophils, Absolute 0.08 10*3/mm3      Basophils, Absolute 0.02  10*3/mm3      Immature Grans, Absolute 0.21 (H) 10*3/mm3     Basic Metabolic Panel [[id number]]  (Abnormal) Collected:  09/11/17 0553    Specimen:  Blood Updated:  09/11/17 0628     Glucose 97 mg/dL      BUN 5 (L) mg/dL      Creatinine 0.68 mg/dL      Sodium 137 mmol/L      Potassium 3.1 (L) mmol/L      Chloride 104 mmol/L      CO2 27.0 mmol/L      Calcium 7.9 (L) mg/dL      eGFR Non African Amer 85 mL/min/1.73      BUN/Creatinine Ratio 7.4     Anion Gap 6.0 mmol/L     Narrative:       The MDRD GFR formula is only valid for adults with stable renal function between ages 18 and 70.    Urine Culture [[id number]]  (Normal) Collected:  09/10/17 1306    Specimen:  Urine from Urine, Clean Catch Updated:  09/11/17 1346     Urine Culture No growth at 24 hours           I reviewed the patient's new clinical results.  I reviewed the patient's new imaging results and agree with the interpretation.     ASSESSMENT/PLAN:   ASSESSMENT: C. difficile colitis, inflammatory bowel disease    PLAN: Continue Flagyl and vancomycin.  Recommend symptomatically support as needed.  Advance diet as tolerated.  We'll transfer patient to Dr. Morales as they report that is their primary gastroenterologist.   The risks, benefits, and alternatives of this procedure have been discussed with the patient or the responsible party- the patient understands and agrees to proceed.         DESTINY Bhagat  09/11/17  5:05 PM               This document has been electronically signed by DESTINY Bhagat on September 11, 2017 5:05 PM

## 2017-09-12 NOTE — PLAN OF CARE
Problem: Patient Care Overview (Adult)  Goal: Plan of Care Review  Outcome: Ongoing (interventions implemented as appropriate)    09/12/17 1610   Coping/Psychosocial Response Interventions   Plan Of Care Reviewed With patient;caregiver;family   Patient Care Overview   Progress no change   Outcome Evaluation   Outcome Summary/Follow up Plan Pt with dx of CDiff and a hx of Crohn's. Decreased appetite at this time with persistent diarrhea. On Megace. Rd will add yogurt to trays and monitor. Pt declines other supplements at this time.

## 2017-09-12 NOTE — PLAN OF CARE
Problem: Patient Care Overview (Adult)  Goal: Plan of Care Review  Outcome: Ongoing (interventions implemented as appropriate)    09/12/17 0308   Coping/Psychosocial Response Interventions   Plan Of Care Reviewed With patient   Patient Care Overview   Progress no change   Outcome Evaluation   Outcome Summary/Follow up Plan Patient VSS, minimal complaints of pain, no nausea/vomiting       Goal: Adult Individualization and Mutuality  Outcome: Ongoing (interventions implemented as appropriate)  Goal: Discharge Needs Assessment  Outcome: Ongoing (interventions implemented as appropriate)    Problem: Fluid Volume Deficit (Adult)  Goal: Identify Related Risk Factors and Signs and Symptoms  Outcome: Ongoing (interventions implemented as appropriate)  Goal: Fluid/Electrolyte Balance  Outcome: Ongoing (interventions implemented as appropriate)  Goal: Comfort/Well Being  Outcome: Ongoing (interventions implemented as appropriate)

## 2017-09-12 NOTE — PROGRESS NOTES
[facility]  INPATIENT PROGRESS NOTE     LOS: 6 days   Patient Care Team:  Nish Morales DO as PCP - General (Gastroenterology)    Chief Complaint:  C. difficile colitis      Subjective     Interval History:     Patient Complaints: Patient seen and examined.  Resting comfortably.  Continues to complain of having persistent diarrhea.    History taken from: Patient.    Review of Systems:    Review of Systems   Constitutional: Positive for appetite change. Negative for activity change, fatigue and fever.   HENT: Negative for ear pain and sore throat.    Eyes: Negative for pain and visual disturbance.   Respiratory: Negative for cough and shortness of breath.    Cardiovascular: Negative for chest pain and palpitations.   Gastrointestinal: Positive for abdominal pain, diarrhea and nausea. Negative for constipation.   Endocrine: Negative for cold intolerance and heat intolerance.   Genitourinary: Negative for difficulty urinating and dysuria.   Musculoskeletal: Negative for arthralgias and gait problem.   Skin: Negative for color change and rash.   Neurological: Positive for weakness. Negative for dizziness and headaches.   Hematological: Negative for adenopathy. Does not bruise/bleed easily.   Psychiatric/Behavioral: Negative for agitation, confusion and sleep disturbance.         Objective     Vital Signs  Temp:  [97.6 °F (36.4 °C)-99.4 °F (37.4 °C)] 99.4 °F (37.4 °C)  Heart Rate:  [] 86  Resp:  [18] 18  BP: (129-168)/(64-86) 152/72    Physical Exam:   Physical Exam   Constitutional: She is oriented to person, place, and time. She appears well-developed and well-nourished. No distress.   HENT:   Head: Normocephalic and atraumatic.   Right Ear: External ear normal.   Left Ear: External ear normal.   Nose: Nose normal.   Eyes: Conjunctivae and EOM are normal. Pupils are equal, round, and reactive to light. Right eye exhibits no discharge. Left eye  exhibits no discharge. No scleral icterus.   Neck: Normal range of motion. Neck supple.   Cardiovascular: Normal rate, regular rhythm, normal heart sounds and intact distal pulses.  Exam reveals no gallop and no friction rub.    No murmur heard.  Pulmonary/Chest: Effort normal and breath sounds normal. No respiratory distress. She has no wheezes. She has no rales. She exhibits no tenderness.   Abdominal: Soft. Bowel sounds are normal. She exhibits no distension and no mass. There is no tenderness. There is no rebound and no guarding.   Musculoskeletal: Normal range of motion.   Neurological: She is alert and oriented to person, place, and time.   Skin: Skin is warm and dry. No rash noted. She is not diaphoretic. No erythema. No pallor.   Psychiatric: She has a normal mood and affect. Her behavior is normal.   Nursing note and vitals reviewed.         Results Review:       Results from last 7 days  Lab Units 09/12/17  0543 09/11/17  1949 09/11/17  0553 09/10/17  0600 09/09/17  0616 09/08/17  0539 09/07/17  1004 09/07/17  0519 09/06/17  2253 09/06/17  1715   SODIUM mmol/L 137  --  137 138 140 139  --  137 133* 133*   POTASSIUM mmol/L 4.5 4.2 3.1* 3.1* 3.8 4.0 4.0 4.3 2.5* 2.6*   CHLORIDE mmol/L 105  --  104 101 109 111*  --  105 99 93*   CO2 mmol/L 23.0  --  27.0 26.0 23.0 21.0*  --  22.0 25.0 24.0   BUN mg/dL 7  --  5* 3* 5* 8  --  9 11 11   CREATININE mg/dL 0.69  --  0.68 0.76 0.86 0.86  --  0.96 1.10* 1.44*   GLUCOSE mg/dL 88  --  97 96 84 102*  --  150* 115* 133*   CALCIUM mg/dL 8.3*  --  7.9* 8.3* 8.1* 8.4  --  8.4 8.0* 8.9   BILIRUBIN mg/dL  --   --   --   --   --   --   --   --   --  0.7   ALK PHOS U/L  --   --   --   --   --   --   --   --   --  134*   ALT (SGPT) U/L  --   --   --   --   --   --   --   --   --  18   AST (SGOT) U/L  --   --   --   --   --   --   --   --   --  27         Results from last 7 days  Lab Units 09/10/17  0600 09/06/17  1715   MAGNESIUM mg/dL 1.9 2.0         Results from last 7  days  Lab Units 09/12/17  0543 09/11/17  0553 09/10/17  0600 09/09/17  0616 09/08/17  0539 09/07/17  0519 09/06/17  2253   WBC 10*3/mm3 6.48 5.69 7.66 5.83 8.38 7.21 8.83   HEMOGLOBIN g/dL 10.8* 10.2* 11.6* 9.9* 9.1* 10.8* 10.4*   HEMATOCRIT % 33.0* 30.6* 35.2 29.7* 26.8* 31.1* 30.5*   PLATELETS 10*3/mm3 187 157 226 173 216 247 210       No results found for: CKTOTAL, CKMB, CKMBINDEX, TROPONINI, TROPONINT    CO2   Date Value Ref Range Status   09/12/2017 23.0 22.0 - 31.0 mmol/L Final              Imaging Results (last 7 days)     Procedure Component Value Units Date/Time    CT Abdomen Pelvis Without Contrast [[id number]] Collected:  09/06/17 1929     Updated:  09/06/17 2008    Narrative:         EXAMINATION:  Computed Tomography           REGION:    Abdomen / Pelvis                   INDICATION:    Diarrhea, left lower quadrant pain      HISTORY:  BENJAMIN. IMAGING:    none          TECHNIQUE:      - reconstructions:    axial, coronal, sagittal      - contrast:      oral:  no ;   intravenous:  no     - Please note:        - Lack of IV contrast limits assessment of solid organ  parenchyma, urinary system, or vascular structures.        - Lack of oral contrast limits assessment of GI tract  structures.          This exam was performed according to the departmental  dose-optimization program which includes automated exposure  control, adjustment of the mA and/or kV according to patient size  and/or use of iterative reconstruction technique.                COMMENTS:    THORAX (INFERIOR):  The lung bases are clear.     The pleura is without fluid or a mass.     The heart size is normal size and there is no pericardial fluid.    ABDOMEN:  Limited assessment of the solid organ parenchyma is grossly  unremarkable demonstrating no evidence of organomegaly.    Enlarged gallbladder.  Limited assessment of the viscera demonstrates overall normal  caliber, however, suggests the presence of circumferential wall  thickening with the  distal ascending colon, transverse colon, and  descending colon.    No evidence of free fluid or free intraperitoneal air.     The osseous structures are grossly unremarkable for age.    RETROPERITONEUM:  Limited assessment of the kidneys demonstrates overall normal  size.     Limited assessment of the ureters demonstrates normal caliber and  course.    The adrenal glands are of normal size and contour.     No gross evidence of significant retroperitoneal adenopathy.  The vascular structures are grossly within normal limits for age.    PELVIS:  Limited assessment of the viscera is grossly unremarkable  demonstrating normal caliber bowel loops.     No evidence of free fluid or free intraperitoneal air.     The osseous structures are grossly unremarkable for age.     The vascular structures are grossly within normal limits for age.            .          Impression:       CONCLUSION:     1. Limited examination due to the lack of intravenous and oral  contrast.        2. There are normal caliber bowel loops however there is  suggestion of circumferential wall thickening associated with the  distal ascending colon, transverse colon, and majority of  descending colon. This degree of wall thickening suggests a  diffuse inflammatory process.  3. The gallbladder is enlarged, and nearly hydropic. Correlation  with ultrasound suggested.                            Electronically signed by:  CYNDIE Simpson MD  9/6/2017 8:07 PM  CDT Workstation: [id number]           Blood Culture   Date Value Ref Range Status   09/06/2017 No growth at 5 days  Final     BCID, PCR   Date Value Ref Range Status   09/06/2017 (C) No organism detected by BCID PCR. Corrected    Staphylococcus species, not aureus. mecA (methicillin resistance gene) detected. Identification by BCID PCR.     Comment:     Corrected result. Previous result was Staphylococcus aureus. mecA (methicillin resistance gene) detected. Identification by BCID PCR. on 9/7/2017 at  2346 CDT                 Urine Culture   Date Value Ref Range Status   09/10/2017 No growth at 24 hours  Final           Medication Review:   Current Facility-Administered Medications   Medication Dose Route Frequency Provider Last Rate Last Dose   • acetaminophen (TYLENOL) tablet 650 mg  650 mg Oral Q4H PRN Atif Nichols MD   650 mg at 09/11/17 2116   • famotidine (PEPCID) tablet 40 mg  40 mg Oral Daily Atif Nichols MD   40 mg at 09/12/17 1002   • hydrALAZINE (APRESOLINE) injection 10 mg  10 mg Intravenous Q6H PRN Chaz Beckett MD       • HYDROcodone-acetaminophen (NORCO) 5-325 MG per tablet 1 tablet  1 tablet Oral Q6H PRN Chaz Beckett MD   1 tablet at 09/12/17 1034   • HYDROmorphone (DILAUDID) injection 0.5 mg  0.5 mg Intravenous Q2H PRN Atif Nichols MD        And   • naloxone (NARCAN) injection 0.4 mg  0.4 mg Intravenous Q5 Min PRN Atif Nichols MD       • influenza vac split quad (FLUZONE QUADRIVALENT) IM suspension 0.5 mL  0.5 mL Intramuscular During Hospitalization Atif Nichols MD       • megestrol (MEGACE) tablet 20 mg  20 mg Oral Daily Chaz Beckett MD   20 mg at 09/12/17 1003   • metroNIDAZOLE (FLAGYL) tablet 500 mg  500 mg Oral Q6H Hattie Barrera MD   500 mg at 09/12/17 0559   • ondansetron (ZOFRAN) tablet 4 mg  4 mg Oral Q6H PRN Atif Nichols MD       • pneumococcal polysaccharide 23-valent (PNEUMOVAX-23) vaccine 0.5 mL  0.5 mL Intramuscular During Hospitalization Atif Nichols MD       • potassium chloride (MICRO-K) CR capsule 40 mEq  40 mEq Oral PRN Atif Nichols MD   40 mEq at 09/11/17 1629    Or   • potassium chloride (KLOR-CON) packet 40 mEq  40 mEq Oral PRN Atif Nichols MD        Or   • potassium chloride 10 mEq in 100 mL IVPB  10 mEq Intravenous Q1H PRN Atif Nichols MD   Stopped at 09/07/17 0454   • sodium chloride 0.9 % flush 1-10 mL  1-10 mL Intravenous PRN Atif Nichols MD       • sodium chloride 0.9 % infusion  50 mL/hr  Intravenous Continuous Chaz Beckett MD 50 mL/hr at 09/11/17 1628 50 mL/hr at 09/11/17 1628   • vancomycin (VANCOCIN) 50 mg/ml oral solution 125 mg  125 mg Oral Q6H Chaz Beckett MD   125 mg at 09/12/17 0559         Assessment/Plan     Principal Problem:    C. difficile colitis  Active Problems:    Lower abdominal pain    Inflammatory bowel disease    Hypokalemia          -Continue his antibiotics.  -Continue to advance diet as tolerated.  -PT OT to continue.  -DVT and GI prophylaxis in place.    -We'll continue monitoring patient hospital setting and treat patient as course dictates.          This document has been electronically signed by Hattie Barrera MD on September 12, 2017 10:37 AM        EMR Dragon/Transcription disclaimer:   Dictated utilizing Dragon dictation.

## 2017-09-12 NOTE — CONSULTS
"Adult Nutrition  Assessment    Patient Name:  Damaris Sánchez  YOB: 1945  MRN: [id number]  Admit Date:  9/6/2017    Assessment Date:  9/12/2017          Reason for Assessment       09/12/17 1601    Reason for Assessment    Gastrointestinal Crohn's disease;Diarrhea    Infectious Disease C. diff      09/12/17 1600    Reason for Assessment    Reason For Assessment/Visit length of stay    Identified At Risk By Screening Criteria reduced oral intake over the last month                Nutrition/Diet History       09/12/17 1602    Nutrition/Diet History    Typical Food/Fluid Intake Pt claims that she does not have a very good appetite              Labs/Tests/Procedures/Meds       09/12/17 1603    Labs/Tests/Procedures/Meds    Labs/Tests Review Reviewed    Medication Review Reviewed, pertinent            Physical Findings       09/12/17 1603    Physical Findings/Assessment    Additional Documentation Physical Appearance (Group)    Physical Appearance    Gastrointestinal diarrhea            Estimated/Assessed Needs       09/12/17 1603    Calculation Measurements    Weight Used For Calculations 61.2 kg (135 lb)    Height Used for Calculations 1.6 m (5' 3\")    Estimated/Assessed Energy Needs    Energy Need Method [place] Smuleor    Age 72    RMR ([place]field-St. Jeor Equation) 1091.48    Activity Factors ([place] St Jeor)  Out of bed, ambulatory  1.3    Total estimated needs ([place] St[place] Jeor) 1430    Estimated/Assessed Protein Needs    Weight Used for Protein Calculation 61.2 kg (135 lb)    Protein (gm/kg) 1.2    1.2 Gm Protein (gm) 73.48    Estimated Protein Range 73    Estimated/Assessed Fluid Needs    Fluid Need Method Other (comment)    RDA Method (mL)  1500            Nutrition Prescription Ordered       09/12/17 1604    Nutrition Prescription PO    Current PO Diet Regular    Fluid Consistency Thin            Evaluation of Received Nutrient/Fluid Intake       09/12/17 1604    PO Evaluation    Number of Days PO " Intake Evaluated 3 days    Number of Meals 10    % PO Intake 40% average            Comments:  Pt presents at increased nutrition risk due to dx of CDiff with persistent diarrhea and a decreased appetite.  SHe reports that her appetite was down a week pta.  She has a hx of Crohn's but it has been under control prior to this episode.  She declines supplements and milk but will take yogurt with all meals.  Of Noted--she is currently on Megace.  RD will add yogurt and monitor her po tolerance and intake.        Electronically signed by:  Candelaria Avila RD  09/12/17 4:08 PM

## 2017-09-13 NOTE — THERAPY EVALUATION
Acute Care - Physical Therapy Initial Evaluation  [facility]     Patient Name: Damaris Sánchez  : 1945  MRN: [id number]  Today's Date: 2017   Onset of Illness/Injury or Date of Surgery Date: 17  Date of Referral to PT: 17  Referring Physician: GARRICK Barrera MD.      Admit Date: 2017     Visit Dx:    ICD-10-CM ICD-9-CM   1. Lower abdominal pain R10.30 789.09   2. Leukocytosis, unspecified type D72.829 288.60   3. Hypokalemia E87.6 276.8   4. C. difficile colitis A04.7 008.45   5. Impaired physical mobility Z74.09 781.99     Patient Active Problem List   Diagnosis   • Lower abdominal pain   • C. difficile colitis   • Inflammatory bowel disease   • Hypokalemia     History reviewed. No pertinent past medical history.  History reviewed. No pertinent surgical history.       PT ASSESSMENT (last 72 hours)      PT Evaluation       17 0955 17 1516    Rehab Evaluation    Document Type evaluation  -CZ     Subjective Information agree to therapy  -CZ     Patient Effort, Rehab Treatment adequate  -CZ     Symptoms Noted During/After Treatment none  -CZ     General Information    Patient Profile Review yes  -CZ     Onset of Illness/Injury or Date of Surgery Date 17  -CZ     Referring Physician GARRICK Barrera MD.  -CZ     General Observations Patient supine in bed, receiving IV from nurse, alert, cooperative, on RA.  -CZ     Pertinent History Of Current Problem Patietn admitted to the ED with worsening bloody diarrhea x2 weeks, with nausea/vomiting and LLQ pain.   -CZ     Precautions/Limitations fall precautions  -CZ     Prior Level of Function independent:;all household mobility;community mobility  -CZ     Equipment Currently Used at Home none  -CZ none  -EW    Plans/Goals Discussed With patient;family  -CZ     Benefits Reviewed patient and family:;improve function;increase independence;increase strength;increase balance  -CZ     Living Environment    Lives With alone  -CZ alone  -EW     Living Arrangements house  -CZ house  -EW    Home Accessibility stairs to enter home;stairs (2 railings present)   Railings being added by son.  -CZ no concerns  -EW    Number of Stairs to Enter Home 2  -CZ     Stair Railings at Home outside, present at both sides  -CZ none  -EW    Type of Financial/Environmental Concern  none  -EW    Transportation Available  car  -EW    Clinical Impression    Date of Referral to PT 09/12/17  -CZ     PT Diagnosis Impaired   -CZ     Prognosis good  -CZ     Functional Level At Time Of Evaluation Patient requires SPV with bed mobility, transfers and ambulation without an AD.   -CZ     Criteria for Skilled Therapeutic Interventions Met treatment indicated  -CZ     Pathology/Pathophysiology Noted (Describe Specifically for Each System) musculoskeletal  -CZ     Impairments Found (describe specific impairments) aerobic capacity/endurance;gait, locomotion, and balance  -CZ     Rehab Potential good, to achieve stated therapy goals  -CZ     Predicted Duration of Therapy Intervention (days/wks) 1-3 dyas  -CZ     Vital Signs    Pre Systolic BP Rehab 146  -CZ     Pre Treatment Diastolic BP 76  -CZ     Post Systolic BP Rehab 147  -CZ     Post Treatment Diastolic BP 72  -CZ     Pretreatment Heart Rate (beats/min) 91  -CZ     Posttreatment Heart Rate (beats/min) 96  -CZ     Pre SpO2 (%) 96  -CZ     O2 Delivery Pre Treatment room air  -CZ     Post SpO2 (%) 94  -CZ     O2 Delivery Post Treatment room air  -CZ     Pre Patient Position Supine  -CZ     Post Patient Position Supine  -CZ     Pain Assessment    Pain Assessment 0-10  -CZ     Pain Score 0  -CZ     Post Pain Score 0  -CZ     Vision Assessment/Intervention    Visual Impairment WFL  -CZ     Cognitive Assessment/Intervention    Current Cognitive/Communication Assessment functional  -CZ     Orientation Status oriented x 4  -CZ     Follows Commands/Answers Questions 100% of the time  -CZ     ROM (Range of Motion)    General ROM no range of motion  deficits identified  -CZ     General ROM Detail BLEs WFL  -CZ     MMT (Manual Muscle Testing)    General MMT Assessment lower extremity strength deficits identified  -CZ     General MMT Assessment Detail BLEs: 4-/5, grossly  -CZ     Bed Mobility, Assessment/Treatment    Bed Mobility, Assistive Device head of bed elevated  -CZ     Bed Mob, Supine to Sit, [place] supervision required  -CZ     Bed Mob, Sit to Supine, [place] supervision required  -CZ     Transfer Assessment/Treatment    Transfers, Sit-Stand [place] supervision required  -CZ     Transfers, Stand-Sit [place] supervision required  -CZ     Gait Assessment/Treatment    Gait, [place] Level supervision required  -CZ     Gait, Assistive Device --   No AD.  -CZ     Gait, Distance (Feet) 25  -CZ     Positioning and Restraints    Pre-Treatment Position in bed  -CZ     Post Treatment Position bed  -CZ     In Bed supine;call light within reach;exit alarm on;with family/caregiver  -CZ       User Key  (r) = Recorded By, (t) = Taken By, (c) = Cosigned By    Initials Name Provider Type    FARAZ Villalta      Jesus Shahid, PT Physical Therapist          Physical Therapy Education     Title: PT OT SLP Therapies (Active)     Topic: Physical Therapy (Active)     Point: Precautions (Active)    Learning Progress Summary    Learner Readiness Method Response Comment Documented by Status   Patient Acceptance E NR Tinetti assessed: 22/28 or moderate fall risk.  Discussed falls, risk and stepping response with patient and her son.  09/13/17 1148 Active   Family Acceptance E NR Tinetti assessed: 22/28 or moderate fall risk.  Discussed falls, risk and stepping response with patient and her son.  09/13/17 1148 Active                      User Key     Initials Effective Dates Name Provider Type Discipline     02/17/17 -  Jesus Shahid, PT Physical Therapist PT                PT Recommendation and Plan  Anticipated Discharge  Disposition: home with home health  Planned Therapy Interventions: balance training, bed mobility training, gait training, patient/family education, stair training, strengthening, stretching, transfer training  PT Frequency: other (see comments) (5-14x/week)  Plan of Care Review  Plan Of Care Reviewed With: patient, son  Outcome Summary/Follow up Plan: Initial PT evaluation complete.  Patient requires SPV with bed mobility, transfers and gait, ambulates 25'x1 without an AD.  Tinetti assessed: 22/28 or moderate fall risk.  Patient has 2 steps to enter home, son is installing bilateral railings.  Continue skilled PT.           IP PT Goals       09/13/17 0955          Transfer Training PT STG    Transfer Training PT STG, Date Established 09/13/17  -CZ      Transfer Training PT STG, Time to Achieve 2 days  -CZ      Transfer Training PT STG, Activity Type bed to chair /chair to bed;sit to stand/stand to sit  -CZ      Transfer Training PT STG, [place] Level independent  -CZ      Transfer Training PT STG, Date Goal Reviewed 09/13/17  -CZ      Transfer Training PT STG, Outcome goal ongoing  -CZ      Gait Training PT STG    Gait Training Goal PT STG, Date Established 09/13/17  -CZ      Gait Training Goal PT STG, Time to Achieve 2 days  -CZ      Gait Training Goal PT STG, [place] Level independent  -CZ      Gait Training Goal PT STG, Distance to Achieve 200'x1.  -CZ      Gait Training Goal PT STG, Additional Goal No AD.   -CZ      Gait Training Goal PT STG, Date Goal Reviewed 09/13/17  -CZ      Gait Training Goal PT STG, Outcome goal ongoing  -CZ      Stair Training PT LTG    Stair Training Goal PT LTG, Date Established 09/13/17  -CZ      Stair Training Goal PT LTG, Time to Achieve by discharge  -CZ      Stair Training Goal PT LTG, Number of Steps 2  -CZ      Stair Training Goal PT LTG, [place] Level conditional independence  -CZ      Stair Training Goal PT LTG, Assist Device 2 handrails  -CZ      Stair  "Training Goal PT LTG, Date Goal Reviewed 09/13/17  -CZ      Stair Training Goal PT LTG, Outcome goal ongoing  -CZ      Physical Therapy PT LTG    Physical Therapy PT LTG, Time to Achieve by discharge  -CZ      Physical Therapy PT LTG, Activity Type Tinetti fall risk assessment.   -CZ      Physical Therapy PT LTG, Addisional Goal Score >/= 24/28 or low fall risk.   -CZ      Physical Therapy PT LTG, Date Goal Reviewed 09/13/17  -CZ      Physical Therapy PT LTG, Outcome goal ongoing  -CZ        User Key  (r) = Recorded By, (t) = Taken By, (c) = Cosigned By    Initials Name Provider Type    CZ Jesus Shahid, PT Physical Therapist                Outcome Measures       09/13/17 0955          How much help from another person do you currently need...    Turning from your back to your side while in flat bed without using bedrails? 3  -CZ      Moving from lying on back to sitting on the side of a flat bed without bedrails? 3  -CZ      Moving to and from a bed to a chair (including a wheelchair)? 3  -CZ      Standing up from a chair using your arms (e.g., wheelchair, bedside chair)? 3  -CZ      Climbing 3-5 steps with a railing? 3  -CZ      To walk in hospital room? 3  -CZ      AM-PAC 6 Clicks Score 18  -CZ      Tinetti Assessment    Tinetti Assessment yes  -CZ      Sitting Balance 1  -CZ      Arises 2  -CZ      Attempts to Rise 2  -CZ      Immediate Standing Balance (first 5 sec) 2  -CZ      Standing Balance 1  -CZ      Sternal Nudge (feet close together) 0  -CZ      Eyes Closed (feet close together) 1  -CZ      Turning 360 Degrees- Steps 1  -CZ      Turning 360 Degrees- Steadiness 1  -CZ      Sitting Down 2  -CZ      Tinetti Balance Score 13  -CZ      Gait Initiation (immediate after told \"go\") 1  -CZ      Step Length- Right Swing 1  -CZ      Step Length- Left Swing 1  -CZ      Foot Clearance- Right Foot 1  -CZ      Foot Clearance- Left Foot 1  -CZ      Step Symmetry 1  -CZ      Step Continuity 1  -CZ      Path " (excursion) 1  -CZ      Trunk 1  -CZ      Base of Support 0  -CZ      Gait Score 9  -CZ      Tinetti Total Score 22  -CZ      [place] Assistive Device --   No AD.  -CZ      T[place]etti Assessment Comments poor stepping response.  -CZ      Functional Assessment    Outcome Measure Options Walker;CHANI-PAC 6 Clicks Basic Mobility (PT)  -CZ        User Key  (r) = Recorded By, (t) = Taken By, (c) = Cosigned By    Initials Name Provider Type    CZ Jesus Shahid, PT Physical Therapist           Time Calculation:         PT Charges       09/13/17 1153          Time Calculation    Start Time 0955  -CZ      Stop Time 1039  -CZ      Time Calculation (min) 44 min  -CZ      PT Received On 09/13/17  -CZ      PT Goal Re-Cert Due Date 09/26/17  -CZ      Time Calculation- PT    Total Timed Code Minutes- PT 29 minute(s)  -CZ        User Key  (r) = Recorded By, (t) = Taken By, (c) = Cosigned By    Initials Name Provider Type    CZ Jesus Shahid, PT Physical Therapist          Therapy Charges for Today     Code Description Service Date Service Provider Modifiers Qty    27544131457 HC PT MOBILITY CURRENT 9/13/2017 Jesus Shahid, PT GP, CJ 1    72300143370 HC PT MOBILITY PROJECTED 9/13/2017 Jesus Shahid, PT GP, CI 1    14380587042 HC PT THERAPEUTIC ACT EA 15 MIN 9/13/2017 Jesus Shahid, PT GP 2    33384684257 HC PT EVAL MOD COMPLEXITY 1 9/13/2017 Jesus Shahid, PT GP 1          PT G-Codes  PT Professional Judgement Used?: Yes  Outcome Measure Options: JANELLE CardosoPAC 6 Clicks Basic Mobility (PT)  Score: 22  Functional Limitation: Mobility: Walking and moving around  Mobility: Walking and Moving Around Current Status (): At least 20 percent but less than 40 percent impaired, limited or restricted  Mobility: Walking and Moving Around Goal Status (): At least 1 percent but less than 20 percent impaired, limited or restricted      Jesus Shahid, PT  9/13/2017

## 2017-09-13 NOTE — ANESTHESIA POSTPROCEDURE EVALUATION
Patient: Damaris Sánchez    Procedure Summary     Date Anesthesia Start Anesthesia Stop Room / Location    09/13/17 [id number] [facility] ENDOSCOPY 2 / [facility] ENDOSCOPY       Procedure Diagnosis Surgeon Provider    FLEXIBLE SIGMOIDOSCOPY WITH BIOPSY (N/A ) C. difficile colitis  (C. difficile colitis [A04.7]) Nish Morales, DO Kristal Hung CRNA          Anesthesia Type: MAC  Last vitals  BP        Temp        Pulse       Resp        SpO2          Post Anesthesia Care and Evaluation    Patient location during evaluation: bedside  Patient participation: complete - patient participated  Level of consciousness: awake and alert  Pain score: 0  Pain management: adequate  Airway patency: patent  Anesthetic complications: No anesthetic complications  PONV Status: none  Cardiovascular status: acceptable and stable  Respiratory status: acceptable and spontaneous ventilation  Hydration status: acceptable

## 2017-09-13 NOTE — THERAPY EVALUATION
Acute Care - Occupational Therapy Initial Evaluation  [facility]     Patient Name: Damaris Sánchez  : 1945  MRN: [id number]  Today's Date: 2017  Onset of Illness/Injury or Date of Surgery Date: 17  Date of Referral to OT: 17  Referring Physician: Dr. Barrera    Admit Date: 2017       ICD-10-CM ICD-9-CM   1. Lower abdominal pain R10.30 789.09   2. Leukocytosis, unspecified type D72.829 288.60   3. Hypokalemia E87.6 276.8   4. C. difficile colitis A04.7 008.45   5. Impaired physical mobility Z74.09 781.99   6. Impaired mobility and ADLs Z74.09 799.89     Patient Active Problem List   Diagnosis   • Lower abdominal pain   • C. difficile colitis   • Inflammatory bowel disease   • Hypokalemia     History reviewed. No pertinent past medical history.  History reviewed. No pertinent surgical history.       OT ASSESSMENT FLOWSHEET (last 72 hours)      OT Evaluation       17 1155 17 0955 17 1516          Rehab Evaluation    Document Type  evaluation  -CZ       Subjective Information agree to therapy;no complaints  -SG agree to therapy  -CZ       Patient Effort, Rehab Treatment good  -SG adequate  -CZ       Symptoms Noted During/After Treatment none  -SG none  -CZ       General Information    Patient Profile Review yes  -SG yes  -CZ       Onset of Illness/Injury or Date of Surgery Date 17  -SG 17  -CZ       Referring Physician Dr. Barrera  -SG GARRICK Barrera MD.  -CZ       General Observations In bed supine, alert and cooperative/agreeable to treatment.  -SG Patient supine in bed, receiving IV from nurse, alert, cooperative, on RA.  -CZ       Pertinent History Of Current Problem Admitted due to diarrhea with nausea/vomitting and LLQpain.  -SG Patietn admitted to the ED with worsening bloody diarrhea x2 weeks, with nausea/vomiting and LLQ pain.   -CZ       Precautions/Limitations fall precautions  -SG fall precautions  -CZ       Prior Level of Function independent:;all  household mobility;community mobility;gait;transfer;bed mobility;ADL's;dressing;bathing;home management;cleaning;cooking  -SG independent:;all household mobility;community mobility  -CZ       Equipment Currently Used at Home none  -SG none  -CZ none  -EW      Plans/Goals Discussed With patient  -SG patient;family  -CZ       Risks Reviewed patient:  -SG        Benefits Reviewed patient:  -SG patient and family:;improve function;increase independence;increase strength;increase balance  -CZ       Living Environment    Lives With alone  -SG alone  -CZ alone  -EW      Living Arrangements house  -SG house  -CZ house  -EW      Home Accessibility stairs to enter home  -SG stairs to enter home;stairs (2 railings present)   Railings being added by son.  -CZ no concerns  -EW      Number of Stairs to Enter Home 2  -SG 2  -CZ       Stair Railings at Home none  -SG outside, present at both sides  -CZ none  -EW      Type of Financial/Environmental Concern none  -SG  none  -EW      Transportation Available family or friend will provide  -SG  car  -EW      Clinical Impression    Date of Referral to OT 09/06/17  -SG        OT Diagnosis Impaired Mobility and ADl's  -SG        Functional Level At Time Of Evaluation --   alert and agreeable to treatment.  Resident completed EOB  -SG        Impairments Found (describe specific impairments) aerobic capacity/endurance;gait, locomotion, and balance;neuromotor development and sensory integration  -SG        Criteria for Skilled Therapeutic Interventions Met yes  -SG        Rehab Potential good, to achieve stated therapy goals  -SG        Therapy Frequency --   3/14 times/Wk  -SG        Predicted Duration of Therapy Intervention (days/wks) --   2 weeks  -SG        Anticipated Discharge Disposition home with home health  -SG        Functional Level Prior    Ambulation 0-->independent  -SG        Transferring 0-->independent  -SG        Toileting 0-->independent  -SG        Bathing  0-->independent  -SG        Dressing 0-->independent  -SG        Eating 0-->independent  -SG        Communication 0-->understands/communicates without difficulty  -SG        Swallowing 0-->swallows foods/liquids without difficulty  -SG        Vital Signs    Pre Systolic BP Rehab 156  -  -CZ       Pre Treatment Diastolic BP 70  -SG 76  -CZ       Post Systolic BP Rehab 138  -  -CZ       Post Treatment Diastolic BP 75  -SG 72  -CZ       Pretreatment Heart Rate (beats/min) 93  -SG 91  -CZ       Posttreatment Heart Rate (beats/min) 70  -SG 96  -CZ       Pre SpO2 (%) 95  -SG 96  -CZ       O2 Delivery Pre Treatment room air  -SG room air  -CZ       Post SpO2 (%) 95  -SG 94  -CZ       O2 Delivery Post Treatment room air  -SG room air  -CZ       Pre Patient Position  Supine  -CZ       Post Patient Position Supine  -SG Supine  -CZ       Pain Assessment    Pain Assessment 0-10  -SG 0-10  -CZ       Pain Score 0  -SG 0  -CZ       Post Pain Score 0  -SG 0  -CZ       Vision Assessment/Intervention    Visual Impairment WFL with corrective lenses  -SG WFL  -CZ       Cognitive Assessment/Intervention    Current Cognitive/Communication Assessment functional  -SG functional  -CZ       Orientation Status oriented x 4  -SG oriented x 4  -CZ       Follows Commands/Answers Questions 100% of the time  -% of the time  -CZ       ROM (Range of Motion)    General ROM no range of motion deficits identified  -SG no range of motion deficits identified  -CZ       General ROM Detail  BLEs WFL  -CZ       MMT (Manual Muscle Testing)    General MMT Assessment  lower extremity strength deficits identified  -CZ       General MMT Assessment Detail  BLEs: 4-/5, grossly  -CZ       Bed Mobility, Assessment/Treatment    Bed Mobility, Assistive Device head of bed elevated  -SG head of bed elevated  -CZ       Bed Mob, Supine to Sit, [place] conditional independence  -SG supervision required  -CZ       Bed Mob, Sit to Supine, [place]  conditional independence  -SG supervision required  -CZ       Transfer Assessment/Treatment    Transfers, Sit-Stand [place] contact guard assist  -SG supervision required  -CZ       Transfers, Stand-Sit [place] contact guard assist  -SG supervision required  -CZ       Lower Body Dressing Assessment/Training    LB Dressing Assess/Train, Clothing Type doffing:;donning:;socks  -SG        LB Dressing Assess/Train, Position sitting  -SG        LB Dressing Assess/Train, [place] conditional independence  -SG        Therapy Exercises    Bilateral Upper Extremity AROM:;20 reps;sitting;pronation/supination;shoulder abduction/adduction;shoulder extension/flexion;shoulder ER/IR  -SG        General Therapy Interventions    Planned Therapy Interventions activity intolerance;ADL retraining;IADL retraining;energy conservation;home exercise program;neuromuscular re-education;strengthening;transfer training  -SG        Positioning and Restraints    Pre-Treatment Position in bed  -SG in bed  -CZ       Post Treatment Position bed  -SG bed  -CZ       In Bed sitting;call light within reach;encouraged to call for assist  -SG supine;call light within reach;exit alarm on;with family/caregiver  -CZ         User Key  (r) = Recorded By, (t) = Taken By, (c) = Cosigned By    Initials Name Effective Dates    EW Pina Roxann Villalta 01/05/17 -     CZ Jesus Shahid, PT 02/17/17 -     SG Farrah Patricia OT 08/03/17 -            Occupational Therapy Education     Title: PT OT SLP Therapies (Active)     Topic: Occupational Therapy (Done)     Point: ADL training (Done)    Description: Instruct learner(s) on proper safety adaptation and remediation techniques during self care or transfers.   Instruct in proper use of assistive devices.    Learning Progress Summary    Learner Readiness Method Response Comment Documented by Status   Patient SHARI Mcintosh   09/13/17 1346 Done               Point: Home exercise program (Done)    Description:  Instruct learner(s) on appropriate technique for monitoring, assisting and/or progressing therapeutic exercises/activities.    Learning Progress Summary    Learner Readiness Method Response Comment Documented by Status   Patient SHARI Mcintosh   09/13/17 1346 Done               Point: Precautions (Done)    Description: Instruct learner(s) on prescribed precautions during self-care and functional transfers.    Learning Progress Summary    Learner Readiness Method Response Comment Documented by Status   Patient SHARI Mcintosh   09/13/17 1346 Done               Point: Body mechanics (Done)    Description: Instruct learner(s) on proper positioning and spine alignment during self-care, functional mobility activities and/or exercises.    Learning Progress Summary    Learner Readiness Method Response Comment Documented by Status   Patient SHARI Mcintosh   09/13/17 1346 Done                      User Key     Initials Effective Dates Name Provider Type Discipline     08/03/17 -  Farrah Patricia OT Occupational Therapist OT                  OT Recommendation and Plan  Anticipated Discharge Disposition: home with home health  Planned Therapy Interventions: activity intolerance, ADL retraining, IADL retraining, energy conservation, home exercise program, neuromuscular re-education, strengthening, transfer training  Therapy Frequency:  (3/14 times/Wk)  Plan of Care Review  Plan Of Care Reviewed With: patient  Progress: no change  Outcome Summary/Follow up Plan: OT evaluation complete this date.  Resident completed EOB BUE AROM exercises. Donning and doffing of socks.            OT Goals       09/13/17 1349          Dynamic Standing Balance OT LTG    Dynamic Standing Balance OT LTG, Date Established 09/06/17  -SG      Dynamic Standing Balance OT LTG, Time to Achieve 2 wks  -SG      Dynamic Standing Balance OT LTG, [place] Level supervision required  -SG      Dynamic Standing Balance OT LTG, Additional Goal Resident to  Independent and safe With dynamic standing balance to complete higher level ADL's.  -SG      Patient Education OT STG    Patient Education OT STG, Date Established 09/13/17  -SG      Patient Education OT STG, Time to Achieve 5 - 7 days  -SG      Patient Education OT STG, Education Type HEP  -SG      Patient Education OT STG, Additional Goal T band exercises.  -SG      Bathing OT LTG    Bathing Goal OT LTG, Date Established 09/13/17  -SG      Bathing Goal OT LTG, Time to Achieve 2 wks  -SG      Bathing Goal OT LTG, [place] Level conditional independence  -SG      Bathing Goal OT LTG, Assist Device shower chair with back  -SG      Bathing Goal OT LTG, Additional Goal Independent and safe   -SG      Home Management OT STG    Home Mgmt Goal OT STG, Date Established 09/13/17  -SG      Home Mgmt Goal OT STG, Time To Achieve 1 wk  -SG      Home Mgmt Goal OT STG, [place] Level conditional independence  -SG      Home Mgmt Goal OT STG, Additional Goal Resident to be CI in room, for gathering of items for all personal care, with 0 LOB and safe.  -SG        User Key  (r) = Recorded By, (t) = Taken By, (c) = Cosigned By    Initials Name Provider Type    MYLA Patricia OT Occupational Therapist                Outcome Measures       09/13/17 1155 09/13/17 0955       How much help from another person do you currently need...    Turning from your back to your side while in flat bed without using bedrails?  3  -CZ     Moving from lying on back to sitting on the side of a flat bed without bedrails?  3  -CZ     Moving to and from a bed to a chair (including a wheelchair)?  3  -CZ     Standing up from a chair using your arms (e.g., wheelchair, bedside chair)?  3  -CZ     Climbing 3-5 steps with a railing?  3  -CZ     To walk in hospital room?  3  -CZ     AM-PAC 6 Clicks Score  18  -CZ     How much help from another is currently needed...    Putting on and taking off regular lower body clothing? 4  -SG      Bathing (including  "washing, rinsing, and drying) 3  -SG      Toileting (which includes using toilet bed pan or urinal) 4  -SG      Putting on and taking off regular upper body clothing 4  -SG      Taking care of personal grooming (such as brushing teeth) 4  -SG      Eating meals 4  -SG      Score 23  -SG      Tinetti Assessment    Tinetti Assessment  yes  -CZ     Sitting Balance  1  -CZ     Arises  2  -CZ     Attempts to Rise  2  -CZ     Immediate Standing Balance (first 5 sec)  2  -CZ     Standing Balance  1  -CZ     Sternal Nudge (feet close together)  0  -CZ     Eyes Closed (feet close together)  1  -CZ     Turning 360 Degrees- Steps  1  -CZ     Turning 360 Degrees- Steadiness  1  -CZ     Sitting Down  2  -CZ     Tinetti Balance Score  13  -CZ     Gait Initiation (immediate after told \"go\")  1  -CZ     Step Length- Right Swing  1  -CZ     Step Length- Left Swing  1  -CZ     Foot Clearance- Right Foot  1  -CZ     Foot Clearance- Left Foot  1  -CZ     Step Symmetry  1  -CZ     Step Continuity  1  -CZ     Path (excursion)  1  -CZ     Trunk  1  -CZ     Base of Support  0  -CZ     Gait Score  9  -CZ     Tinetti Total Score  22  -CZ     Tinetti Assistive Device  --   No AD.  -CZ     Tinetti Assessment Comments  poor stepping response.  -CZ     Functional Assessment    Outcome Measure Options AM-PAC 6 Clicks Daily Activity (OT)  -SG Tinetti;AM-PAC 6 Clicks Basic Mobility (PT)  -CZ       User Key  (r) = Recorded By, (t) = Taken By, (c) = Cosigned By    Initials Name Provider Type    JESENIA Shahid, PT Physical Therapist     Farrah Patricia OT Occupational Therapist          Time Calculation:   OT Start Time: 1155  OT Stop Time: 1250  OT Time Calculation (min): 55 min    Therapy Charges for Today     Code Description Service Date Service Provider Modifiers Qty    65965379803  OT SELFCARE CURRENT 9/13/2017 HUMBERTO Arnold,  1    89101726041  OT SELFCARE PROJECTED 9/13/2017 HUMBERTO Arnold,  1    [id number]  OT EVAL MOD " COMPLEXITY 1 9/13/2017 Farrah Harshad OT GO 1    [id number]  OT SELF CARE/MGMT/TRAIN EA 15 MIN 9/13/2017 Farrah HUMBERTO Patricia GO 1    [id number]  OT THERAPEUTIC ACT EA 15 MIN 9/13/2017 Farrah HUMBERTO Patricia GO 1    [id number]  OT THER PROC EA 15 MIN 9/13/2017 Farrah HUMBERTO Patricia GO 1          OT G-codes  OT Professional Judgement Used?: Yes  OT Functional Scales Options: AM-PAC 6 Clicks Daily Activity (OT)  Score: 23  Functional Limitation: Self care  Self Care Current Status (): At least 1 percent but less than 20 percent impaired, limited or restricted  Self Care Goal Status (): 0 percent impaired, limited or restricted    Farrah Patricia OT  9/13/2017

## 2017-09-13 NOTE — PLAN OF CARE
Problem: GI Endoscopy (Adult)  Goal: Signs and Symptoms of Listed Potential Problems Will be Absent or Manageable (GI Endoscopy)    09/13/17 0849   GI Endoscopy   Problems Assessed (GI Endoscopy) all   Problems Present (GI Endoscopy) none

## 2017-09-13 NOTE — PLAN OF CARE
Problem: GI Endoscopy (Adult)  Goal: Signs and Symptoms of Listed Potential Problems Will be Absent or Manageable (GI Endoscopy)  Outcome: Ongoing (interventions implemented as appropriate)    09/13/17 1329   GI Endoscopy   Problems Assessed (GI Endoscopy) all   Problems Present (GI Endoscopy) none

## 2017-09-13 NOTE — PLAN OF CARE
Problem: Patient Care Overview (Adult)  Goal: Plan of Care Review  Outcome: Ongoing (interventions implemented as appropriate)    09/13/17 1349   Coping/Psychosocial Response Interventions   Plan Of Care Reviewed With patient   Patient Care Overview   Progress no change   Outcome Evaluation   Outcome Summary/Follow up Plan OT evaluation complete this date. Resident completed EOB BUE AROM exercises. Donning and doffing of socks.        Goal: Discharge Needs Assessment  Outcome: Ongoing (interventions implemented as appropriate)    09/13/17 1349   Discharge Needs Assessment   Discharge Disposition home healthcare service         Problem: Inpatient Occupational Therapy  Goal: Dynamic Standing Balance Goal LTG-OT  Outcome: Ongoing (interventions implemented as appropriate)    09/13/17 1349   Dynamic Standing Balance OT LTG   Dynamic Standing Balance OT LTG, Date Established 09/06/17   Dynamic Standing Balance OT LTG, Time to Achieve 2 wks   Dynamic Standing Balance OT LTG, [place] Level supervision required   Dynamic Standing Balance OT LTG, Additional Goal Resident to Independent and safe With dynamic standing balance to complete higher level ADL's.       Goal: Patient Education Goal STG- OT  Outcome: Ongoing (interventions implemented as appropriate)    09/13/17 1349   Patient Education OT STG   Patient Education OT STG, Date Established 09/13/17   Patient Education OT STG, Time to Achieve 5 - 7 days   Patient Education OT STG, Education Type HEP   Patient Education OT STG, Additional Goal T band exercises.       Goal: Bathing Goal LTG- OT  Outcome: Ongoing (interventions implemented as appropriate)    09/13/17 1349   Bathing OT LTG   Bathing Goal OT LTG, Date Established 09/13/17   Bathing Goal OT LTG, Time to Achieve 2 wks   Bathing Goal OT LTG, [place] Level conditional independence   Bathing Goal OT LTG, Assist Device shower chair with back   Bathing Goal OT LTG, Additional Goal Independent and safe         Goal: Home Management Goal STG- OT  Outcome: Ongoing (interventions implemented as appropriate)    09/13/17 1349   Home Management OT STG   Home Mgmt Goal OT STG, Date Established 09/13/17   Home Mgmt Goal OT STG, Time To Achieve 1 wk   Home Mgmt Goal OT STG, [place] Level conditional independence   Home Mgmt Goal OT STG, Additional Goal Resident to be CI in room, for gathering of items for all personal care, with 0 LOB and safe.

## 2017-09-13 NOTE — PLAN OF CARE
Problem: Patient Care Overview (Adult)  Goal: Plan of Care Review  Outcome: Ongoing (interventions implemented as appropriate)    09/13/17 1436   Coping/Psychosocial Response Interventions   Plan Of Care Reviewed With patient   Patient Care Overview   Progress improving   Outcome Evaluation   Outcome Summary/Follow up Plan NPO for procedure today iv continue with no problem       Goal: Adult Individualization and Mutuality  Outcome: Ongoing (interventions implemented as appropriate)  Goal: Discharge Needs Assessment  Outcome: Ongoing (interventions implemented as appropriate)    Problem: Fluid Volume Deficit (Adult)  Goal: Identify Related Risk Factors and Signs and Symptoms  Outcome: Outcome(s) achieved Date Met:  09/13/17  Goal: Fluid/Electrolyte Balance  Outcome: Ongoing (interventions implemented as appropriate)  Goal: Comfort/Well Being  Outcome: Outcome(s) achieved Date Met:  09/13/17

## 2017-09-13 NOTE — PLAN OF CARE
Problem: Patient Care Overview (Adult)  Goal: Plan of Care Review  Outcome: Ongoing (interventions implemented as appropriate)    09/13/17 2876   Coping/Psychosocial Response Interventions   Plan Of Care Reviewed With patient   Patient Care Overview   Progress no change   Outcome Evaluation   Outcome Summary/Follow up Plan harsh well

## 2017-09-13 NOTE — ANESTHESIA PREPROCEDURE EVALUATION
Anesthesia Evaluation     NPO Solid Status: > 8 hours  NPO Liquid Status: > 2 hours     Airway   Mallampati: II  TM distance: <3 FB  Neck ROM: limited  no difficulty expected  Dental    (+) upper dentures    Pulmonary - normal exam   (+) a smoker Former,   Cardiovascular - normal exam  Exercise tolerance: good (4-7 METS)    Rhythm: regular  Rate: normal        Neuro/Psych  GI/Hepatic/Renal/Endo      Musculoskeletal     Abdominal    Substance History      OB/GYN          Other                                        Anesthesia Plan    ASA 2     MAC     Anesthetic plan and risks discussed with patient.    Plan discussed with CRNA.

## 2017-09-13 NOTE — PROGRESS NOTES
Herbert Sanchez DO,[facility]  Gastroenterology  Hepatology  Endoscopy  Board Certified in Internal Medicine and gastroenterology  [address], [address]  [place]. [address]  - [phone]   F - [phone]     GASTROENTEROLOGY PROGRESS NOTE   HERBERT SANCHEZ DO.         SUBJECTIVE:   9/12/2017  Chief Complaint:     Subjective  : bloody diarrhea    Patient is 72 y.o. female, personal history of inflammatory bowel disease that has the clinical appearance and endoscopic appearance of Crohn's disease diagnosed in 2014 and not receiving any medical treatments, with abdominal pain to the left lower quadrant, diarrhea, bloody stools, nausea with no fever.    The patient was found to have Clostridium difficile that was noted upon arrival to the hospital.  Subsequent testing shows that this has resolved on the 10th.    She has been without any medical treatments for inflammatory bowel disease.  This is because she does not want to undergo the risk or expenses related to the inflammatory bowel disease treatments.  She says that she will have multiple semisolid bowel movements per day for the past several years.  However, 2 or 3 weeks ago she began to have abdominal pain left lower quadrant.  She describes bloody stools that occur on a daily basis.  She has had pain localized to left lower quadrant.  Because she was having 20 bowel movements per day, they brought her to the hospital emergency room.    The patient had a CT scan that shows that there is inflammatory changes that are present throughout the entirety of the colon.  There is no evidence of any diverticulitis.  There is no evidence of any free air or free fluid.    Upon arrival to the hospital emergency room, with these abnormal CT scan findings, she received 1 dose of intravenous steroids.  She has been treated with oral Flagyl and oral vancomycin.  She says the pain is getting better with the pain medications and there has been no evidence of any  further elevations of the white blood cell count.  However, she continues to have 8 loose bowel movements per day.  There is blood in the stool.  There is black within the stool.     CURRENT MEDICATIONS/OBJECTIVE/VS/PE:     Current Medications:     Current Facility-Administered Medications   Medication Dose Route Frequency Provider Last Rate Last Dose   • acetaminophen (TYLENOL) tablet 650 mg  650 mg Oral Q4H PRN Atif Nichols MD   650 mg at 09/11/17 2116   • famotidine (PEPCID) tablet 40 mg  40 mg Oral Daily Atif Nichols MD   40 mg at 09/12/17 1002   • hydrALAZINE (APRESOLINE) injection 10 mg  10 mg Intravenous Q6H PRN Hattie Barrera MD       • HYDROcodone-acetaminophen (NORCO) 5-325 MG per tablet 1 tablet  1 tablet Oral Q6H PRN Chaz Beckett MD   1 tablet at 09/12/17 1718   • HYDROmorphone (DILAUDID) injection 0.5 mg  0.5 mg Intravenous Q2H PRN Atif Nichols MD        And   • naloxone (NARCAN) injection 0.4 mg  0.4 mg Intravenous Q5 Min PRN Atif Nichols MD       • influenza vac split quad (FLUZONE QUADRIVALENT) IM suspension 0.5 mL  0.5 mL Intramuscular During Hospitalization Atif Nichols MD       • megestrol (MEGACE) tablet 20 mg  20 mg Oral Daily Chaz Beckett MD   20 mg at 09/12/17 1003   • metroNIDAZOLE (FLAGYL) tablet 500 mg  500 mg Oral Q6H Hattie Barrera MD   500 mg at 09/12/17 1717   • ondansetron (ZOFRAN) tablet 4 mg  4 mg Oral Q6H PRN Atif Nichols MD       • [START ON 9/13/2017] pantoprazole (PROTONIX) EC tablet 40 mg  40 mg Oral Q AM Hattie Barrera MD       • pneumococcal polysaccharide 23-valent (PNEUMOVAX-23) vaccine 0.5 mL  0.5 mL Intramuscular During Hospitalization Atif Nihcols MD       • potassium chloride (MICRO-K) CR capsule 40 mEq  40 mEq Oral PRN Atif Nichols MD   40 mEq at 09/11/17 1629    Or   • potassium chloride (KLOR-CON) packet 40 mEq  40 mEq Oral PRN Atif Nichols MD        Or   • potassium chloride 10 mEq in  100 mL IVPB  10 mEq Intravenous Q1H PRN Atif Nichols MD   Stopped at 09/07/17 0454   • sodium chloride 0.9 % flush 1-10 mL  1-10 mL Intravenous PRN Atif Nichols MD       • sodium chloride 0.9 % infusion  50 mL/hr Intravenous Continuous Chaz Beckett MD 50 mL/hr at 09/12/17 1145 50 mL/hr at 09/12/17 1145   • vancomycin (VANCOCIN) 50 mg/ml oral solution 125 mg  125 mg Oral Q6H Chaz Beckett MD   125 mg at 09/12/17 1717       Objective     Physical Exam:   Temp:  [97.6 °F (36.4 °C)-99.4 °F (37.4 °C)] 99 °F (37.2 °C)  Heart Rate:  [] 102  Resp:  [18] 18  BP: (121-168)/(64-86) 121/72     Physical Exam:  General Appearance:    Alert, cooperative, in no acute distress   Head:    Normocephalic, without obvious abnormality, atraumatic   Eyes:            Lids and lashes normal, conjunctivae and sclerae normal, no   icterus, no pallor, corneas clear, PERRLA   Ears:    Ears appear intact with no abnormalities noted   Throat:   No oral lesions, no thrush, oral mucosa moist   Neck:   No adenopathy, supple, trachea midline, no thyromegaly, no     carotid bruit, no JVD   Back:     No kyphosis present, no scoliosis present, no skin lesions,       erythema or scars, no tenderness to percussion or                   palpation,   range of motion normal   Lungs:     Clear to auscultation,respirations regular, even and                   unlabored    Heart:    Regular rhythm and normal rate, normal S1 and S2, no            murmur, no gallop, no rub, no click   Breast Exam:    Deferred   Abdomen:     Normal bowel sounds, no masses, no organomegaly, soft      llq tender, non-distended, no guarding, no rebound                 tenderness   Genitalia:    Deferred   Extremities:   Moves all extremities well, no edema, no cyanosis, no              redness   Pulses:   Pulses palpable and equal bilaterally   Skin:   No bleeding, bruising or rash   Lymph nodes:   No palpable adenopathy   Neurologic:   Cranial nerves 2 - 12 grossly  intact, sensation intact, DTR        present and equal bilaterally      Results Review:     Lab Results (last 24 hours)     Procedure Component Value Units Date/Time    Potassium [[id number]]  (Normal) Collected:  09/11/17 1949    Specimen:  Blood Updated:  09/11/17 2016     Potassium 4.2 mmol/L     Blood Culture [[id number]]  (Normal) Collected:  09/06/17 2252    Specimen:  Blood from Arm, Left Updated:  09/11/17 2305     Blood Culture No growth at 5 days    CBC & Differential [[id number]] Collected:  09/12/17 0543    Specimen:  Blood Updated:  09/12/17 0657    Narrative:       The following orders were created for panel order CBC & Differential.  Procedure                               Abnormality         Status                     ---------                               -----------         ------                     CBC Auto Differential[[id number]]        Abnormal            Final result                 Please view results for these tests on the individual orders.    CBC Auto Differential [[id number]]  (Abnormal) Collected:  09/12/17 0543    Specimen:  Blood Updated:  09/12/17 0657     WBC 6.48 10*3/mm3      RBC 3.59 (L) 10*6/mm3      Hemoglobin 10.8 (L) g/dL      Hematocrit 33.0 (L) %      MCV 91.9 fL      MCH 30.1 pg      MCHC 32.7 g/dL      RDW 14.4 %      RDW-SD 48.4 (H) fl      MPV 9.8 fL      Platelets 187 10*3/mm3      Neutrophil % 66.6 %      Lymphocyte % 19.4 %      Monocyte % 7.6 %      Eosinophil % 3.7 %      Basophil % 0.5 %      Immature Grans % 2.2 (H) %      Neutrophils, Absolute 4.32 10*3/mm3      Lymphocytes, Absolute 1.26 10*3/mm3      Monocytes, Absolute 0.49 10*3/mm3      Eosinophils, Absolute 0.24 10*3/mm3      Basophils, Absolute 0.03 10*3/mm3      Immature Grans, Absolute 0.14 (H) 10*3/mm3     Basic Metabolic Panel [[id number]]  (Abnormal) Collected:  09/12/17 0543    Specimen:  Blood Updated:  09/12/17 0716     Glucose 88 mg/dL      BUN 7 mg/dL      Creatinine 0.69 mg/dL      Sodium 137 mmol/L       Potassium 4.5 mmol/L      Chloride 105 mmol/L      CO2 23.0 mmol/L      Calcium 8.3 (L) mg/dL      eGFR Non African Amer 84 mL/min/1.73      BUN/Creatinine Ratio 10.1     Anion Gap 9.0 mmol/L     Narrative:       The MDRD GFR formula is only valid for adults with stable renal function between ages 18 and 70.           I reviewed the patient's new clinical results.  I reviewed the patient's new imaging results and agree with the interpretation.     ASSESSMENT/PLAN:   ASSESSMENT:   1.  Diffuse inflammatory changes throughout the entirety of the patient's colon.  This could be related to the Clostridium difficile.  It also could be related to the known inflammatory bowel disease.  The patient certainly could have had a progression of the inflammatory bowel disease  2.  Clostridium difficile noted on initial evaluation.  It has been subsequently negative.  No risk factors for Clostridium difficile  3.  Anemia secondary to chronic disease  4.  Positive blood culture for staph, not MRSA.  Suspect a contaminant    PLAN:   1.  Flexible sigmoidoscopy to be done to evaluate for the possible options for treatment.  If the patient has pseudomembranes that I would treat the patient with a full course of vancomycin.  2.  If there is no evidence of any pseudomembranes, then I would treat the patient with vancomycin and steroids  3.  Discontinue proton pump inhibitors as they can reduce the effectiveness of the patient's medication  4.  Change the patient's oral Flagyl to intravenous Flagyl as this is more effective  5.  Probiotics  6.  Discussed other options which include Dificid and fecal microbial transplantation.  She may be quite playing that she does not want fecal microbial transplantation.  The family asked about oral stool capsules and I told them that they are not commercially available at this time.  At least what I have knowledge of.    Risk and benefits associated with the unprepped flexible sigmoidoscopy are  reviewed with the patient and family.  They wish to proceed      Nish Morales DO  09/12/17  7:31 PM

## 2017-09-13 NOTE — PLAN OF CARE
Problem: Patient Care Overview (Adult)  Goal: Plan of Care Review  Outcome: Ongoing (interventions implemented as appropriate)    09/13/17 0955   Coping/Psychosocial Response Interventions   Plan Of Care Reviewed With patient;son   Outcome Evaluation   Outcome Summary/Follow up Plan Initial PT evaluation complete. Patient requires SPV with bed mobility, transfers and gait, ambulates 25'x1 without an AD. Tinetti assessed: 22/28 or moderate fall risk. Patient has 2 steps to enter home, son is installing bilateral railings. Continue skilled PT.          Problem: Inpatient Physical Therapy  Goal: Transfer Training Goal 1 STG- PT  Outcome: Ongoing (interventions implemented as appropriate)    09/13/17 0955   Transfer Training PT STG   Transfer Training PT STG, Date Established 09/13/17   Transfer Training PT STG, Time to Achieve 2 days   Transfer Training PT STG, Activity Type bed to chair /chair to bed;sit to stand/stand to sit   Transfer Training PT STG, [place] Level independent   Transfer Training PT STG, Date Goal Reviewed 09/13/17   Transfer Training PT STG, Outcome goal ongoing       Goal: Gait Training Goal STG- PT  Outcome: Ongoing (interventions implemented as appropriate)    09/13/17 0955   Gait Training PT STG   Gait Training Goal PT STG, Date Established 09/13/17   Gait Training Goal PT STG, Time to Achieve 2 days   Gait Training Goal PT STG, [place] Level independent   Gait Training Goal PT STG, Distance to Achieve 200'x1.   Gait Training Goal PT STG, Additional Goal No AD.    Gait Training Goal PT STG, Date Goal Reviewed 09/13/17   Gait Training Goal PT STG, Outcome goal ongoing       Goal: Stair Training Goal LTG- PT  Outcome: Ongoing (interventions implemented as appropriate)    09/13/17 0955   Stair Training PT LTG   Stair Training Goal PT LTG, Date Established 09/13/17   Stair Training Goal PT LTG, Time to Achieve by discharge   Stair Training Goal PT LTG, Number of Steps 2   Stair Training  Goal PT LTG, [place] Level conditional independence   Stair Training Goal PT LTG, Assist Device 2 handrails   Stair Training Goal PT LTG, Date Goal Reviewed 09/13/17   Stair Training Goal PT LTG, Outcome goal ongoing       Goal: Physical Therapy Goal LTG- PT  Outcome: Ongoing (interventions implemented as appropriate)    09/13/17 0955   Physical Therapy PT LTG   Physical Therapy PT LTG, Time to Achieve by discharge   Physical Therapy PT LTG, Activity Type Tinetti fall risk assessment.    Physical Therapy PT LTG, Addisional Goal Score >/= 24/28 or low fall risk.    Physical Therapy PT LTG, Date Goal Reviewed 09/13/17   Physical Therapy PT LTG, Outcome goal ongoing

## 2017-09-14 NOTE — PLAN OF CARE
Problem: Patient Care Overview (Adult)  Goal: Plan of Care Review  Outcome: Ongoing (interventions implemented as appropriate)    09/14/17 0200   Coping/Psychosocial Response Interventions   Plan Of Care Reviewed With patient   Patient Care Overview   Progress improving   Outcome Evaluation   Outcome Summary/Follow up Plan VSS, no c/o pain, no n/v, ambulating well       Goal: Adult Individualization and Mutuality  Outcome: Ongoing (interventions implemented as appropriate)  Goal: Discharge Needs Assessment  Outcome: Ongoing (interventions implemented as appropriate)    Problem: Fluid Volume Deficit (Adult)  Goal: Fluid/Electrolyte Balance  Outcome: Ongoing (interventions implemented as appropriate)

## 2017-09-14 NOTE — PROGRESS NOTES
[facility]  INPATIENT PROGRESS NOTE     LOS: 7 days   Patient Care Team:  Nish Morales DO as PCP - General (Gastroenterology)    Chief Complaint:  C. difficile colitis      Subjective     Interval History:     Patient Complaints: Patient seen and examined. Feels well.    History taken from: Patient and family.    Review of Systems:    Review of Systems   Constitutional: Positive for appetite change. Negative for activity change, fatigue and fever.   HENT: Negative for ear pain and sore throat.    Eyes: Negative for pain and visual disturbance.   Respiratory: Negative for cough and shortness of breath.    Cardiovascular: Negative for chest pain and palpitations.   Gastrointestinal: Positive for abdominal pain, diarrhea and nausea. Negative for constipation.   Endocrine: Negative for cold intolerance and heat intolerance.   Genitourinary: Negative for difficulty urinating and dysuria.   Musculoskeletal: Negative for arthralgias and gait problem.   Skin: Negative for color change and rash.   Neurological: Positive for weakness. Negative for dizziness and headaches.   Hematological: Negative for adenopathy. Does not bruise/bleed easily.   Psychiatric/Behavioral: Negative for agitation, confusion and sleep disturbance.         Objective     Vital Signs  Temp:  [97.1 °F (36.2 °C)-99.9 °F (37.7 °C)] 98.8 °F (37.1 °C)  Heart Rate:  [] 102  Resp:  [16-20] 20  BP: (131-164)/(70-85) 149/77    Physical Exam:   Physical Exam   Constitutional: She is oriented to person, place, and time. She appears well-developed and well-nourished. No distress.   HENT:   Head: Normocephalic and atraumatic.   Right Ear: External ear normal.   Left Ear: External ear normal.   Nose: Nose normal.   Eyes: Conjunctivae and EOM are normal. Pupils are equal, round, and reactive to light. Right eye exhibits no discharge. Left eye exhibits no discharge. No scleral icterus.   Neck:  Normal range of motion. Neck supple.   Cardiovascular: Normal rate, regular rhythm, normal heart sounds and intact distal pulses.  Exam reveals no gallop and no friction rub.    No murmur heard.  Pulmonary/Chest: Effort normal and breath sounds normal. No respiratory distress. She has no wheezes. She has no rales. She exhibits no tenderness.   Abdominal: Soft. Bowel sounds are normal. She exhibits no distension and no mass. There is no tenderness. There is no rebound and no guarding.   Musculoskeletal: Normal range of motion.   Neurological: She is alert and oriented to person, place, and time.   Skin: Skin is warm and dry. No rash noted. She is not diaphoretic. No erythema. No pallor.   Psychiatric: She has a normal mood and affect. Her behavior is normal.   Nursing note and vitals reviewed.         Results Review:       Results from last 7 days  Lab Units 09/13/17  0515 09/12/17  0543 09/11/17  1949 09/11/17  0553 09/10/17  0600 09/09/17  0616 09/08/17  0539  09/07/17  0519   SODIUM mmol/L 138 137  --  137 138 140 139  --  137   POTASSIUM mmol/L 4.0 4.5 4.2 3.1* 3.1* 3.8 4.0  < > 4.3   CHLORIDE mmol/L 105 105  --  104 101 109 111*  --  105   CO2 mmol/L 23.0 23.0  --  27.0 26.0 23.0 21.0*  --  22.0   BUN mg/dL 7 7  --  5* 3* 5* 8  --  9   CREATININE mg/dL 0.79 0.69  --  0.68 0.76 0.86 0.86  --  0.96   GLUCOSE mg/dL 86 88  --  97 96 84 102*  --  150*   CALCIUM mg/dL 8.1* 8.3*  --  7.9* 8.3* 8.1* 8.4  --  8.4   BILIRUBIN mg/dL 0.4  --   --   --   --   --   --   --   --    ALK PHOS U/L 84  --   --   --   --   --   --   --   --    ALT (SGPT) U/L 36  --   --   --   --   --   --   --   --    AST (SGOT) U/L 29  --   --   --   --   --   --   --   --    < > = values in this interval not displayed.      Results from last 7 days  Lab Units 09/13/17  0515 09/10/17  0600   MAGNESIUM mg/dL 1.7 1.9         Results from last 7 days  Lab Units 09/13/17  0515 09/12/17  0543 09/11/17  0553 09/10/17  0600 09/09/17  0616 09/08/17  0539  09/07/17  0519   WBC 10*3/mm3 6.76 6.48 5.69 7.66 5.83 8.38 7.21   HEMOGLOBIN g/dL 10.6* 10.8* 10.2* 11.6* 9.9* 9.1* 10.8*   HEMATOCRIT % 32.3* 33.0* 30.6* 35.2 29.7* 26.8* 31.1*   PLATELETS 10*3/mm3 181 187 157 226 173 216 247       No results found for: CKTOTAL, CKMB, CKMBINDEX, TROPONINI, TROPONINT    CO2   Date Value Ref Range Status   09/13/2017 23.0 22.0 - 31.0 mmol/L Final              Imaging Results (last 7 days)     Procedure Component Value Units Date/Time    CT Abdomen Pelvis Without Contrast [[id number]] Collected:  09/06/17 1929     Updated:  09/06/17 2008    Narrative:         EXAMINATION:  Computed Tomography           REGION:    Abdomen / Pelvis                   INDICATION:    Diarrhea, left lower quadrant pain      HISTORY:  BENJAMIN. IMAGING:    none          TECHNIQUE:      - reconstructions:    axial, coronal, sagittal      - contrast:      oral:  no ;   intravenous:  no     - Please note:        - Lack of IV contrast limits assessment of solid organ  parenchyma, urinary system, or vascular structures.        - Lack of oral contrast limits assessment of GI tract  structures.          This exam was performed according to the departmental  dose-optimization program which includes automated exposure  control, adjustment of the mA and/or kV according to patient size  and/or use of iterative reconstruction technique.                COMMENTS:    THORAX (INFERIOR):  The lung bases are clear.     The pleura is without fluid or a mass.     The heart size is normal size and there is no pericardial fluid.    ABDOMEN:  Limited assessment of the solid organ parenchyma is grossly  unremarkable demonstrating no evidence of organomegaly.    Enlarged gallbladder.  Limited assessment of the viscera demonstrates overall normal  caliber, however, suggests the presence of circumferential wall  thickening with the distal ascending colon, transverse colon, and  descending colon.    No evidence of free fluid or free  intraperitoneal air.     The osseous structures are grossly unremarkable for age.    RETROPERITONEUM:  Limited assessment of the kidneys demonstrates overall normal  size.     Limited assessment of the ureters demonstrates normal caliber and  course.    The adrenal glands are of normal size and contour.     No gross evidence of significant retroperitoneal adenopathy.  The vascular structures are grossly within normal limits for age.    PELVIS:  Limited assessment of the viscera is grossly unremarkable  demonstrating normal caliber bowel loops.     No evidence of free fluid or free intraperitoneal air.     The osseous structures are grossly unremarkable for age.     The vascular structures are grossly within normal limits for age.            .          Impression:       CONCLUSION:     1. Limited examination due to the lack of intravenous and oral  contrast.        2. There are normal caliber bowel loops however there is  suggestion of circumferential wall thickening associated with the  distal ascending colon, transverse colon, and majority of  descending colon. This degree of wall thickening suggests a  diffuse inflammatory process.  3. The gallbladder is enlarged, and nearly hydropic. Correlation  with ultrasound suggested.                            Electronically signed by:  CYNDIE Simpson MD  9/6/2017 8:07 PM  CDT Workstation: [id number]           Blood Culture   Date Value Ref Range Status   09/06/2017 No growth at 5 days  Final     BCID, PCR   Date Value Ref Range Status   09/06/2017 (C) No organism detected by BCID PCR. Corrected    Staphylococcus species, not aureus. mecA (methicillin resistance gene) detected. Identification by BCID PCR.     Comment:     Corrected result. Previous result was Staphylococcus aureus. mecA (methicillin resistance gene) detected. Identification by BCID PCR. on 9/7/2017 at 2346 CDT                 Urine Culture   Date Value Ref Range Status   09/10/2017 No growth at 24  hours  Final           Medication Review:   Current Facility-Administered Medications   Medication Dose Route Frequency Provider Last Rate Last Dose   • acetaminophen (TYLENOL) tablet 650 mg  650 mg Oral Q4H PRN Atif Nichols MD   650 mg at 09/12/17 2148   • acidophilus (FLORANEX) tablet 1 tablet  1 tablet Oral TID Nish Morales, DO   1 tablet at 09/12/17 2137   • famotidine (PEPCID) tablet 40 mg  40 mg Oral Daily Atif Nichols MD   40 mg at 09/12/17 1002   • hydrALAZINE (APRESOLINE) injection 10 mg  10 mg Intravenous Q6H PRN Hattie Barrera MD       • HYDROcodone-acetaminophen (NORCO) 5-325 MG per tablet 1 tablet  1 tablet Oral Q6H PRN Chaz Beckett MD   1 tablet at 09/13/17 1841   • HYDROmorphone (DILAUDID) injection 0.5 mg  0.5 mg Intravenous Q2H PRN Atif Nichols MD        And   • naloxone (NARCAN) injection 0.4 mg  0.4 mg Intravenous Q5 Min PRN Atif Nichols MD       • influenza vac split quad (FLUZONE QUADRIVALENT) IM suspension 0.5 mL  0.5 mL Intramuscular During Hospitalization Atif Nichols MD       • megestrol (MEGACE) tablet 20 mg  20 mg Oral Daily Chaz Beckett MD   20 mg at 09/12/17 1003   • methylPREDNISolone sodium succinate (SOLU-Medrol) injection 40 mg  40 mg Intravenous Q6H Nish Morales, DO   40 mg at 09/13/17 1813   • metroNIDAZOLE (FLAGYL) IVPB 500 mg  500 mg Intravenous Q8H Nish Morales, DO   500 mg at 09/13/17 1315   • ondansetron (ZOFRAN) tablet 4 mg  4 mg Oral Q6H PRN Atif Nichols MD       • pneumococcal polysaccharide 23-valent (PNEUMOVAX-23) vaccine 0.5 mL  0.5 mL Intramuscular During Hospitalization Atif Nichols MD       • potassium chloride (MICRO-K) CR capsule 40 mEq  40 mEq Oral PRN Atif Nichols MD   40 mEq at 09/11/17 1629    Or   • potassium chloride (KLOR-CON) packet 40 mEq  40 mEq Oral PRN Atif Nichols MD        Or   • potassium chloride 10 mEq in 100 mL IVPB  10 mEq Intravenous Q1H PRN Atif Nichols MD    Stopped at 09/07/17 0454   • sodium chloride 0.9 % flush 1-10 mL  1-10 mL Intravenous PRN Atif Nichols MD       • sodium chloride 0.9 % infusion  50 mL/hr Intravenous Continuous Chaz Beckett MD 50 mL/hr at 09/13/17 0850 50 mL/hr at 09/13/17 0850   • vancomycin (VANCOCIN) 50 mg/ml oral solution 250 mg  250 mg Oral Q6H Nish Morales,    250 mg at 09/13/17 1812         Assessment/Plan     Principal Problem:    C. difficile colitis  Active Problems:    Lower abdominal pain    Inflammatory bowel disease    Hypokalemia          -continue abx.  -GI f/u appreciated  -S/p Sigmoidoscopy.  -Tolerating PO diet.  -PT/OT to continue.  -DVT/GI prophylaxis in place.  -Continue monitoring in hospital setting and treat patient as course dictates.          This document has been electronically signed by Hattie Barrera MD on September 13, 2017 7:09 PM        EMR Dragon/Transcription disclaimer:   Dictated utilizing Dragon dictation.

## 2017-09-14 NOTE — PLAN OF CARE
Problem: Patient Care Overview (Adult)  Goal: Plan of Care Review  Outcome: Ongoing (interventions implemented as appropriate)    09/14/17 1309   Coping/Psychosocial Response Interventions   Plan Of Care Reviewed With patient   Patient Care Overview   Progress progress toward functional goals as expected   Outcome Evaluation   Outcome Summary/Follow up Plan pt responded well to therapy w/ much increased gait to 200 ft SBA. pt mod indep w/ sup-sit and indep w/ sit-stand. pt completed stair training min A w/o hr, SBA w/ dianna hr. pt completed 20 reps of AROM. recommend  PT upon DC. Follow up plan: balance training.        Goal: Discharge Needs Assessment  Outcome: Ongoing (interventions implemented as appropriate)    09/12/17 1516 09/13/17 1155 09/13/17 1349   Discharge Needs Assessment   Concerns To Be Addressed no discharge needs identified --  --    Readmission Within The Last 30 Days no previous admission in last 30 days --  --    Equipment Needed After Discharge none --  --    Discharge Disposition --  --  home healthcare service   Discharge Planning Comments transitional visit --  --    Current Health   Anticipated Changes Related to Illness none --  --    Self-Care   Equipment Currently Used at Home --  none --    Living Environment   Transportation Available --  family or friend will provide --          Problem: Inpatient Physical Therapy  Goal: Transfer Training Goal 1 STG- PT  Outcome: Ongoing (interventions implemented as appropriate)    09/13/17 0955 09/14/17 1309   Transfer Training PT STG   Transfer Training PT STG, Date Established 09/13/17 --    Transfer Training PT STG, Time to Achieve 2 days --    Transfer Training PT STG, Activity Type bed to chair /chair to bed;sit to stand/stand to sit --    Transfer Training PT STG, [place] Level independent --    Transfer Training PT STG, Date Goal Reviewed --  09/14/17   Transfer Training PT STG, Outcome --  goal ongoing       Goal: Gait Training Goal STG-  PT  Outcome: Ongoing (interventions implemented as appropriate)  Goal: Stair Training Goal LTG- PT  Outcome: Ongoing (interventions implemented as appropriate)    09/13/17 0955 09/14/17 1309   Stair Training PT LTG   Stair Training Goal PT LTG, Date Established 09/13/17 --    Stair Training Goal PT LTG, Time to Achieve by discharge --    Stair Training Goal PT LTG, Number of Steps 2 --    Stair Training Goal PT LTG, [place] Level conditional independence --    Stair Training Goal PT LTG, Assist Device 2 handrails --    Stair Training Goal PT LTG, Date Goal Reviewed --  09/14/17   Stair Training Goal PT LTG, Outcome --  goal ongoing       Goal: Physical Therapy Goal LTG- PT  Outcome: Ongoing (interventions implemented as appropriate)    09/13/17 0955 09/14/17 1309   Physical Therapy PT LTG   Physical Therapy PT LTG, Time to Achieve by discharge --    Physical Therapy PT LTG, Activity Type Tinetti fall risk assessment.  --    Physical Therapy PT LTG, Addisional Goal Score >/= 24/28 or low fall risk.  --    Physical Therapy PT LTG, Date Goal Reviewed --  09/14/17   Physical Therapy PT LTG, Outcome --  goal ongoing

## 2017-09-14 NOTE — THERAPY TREATMENT NOTE
Acute Care - Occupational Therapy Treatment Note  [facility]     Patient Name: Damaris Sánchez  : 1945  MRN: [id number]  Today's Date: 2017  Onset of Illness/Injury or Date of Surgery Date: 17  Date of Referral to OT: 17  Referring Physician: Dr. Barrera      Admit Date: 2017    Visit Dx:     ICD-10-CM ICD-9-CM   1. Lower abdominal pain R10.30 789.09   2. Leukocytosis, unspecified type D72.829 288.60   3. Hypokalemia E87.6 276.8   4. C. difficile colitis A04.7 008.45   5. Impaired physical mobility Z74.09 781.99   6. Impaired mobility and ADLs Z74.09 799.89     Patient Active Problem List   Diagnosis   • Lower abdominal pain   • C. difficile colitis   • Inflammatory bowel disease   • Hypokalemia             Adult Rehabilitation Note       17 0740          Rehab Assessment/Intervention    Discipline occupational therapy assistant  -KD      Document Type therapy note (daily note)  -KD      Subjective Information agree to therapy  -KD      Precautions/Limitations fall precautions  -KD      Recorded by [KD] HILARY KelleyA/L      Vital Signs    Pre Systolic BP Rehab 117  -KD      Pre Treatment Diastolic BP 64  -KD      Intra Systolic BP Rehab 142  -KD      Intra Treatment Diastolic BP 73  -KD      Post Systolic BP Rehab 173  -KD      Post Treatment Diastolic BP 77  -KD      Pretreatment Heart Rate (beats/min) 93  -KD      Intratreatment Heart Rate (beats/min) 94  -KD      Posttreatment Heart Rate (beats/min) 101  -KD      Pre SpO2 (%) 95  -KD      O2 Delivery Pre Treatment room air  -KD      Intra SpO2 (%) 97  -KD      O2 Delivery Intra Treatment room air  -KD      Post SpO2 (%) 97  -KD      O2 Delivery Post Treatment room air  -KD      Pre Patient Position Supine  -KD      Intra Patient Position Standing  -KD      Post Patient Position Sitting  -KD      Recorded by [KD] HILARY KelleyA/L      Pain Assessment    Pain Assessment No/denies pain  -KD      Recorded by [KD]  MADDI Kelley/L      Cognitive Assessment/Intervention    Current Cognitive/Communication Assessment functional  -KD      Orientation Status oriented x 4  -KD      Follows Commands/Answers Questions 100% of the time  -KD      Recorded by [KD] MADDI Kelley/L      Bed Mobility, Assessment/Treatment    Bed Mobility, Assistive Device head of bed elevated  -KD      Bed Mobility, Roll Right, Ross independent  -KD      Bed Mobility, Scoot/Bridge, Ross independent  -KD      Bed Mob, Sidelying to Sit, Ross independent  -KD      Bed Mob, Sit to Sidelying, Ross independent  -KD      Recorded by [KD] JAM Kelley      Transfer Assessment/Treatment    Transfers, Sit-Stand Ross independent  -KD      Transfers, Stand-Sit [place] independent  -KD      Transfers, Sit-Stand-Sit, Assist Device --   none  -KD      Toilet Transfer, Ross independent  -KD      Toilet Transfer, Assistive Device --   none  -KD      Transfer, Comment Pt completed 1 set x 5 reps sit<>stands  -KD      Recorded by [KD] MADDI Kelley/L      Functional Mobility    Functional Mobility- Ind. Level independent  -KD      Recorded by [KD] MADDI Kelley/L      Upper Body Bathing Assessment/Training    UB Bathing Assess/Train, [place] Level independent  -KD      Recorded by [KD] MADDI Kelley/L      Lower Body Bathing Assessment/Training    LB Bathing Assess/Train, [place] Level independent  -KD      Recorded by [KD] MADDI Kelley/L      Upper Body Dressing Assessment/Training    UB Dressing Assess/Train, Clothing Type donning:;hospital gown  -KD      UB Dressing Assess/Train, Position edge of bed;sitting  -KD      UB Dressing Assess/Train, Ross independent  -KD      Recorded by [KD] MADDI Kelley/L      Lower Body Dressing Assessment/Training    LB Dressing Assess/Train, Clothing Type donning:;slipper socks  -KD      LB Dressing  Assess/Train, Position edge of bed;sitting  -KD      LB Dressing Assess/Train, [place] independent  -KD      Recorded by [KD] JAM Kelley      Toileting Assessment/Training    Toileting Assess/Train, Assistive Device grab bars  -KD      Toileting Assess/Train, Position sitting  -KD      Toileting Assess/Train, Indepen Level independent  -KD      Recorded by [KD] JAM Kelley      Grooming Assessment/Training    Grooming Assess/Train, Position sink side;standing  -KD      Grooming Assess/Train, Indepen Level independent  -KD      Recorded by [KD] JAM Kelley      Balance Skills Training    Standing-Balance Activities --   ADL activity  -KD      Standing Balance # of Minutes 5  -KD      Recorded by [SANDRA] JAM Kelley      Therapy Exercises    Bilateral Upper Extremity AROM:;20 reps;sitting;elbow flexion/extension;hand pumps;pronation/supination;shoulder abduction/adduction;shoulder extension/flexion;shoulder ER/IR  -KD      BUE Resistance manual resistance- minimal   2lb HW  -KD      Recorded by [SANDRA] JAM Kelley      Positioning and Restraints    Pre-Treatment Position in bed  -KD      Post Treatment Position bed  -KD      In Bed sitting;call light within reach;encouraged to call for assist;exit alarm on  -KD      Recorded by [SANDRA] JAM Kelley        User Key  (r) = Recorded By, (t) = Taken By, (c) = Cosigned By    Initials Name Effective Dates     JAM Kelley 10/17/16 -                 OT Goals       09/14/17 0740 09/13/17 1349       Dynamic Standing Balance OT LTG    Dynamic Standing Balance OT LTG, Date Established  09/06/17  -SG     Dynamic Standing Balance OT LTG, Time to Achieve  2 wks  -SG     Dynamic Standing Balance OT LTG, [place] Level  supervision required  -SG     Dynamic Standing Balance OT LTG, Additional Goal  Resident to Independent and safe With dynamic standing balance to complete higher level ADL's.  -SG      Dynamic Standing Balance OT LTG, Date Goal Reviewed 09/14/17  -KD      Dynamic Standing Balance OT LTG, Outcome goal met  -KD      Patient Education OT STG    Patient Education OT STG, Date Established  09/13/17  -SG     Patient Education OT STG, Time to Achieve  5 - 7 days  -SG     Patient Education OT STG, Education Type  HEP  -SG     Patient Education OT STG, Additional Goal  T band exercises.  -SG     Patient Education OT STG, Date Goal Reviewed 09/14/17  -KD      Patient Education OT STG Outcome goal not met  -KD      Bathing OT LTG    Bathing Goal OT LTG, Date Established  09/13/17  -SG     Bathing Goal OT LTG, Time to Achieve  2 wks  -SG     Bathing Goal OT LTG, [place] Level  conditional independence  -SG     Bathing Goal OT LTG, Assist Device  shower chair with back  -SG     Bathing Goal OT LTG, Additional Goal  Independent and safe   -SG     Bathing Goal OT LTG, Date Goal Reviewed 09/14/17  -KD      Bathing Goal OT LTG, Outcome goal not met  -KD      Home Management OT STG    Home Mgmt Goal OT STG, Date Established  09/13/17  -SG     Home Mgmt Goal OT STG, Time To Achieve  1 wk  -SG     Home Mgmt Goal OT STG, [place] Level  conditional independence  -SG     Home Mgmt Goal OT STG, Additional Goal  Resident to be CI in room, for gathering of items for all personal care, with 0 LOB and safe.  -SG     Home Mgmt Goal OT STG, Date Goal Reviewed 09/14/17  -KD      Home Mgmt Goal OT STG, Outcome Achieved goal not met  -KD        User Key  (r) = Recorded By, (t) = Taken By, (c) = Cosigned By    Initials Name Provider Type     JAM Kelley Occupational Therapy Assistant    MYLA Patricia OT Occupational Therapist          Occupational Therapy Education     Title: PT OT SLP Therapies (Active)     Topic: Occupational Therapy (Done)     Point: ADL training (Done)    Description: Instruct learner(s) on proper safety adaptation and remediation techniques during self care or transfers.   Instruct in  proper use of assistive devices.    Learning Progress Summary    Learner Readiness Method Response Comment Documented by Status   Patient Acceptance TB VU  KD 09/14/17 1144 Done    Eager E,TB DU  SG 09/13/17 1346 Done               Point: Home exercise program (Done)    Description: Instruct learner(s) on appropriate technique for monitoring, assisting and/or progressing therapeutic exercises/activities.    Learning Progress Summary    Learner Readiness Method Response Comment Documented by Status   Patient Eager E,TB DU  SG 09/13/17 1346 Done               Point: Precautions (Done)    Description: Instruct learner(s) on prescribed precautions during self-care and functional transfers.    Learning Progress Summary    Learner Readiness Method Response Comment Documented by Status   Patient Eager E,TB DU  SG 09/13/17 1346 Done               Point: Body mechanics (Done)    Description: Instruct learner(s) on proper positioning and spine alignment during self-care, functional mobility activities and/or exercises.    Learning Progress Summary    Learner Readiness Method Response Comment Documented by Status   Patient Eager E,TB DU   09/13/17 1346 Done                      User Key     Initials Effective Dates Name Provider Type Discipline     10/17/16 -  MADDI Kelley/L Occupational Therapy Assistant OT     08/03/17 -  Farrah Patricia OT Occupational Therapist OT                  OT Recommendation and Plan  Anticipated Discharge Disposition: home with home health  Planned Therapy Interventions: activity intolerance, ADL retraining, IADL retraining, energy conservation, home exercise program, neuromuscular re-education, strengthening, transfer training  Therapy Frequency:  (3/14 times/Wk)  Plan of Care Review  Outcome Summary/Follow up Plan: Pt Indep with bed mobility and t/f's this AM. Pt also demonstrated Indep with ADL. Pt met 1 new goal this AM        Outcome Measures       09/14/17 0740 09/13/17 1692  "09/13/17 0955    How much help from another person do you currently need...    Turning from your back to your side while in flat bed without using bedrails?   3  -CZ    Moving from lying on back to sitting on the side of a flat bed without bedrails?   3  -CZ    Moving to and from a bed to a chair (including a wheelchair)?   3  -CZ    Standing up from a chair using your arms (e.g., wheelchair, bedside chair)?   3  -CZ    Climbing 3-5 steps with a railing?   3  -CZ    To walk in hospital room?   3  -CZ    AM-PAC 6 Clicks Score   18  -CZ    How much help from another is currently needed...    Putting on and taking off regular lower body clothing? 4  -KD 4  -SG     Bathing (including washing, rinsing, and drying) 4  -KD 3  -SG     Toileting (which includes using toilet bed pan or urinal) 4  -KD 4  -SG     Putting on and taking off regular upper body clothing 4  -KD 4  -SG     Taking care of personal grooming (such as brushing teeth) 4  -KD 4  -SG     Eating meals 4  -KD 4  -SG     Score 24  -KD 23  -SG     Tinetti Assessment    Tinetti Assessment   yes  -CZ    Sitting Balance   1  -CZ    Arises   2  -CZ    Attempts to Rise   2  -CZ    Immediate Standing Balance (first 5 sec)   2  -CZ    Standing Balance   1  -CZ    Sternal Nudge (feet close together)   0  -CZ    Eyes Closed (feet close together)   1  -CZ    Turning 360 Degrees- Steps   1  -CZ    Turning 360 Degrees- Steadiness   1  -CZ    Sitting Down   2  -CZ    Tinetti Balance Score   13  -CZ    Gait Initiation (immediate after told \"go\")   1  -CZ    Step Length- Right Swing   1  -CZ    Step Length- Left Swing   1  -CZ    Foot Clearance- Right Foot   1  -CZ    Foot Clearance- Left Foot   1  -CZ    Step Symmetry   1  -CZ    Step Continuity   1  -CZ    Path (excursion)   1  -CZ    Trunk   1  -CZ    Base of Support   0  -CZ    Gait Score   9  -CZ    Tinetti Total Score   22  -CZ    Tinetti Assistive Device   --   No AD.  -CZ    Tinetti Assessment Comments   poor stepping " response.  -CZ    Functional Assessment    Outcome Measure Options  AM-PAC 6 Clicks Daily Activity (OT)  -SG Walker;AM-PAC 6 Clicks Basic Mobility (PT)  -CZ      User Key  (r) = Recorded By, (t) = Taken By, (c) = Cosigned By    Initials Name Provider Type    MADDI Dhaliwal/L Occupational Therapy Assistant    CZ Jesus Shahid, PT Physical Therapist    MYLA Patricia, OT Occupational Therapist           Time Calculation:         Time Calculation- OT       09/14/17 1146 09/14/17 1145       Time Calculation- OT    OT Start Time 0740  -KD 0740  -KD     OT Stop Time 0849  -KD 0845  -KD     OT Time Calculation (min) 69 min  -KD 65 min  -KD     Total Timed Code Minutes- OT 69 minute(s)  -KD      OT Received On 09/14/17  -KD 09/14/17  -KD       User Key  (r) = Recorded By, (t) = Taken By, (c) = Cosigned By    Initials Name Provider Type    MADDI Dhaliwal/L Occupational Therapy Assistant           Therapy Charges for Today     Code Description Service Date Service Provider Modifiers Qty    20061280310 HC OT THER PROC EA 15 MIN 9/14/2017 HILARY KelleyA/L GO 2    64196826211 HC OT THERAPEUTIC ACT EA 15 MIN 9/14/2017 MADDI Kelley/L GO 1    01298293872 HC OT SELF CARE/MGMT/TRAIN EA 15 MIN 9/14/2017 MADDI Kelley/L GO 2          OT G-codes  OT Professional Judgement Used?: Yes  OT Functional Scales Options: AM-PAC 6 Clicks Daily Activity (OT)  Score: 23  Functional Limitation: Self care  Self Care Current Status (): At least 1 percent but less than 20 percent impaired, limited or restricted  Self Care Goal Status (): 0 percent impaired, limited or restricted    JAM Kelley  9/14/2017

## 2017-09-14 NOTE — PLAN OF CARE
Problem: Patient Care Overview (Adult)  Goal: Plan of Care Review  Outcome: Ongoing (interventions implemented as appropriate)    09/14/17 0200 09/14/17 0740   Coping/Psychosocial Response Interventions   Plan Of Care Reviewed With patient --    Patient Care Overview   Progress improving --    Outcome Evaluation   Outcome Summary/Follow up Plan --  Pt Indep with bed mobility and t/f's this AM. Pt also demonstrated Indep with ADL. Pt met 1 new goal this AM       Goal: Discharge Needs Assessment  Outcome: Ongoing (interventions implemented as appropriate)    09/12/17 1516 09/13/17 1155 09/13/17 1349   Discharge Needs Assessment   Concerns To Be Addressed no discharge needs identified --  --    Readmission Within The Last 30 Days no previous admission in last 30 days --  --    Equipment Needed After Discharge none --  --    Discharge Disposition --  --  home healthcare service   Discharge Planning Comments transitional visit --  --    Current Health   Anticipated Changes Related to Illness none --  --    Self-Care   Equipment Currently Used at Home --  none --    Living Environment   Transportation Available --  family or friend will provide --          Problem: Inpatient Occupational Therapy  Goal: Dynamic Standing Balance Goal LTG-OT  Outcome: Ongoing (interventions implemented as appropriate)    09/14/17 0740   Dynamic Standing Balance OT LTG   Dynamic Standing Balance OT LTG, Date Goal Reviewed 09/14/17   Dynamic Standing Balance OT LTG, Outcome goal met       Goal: Patient Education Goal STG- OT  Outcome: Ongoing (interventions implemented as appropriate)    09/13/17 1349 09/14/17 0740   Patient Education OT STG   Patient Education OT STG, Date Established 09/13/17 --    Patient Education OT STG, Time to Achieve 5 - 7 days --    Patient Education OT STG, Education Type HEP --    Patient Education OT STG, Additional Goal T band exercises. --    Patient Education OT STG, Date Goal Reviewed --  09/14/17   Patient  Education OT STG Outcome --  goal not met       Goal: Bathing Goal LTG- OT  Outcome: Ongoing (interventions implemented as appropriate)    09/13/17 1349 09/14/17 0740   Bathing OT LTG   Bathing Goal OT LTG, Date Established 09/13/17 --    Bathing Goal OT LTG, Time to Achieve 2 wks --    Bathing Goal OT LTG, [place] Level conditional independence --    Bathing Goal OT LTG, Assist Device shower chair with back --    Bathing Goal OT LTG, Additional Goal Independent and safe  --    Bathing Goal OT LTG, Date Goal Reviewed --  09/14/17   Bathing Goal OT LTG, Outcome --  goal not met       Goal: Home Management Goal STG- OT  Outcome: Ongoing (interventions implemented as appropriate)    09/13/17 1349 09/14/17 0740   Home Management OT STG   Home Mgmt Goal OT STG, Date Established 09/13/17 --    Home Mgmt Goal OT STG, Time To Achieve 1 wk --    Home Mgmt Goal OT STG, [place] Level conditional independence --    Home Mgmt Goal OT STG, Additional Goal Resident to be CI in room, for gathering of items for all personal care, with 0 LOB and safe. --    Home Mgmt Goal OT STG, Date Goal Reviewed --  09/14/17   Home Mgmt Goal OT STG, Outcome Achieved --  goal not met

## 2017-09-14 NOTE — PROGRESS NOTES
[facility]  INPATIENT PROGRESS NOTE     LOS: 8 days   Patient Care Team:  Nish Morales DO as PCP - General (Gastroenterology)    Chief Complaint:  C. difficile colitis      Subjective     Interval History:     Patient Complaints: Patient seen and examined.  Resting comfortably and states she is feeling well.    History taken from: Patient.    Review of Systems:    Review of Systems   Constitutional: Positive for appetite change. Negative for activity change, fatigue and fever.   HENT: Negative for ear pain and sore throat.    Eyes: Negative for pain and visual disturbance.   Respiratory: Negative for cough and shortness of breath.    Cardiovascular: Negative for chest pain and palpitations.   Gastrointestinal: Positive for abdominal pain, diarrhea and nausea. Negative for constipation.   Endocrine: Negative for cold intolerance and heat intolerance.   Genitourinary: Negative for difficulty urinating and dysuria.   Musculoskeletal: Negative for arthralgias and gait problem.   Skin: Negative for color change and rash.   Neurological: Positive for weakness. Negative for dizziness and headaches.   Hematological: Negative for adenopathy. Does not bruise/bleed easily.   Psychiatric/Behavioral: Negative for agitation, confusion and sleep disturbance.         Objective     Vital Signs  Temp:  [97.1 °F (36.2 °C)-99.1 °F (37.3 °C)] 97.6 °F (36.4 °C)  Heart Rate:  [] 94  Resp:  [16-20] 18  BP: (117-164)/(64-81) 134/75    Physical Exam:   Physical Exam   Constitutional: She is oriented to person, place, and time. She appears well-developed and well-nourished. No distress.   HENT:   Head: Normocephalic and atraumatic.   Right Ear: External ear normal.   Left Ear: External ear normal.   Nose: Nose normal.   Eyes: Conjunctivae and EOM are normal. Pupils are equal, round, and reactive to light. Right eye exhibits no discharge. Left eye exhibits no discharge.  No scleral icterus.   Neck: Normal range of motion. Neck supple.   Cardiovascular: Normal rate, regular rhythm, normal heart sounds and intact distal pulses.  Exam reveals no gallop and no friction rub.    No murmur heard.  Pulmonary/Chest: Effort normal and breath sounds normal. No respiratory distress. She has no wheezes. She has no rales. She exhibits no tenderness.   Abdominal: Soft. Bowel sounds are normal. She exhibits no distension and no mass. There is no tenderness. There is no rebound and no guarding.   Musculoskeletal: Normal range of motion.   Neurological: She is alert and oriented to person, place, and time.   Skin: Skin is warm and dry. No rash noted. She is not diaphoretic. No erythema. No pallor.   Psychiatric: She has a normal mood and affect. Her behavior is normal.   Nursing note and vitals reviewed.         Results Review:       Results from last 7 days  Lab Units 09/14/17  0532 09/13/17  0515 09/12/17  0543 09/11/17  1949 09/11/17  0553 09/10/17  0600 09/09/17  0616 09/08/17  0539   SODIUM mmol/L 138 138 137  --  137 138 140 139   POTASSIUM mmol/L 3.9 4.0 4.5 4.2 3.1* 3.1* 3.8 4.0   CHLORIDE mmol/L 106 105 105  --  104 101 109 111*   CO2 mmol/L 21.0* 23.0 23.0  --  27.0 26.0 23.0 21.0*   BUN mg/dL 8 7 7  --  5* 3* 5* 8   CREATININE mg/dL 0.71 0.79 0.69  --  0.68 0.76 0.86 0.86   GLUCOSE mg/dL 157* 86 88  --  97 96 84 102*   CALCIUM mg/dL 8.4 8.1* 8.3*  --  7.9* 8.3* 8.1* 8.4   BILIRUBIN mg/dL 0.5 0.4  --   --   --   --   --   --    ALK PHOS U/L 102 84  --   --   --   --   --   --    ALT (SGPT) U/L 32 36  --   --   --   --   --   --    AST (SGOT) U/L 32 29  --   --   --   --   --   --          Results from last 7 days  Lab Units 09/14/17  0532 09/13/17  0515 09/10/17  0600   MAGNESIUM mg/dL 1.9 1.7 1.9         Results from last 7 days  Lab Units 09/14/17  0532 09/13/17  0515 09/12/17  0543 09/11/17  0553 09/10/17  0600 09/09/17  0616 09/08/17  0539   WBC 10*3/mm3 6.58 6.76 6.48 5.69 7.66 5.83  8.38   HEMOGLOBIN g/dL 11.5* 10.6* 10.8* 10.2* 11.6* 9.9* 9.1*   HEMATOCRIT % 35.4 32.3* 33.0* 30.6* 35.2 29.7* 26.8*   PLATELETS 10*3/mm3 261 181 187 157 226 173 216       No results found for: CKTOTAL, CKMB, CKMBINDEX, TROPONINI, TROPONINT    CO2   Date Value Ref Range Status   09/14/2017 21.0 (L) 22.0 - 31.0 mmol/L Final              Imaging Results (last 7 days)     ** No results found for the last 168 hours. **                           Urine Culture   Date Value Ref Range Status   09/10/2017 No growth at 24 hours  Final           Medication Review:   Current Facility-Administered Medications   Medication Dose Route Frequency Provider Last Rate Last Dose   • acetaminophen (TYLENOL) tablet 650 mg  650 mg Oral Q4H PRN Atif Nichols MD   650 mg at 09/13/17 2136   • acidophilus (FLORANEX) tablet 1 tablet  1 tablet Oral TID Nish Morales DO   1 tablet at 09/14/17 0844   • famotidine (PEPCID) tablet 40 mg  40 mg Oral Daily Atif Nichols MD   40 mg at 09/14/17 0844   • hydrALAZINE (APRESOLINE) injection 10 mg  10 mg Intravenous Q6H PRN Hattie Barrera MD       • HYDROcodone-acetaminophen (NORCO) 5-325 MG per tablet 1 tablet  1 tablet Oral Q6H PRN Chaz Beckett MD   1 tablet at 09/14/17 0609   • HYDROmorphone (DILAUDID) injection 0.5 mg  0.5 mg Intravenous Q2H PRN Atif Nichols MD        And   • naloxone (NARCAN) injection 0.4 mg  0.4 mg Intravenous Q5 Min PRN Atif Nichols MD       • influenza vac split quad (FLUZONE QUADRIVALENT) IM suspension 0.5 mL  0.5 mL Intramuscular During Hospitalization Atif Nichols MD       • megestrol (MEGACE) tablet 20 mg  20 mg Oral Daily Chaz Beckett MD   20 mg at 09/14/17 0844   • methylPREDNISolone sodium succinate (SOLU-Medrol) injection 40 mg  40 mg Intravenous Q6H Nish Morales DO   40 mg at 09/14/17 1216   • metroNIDAZOLE (FLAGYL) IVPB 500 mg  500 mg Intravenous Q8H Nish Morales DO   500 mg at 09/14/17 1216   • ondansetron (ZOFRAN)  tablet 4 mg  4 mg Oral Q6H PRN Atif Nichols MD       • pneumococcal polysaccharide 23-valent (PNEUMOVAX-23) vaccine 0.5 mL  0.5 mL Intramuscular During Hospitalization Atif Nichols MD       • potassium chloride (MICRO-K) CR capsule 40 mEq  40 mEq Oral PRN Atif Nichols MD   40 mEq at 09/11/17 1629    Or   • potassium chloride (KLOR-CON) packet 40 mEq  40 mEq Oral PRN Atif Nichols MD        Or   • potassium chloride 10 mEq in 100 mL IVPB  10 mEq Intravenous Q1H PRN Atif Nichols MD   Stopped at 09/07/17 0454   • sodium chloride 0.9 % flush 1-10 mL  1-10 mL Intravenous PRN Atif Nichols MD       • sodium chloride 0.9 % infusion  50 mL/hr Intravenous Continuous Chaz Beckett MD 50 mL/hr at 09/13/17 2004 50 mL/hr at 09/13/17 2004   • vancomycin (VANCOCIN) 50 mg/ml oral solution 250 mg  250 mg Oral Q6H Nish Morales DO   250 mg at 09/14/17 1216         Assessment/Plan     Principal Problem:    C. difficile colitis  Active Problems:    Lower abdominal pain    Inflammatory bowel disease    Hypokalemia          -GI follow-up appreciated.  -We'll continue with IV antibiotics.  -Continue with PT OT monitoring.  -DVT and GI prophylaxis in place.  -We'll continue monitoring patient hospital setting and treat patient as course dictates.  -Discharge planning in progress.          This document has been electronically signed by Hattie Barrera MD on September 14, 2017 1:06 PM        EMR Dragon/Transcription disclaimer:   Dictated utilizing Dragon dictation.

## 2017-09-14 NOTE — CONSULTS
"Adult Nutrition  Assessment    Patient Name:  Damaris Sánchez  YOB: 1945  MRN: [id number]  Admit Date:  9/6/2017    Assessment Date:  9/14/2017          Reason for Assessment       09/14/17 1321    Reason for Assessment    Reason For Assessment/Visit follow up protocol              Nutrition/Diet History       09/14/17 1321    Nutrition/Diet History    Typical Food/Fluid Intake Pt says her appetite is back \"full force\".                         Comments:  Pt says appetite is back \"full force\". Says she is feeling much improved and plans to d/c soon. Will monitor.         Electronically signed by:  Ritu Bella RD  09/14/17 1:23 PM  "

## 2017-09-14 NOTE — THERAPY TREATMENT NOTE
Acute Care - Physical Therapy Treatment Note  [facility]     Patient Name: Damaris Sánchez  : 1945  MRN: [id number]  Today's Date: 2017  Onset of Illness/Injury or Date of Surgery Date: 17  Date of Referral to PT: 17  Referring Physician: Dr. Barrera    Admit Date: 2017    Visit Dx:    ICD-10-CM ICD-9-CM   1. Lower abdominal pain R10.30 789.09   2. Leukocytosis, unspecified type D72.829 288.60   3. Hypokalemia E87.6 276.8   4. C. difficile colitis A04.7 008.45   5. Impaired physical mobility Z74.09 781.99   6. Impaired mobility and ADLs Z74.09 799.89     Patient Active Problem List   Diagnosis   • Lower abdominal pain   • C. difficile colitis   • Inflammatory bowel disease   • Hypokalemia               Adult Rehabilitation Note       17 1309 17 0740       Rehab Assessment/Intervention    Discipline physical therapy assistant  -SEJAL occupational therapy assistant  -KD     Document Type therapy note (daily note)  -SEJAL therapy note (daily note)  -KD     Subjective Information agree to therapy  -SEJAL agree to therapy  -KD     Patient Effort, Rehab Treatment good  -SEJAL      Precautions/Limitations fall precautions  -SEJAL fall precautions  -KD     Recorded by [SEJAL] Shalom Ross, PTA [KD] HILARY KelleyA/L     Vital Signs    Pre Systolic BP Rehab 140  -SEJAL 117  -KD     Pre Treatment Diastolic BP 76  -SEJAL 64  -KD     Intra Systolic BP Rehab  142  -KD     Intra Treatment Diastolic   -SEJAL 73  -KD     Post Systolic BP Rehab 73  -SEJAL 173  -KD     Post Treatment Diastolic BP  77  -KD     Pretreatment Heart Rate (beats/min) 100  -SEJAL 93  -KD     Intratreatment Heart Rate (beats/min) 104  -SEJAL 94  -KD     Posttreatment Heart Rate (beats/min) 111  -SEJAL 101  -KD     Pre SpO2 (%) 99  -SEJAL 95  -KD     O2 Delivery Pre Treatment room air  -SEJAL room air  -KD     Intra SpO2 (%) 99  -SEJAL 97  -KD     O2 Delivery Intra Treatment room air  -SEJAL room air  -KD     Post SpO2 (%) 99  -SEJAL 97  -KD     O2  Delivery Post Treatment room air  -SEJAL room air  -KD     Pre Patient Position Supine  -SEJAL Supine  -KD     Intra Patient Position  Standing  -KD     Post Patient Position Sitting  -SEJAL Sitting  -KD     Recorded by [SEJAL] Shalom Ross PTA [KD] Swetha Kitchen, LINDA/L     Pain Assessment    Pain Assessment No/denies pain  -SEJAL No/denies pain  -KD     Recorded by [SEJAL] Shalom Ross PTA [KD] Swetha Kitchen, LINDA/L     Vision Assessment/Intervention    Visual Impairment WFL with corrective lenses  -SEJAL      Recorded by [SEJAL] Shalom Ross PTA      Cognitive Assessment/Intervention    Current Cognitive/Communication Assessment functional  -SEJAL functional  -KD     Orientation Status oriented x 4  -SEJAL oriented x 4  -KD     Follows Commands/Answers Questions 100% of the time  -SEJAL 100% of the time  -KD     Personal Safety Interventions gait belt;muscle strengthening facilitated;nonskid shoes/slippers when out of bed;supervised activity  -SEJAL      Recorded by [SEJAL] Shalom Ross PTA [KD] Swetha Kitchen, LINDA/L     Bed Mobility, Assessment/Treatment    Bed Mobility, Assistive Device bed rails;head of bed elevated  -SEJAL head of bed elevated  -KD     Bed Mobility, Roll Right, [place]  independent  -KD     Bed Mobility, Scoot/Bridge, [place]  independent  -KD     Bed Mob, Supine to Sit, [place] conditional independence  -SEJAL      Bed Mob, Sidelying to Sit, [place]  independent  -KD     Bed Mob, Sit to Sidelying, [place]  independent  -KD     Recorded by [SEJAL] Shalom Ross PTA [KD] Swetha Kitchen, LINDA/L     Transfer Assessment/Treatment    Transfers, Sit-Stand [place] independent  -SEJAL independent  -KD     Transfers, Stand-Sit [place] independent  -SEJAL independent  -KD     Transfers, Sit-Stand-Sit, Assist Device --   no AD  -SEJAL --   none  -KD     Toilet Transfer, [place]  independent  -KD     Toilet Transfer, Assistive Device  --   none  -KD     Transfer, Comment multiple sit-stands.   -SEJAL  Pt completed 1 set x 5 reps sit<>stands  -KD     Recorded by [SEJAL] Shalom Ross PTA [KD] MADDI Kelley/L     Gait Assessment/Treatment    Gait, [place] Level supervision required  -SEJAL      Gait, Assistive Device --   no AD  -SEJAL      Gait, Distance (Feet) 200  -SEJAL      Gait, Comment fast irving and slightly unsteady. somewhat impulsive.   -SEJAL      Recorded by [SEJAL] Shalom Ross PTA      Stairs Assessment/Treatment    Number of Stairs 5  -SEJAL      Stairs, Handrail Location none  -SEJAL      Stairs, [place] Level minimum assist (75% patient effort)  -SEAJL      Stairs, Technique Used step to step (ascending);step to step (descending)   edu on step-to for safety.   -SEJAL      Stairs, Comment 3 trails w/o handrail min A. 1 trial w/ dianna hr, SBA. pt states her son will build her hand rails.   -SEJAL      Recorded by [SEJAL] Shalom Ross PTA      Functional Mobility    Functional Mobility- Ind. Level  independent  -KD     Recorded by  [KD] MADDI Kelley/L     Upper Body Bathing Assessment/Training    UB Bathing Assess/Train, [place] Level  independent  -KD     Recorded by  [KD] MADDI Kelley/L     Lower Body Bathing Assessment/Training    LB Bathing Assess/Train, [place] Level  independent  -KD     Recorded by  [KD] MADDI Kelley/L     Upper Body Dressing Assessment/Training    UB Dressing Assess/Train, Clothing Type  donning:;hospital gown  -KD     UB Dressing Assess/Train, Position  edge of bed;sitting  -KD     UB Dressing Assess/Train, [place]  independent  -KD     Recorded by  [KD] MADDI Kelley/L     Lower Body Dressing Assessment/Training    LB Dressing Assess/Train, Clothing Type  donning:;slipper socks  -KD     LB Dressing Assess/Train, Position  edge of bed;sitting  -KD     LB Dressing Assess/Train, [place]  independent  -KD     Recorded by  [KD] MADDI Kelley/L     Toileting Assessment/Training    Toileting Assess/Train, Assistive Device  grab  bars  -KD     Toileting Assess/Train, Position  sitting  -KD     Toileting Assess/Train, Indepen Level  independent  -KD     Recorded by  [KD] JAM Kelley     Grooming Assessment/Training    Grooming Assess/Train, Position  sink side;standing  -KD     Grooming Assess/Train, Indepen Level  independent  -KD     Recorded by  [KD] MADDI Kelley/L     Balance Skills Training    Standing-Balance Activities  --   ADL activity  -KD     Standing Balance # of Minutes  5  -KD     Recorded by  [KD] MADDI Kelley/L     Therapy Exercises    Bilateral Lower Extremities AROM:;20 reps;supine;ankle pumps/circles;hip abduction/adduction;heel slides;SAQ;SLR  -SEJAL      Bilateral Upper Extremity  AROM:;20 reps;sitting;elbow flexion/extension;hand pumps;pronation/supination;shoulder abduction/adduction;shoulder extension/flexion;shoulder ER/IR  -KD     BUE Resistance  manual resistance- minimal   2lb HW  -KD     Recorded by [SEJAL] Shalom Ross PTA [KD] JAM Kelley     Positioning and Restraints    Pre-Treatment Position in bed  -SEJAL in bed  -KD     Post Treatment Position bed  -SEJAL bed  -KD     In Bed sitting EOB;call light within reach;encouraged to call for assist;with family/caregiver   all needs met.   -SEJAL sitting;call light within reach;encouraged to call for assist;exit alarm on  -KD     Recorded by [SEJAL] Shalom Ross PTA [KD] JAM Kelley       User Key  (r) = Recorded By, (t) = Taken By, (c) = Cosigned By    Initials Name Effective Dates    SEJAL Ross PTA 10/17/16 -     KD JAM Kelley 10/17/16 -                 IP PT Goals       09/14/17 1309 09/13/17 0955       Transfer Training PT STG    Transfer Training PT STG, Date Established  09/13/17  -CZ     Transfer Training PT STG, Time to Achieve  2 days  -CZ     Transfer Training PT STG, Activity Type  bed to chair /chair to bed;sit to stand/stand to sit  -CZ     Transfer Training PT STG, [place] Level   independent  -CZ     Transfer Training PT STG, Date Goal Reviewed 09/14/17  -SEJAL 09/13/17  -CZ     Transfer Training PT STG, Outcome goal ongoing  - goal ongoing  -CZ     Gait Training PT STG    Gait Training Goal PT STG, Date Established  09/13/17  -CZ     Gait Training Goal PT STG, Time to Achieve  2 days  -CZ     Gait Training Goal PT STG, [place] Level  independent  -CZ     Gait Training Goal PT STG, Distance to Achieve  200'x1.  -CZ     Gait Training Goal PT STG, Additional Goal  No AD.   -CZ     Gait Training Goal PT STG, Date Goal Reviewed 09/14/17  -SEJAL 09/13/17  -CZ     Gait Training Goal PT STG, Outcome goal ongoing  - goal ongoing  -CZ     Stair Training PT LTG    Stair Training Goal PT LTG, Date Established  09/13/17  -     Stair Training Goal PT LTG, Time to Achieve  by discharge  -CZ     Stair Training Goal PT LTG, Number of Steps  2  -CZ     Stair Training Goal PT LTG, [place] Level  conditional independence  -CZ     Stair Training Goal PT LTG, Assist Device  2 handrails  -CZ     Stair Training Goal PT LTG, Date Goal Reviewed 09/14/17  -SEJAL 09/13/17  -CZ     Stair Training Goal PT LTG, Outcome goal ongoing  - goal ongoing  -     Physical Therapy PT LTG    Physical Therapy PT LTG, Time to Achieve  by discharge  -     Physical Therapy PT LTG, Activity Type  [place] fall risk assessment.   -CZ     Physical Therapy PT LTG, Addisional Goal  Score >/= 24/28 or low fall risk.   -     Physical Therapy PT LTG, Date Goal Reviewed 09/14/17  -SEJAL 09/13/17  -     Physical Therapy PT LTG, Outcome goal ongoing  - goal ongoing  -       User Key  (r) = Recorded By, (t) = Taken By, (c) = Cosigned By    Initials Name Provider Type    SEJAL Ross PTA Physical Therapy Assistant    JESENIA Shahid, PT Physical Therapist          Physical Therapy Education     Title: PT OT SLP Therapies (Active)     Topic: Physical Therapy (Active)     Point: Mobility training (Active)    Learning  Progress Summary    Learner Readiness Method Response Comment Documented by Status   Patient Acceptance E NR stair taining as well as gait and t/f training.  09/14/17 1410 Active               Point: Precautions (Active)    Learning Progress Summary    Learner Readiness Method Response Comment Documented by Status   Patient Acceptance E NR [place] assessed: 22/28 or moderate fall risk.  Discussed falls, risk and stepping response with patient and her son.  09/13/17 1148 Active   Family Acceptance E NR [place] assessed: 22/28 or moderate fall risk.  Discussed falls, risk and stepping response with patient and her son.  09/13/17 1148 Active                      User Key     Initials Effective Dates Name Provider Type Discipline     10/17/16 -  Shalom Ross, PTA Physical Therapy Assistant PT     02/17/17 -  Jesus Shahid, PT Physical Therapist PT                    PT Recommendation and Plan  Anticipated Discharge Disposition: home with home health  Planned Therapy Interventions: balance training, bed mobility training, gait training, patient/family education, stair training, strengthening, stretching, transfer training  PT Frequency: other (see comments) (5-14x/week)  Plan of Care Review  Plan Of Care Reviewed With: patient  Progress: progress toward functional goals as expected  Outcome Summary/Follow up Plan: pt responded well to therapy w/ much increased gait to 200 ft SBA. pt mod indep w/ sup-sit and indep w/ sit-stand. pt completed stair training min A w/o hr, SBA w/ dianna hr. pt completed 20 reps of AROM. recommend  PT upon DC. Follow up plan: balance training.           Outcome Measures       09/14/17 1309 09/14/17 0740 09/13/17 1155    How much help from another person do you currently need...    Turning from your back to your side while in flat bed without using bedrails? 4  -SEJAL      Moving from lying on back to sitting on the side of a flat bed without bedrails? 4  -SEJAL      Moving to and from  "a bed to a chair (including a wheelchair)? 3  -SEJAL      Standing up from a chair using your arms (e.g., wheelchair, bedside chair)? 4  -SEJAL      Climbing 3-5 steps with a railing? 3  -SEJAL      To walk in hospital room? 3  -SEJAL      AM-[facility] 6 Clicks Score 21  -SEJAL      How much help from another is currently needed...    Putting on and taking off regular lower body clothing?  4  -KD 4  -SG    Bathing (including washing, rinsing, and drying)  4  -KD 3  -SG    Toileting (which includes using toilet bed pan or urinal)  4  -KD 4  -SG    Putting on and taking off regular upper body clothing  4  -KD 4  -SG    Taking care of personal grooming (such as brushing teeth)  4  -KD 4  -SG    Eating meals  4  -KD 4  -SG    Score  24  -KD 23  -SG    Functional Assessment    Outcome Measure Options AM-[facility] 6 Clicks Basic Mobility (PT)  -[facility] 6 Clicks Daily Activity (OT)  -      09/13/17 0955          How much help from another person do you currently need...    Turning from your back to your side while in flat bed without using bedrails? 3  -CZ      Moving from lying on back to sitting on the side of a flat bed without bedrails? 3  -CZ      Moving to and from a bed to a chair (including a wheelchair)? 3  -CZ      Standing up from a chair using your arms (e.g., wheelchair, bedside chair)? 3  -CZ      Climbing 3-5 steps with a railing? 3  -CZ      To walk in hospital room? 3  -CZ      AM-[facility] 6 Clicks Score 18  -CZ      Tinetti Assessment    [place] Assessment yes  -CZ      Sitting Balance 1  -CZ      Arises 2  -CZ      Attempts to Rise 2  -CZ      Immediate Standing Balance (first 5 sec) 2  -CZ      Standing Balance 1  -CZ      Sternal Nudge (feet close together) 0  -CZ      Eyes Closed (feet close together) 1  -CZ      Turning 360 Degrees- Steps 1  -CZ      Turning 360 Degrees- Steadiness 1  -CZ      Sitting Down 2  -CZ      [place] Balance Score 13  -CZ      Gait Initiation (immediate after told \"go\") 1  -CZ      Step Length- Right " Swing 1  -CZ      Step Length- Left Swing 1  -CZ      Foot Clearance- Right Foot 1  -CZ      Foot Clearance- Left Foot 1  -CZ      Step Symmetry 1  -CZ      Step Continuity 1  -CZ      Path (excursion) 1  -CZ      Trunk 1  -CZ      Base of Support 0  -CZ      Gait Score 9  -CZ      [place] Total Score 22  -CZ      [place] Assistive Device --   No AD.  -CZ      [place]etti Assessment Comments poor stepping response.  -CZ      Functional Assessment    Outcome Measure Options [place];AM-[facility] 6 Clicks Basic Mobility (PT)  -CZ        User Key  (r) = Recorded By, (t) = Taken By, (c) = Cosigned By    Initials Name Provider Type    SEJAL Ross PTA Physical Therapy Assistant    MADDI Dhaliwal/L Occupational Therapy Assistant    CZ Jesus Shahid, PT Physical Therapist    MYLA Patricia, OT Occupational Therapist           Time Calculation:         PT Charges       09/14/17 1415          Time Calculation    Start Time 1309  -SEJAL      Stop Time 1355  -SEJAL      Time Calculation (min) 46 min  -SEJAL      PT Non-Billable Time (min) 5 min  -SEJAL      Time Calculation- PT    Total Timed Code Minutes- PT 41 minute(s)  -SEJAL        User Key  (r) = Recorded By, (t) = Taken By, (c) = Cosigned By    Initials Name Provider Type    SEJAL Ross PTA Physical Therapy Assistant          Therapy Charges for Today     Code Description Service Date Service Provider Modifiers Qty    31653383349 HC GAIT TRAINING EA 15 MIN 9/14/2017 Shalom Ross PTA GP 1    08414716801 HC PT THER PROC EA 15 MIN 9/14/2017 Shalom Ross PTA GP 1    32864927472 HC PT THERAPEUTIC ACT EA 15 MIN 9/14/2017 Shalom Ross PTA GP 1          PT G-Codes  PT Professional Judgement Used?: Yes  Outcome Measure Options: AM-[facility] 6 Clicks Basic Mobility (PT)  Score: 22  Functional Limitation: Mobility: Walking and moving around  Mobility: Walking and Moving Around Current Status (): At least 20 percent but less than 40 percent impaired, limited or  restricted  Mobility: Walking and Moving Around Goal Status (): At least 1 percent but less than 20 percent impaired, limited or restricted    Shalom Ross, PTA  9/14/2017

## 2017-09-14 NOTE — PLAN OF CARE
Problem: Patient Care Overview (Adult)  Goal: Plan of Care Review  Outcome: Ongoing (interventions implemented as appropriate)    09/14/17 6148   Coping/Psychosocial Response Interventions   Plan Of Care Reviewed With patient   Patient Care Overview   Progress no change   Outcome Evaluation   Outcome Summary/Follow up Plan Denies pain. Vitals stable. Tolerating diet.        Goal: Adult Individualization and Mutuality  Outcome: Ongoing (interventions implemented as appropriate)  Goal: Discharge Needs Assessment  Outcome: Ongoing (interventions implemented as appropriate)    Problem: Fluid Volume Deficit (Adult)  Goal: Fluid/Electrolyte Balance  Outcome: Ongoing (interventions implemented as appropriate)

## 2017-09-15 NOTE — THERAPY TREATMENT NOTE
Acute Care - Physical Therapy Treatment Note  [facility]     Patient Name: Damaris Sánchez  : 1945  MRN: [id number]  Today's Date: 9/15/2017  Onset of Illness/Injury or Date of Surgery Date: 17  Date of Referral to PT: 17  Referring Physician: Dr. Barrera    Admit Date: 2017    Visit Dx:    ICD-10-CM ICD-9-CM   1. Lower abdominal pain R10.30 789.09   2. Leukocytosis, unspecified type D72.829 288.60   3. Hypokalemia E87.6 276.8   4. C. difficile colitis A04.7 008.45   5. Impaired physical mobility Z74.09 781.99   6. Impaired mobility and ADLs Z74.09 799.89     Patient Active Problem List   Diagnosis   • Lower abdominal pain   • C. difficile colitis   • Inflammatory bowel disease   • Hypokalemia               Adult Rehabilitation Note       09/15/17 1046 17 1309 17 0740    Rehab Assessment/Intervention    Discipline physical therapy assistant  -SEJAL physical therapy assistant  -SEJAL occupational therapy assistant  -KD    Document Type therapy note (daily note)  -SEJAL therapy note (daily note)  -SEJAL therapy note (daily note)  -KD    Subjective Information agree to therapy  -SEJAL agree to therapy  -SEJAL agree to therapy  -KD    Patient Effort, Rehab Treatment good  -SEJAL good  -SEJAL     Precautions/Limitations fall precautions  -SEJAL fall precautions  -SEJAL fall precautions  -KD    Recorded by [SEJAL] Shalom Ross PTA [SEJAL] Shalom Ross PTA [KD] MADDI Kelley/L    Vital Signs    Pre Systolic BP Rehab 159  -SEJAL 140  -SEJAL 117  -KD    Pre Treatment Diastolic BP 79  -SEJAL 76  -SEJAL 64  -KD    Intra Systolic BP Rehab   142  -KD    Intra Treatment Diastolic BP  156  -SEJAL 73  -KD    Post Systolic BP Rehab 144  -SEJAL 73  -SEJAL 173  -KD    Post Treatment Diastolic BP 72  -SEJAL  77  -KD    Pretreatment Heart Rate (beats/min) 92  -SEJAL 100  -SEJAL 93  -KD    Intratreatment Heart Rate (beats/min)  104  -SEJAL 94  -KD    Posttreatment Heart Rate (beats/min) 96  -SEJAL 111  -SEJAL 101  -KD    Pre SpO2 (%) 97  -SEJAL 99  -SEJAL 95  -KD     O2 Delivery Pre Treatment room air  -SEJAL room air  -SEJAL room air  -KD    Intra SpO2 (%)  99  -SEJAL 97  -KD    O2 Delivery Intra Treatment  room air  -SEJAL room air  -KD    Post SpO2 (%) 98  -SEJAL 99  -SEJAL 97  -KD    O2 Delivery Post Treatment room air  -SEJAL room air  -SEJAL room air  -KD    Pre Patient Position Supine  -SEJAL Supine  -SEJAL Supine  -KD    Intra Patient Position   Standing  -KD    Post Patient Position Sitting  -SEJAL Sitting  -SEJAL Sitting  -KD    Recorded by [SEJAL] Shalom Ross PTA [SEJAL] Shalom Ross PTA [KD] Swetha Kitchen, LINDA/L    Pain Assessment    Pain Assessment No/denies pain  -SEJAL No/denies pain  -SEJAL No/denies pain  -KD    Recorded by [SEJAL] Shalom Ross PTA [SEJAL] Shalom Ross PTA [KD] Swetha Kitchen, LINDA/L    Vision Assessment/Intervention    Visual Impairment WFL with corrective lenses  -SEJAL WFL with corrective lenses  -SEJAL     Recorded by [SEJAL] Shalom Ross PTA [SEJAL] Shalom Ross PTA     Cognitive Assessment/Intervention    Current Cognitive/Communication Assessment functional  -SEJAL functional  -SEJAL functional  -KD    Orientation Status oriented x 4  -SEJAL oriented x 4  -SEJAL oriented x 4  -KD    Follows Commands/Answers Questions 100% of the time  -SEJAL 100% of the time  -SEJAL 100% of the time  -KD    Personal Safety Interventions gait belt;muscle strengthening facilitated;nonskid shoes/slippers when out of bed;supervised activity  -SEJAL gait belt;muscle strengthening facilitated;nonskid shoes/slippers when out of bed;supervised activity  -SEJAL     Recorded by [SEJAL] Shalom Ross PTA [SEJAL] Shalom Ross PTA [KD] Swetha Kitchen, LINDA/L    Bed Mobility, Assessment/Treatment    Bed Mobility, Assistive Device bed rails;head of bed elevated  -SEJAL bed rails;head of bed elevated  -SEJAL head of bed elevated  -KD    Bed Mobility, Roll Right, [place]   independent  -KD    Bed Mobility, Scoot/Bridge, [place]   independent  -KD    Bed Mob, Supine to Sit, [place] conditional independence  -SEJAL  conditional independence  -SEJAL     Bed Mob, Sidelying to Sit, [place]   independent  -KD    Bed Mob, Sit to Sidelying, [place]   independent  -KD    Recorded by [SEJAL] Shalom Ross PTA [SEJAL] Shalom Ross PTA [KD] Swetha Kitchen, LINDA/L    Transfer Assessment/Treatment    Transfers, Sit-Stand [place] independent  -SEJAL independent  -SEJAL independent  -KD    Transfers, Stand-Sit [place] independent  -SEJAL independent  -SEJAL independent  -KD    Transfers, Sit-Stand-Sit, Assist Device --   no AD  -SEJAL --   no AD  -SEJAL --   none  -KD    Toilet Transfer, [place] independent  -SEJAL  independent  -KD    Toilet Transfer, Assistive Device other (see comments)   no AD  -SEJAL  --   none  -KD    Transfer, Comment pt completed 2 BR t/fs this tx. pt completed her own pericare. indep w/ t/fs.   -SEJAL multiple sit-stands.   -SEJAL Pt completed 1 set x 5 reps sit<>stands  -KD    Recorded by [SEJAL] Shalom Ross PTA [SEJAL] Shalom Ross PTA [KD] Swetha Kitchen, LINDA/L    Gait Assessment/Treatment    Gait, [place] Level independent;supervision required  -SEJAL supervision required  -SEJAL     Gait, Assistive Device other (see comments)   no AD  -SEJAL --   no AD  -SEJAL     Gait, Distance (Feet) 570   + multiple short distances in room  -SEJAL 200  -SEJAL     Gait, Gait Deviations --   fast irving  -SEJAL      Gait, Comment 1 self corrected, minor LOB while turning  -SEJAL fast irving and slightly unsteady. somewhat impulsive.   -SEJAL     Recorded by [SEJAL] Shalom Ross PTA [SEJAL] Shalom Ross PTA     Stairs Assessment/Treatment    Number of Stairs 5  -SEJAL 5  -SEJAL     Stairs, Handrail Location both sides  -SEJAL none  -SEJAL     Stairs, [place] Level supervision required  -SEJAL minimum assist (75% patient effort)  -SEJAL     Stairs, Technique Used step to step (ascending);step to step (descending)  -SEJAL step to step (ascending);step to step (descending)   edu on step-to for safety.   -SEJAL     Stairs, Comment 2 attempts  -SEJAL 3 trails w/o handrail  min A. 1 trial w/ dianna hr, SBA. pt states her son will build her hand rails.   -SEJAL     Recorded by [SEJAL] Shalom Ross PTA [SEJAL] Shalom Ross PTA     Functional Mobility    Functional Mobility- Ind. Level   independent  -KD    Recorded by   [KD] Swetha Kitchen, LINDA/L    Upper Body Bathing Assessment/Training    UB Bathing Assess/Train, [place] Level   independent  -KD    Recorded by   [KD] Swetha Kitchen, LINDA/L    Lower Body Bathing Assessment/Training    LB Bathing Assess/Train, [place] Level   independent  -KD    Recorded by   [KD] Swetha Kitchen, LINDA/L    Upper Body Dressing Assessment/Training    UB Dressing Assess/Train, Clothing Type   donning:;hospital gown  -KD    UB Dressing Assess/Train, Position   edge of bed;sitting  -KD    UB Dressing Assess/Train, [place]   independent  -KD    Recorded by   [KD] Swetha Kitchen, LINDA/L    Lower Body Dressing Assessment/Training    LB Dressing Assess/Train, Clothing Type   donning:;slipper socks  -KD    LB Dressing Assess/Train, Position   edge of bed;sitting  -KD    LB Dressing Assess/Train, [place]   independent  -KD    Recorded by   [KD] Swetha Kitchen LINDA/L    Toileting Assessment/Training    Toileting Assess/Train, Assistive Device   grab bars  -KD    Toileting Assess/Train, Position   sitting  -KD    Toileting Assess/Train, Indepen Level   independent  -KD    Recorded by   [KD] Swetha Kitchen LINDA/L    Grooming Assessment/Training    Grooming Assess/Train, Position   sink side;standing  -KD    Grooming Assess/Train, Indepen Level   independent  -KD    Recorded by   [KD] Swetha Kitchen LINDA/L    Balance Skills Training    Standing-Balance Activities   --   ADL activity  -KD    Standing Balance # of Minutes   5  -KD    Recorded by   [KD] HILARY KelleyA/L    Therapy Exercises    Bilateral Lower Extremities AROM:;20 reps;standing;heel raises;hip abduction/adduction;mini squats  -SEJAL AROM:;20 reps;supine;ankle pumps/circles;hip  abduction/adduction;heel slides;SAQ;SLR  -SEJAL     Bilateral Upper Extremity   AROM:;20 reps;sitting;elbow flexion/extension;hand pumps;pronation/supination;shoulder abduction/adduction;shoulder extension/flexion;shoulder ER/IR  -KD    BUE Resistance   manual resistance- minimal   2lb HW  -KD    Recorded by [SEJAL] Shalom Ross PTA [SEJAL] Shalom Ross PTA [KD] Swetha Kitchen LINDA/L    Positioning and Restraints    Pre-Treatment Position in bed  -SEJAL in bed  -SEJAL in bed  -KD    Post Treatment Position bed  -SEJAL bed  -SEJAL bed  -KD    In Bed sitting;call light within reach;encouraged to call for assist   all needs met  -SEJAL sitting EOB;call light within reach;encouraged to call for assist;with family/caregiver   all needs met.   -SEJAL sitting;call light within reach;encouraged to call for assist;exit alarm on  -KD    Recorded by [SEJAL] Shalom Ross PTA [SEJAL] Shalom Ross PTA [KD] Swetha Kitchen LINDA/L      User Key  (r) = Recorded By, (t) = Taken By, (c) = Cosigned By    Initials Name Effective Dates     Shalom Ross PTA 10/17/16 -     KD MADDI Kelley/JARAD 10/17/16 -                 IP PT Goals       09/15/17 1046 09/14/17 1309 09/13/17 0955    Transfer Training PT STG    Transfer Training PT STG, Date Established   09/13/17  -CZ    Transfer Training PT STG, Time to Achieve   2 days  -CZ    Transfer Training PT STG, Activity Type   bed to chair /chair to bed;sit to stand/stand to sit  -CZ    Transfer Training PT STG, [place] Level   independent  -CZ    Transfer Training PT STG, Date Goal Reviewed 09/15/17  -SEJAL 09/14/17  -SEJAL 09/13/17  -CZ    Transfer Training PT STG, Outcome goal ongoing  -SEJAL goal ongoing  -SEJAL goal ongoing  -CZ    Gait Training PT STG    Gait Training Goal PT STG, Date Established   09/13/17  -CZ    Gait Training Goal PT STG, Time to Achieve   2 days  -CZ    Gait Training Goal PT STG, [place] Level   independent  -CZ    Gait Training Goal PT STG, Distance to Achieve   200'x1.  -CZ     Gait Training Goal PT STG, Additional Goal   No AD.   -CZ    Gait Training Goal PT STG, Date Goal Reviewed 09/15/17  -SEJAL 09/14/17  -SEJAL 09/13/17  -CZ    Gait Training Goal PT STG, Outcome goal ongoing  -SEJAL goal ongoing  -SEJAL goal ongoing  -CZ    Stair Training PT LTG    Stair Training Goal PT LTG, Date Established   09/13/17  -CZ    Stair Training Goal PT LTG, Time to Achieve   by discharge  -CZ    Stair Training Goal PT LTG, Number of Steps   2  -CZ    Stair Training Goal PT LTG, [place] Level   conditional independence  -CZ    Stair Training Goal PT LTG, Assist Device   2 handrails  -CZ    Stair Training Goal PT LTG, Date Goal Reviewed 09/15/17  -SEJAL 09/14/17  -SEJAL 09/13/17  -CZ    Stair Training Goal PT LTG, Outcome goal ongoing  -SEJAL goal ongoing  -SEJAL goal ongoing  -CZ    Physical Therapy PT LTG    Physical Therapy PT LTG, Time to Achieve   by discharge  -    Physical Therapy PT LTG, Activity Type   Tinetti fall risk assessment.   -CZ    Physical Therapy PT LTG, Addisional Goal   Score >/= 24/28 or low fall risk.   -CZ    Physical Therapy PT LTG, Date Goal Reviewed 09/15/17  -SEJAL 09/14/17  -SEJAL 09/13/17  -CZ    Physical Therapy PT LTG, Outcome goal ongoing  -SEJAL goal ongoing  -SEJAL goal ongoing  -      User Key  (r) = Recorded By, (t) = Taken By, (c) = Cosigned By    Initials Name Provider Type    SEJAL Ross, PTA Physical Therapy Assistant    JESENIA Shahid, PT Physical Therapist          Physical Therapy Education     Title: PT OT SLP Therapies (Active)     Topic: Physical Therapy (Active)     Point: Mobility training (Active)    Learning Progress Summary    Learner Readiness Method Response Comment Documented by Status   Patient Acceptance E NR  SEJAL 09/15/17 1309 Active    Acceptance E NR stair taining as well as gait and t/f training.  09/14/17 1410 Active               Point: Precautions (Active)    Learning Progress Summary    Learner Readiness Method Response Comment Documented by Status    Patient Acceptance E NR [place] assessed: 22/28 or moderate fall risk.  Discussed falls, risk and stepping response with patient and her son.  09/13/17 1148 Active   Family Acceptance E NR [place] assessed: 22/28 or moderate fall risk.  Discussed falls, risk and stepping response with patient and her son.  09/13/17 1148 Active                      User Key     Initials Effective Dates Name Provider Type Discipline     10/17/16 -  Shalom Ross, PTA Physical Therapy Assistant PT     02/17/17 -  Jesus Shahid, PT Physical Therapist PT                    PT Recommendation and Plan  Anticipated Discharge Disposition: home with home health  Planned Therapy Interventions: balance training, bed mobility training, gait training, patient/family education, stair training, strengthening, stretching, transfer training  PT Frequency: other (see comments) (5-14x/week)  Plan of Care Review  Plan Of Care Reviewed With: patient  Progress: progress toward functional goals as expected  Outcome Summary/Follow up Plan: pt responded well to therapy w/ much increased gait to 570 ft SBA-indep. pt w/ only 1 minor, self corrected LOB. indep w/ sup-sit and sit-stand. pt compelted standing ther ex. pt indep w/ BR t/fs. no new goals met at this time. pt would continue to benefit from PT services. Recommend  PT upon DC.           Outcome Measures       09/15/17 1046 09/14/17 1309 09/14/17 0740    How much help from another person do you currently need...    Turning from your back to your side while in flat bed without using bedrails? 4  -SEJAL 4  -SEJAL     Moving from lying on back to sitting on the side of a flat bed without bedrails? 4  -SEJAL 4  -SEJAL     Moving to and from a bed to a chair (including a wheelchair)? 4  -SEJAL 3  -SEJAL     Standing up from a chair using your arms (e.g., wheelchair, bedside chair)? 4  -SEJAL 4  -SEJAL     Climbing 3-5 steps with a railing? 3  -SEJAL 3  -SEJAL     To walk in hospital room? 4  -SEJAL 3  -SEJAL     AM-PAC 6 Clicks  Score 23  -SEJAL 21  -SEJAL     How much help from another is currently needed...    Putting on and taking off regular lower body clothing?   4  -KD    Bathing (including washing, rinsing, and drying)   4  -KD    Toileting (which includes using toilet bed pan or urinal)   4  -KD    Putting on and taking off regular upper body clothing   4  -KD    Taking care of personal grooming (such as brushing teeth)   4  -KD    Eating meals   4  -KD    Score   24  -KD    Functional Assessment    Outcome Measure Options AM-PAC 6 Clicks Basic Mobility (PT)  -SEJAL AM-PAC 6 Clicks Basic Mobility (PT)  -SEJAL       09/13/17 1155 09/13/17 0955       How much help from another person do you currently need...    Turning from your back to your side while in flat bed without using bedrails?  3  -CZ     Moving from lying on back to sitting on the side of a flat bed without bedrails?  3  -CZ     Moving to and from a bed to a chair (including a wheelchair)?  3  -CZ     Standing up from a chair using your arms (e.g., wheelchair, bedside chair)?  3  -CZ     Climbing 3-5 steps with a railing?  3  -CZ     To walk in hospital room?  3  -CZ     AM-[facility] 6 Clicks Score  18  -CZ     How much help from another is currently needed...    Putting on and taking off regular lower body clothing? 4  -SG      Bathing (including washing, rinsing, and drying) 3  -SG      Toileting (which includes using toilet bed pan or urinal) 4  -SG      Putting on and taking off regular upper body clothing 4  -SG      Taking care of personal grooming (such as brushing teeth) 4  -SG      Eating meals 4  -SG      Score 23  -SG      Tinetti Assessment    Tinetti Assessment  yes  -CZ     Sitting Balance  1  -CZ     Arises  2  -CZ     Attempts to Rise  2  -CZ     Immediate Standing Balance (first 5 sec)  2  -CZ     Standing Balance  1  -CZ     Sternal Nudge (feet close together)  0  -CZ     Eyes Closed (feet close together)  1  -CZ     Turning 360 Degrees- Steps  1  -CZ     Turning 360  "Degrees- Steadiness  1  -CZ     Sitting Down  2  -CZ     T[place]etti Balance Score  13  -CZ     Gait Initiation (immediate after told \"go\")  1  -CZ     Step Length- Right Swing  1  -CZ     Step Length- Left Swing  1  -CZ     Foot Clearance- Right Foot  1  -CZ     Foot Clearance- Left Foot  1  -CZ     Step Symmetry  1  -CZ     Step Continuity  1  -CZ     Path (excursion)  1  -CZ     Trunk  1  -CZ     Base of Support  0  -CZ     Gait Score  9  -CZ     Tinetti Total Score  22  -CZ     T[place]etti Assistive Device  --   No AD.  -CZ     Tinetti Assessment Comments  poor stepping response.  -CZ     Functional Assessment    Outcome Measure Options AM-PAC 6 Clicks Daily Activity (OT)  -SG Tinetti;AM-PAC 6 Clicks Basic Mobility (PT)  -CZ       User Key  (r) = Recorded By, (t) = Taken By, (c) = Cosigned By    Initials Name Provider Type    SEJAL Ross PTA Physical Therapy Assistant    SANDRA Kitchen, LINDA/L Occupational Therapy Assistant    JESENIA Shahid, PT Physical Therapist    MYLA Patricia, OT Occupational Therapist           Time Calculation:         PT Charges       09/15/17 1313          Time Calculation    Start Time 1046  -SEJAL      Stop Time 1142  -SEJAL      Time Calculation (min) 56 min  -SEJAL      Time Calculation- PT    Total Timed Code Minutes- PT 56 minute(s)  -SEJAL        User Key  (r) = Recorded By, (t) = Taken By, (c) = Cosigned By    Initials Name Provider Type    SEJAL Ross PTA Physical Therapy Assistant          Therapy Charges for Today     Code Description Service Date Service Provider Modifiers Qty    52311059760 HC GAIT TRAINING EA 15 MIN 9/14/2017 Shalom Ross PTA GP 1    37472618889 HC PT THER PROC EA 15 MIN 9/14/2017 Shalom Ross PTA GP 1    12604457300 HC PT THERAPEUTIC ACT EA 15 MIN 9/14/2017 Shalom Ross PTA GP 1    82185932753 HC GAIT TRAINING EA 15 MIN 9/15/2017 Shalom Ross PTA GP 1    37879427480 HC PT THER PROC EA 15 MIN 9/15/2017 Shalom Ross PTA GP 1    " [id number]  PT THERAPEUTIC ACT EA 15 MIN 9/15/2017 Shalom Ross PTA GP 2          PT G-Codes  PT Professional Judgement Used?: Yes  Outcome Measure Options: AM-PAC 6 Clicks Basic Mobility (PT)  Score: 22  Functional Limitation: Mobility: Walking and moving around  Mobility: Walking and Moving Around Current Status (): At least 20 percent but less than 40 percent impaired, limited or restricted  Mobility: Walking and Moving Around Goal Status (): At least 1 percent but less than 20 percent impaired, limited or restricted    Shalom Ross PTA  9/15/2017

## 2017-09-15 NOTE — THERAPY TREATMENT NOTE
Acute Care - Occupational Therapy Treatment Note  [facility]     Patient Name: Damaris Sánchez  : 1945  MRN: [id number]  Today's Date: 9/15/2017  Onset of Illness/Injury or Date of Surgery Date: 17  Date of Referral to OT: 17  Referring Physician: Dr. Barrera      Admit Date: 2017    Visit Dx:     ICD-10-CM ICD-9-CM   1. Lower abdominal pain R10.30 789.09   2. Leukocytosis, unspecified type D72.829 288.60   3. Hypokalemia E87.6 276.8   4. C. difficile colitis A04.7 008.45   5. Impaired physical mobility Z74.09 781.99   6. Impaired mobility and ADLs Z74.09 799.89     Patient Active Problem List   Diagnosis   • Lower abdominal pain   • C. difficile colitis   • Inflammatory bowel disease   • Hypokalemia             Adult Rehabilitation Note       09/15/17 1418 09/15/17 1046 17 1309    Rehab Assessment/Intervention    Discipline occupational therapy assistant  -CS physical therapy assistant  -SEJAL physical therapy assistant  -SEJAL    Document Type therapy note (daily note)  -CS therapy note (daily note)  -SEJAL therapy note (daily note)  -SEJAL    Subjective Information agree to therapy  -CS agree to therapy  -SEJAL agree to therapy  -SEJAL    Patient Effort, Rehab Treatment  good  -SEJAL good  -SEJAL    Precautions/Limitations fall precautions  -CS fall precautions  -SEJAL fall precautions  -SEJAL    Recorded by [CS] MADDI Hare/JARAD [SEJAL] Shalom Ross, PTA [SEJAL] Shalom Ross, PTA    Vital Signs    Pre Systolic BP Rehab  159  -SEJAL 140  -SEJAL    Pre Treatment Diastolic BP  79  -SEJAL 76  -SEJAL    Intra Treatment Diastolic BP   156  -SEJAL    Post Systolic BP Rehab 161  -  -SEJAL 73  -SEJAL    Post Treatment Diastolic BP 76  -CS 72  -SEJAL     Pretreatment Heart Rate (beats/min)  92  -SEJAL 100  -SEJAL    Intratreatment Heart Rate (beats/min)   104  -SEJAL    Posttreatment Heart Rate (beats/min) 100  -CS 96  -SEJAL 111  -SEJAL    Pre SpO2 (%)  97  -SEJAL 99  -SEJAL    O2 Delivery Pre Treatment  room air  -SEJAL room air  -SEJAL    Intra SpO2 (%)    99  -SEJAL    O2 Delivery Intra Treatment   room air  -SEJAL    Post SpO2 (%) 95  -CS 98  -SEJAL 99  -SEJAL    O2 Delivery Post Treatment room air  -CS room air  -SEJAL room air  -SEJAL    Pre Patient Position Supine  -CS Supine  -SEJAL Supine  -SEJAL    Intra Patient Position Sitting  -CS      Post Patient Position Sitting  -CS Sitting  -SEJAL Sitting  -SEJAL    Recorded by [CS] MADDI Hare/L [SEJAL] Shalom Ross PTA [SEJAL] Shalom Ross PTA    Pain Assessment    Pain Assessment 0-10  -CS No/denies pain  -SEJAL No/denies pain  -SEJAL    Pain Score 2  -CS      Post Pain Score 2  -CS      Pain Location Abdomen  -CS      Recorded by [CS] MADDI Hare/JARAD [SEJAL] Shalom Ross PTA [SEJAL] Shalom Ross PTA    Vision Assessment/Intervention    Visual Impairment  WFL with corrective lenses  -SEJAL WFL with corrective lenses  -SEJAL    Recorded by  [SEJAL] Shalom Ross PTA [SEJAL] Shalom Ross PTA    Cognitive Assessment/Intervention    Current Cognitive/Communication Assessment functional  -CS functional  -SEJAL functional  -SEAJL    Orientation Status oriented x 4  -CS oriented x 4  -SEJAL oriented x 4  -SEJAL    Follows Commands/Answers Questions 100% of the time  -% of the time  -SEJAL 100% of the time  -SEJAL    Personal Safety Interventions  gait belt;muscle strengthening facilitated;nonskid shoes/slippers when out of bed;supervised activity  -SEJAL gait belt;muscle strengthening facilitated;nonskid shoes/slippers when out of bed;supervised activity  -SEJAL    Recorded by [CS] MADDI Hare/JARAD [SEJAL] Shalom Ross PTA [SEJAL] Shalom Ross PTA    Bed Mobility, Assessment/Treatment    Bed Mobility, Assistive Device  bed rails;head of bed elevated  -SEJAL bed rails;head of bed elevated  -SEJAL    Bed Mob, Supine to Sit, Hall conditional independence  -CS conditional independence  -SEJAL conditional independence  -SEJAL    Recorded by [CS] MADDI Hare/JARAD [SEJAL] Shalom Ross PTA [SEJAL] Shalom Rsos PTA    Transfer Assessment/Treatment     Transfers, Sit-Stand [place]  independent  -SEJAL independent  -SEJAL    Transfers, Stand-Sit [place]  independent  -SEJAL independent  -SEJAL    Transfers, Sit-Stand-Sit, Assist Device  --   no AD  -SEJAL --   no AD  -SEJAL    Toilet Transfer, [place]  independent  -SEJAL     Toilet Transfer, Assistive Device  other (see comments)   no AD  -SEJAL     Transfer, Comment  pt completed 2 BR t/fs this tx. pt completed her own pericare. indep w/ t/fs.   -SEJAL multiple sit-stands.   -SEJAL    Recorded by  [SEJAL] Shalom Ross PTA [SEJAL] Shalom Ross PTA    Gait Assessment/Treatment    Gait, [place] Level  independent;supervision required  -SEJAL supervision required  -SEJAL    Gait, Assistive Device  other (see comments)   no AD  -SEJAL --   no AD  -SEJAL    Gait, Distance (Feet)  570   + multiple short distances in room  -SEJAL 200  -SEJAL    Gait, Gait Deviations  --   fast irving  -SEJAL     Gait, Comment  1 self corrected, minor LOB while turning  -SEJAL fast irving and slightly unsteady. somewhat impulsive.   -SEJAL    Recorded by  [SEJAL] Shalom Ross PTA [SEJAL] Shalom Ross PTA    Stairs Assessment/Treatment    Number of Stairs  5  -SEJAL 5  -SEJAL    Stairs, Handrail Location  both sides  -SEJAL none  -SEJAL    Stairs, [place] Level  supervision required  -SEJAL minimum assist (75% patient effort)  -SEJAL    Stairs, Technique Used  step to step (ascending);step to step (descending)  -SEJAL step to step (ascending);step to step (descending)   edu on step-to for safety.   -SEJAL    Stairs, Comment  2 attempts  -SEJAL 3 trails w/o handrail min A. 1 trial w/ dianna hr, SBA. pt states her son will build her hand rails.   -SEJAL    Recorded by  [SEJAL] Shalom Ross PTA [SEJAL] Shalom Ross PTA    Therapy Exercises    Bilateral Lower Extremities  AROM:;20 reps;standing;heel raises;hip abduction/adduction;mini squats  -SEJAL AROM:;20 reps;supine;ankle pumps/circles;hip abduction/adduction;heel slides;SAQ;SLR  -SEJAL    Bilateral Upper Extremity AROM:;20 reps;sitting;elbow  flexion/extension;hand pumps;pronation/supination;shoulder extension/flexion;shoulder ER/IR  -CS      BUE Resistance theraband  -CS      Recorded by [CS] JAM Hare [SEJAL] Shalom Ross PTA [SEJAL] Shalom Ross PTA    Positioning and Restraints    Pre-Treatment Position in bed  -CS in bed  -SEJAL in bed  -SEJAL    Post Treatment Position bed  -CS bed  -SEJAL bed  -SEJAL    In Bed sitting EOB;call light within reach;with family/caregiver  -CS sitting;call light within reach;encouraged to call for assist   all needs met  -SEJAL sitting EOB;call light within reach;encouraged to call for assist;with family/caregiver   all needs met.   -SEJAL    Recorded by [CS] MADDI Hare/JARAD [SEJAL] Shalom Ross PTA [SEJAL] Shalom Ross PTA      09/14/17 0740          Rehab Assessment/Intervention    Discipline occupational therapy assistant  -KD      Document Type therapy note (daily note)  -KD      Subjective Information agree to therapy  -KD      Precautions/Limitations fall precautions  -KD      Recorded by [KD] MADDI Kelley/L      Vital Signs    Pre Systolic BP Rehab 117  -KD      Pre Treatment Diastolic BP 64  -KD      Intra Systolic BP Rehab 142  -KD      Intra Treatment Diastolic BP 73  -KD      Post Systolic BP Rehab 173  -KD      Post Treatment Diastolic BP 77  -KD      Pretreatment Heart Rate (beats/min) 93  -KD      Intratreatment Heart Rate (beats/min) 94  -KD      Posttreatment Heart Rate (beats/min) 101  -KD      Pre SpO2 (%) 95  -KD      O2 Delivery Pre Treatment room air  -KD      Intra SpO2 (%) 97  -KD      O2 Delivery Intra Treatment room air  -KD      Post SpO2 (%) 97  -KD      O2 Delivery Post Treatment room air  -KD      Pre Patient Position Supine  -KD      Intra Patient Position Standing  -KD      Post Patient Position Sitting  -KD      Recorded by [KD] MADDI Kelley/L      Pain Assessment    Pain Assessment No/denies pain  -KD      Recorded by [KD] MADDI Kelley/JARAD      Cognitive  Assessment/Intervention    Current Cognitive/Communication Assessment functional  -KD      Orientation Status oriented x 4  -KD      Follows Commands/Answers Questions 100% of the time  -KD      Recorded by [KD] JAM Kelley      Bed Mobility, Assessment/Treatment    Bed Mobility, Assistive Device head of bed elevated  -KD      Bed Mobility, Roll Right, [place] independent  -KD      Bed Mobility, Scoot/Bridge, [place] independent  -KD      Bed Mob, Sidelying to Sit, [place] independent  -KD      Bed Mob, Sit to Sidelying, [place] independent  -KD      Recorded by [KD] JAM Kelley      Transfer Assessment/Treatment    Transfers, Sit-Stand [place] independent  -KD      Transfers, Stand-Sit [place] independent  -KD      Transfers, Sit-Stand-Sit, Assist Device --   none  -KD      Toilet Transfer, [place] independent  -KD      Toilet Transfer, Assistive Device --   none  -KD      Transfer, Comment Pt completed 1 set x 5 reps sit<>stands  -KD      Recorded by [KD] MADDI Kelley/L      Functional Mobility    Functional Mobility- Ind. Level independent  -KD      Recorded by [KD] MADDI Kelley/L      Upper Body Bathing Assessment/Training    UB Bathing Assess/Train, [place] Level independent  -KD      Recorded by [KD] MADDI Kelley/L      Lower Body Bathing Assessment/Training    LB Bathing Assess/Train, [place] Level independent  -KD      Recorded by [KD] MADDI Kelley/L      Upper Body Dressing Assessment/Training    UB Dressing Assess/Train, Clothing Type donning:;hospital gown  -KD      UB Dressing Assess/Train, Position edge of bed;sitting  -KD      UB Dressing Assess/Train, [place] independent  -KD      Recorded by [KD] MADDI eKlley/L      Lower Body Dressing Assessment/Training    LB Dressing Assess/Train, Clothing Type donning:;slipper socks  -KD      LB Dressing Assess/Train, Position edge of bed;sitting  -KD       LB Dressing Assess/Train, [place] independent  -KD      Recorded by [KD] MADDI Kelley/L      Toileting Assessment/Training    Toileting Assess/Train, Assistive Device grab bars  -KD      Toileting Assess/Train, Position sitting  -KD      Toileting Assess/Train, Indepen Level independent  -KD      Recorded by [KD] MADDI Kelley/L      Grooming Assessment/Training    Grooming Assess/Train, Position sink side;standing  -KD      Grooming Assess/Train, Indepen Level independent  -KD      Recorded by [KD] MADDI Kelley/L      Balance Skills Training    Standing-Balance Activities --   ADL activity  -KD      Standing Balance # of Minutes 5  -KD      Recorded by [KD] JAM Kelley      Therapy Exercises    Bilateral Upper Extremity AROM:;20 reps;sitting;elbow flexion/extension;hand pumps;pronation/supination;shoulder abduction/adduction;shoulder extension/flexion;shoulder ER/IR  -KD      BUE Resistance manual resistance- minimal   2lb HW  -KD      Recorded by [KD] JAM Kelley      Positioning and Restraints    Pre-Treatment Position in bed  -KD      Post Treatment Position bed  -KD      In Bed sitting;call light within reach;encouraged to call for assist;exit alarm on  -KD      Recorded by [KD] JAM Kelley        User Key  (r) = Recorded By, (t) = Taken By, (c) = Cosigned By    Initials Name Effective Dates    SEJAL Ross, BRUNO 10/17/16 -     KD JAM Kelley 10/17/16 -     CS MADDI Hare/JARAD 10/17/16 -                 OT Goals       09/15/17 1548 09/14/17 0740 09/13/17 1349    Dynamic Standing Balance OT LTG    Dynamic Standing Balance OT LTG, Date Established   09/06/17  -SG    Dynamic Standing Balance OT LTG, Time to Achieve   2 wks  -SG    Dynamic Standing Balance OT LTG, [place] Level   supervision required  -SG    Dynamic Standing Balance OT LTG, Additional Goal   Resident to Independent and safe With dynamic standing balance to  complete higher level ADL's.  -SG    Dynamic Standing Balance OT LTG, Date Goal Reviewed 09/15/17  -CS 09/14/17  -KD     Dynamic Standing Balance OT LTG, Outcome  goal met  -KD     Patient Education OT STG    Patient Education OT STG, Date Established   09/13/17  -SG    Patient Education OT STG, Time to Achieve   5 - 7 days  -SG    Patient Education OT STG, Education Type   HEP  -SG    Patient Education OT STG, Additional Goal   T band exercises.  -SG    Patient Education OT STG, Date Goal Reviewed 09/15/17  -CS 09/14/17  -KD     Patient Education OT STG Outcome  goal not met  -KD     Bathing OT LTG    Bathing Goal OT LTG, Date Established   09/13/17  -SG    Bathing Goal OT LTG, Time to Achieve   2 wks  -SG    Bathing Goal OT LTG, [place] Level   conditional independence  -SG    Bathing Goal OT LTG, Assist Device   shower chair with back  -SG    Bathing Goal OT LTG, Additional Goal   Independent and safe   -SG    Bathing Goal OT LTG, Date Goal Reviewed 09/15/17  -CS 09/14/17  -KD     Bathing Goal OT LTG, Outcome  goal not met  -KD     Home Management OT STG    Home Mgmt Goal OT STG, Date Established   09/13/17  -SG    Home Mgmt Goal OT STG, Time To Achieve   1 wk  -SG    Home Mgmt Goal OT STG, [place] Level   conditional independence  -SG    Home Mgmt Goal OT STG, Additional Goal   Resident to be CI in room, for gathering of items for all personal care, with 0 LOB and safe.  -SG    Home Mgmt Goal OT STG, Date Goal Reviewed 09/15/17  -CS 09/14/17  -KD     Home Mgmt Goal OT STG, Outcome Achieved  goal not met  -KD       User Key  (r) = Recorded By, (t) = Taken By, (c) = Cosigned By    Initials Name Provider Type    KD MADDI Kelley/L Occupational Therapy Assistant    MADDI Zamora/JARAD Occupational Therapy Assistant    MYLA Patricia OT Occupational Therapist          Occupational Therapy Education     Title: PT OT SLP Therapies (Active)     Topic: Occupational Therapy (Done)     Point: ADL  training (Done)    Description: Instruct learner(s) on proper safety adaptation and remediation techniques during self care or transfers.   Instruct in proper use of assistive devices.    Learning Progress Summary    Learner Readiness Method Response Comment Documented by Status   Patient Acceptance E,TB,D,H VU Pt was educated on HEP and home safety.  09/15/17 1547 Done    Acceptance TB VU  KD 09/14/17 1144 Done    Eager E,TB DU   09/13/17 1346 Done               Point: Home exercise program (Done)    Description: Instruct learner(s) on appropriate technique for monitoring, assisting and/or progressing therapeutic exercises/activities.    Learning Progress Summary    Learner Readiness Method Response Comment Documented by Status   Patient Acceptance E,TB,D,H VU Pt was educated on HEP and home safety.  09/15/17 1547 Done    Eager E,TB LORE   09/13/17 1346 Done               Point: Precautions (Done)    Description: Instruct learner(s) on prescribed precautions during self-care and functional transfers.    Learning Progress Summary    Learner Readiness Method Response Comment Documented by Status   Patient Acceptance E,TB,D,H VU Pt was educated on HEP and home safety.  09/15/17 1547 Done    Eager E,TB DU   09/13/17 1346 Done               Point: Body mechanics (Done)    Description: Instruct learner(s) on proper positioning and spine alignment during self-care, functional mobility activities and/or exercises.    Learning Progress Summary    Learner Readiness Method Response Comment Documented by Status   Patient Acceptance E,TB,D,H VU Pt was educated on HEP and home safety.  09/15/17 1547 Done    Eager E,TB LORE   09/13/17 1346 Done                      User Key     Initials Effective Dates Name Provider Type Discipline     10/17/16 -  MADDI Kelley/L Occupational Therapy Assistant OT     10/17/16 -  MADDI Haer/JARAD Occupational Therapy Assistant OT     08/03/17 -  Farrah Patricia OT  Occupational Therapist OT                  OT Recommendation and Plan  Anticipated Discharge Disposition: home with home health  Planned Therapy Interventions: activity intolerance, ADL retraining, IADL retraining, energy conservation, home exercise program, neuromuscular re-education, strengthening, transfer training  Therapy Frequency:  (3/14 times/Wk)  Plan of Care Review  Plan Of Care Reviewed With: patient  Progress: improving  Outcome Summary/Follow up Plan: Pt tolerated tx well this date. No goals met this tx. Continue POC.        Outcome Measures       09/15/17 1500 09/15/17 1046 09/14/17 1309    How much help from another person do you currently need...    Turning from your back to your side while in flat bed without using bedrails?  4  -SEJAL 4  -SEJAL    Moving from lying on back to sitting on the side of a flat bed without bedrails?  4  -SEJAL 4  -SEJAL    Moving to and from a bed to a chair (including a wheelchair)?  4  -SEJAL 3  -SEJAL    Standing up from a chair using your arms (e.g., wheelchair, bedside chair)?  4  -SEJAL 4  -SEJAL    Climbing 3-5 steps with a railing?  3  -SEAJL 3  -SEJAL    To walk in hospital room?  4  -SEJAL 3  -SEJAL    AM-PAC 6 Clicks Score  23  -SEJAL 21  -SEJAL    How much help from another is currently needed...    Putting on and taking off regular lower body clothing? 4  -CS      Bathing (including washing, rinsing, and drying) 4  -CS      Toileting (which includes using toilet bed pan or urinal) 4  -CS      Putting on and taking off regular upper body clothing 4  -CS      Taking care of personal grooming (such as brushing teeth) 4  -CS      Eating meals 4  -CS      Score 24  -CS      Functional Assessment    Outcome Measure Options AM-PAC 6 Clicks Daily Activity (OT)  -CS AM-PAC 6 Clicks Basic Mobility (PT)  -SEJAL AM-PAC 6 Clicks Basic Mobility (PT)  -SEJAL      09/14/17 0740 09/13/17 1155 09/13/17 0955    How much help from another person do you currently need...    Turning from your back to your side while in flat  "bed without using bedrails?   3  -CZ    Moving from lying on back to sitting on the side of a flat bed without bedrails?   3  -CZ    Moving to and from a bed to a chair (including a wheelchair)?   3  -CZ    Standing up from a chair using your arms (e.g., wheelchair, bedside chair)?   3  -CZ    Climbing 3-5 steps with a railing?   3  -CZ    To walk in hospital room?   3  -CZ    AM-PAC 6 Clicks Score   18  -CZ    How much help from another is currently needed...    Putting on and taking off regular lower body clothing? 4  -KD 4  -SG     Bathing (including washing, rinsing, and drying) 4  -KD 3  -SG     Toileting (which includes using toilet bed pan or urinal) 4  -KD 4  -SG     Putting on and taking off regular upper body clothing 4  -KD 4  -SG     Taking care of personal grooming (such as brushing teeth) 4  -KD 4  -SG     Eating meals 4  -KD 4  -SG     Score 24  -KD 23  -SG     T[place]etti Assessment    [place] Assessment   yes  -CZ    Sitting Balance   1  -CZ    Arises   2  -CZ    Attempts to Rise   2  -CZ    Immediate Standing Balance (first 5 sec)   2  -CZ    Standing Balance   1  -CZ    Sternal Nudge (feet close together)   0  -CZ    Eyes Closed (feet close together)   1  -CZ    Turning 360 Degrees- Steps   1  -CZ    Turning 360 Degrees- Steadiness   1  -CZ    Sitting Down   2  -CZ    [place]etti Balance Score   13  -CZ    Gait Initiation (immediate after told \"go\")   1  -CZ    Step Length- Right Swing   1  -CZ    Step Length- Left Swing   1  -CZ    Foot Clearance- Right Foot   1  -CZ    Foot Clearance- Left Foot   1  -CZ    Step Symmetry   1  -CZ    Step Continuity   1  -CZ    Path (excursion)   1  -CZ    Trunk   1  -CZ    Base of Support   0  -CZ    Gait Score   9  -CZ    Tinetti Total Score   22  -CZ    Tinetti Assistive Device   --   No AD.  -CZ    T[place]etti Assessment Comments   poor stepping response.  -CZ    Functional Assessment    Outcome Measure Options  AM-PAC 6 Clicks Daily Activity (OT)  -SG [place];AM-PAC 6 " Clicks Basic Mobility (PT)  -CZ      User Key  (r) = Recorded By, (t) = Taken By, (c) = Cosigned By    Initials Name Provider Type    SEJAL Shalom Ross, PTA Physical Therapy Assistant    KD Swetha Kitchen, LINDA/L Occupational Therapy Assistant    CS HILARY HareA/JARAD Occupational Therapy Assistant    CZ Jesus Shahid, PT Physical Therapist    MYLA Patricia, OT Occupational Therapist           Time Calculation:         Time Calculation- OT       09/15/17 1549          Time Calculation- OT    OT Start Time 1418  -CS      OT Stop Time 1505  -CS      OT Time Calculation (min) 47 min  -CS      Total Timed Code Minutes- OT 47 minute(s)  -CS      OT Received On 09/15/17  -CS        User Key  (r) = Recorded By, (t) = Taken By, (c) = Cosigned By    Initials Name Provider Type    CS MADDI Hare/JARAD Occupational Therapy Assistant           Therapy Charges for Today     Code Description Service Date Service Provider Modifiers Qty    84409528698 HC OT SELF CARE/MGMT/TRAIN EA 15 MIN 9/15/2017 MADDI Hare/L GO 1    68993191719 HC OT THER PROC EA 15 MIN 9/15/2017 MADDI Hare/L GO 2          OT G-codes  OT Professional Judgement Used?: Yes  OT Functional Scales Options: AM-PAC 6 Clicks Daily Activity (OT)  Score: 23  Functional Limitation: Self care  Self Care Current Status (): At least 1 percent but less than 20 percent impaired, limited or restricted  Self Care Goal Status (): 0 percent impaired, limited or restricted    MADDI Hare/JARAD  9/15/2017

## 2017-09-15 NOTE — PROGRESS NOTES
Nish Morales DO,[facility]  Gastroenterology  Hepatology  Endoscopy  Board Certified in Internal Medicine and gastroenterology  [address], [address]  [place]. [address]  T- [phone]   F - [phone]     GASTROENTEROLOGY PROGRESS NOTE   NISH MORALES DO.         SUBJECTIVE:   9/14/2017  Chief Complaint:     Subjective  :  No pain.  Semi-soft stools.  No n/v.  Black stools.  Reviewed about the need for treatment.           CURRENT MEDICATIONS/OBJECTIVE/VS/PE:     Current Medications:     Current Facility-Administered Medications   Medication Dose Route Frequency Provider Last Rate Last Dose   • acetaminophen (TYLENOL) tablet 650 mg  650 mg Oral Q4H PRN Atif Nichols MD   650 mg at 09/13/17 2136   • acidophilus (FLORANEX) tablet 1 tablet  1 tablet Oral TID Nish Morales DO   1 tablet at 09/14/17 1642   • famotidine (PEPCID) tablet 40 mg  40 mg Oral Daily Atif Nichols MD   40 mg at 09/14/17 0844   • hydrALAZINE (APRESOLINE) injection 10 mg  10 mg Intravenous Q6H PRN Hattie Barrera MD       • HYDROcodone-acetaminophen (NORCO) 5-325 MG per tablet 1 tablet  1 tablet Oral Q6H PRN Chaz Beckett MD   1 tablet at 09/14/17 1642   • HYDROmorphone (DILAUDID) injection 0.5 mg  0.5 mg Intravenous Q2H PRN Atif Nichols MD        And   • naloxone (NARCAN) injection 0.4 mg  0.4 mg Intravenous Q5 Min PRN Atif Nichols MD       • influenza vac split quad (FLUZONE QUADRIVALENT) IM suspension 0.5 mL  0.5 mL Intramuscular During Hospitalization Atif Nichols MD       • megestrol (MEGACE) tablet 20 mg  20 mg Oral Daily Chaz Beckett MD   20 mg at 09/14/17 0844   • methylPREDNISolone sodium succinate (SOLU-Medrol) injection 40 mg  40 mg Intravenous Q6H Nish Morales DO   40 mg at 09/14/17 1748   • metroNIDAZOLE (FLAGYL) IVPB 500 mg  500 mg Intravenous Q8H Nish Morales DO   500 mg at 09/14/17 1216   • ondansetron (ZOFRAN) tablet 4 mg  4 mg Oral Q6H PRN Atif W  MD Magdal[place]       • pneumococcal polysaccharide 23-valent (PNEUMOVAX-23) vaccine 0.5 mL  0.5 mL Intramuscular During Hospitalization Atif Nichols MD       • potassium chloride (MICRO-K) CR capsule 40 mEq  40 mEq Oral PRN Atif Nichols MD   40 mEq at 09/11/17 1629    Or   • potassium chloride (KLOR-CON) packet 40 mEq  40 mEq Oral PRN Atif Nichols MD        Or   • potassium chloride 10 mEq in 100 mL IVPB  10 mEq Intravenous Q1H PRN Atif Nichols MD   Stopped at 09/07/17 0454   • sodium chloride 0.9 % flush 1-10 mL  1-10 mL Intravenous PRN Atif Nichols MD       • sodium chloride 0.9 % infusion  50 mL/hr Intravenous Continuous Chaz Beckett MD 50 mL/hr at 09/13/17 2004 50 mL/hr at 09/13/17 2004   • vancomycin (VANCOCIN) 50 mg/ml oral solution 250 mg  250 mg Oral Q6H Nish Morales,    250 mg at 09/14/17 1718       Objective     Physical Exam:   Temp:  [97.1 °F (36.2 °C)-99.7 °F (37.6 °C)] 99.7 °F (37.6 °C)  Heart Rate:  [] 100  Resp:  [18-20] 18  BP: (117-151)/(64-77) 139/73     Physical Exam:  General Appearance:    Alert, cooperative, in no acute distress   Head:    Normocephalic, without obvious abnormality, atraumatic   Eyes:            Lids and lashes normal, conjunctivae and sclerae normal, no   icterus, no pallor, corneas clear, PERRLA   Ears:    Ears appear intact with no abnormalities noted   Throat:   No oral lesions, no thrush, oral mucosa moist   Neck:   No adenopathy, supple, trachea midline, no thyromegaly, no     carotid bruit, no JVD   Back:     No kyphosis present, no scoliosis present, no skin lesions,       erythema or scars, no tenderness to percussion or                   palpation,   range of motion normal   Lungs:     Clear to auscultation,respirations regular, even and                   unlabored    Heart:    Regular rhythm and normal rate, normal S1 and S2, no            murmur, no gallop, no rub, no click   Breast Exam:    Deferred   Abdomen:     Normal  bowel sounds, no masses, no organomegaly, soft        non-tender, non-distended, no guarding, no rebound                 tenderness   Genitalia:    Deferred   Extremities:   Moves all extremities well, no edema, no cyanosis, no              redness   Pulses:   Pulses palpable and equal bilaterally   Skin:   No bleeding, bruising or rash   Lymph nodes:   No palpable adenopathy   Neurologic:   Cranial nerves 2 - 12 grossly intact, sensation intact, DTR        present and equal bilaterally      Results Review:     Lab Results (last 24 hours)     Procedure Component Value Units Date/Time    CBC & Differential [[id number]] Collected:  09/14/17 0532    Specimen:  Blood Updated:  09/14/17 0630    Narrative:       The following orders were created for panel order CBC & Differential.  Procedure                               Abnormality         Status                     ---------                               -----------         ------                     CBC Auto Differential[[id number]]        Abnormal            Final result                 Please view results for these tests on the individual orders.    CBC Auto Differential [[id number]]  (Abnormal) Collected:  09/14/17 0532    Specimen:  Blood Updated:  09/14/17 0630     WBC 6.58 10*3/mm3      RBC 3.92 10*6/mm3      Hemoglobin 11.5 (L) g/dL      Hematocrit 35.4 %      MCV 90.3 fL      MCH 29.3 pg      MCHC 32.5 g/dL      RDW 14.3 %      RDW-SD 47.4 (H) fl      MPV 9.7 fL      Platelets 261 10*3/mm3      Neutrophil % 86.2 (H) %      Lymphocyte % 9.9 (L) %      Monocyte % 1.8 %      Eosinophil % 0.2 %      Basophil % 0.2 %      Immature Grans % 1.7 (H) %      Neutrophils, Absolute 5.68 10*3/mm3      Lymphocytes, Absolute 0.65 10*3/mm3      Monocytes, Absolute 0.12 10*3/mm3      Eosinophils, Absolute 0.01 10*3/mm3      Basophils, Absolute 0.01 10*3/mm3      Immature Grans, Absolute 0.11 (H) 10*3/mm3     Magnesium [[id number]]  (Normal) Collected:  09/14/17 0532    Specimen:   Blood Updated:  09/14/17 0644     Magnesium 1.9 mg/dL     Comprehensive Metabolic Panel [[id number]]  (Abnormal) Collected:  09/14/17 0532    Specimen:  Blood Updated:  09/14/17 0646     Glucose 157 (H) mg/dL      BUN 8 mg/dL      Creatinine 0.71 mg/dL      Sodium 138 mmol/L      Potassium 3.9 mmol/L      Chloride 106 mmol/L      CO2 21.0 (L) mmol/L      Calcium 8.4 mg/dL      Total Protein 7.0 g/dL      Albumin 3.40 g/dL      ALT (SGPT) 32 U/L      AST (SGOT) 32 U/L      Alkaline Phosphatase 102 U/L      Total Bilirubin 0.5 mg/dL      eGFR Non African Amer 81 mL/min/1.73      Globulin 3.6 (H) gm/dL      A/G Ratio 0.9 (L) g/dL      BUN/Creatinine Ratio 11.3     Anion Gap 11.0 mmol/L     Narrative:       The MDRD GFR formula is only valid for adults with stable renal function between ages 18 and 70.    Tissue Pathology Exam [[id number]] Collected:  09/13/17 1658    Specimen:  Tissue from Large Intestine, Sigmoid Colon Updated:  09/14/17 0824           I reviewed the patient's new clinical results.  I reviewed the patient's new imaging results and agree with the interpretation.     ASSESSMENT/PLAN:   ASSESSMENT:   1.  Universal ulcerative colitis  2.  Clostridium difficile on 1/2 stool studies  3.  Anemia of chronic blood loss and chronic disease    PLAN:   1.  Continue the current steroid dose  2.  HCV antibody testing at her request  3.  Maríaalamsuresh Morales DO  09/14/17  7:33 PM

## 2017-09-15 NOTE — PLAN OF CARE
Problem: Patient Care Overview (Adult)  Goal: Adult Individualization and Mutuality  Outcome: Ongoing (interventions implemented as appropriate)    Problem: Fluid Volume Deficit (Adult)  Goal: Fluid/Electrolyte Balance  Outcome: Ongoing (interventions implemented as appropriate)

## 2017-09-15 NOTE — PLAN OF CARE
Problem: Patient Care Overview (Adult)  Goal: Plan of Care Review  Outcome: Ongoing (interventions implemented as appropriate)  Goal: Adult Individualization and Mutuality  Outcome: Ongoing (interventions implemented as appropriate)  Goal: Discharge Needs Assessment  Outcome: Ongoing (interventions implemented as appropriate)    Problem: Fluid Volume Deficit (Adult)  Goal: Fluid/Electrolyte Balance  Outcome: Ongoing (interventions implemented as appropriate)

## 2017-09-15 NOTE — PLAN OF CARE
Problem: Patient Care Overview (Adult)  Goal: Plan of Care Review  Outcome: Ongoing (interventions implemented as appropriate)    09/15/17 1548   Coping/Psychosocial Response Interventions   Plan Of Care Reviewed With patient   Patient Care Overview   Progress improving   Outcome Evaluation   Outcome Summary/Follow up Plan Pt tolerated tx well this date. No goals met this tx. Continue POC.         Problem: Inpatient Occupational Therapy  Goal: Dynamic Standing Balance Goal LTG-OT  Outcome: Ongoing (interventions implemented as appropriate)    09/13/17 1349 09/14/17 0740 09/15/17 1548   Dynamic Standing Balance OT LTG   Dynamic Standing Balance OT LTG, Date Established 09/06/17 --  --    Dynamic Standing Balance OT LTG, Time to Achieve 2 wks --  --    Dynamic Standing Balance OT LTG, [place] Level supervision required --  --    Dynamic Standing Balance OT LTG, Additional Goal Resident to Independent and safe With dynamic standing balance to complete higher level ADL's. --  --    Dynamic Standing Balance OT LTG, Date Goal Reviewed --  --  09/15/17   Dynamic Standing Balance OT LTG, Outcome --  goal met --        Goal: Patient Education Goal STG- OT  Outcome: Ongoing (interventions implemented as appropriate)    09/13/17 1349 09/14/17 0740 09/15/17 1548   Patient Education OT STG   Patient Education OT STG, Date Established 09/13/17 --  --    Patient Education OT STG, Time to Achieve 5 - 7 days --  --    Patient Education OT STG, Education Type HEP --  --    Patient Education OT STG, Additional Goal T band exercises. --  --    Patient Education OT STG, Date Goal Reviewed --  --  09/15/17   Patient Education OT STG Outcome --  goal not met --        Goal: Bathing Goal LTG- OT  Outcome: Ongoing (interventions implemented as appropriate)    09/13/17 1349 09/14/17 0740 09/15/17 1548   Bathing OT LTG   Bathing Goal OT LTG, Date Established 09/13/17 --  --    Bathing Goal OT LTG, Time to Achieve 2 wks --  --    Bathing  Goal OT LTG, [place] Level conditional independence --  --    Bathing Goal OT LTG, Assist Device shower chair with back --  --    Bathing Goal OT LTG, Additional Goal Independent and safe  --  --    Bathing Goal OT LTG, Date Goal Reviewed --  --  09/15/17   Bathing Goal OT LTG, Outcome --  goal not met --        Goal: Home Management Goal STG- OT  Outcome: Ongoing (interventions implemented as appropriate)    09/13/17 1349 09/14/17 0740 09/15/17 1548   Home Management OT STG   Home Mgmt Goal OT STG, Date Established 09/13/17 --  --    Home Mgmt Goal OT STG, Time To Achieve 1 wk --  --    Home Mgmt Goal OT STG, [place] Level conditional independence --  --    Home Mgmt Goal OT STG, Additional Goal Resident to be CI in room, for gathering of items for all personal care, with 0 LOB and safe. --  --    Home Mgmt Goal OT STG, Date Goal Reviewed --  --  09/15/17   Home Mgmt Goal OT STG, Outcome Achieved --  goal not met --

## 2017-09-15 NOTE — PLAN OF CARE
Problem: Patient Care Overview (Adult)  Goal: Plan of Care Review  Outcome: Ongoing (interventions implemented as appropriate)    09/15/17 1046   Coping/Psychosocial Response Interventions   Plan Of Care Reviewed With patient   Patient Care Overview   Progress progress toward functional goals as expected   Outcome Evaluation   Outcome Summary/Follow up Plan pt responded well to therapy w/ much increased gait to 570 ft SBA-indep. pt w/ only 1 minor, self corrected LOB. indep w/ sup-sit and sit-stand. pt completed standing ther ex. pt indep w/ BR t/fs. no new goals met at this time. pt would continue to benefit from PT services. Recommend  PT upon DC.        Goal: Discharge Needs Assessment  Outcome: Ongoing (interventions implemented as appropriate)    09/12/17 1516 09/13/17 1155 09/13/17 1349   Discharge Needs Assessment   Concerns To Be Addressed no discharge needs identified --  --    Readmission Within The Last 30 Days no previous admission in last 30 days --  --    Equipment Needed After Discharge none --  --    Discharge Disposition --  --  home healthcare service   Discharge Planning Comments transitional visit --  --    Current Health   Anticipated Changes Related to Illness none --  --    Self-Care   Equipment Currently Used at Home --  none --    Living Environment   Transportation Available --  family or friend will provide --          Problem: Inpatient Physical Therapy  Goal: Transfer Training Goal 1 STG- PT  Outcome: Ongoing (interventions implemented as appropriate)    09/13/17 0955 09/15/17 1046   Transfer Training PT STG   Transfer Training PT STG, Date Established 09/13/17 --    Transfer Training PT STG, Time to Achieve 2 days --    Transfer Training PT STG, Activity Type bed to chair /chair to bed;sit to stand/stand to sit --    Transfer Training PT STG, [place] Level independent --    Transfer Training PT STG, Date Goal Reviewed --  09/15/17   Transfer Training PT STG, Outcome --  goal  ongoing       Goal: Gait Training Goal STG- PT  Outcome: Ongoing (interventions implemented as appropriate)    09/13/17 0955 09/15/17 1046   Gait Training PT STG   Gait Training Goal PT STG, Date Established 09/13/17 --    Gait Training Goal PT STG, Time to Achieve 2 days --    Gait Training Goal PT STG, [place] Level independent --    Gait Training Goal PT STG, Distance to Achieve 200'x1. --    Gait Training Goal PT STG, Additional Goal No AD.  --    Gait Training Goal PT STG, Date Goal Reviewed --  09/15/17   Gait Training Goal PT STG, Outcome --  goal ongoing       Goal: Stair Training Goal LTG- PT  Outcome: Ongoing (interventions implemented as appropriate)    09/13/17 0955 09/15/17 1046   Stair Training PT LTG   Stair Training Goal PT LTG, Date Established 09/13/17 --    Stair Training Goal PT LTG, Time to Achieve by discharge --    Stair Training Goal PT LTG, Number of Steps 2 --    Stair Training Goal PT LTG, [place] Level conditional independence --    Stair Training Goal PT LTG, Assist Device 2 handrails --    Stair Training Goal PT LTG, Date Goal Reviewed --  09/15/17   Stair Training Goal PT LTG, Outcome --  goal ongoing       Goal: Physical Therapy Goal LTG- PT  Outcome: Ongoing (interventions implemented as appropriate)    09/13/17 0955 09/15/17 1046   Physical Therapy PT LTG   Physical Therapy PT LTG, Time to Achieve by discharge --    Physical Therapy PT LTG, Activity Type [place] fall risk assessment.  --    Physical Therapy PT LTG, Addisional Goal Score >/= 24/28 or low fall risk.  --    Physical Therapy PT LTG, Date Goal Reviewed --  09/15/17   Physical Therapy PT LTG, Outcome --  goal ongoing

## 2017-09-16 NOTE — PLAN OF CARE
Problem: Patient Care Overview (Adult)  Goal: Plan of Care Review  Outcome: Ongoing (interventions implemented as appropriate)    09/16/17 1526   Coping/Psychosocial Response Interventions   Plan Of Care Reviewed With patient   Patient Care Overview   Progress improving   Outcome Evaluation   Outcome Summary/Follow up Plan Pt tolerated tx well this date. Pt was I with toilet t/f. No goals met this tx. Continue POC.         Problem: Inpatient Occupational Therapy  Goal: Dynamic Standing Balance Goal LTG-OT  Outcome: Ongoing (interventions implemented as appropriate)    09/13/17 1349 09/14/17 0740 09/16/17 1526   Dynamic Standing Balance OT LTG   Dynamic Standing Balance OT LTG, Date Established 09/06/17 --  --    Dynamic Standing Balance OT LTG, Time to Achieve 2 wks --  --    Dynamic Standing Balance OT LTG, [place] Level supervision required --  --    Dynamic Standing Balance OT LTG, Additional Goal Resident to Independent and safe With dynamic standing balance to complete higher level ADL's. --  --    Dynamic Standing Balance OT LTG, Date Goal Reviewed --  --  09/16/17   Dynamic Standing Balance OT LTG, Outcome --  goal met --        Goal: Patient Education Goal STG- OT  Outcome: Ongoing (interventions implemented as appropriate)    09/13/17 1349 09/14/17 0740 09/16/17 1526   Patient Education OT STG   Patient Education OT STG, Date Established 09/13/17 --  --    Patient Education OT STG, Time to Achieve 5 - 7 days --  --    Patient Education OT STG, Education Type HEP --  --    Patient Education OT STG, Additional Goal T band exercises. --  --    Patient Education OT STG, Date Goal Reviewed --  --  09/16/17   Patient Education OT STG Outcome --  goal not met --        Goal: Bathing Goal LTG- OT  Outcome: Ongoing (interventions implemented as appropriate)    09/13/17 1349 09/14/17 0740 09/16/17 1526   Bathing OT LTG   Bathing Goal OT LTG, Date Established 09/13/17 --  --    Bathing Goal OT LTG, Time to  Achieve 2 wks --  --    Bathing Goal OT LTG, [place] Level conditional independence --  --    Bathing Goal OT LTG, Assist Device shower chair with back --  --    Bathing Goal OT LTG, Additional Goal Independent and safe  --  --    Bathing Goal OT LTG, Date Goal Reviewed --  --  09/16/17   Bathing Goal OT LTG, Outcome --  goal not met --        Goal: Home Management Goal STG- OT  Outcome: Ongoing (interventions implemented as appropriate)    09/13/17 1349 09/14/17 0740 09/16/17 1526   Home Management OT STG   Home Mgmt Goal OT STG, Date Established 09/13/17 --  --    Home Mgmt Goal OT STG, Time To Achieve 1 wk --  --    Home Mgmt Goal OT STG, [place] Level conditional independence --  --    Home Mgmt Goal OT STG, Additional Goal Resident to be CI in room, for gathering of items for all personal care, with 0 LOB and safe. --  --    Home Mgmt Goal OT STG, Date Goal Reviewed --  --  09/16/17   Home Mgmt Goal OT STG, Outcome Achieved --  goal not met --

## 2017-09-16 NOTE — PLAN OF CARE
Problem: Patient Care Overview (Adult)  Goal: Plan of Care Review  Outcome: Ongoing (interventions implemented as appropriate)    09/16/17 1526 09/16/17 1635   Coping/Psychosocial Response Interventions   Plan Of Care Reviewed With patient --    Patient Care Overview   Progress improving --    Outcome Evaluation   Outcome Summary/Follow up Plan --  pt independent with transfers, pt ambulated 350` without AD Independently. pt met 2 goals this Tx, Pt would benefit from HHPT @ D/C         Problem: Inpatient Physical Therapy  Goal: Transfer Training Goal 1 STG- PT  Outcome: Outcome(s) achieved Date Met:  09/16/17 09/13/17 0955 09/15/17 1046 09/16/17 1635   Transfer Training PT STG   Transfer Training PT STG, Date Established 09/13/17 --  --    Transfer Training PT STG, Time to Achieve 2 days --  --    Transfer Training PT STG, Activity Type bed to chair /chair to bed;sit to stand/stand to sit --  --    Transfer Training PT STG, [place] Level independent --  --    Transfer Training PT STG, Date Goal Reviewed --  09/15/17 --    Transfer Training PT STG, Outcome --  --  goal met       Goal: Gait Training Goal STG- PT  Outcome: Outcome(s) achieved Date Met:  09/16/17  Goal: Stair Training Goal LTG- PT  Outcome: Ongoing (interventions implemented as appropriate)    09/13/17 0955 09/15/17 1046   Stair Training PT LTG   Stair Training Goal PT LTG, Date Established 09/13/17 --    Stair Training Goal PT LTG, Time to Achieve by discharge --    Stair Training Goal PT LTG, Number of Steps 2 --    Stair Training Goal PT LTG, [place] Level conditional independence --    Stair Training Goal PT LTG, Assist Device 2 handrails --    Stair Training Goal PT LTG, Date Goal Reviewed --  09/15/17   Stair Training Goal PT LTG, Outcome --  goal ongoing       Goal: Physical Therapy Goal LTG- PT  Outcome: Ongoing (interventions implemented as appropriate)    09/13/17 0955 09/15/17 1046   Physical Therapy PT LTG   Physical Therapy PT  LTG, Time to Achieve by discharge --    Physical Therapy PT LTG, Activity Type [place] fall risk assessment.  --    Physical Therapy PT LTG, Addisional Goal Score >/= 24/28 or low fall risk.  --    Physical Therapy PT LTG, Date Goal Reviewed --  09/15/17   Physical Therapy PT LTG, Outcome --  goal ongoing

## 2017-09-16 NOTE — PLAN OF CARE
Problem: Patient Care Overview (Adult)  Goal: Plan of Care Review  Outcome: Ongoing (interventions implemented as appropriate)    09/16/17 6591   Coping/Psychosocial Response Interventions   Plan Of Care Reviewed With patient   Patient Care Overview   Progress improving   Outcome Evaluation   Outcome Summary/Follow up Plan Pt rested between care. VS stable. Pain controlled with meds. Potassium protocol intitated X2 doses.

## 2017-09-16 NOTE — SIGNIFICANT NOTE
09/16/17 1514   Rehab Treatment   Discipline physical therapy assistant   Treatment Not Performed other (see comments)  (pt requested to wait until her grandchildren leave )        09/16/17 1514   Rehab Treatment   Discipline physical therapy assistant   Treatment Not Performed other (see comments)  (pt requested to wait until her grandchildren leave )

## 2017-09-16 NOTE — PROGRESS NOTES
[facility] Medicine Services  INPATIENT PROGRESS NOTE     LOS: 10 days   Patient Care Team:  Nish Morales DO as PCP - General (Gastroenterology)    Chief Complaint:  C. difficile colitis      Subjective     Interval History:     Patient Complaints: Patient seen and examined.  Patient continues to complain of having loose bowel movements.    History taken from: Patient.    Review of Systems:    Review of Systems   Constitutional: Positive for appetite change. Negative for activity change, chills, diaphoresis, fatigue and fever.   HENT: Negative for congestion, ear pain, rhinorrhea, sore throat and trouble swallowing.    Eyes: Negative for pain and visual disturbance.   Respiratory: Negative for cough, chest tightness, shortness of breath and wheezing.    Cardiovascular: Negative for chest pain, palpitations and leg swelling.   Gastrointestinal: Negative for abdominal pain, blood in stool, constipation, diarrhea, nausea and vomiting.   Endocrine: Negative for cold intolerance and heat intolerance.   Genitourinary: Negative for decreased urine volume, difficulty urinating and dysuria.   Musculoskeletal: Negative for arthralgias, back pain, gait problem and neck pain.   Skin: Negative for color change and rash.   Neurological: Positive for weakness. Negative for dizziness, syncope, light-headedness, numbness and headaches.   Hematological: Negative for adenopathy. Does not bruise/bleed easily.   Psychiatric/Behavioral: Negative for agitation, confusion and sleep disturbance. The patient is not nervous/anxious.          Objective     Vital Signs  Temp:  [96.9 °F (36.1 °C)-98.6 °F (37 °C)] 96.9 °F (36.1 °C)  Heart Rate:  [] 90  Resp:  [18-20] 18  BP: (134-161)/(67-80) 134/67    Physical Exam:   Physical Exam   Constitutional: She is oriented to person, place, and time. She appears well-developed and well-nourished. No distress.   HENT:   Head: Normocephalic and  atraumatic.   Right Ear: External ear normal.   Left Ear: External ear normal.   Nose: Nose normal.   Eyes: Conjunctivae and EOM are normal. Pupils are equal, round, and reactive to light. Right eye exhibits no discharge. Left eye exhibits no discharge. No scleral icterus.   Neck: Normal range of motion. Neck supple.   Cardiovascular: Normal rate, regular rhythm, normal heart sounds and intact distal pulses.  Exam reveals no gallop and no friction rub.    No murmur heard.  Pulmonary/Chest: Effort normal and breath sounds normal. No respiratory distress. She has no wheezes. She has no rales. She exhibits no tenderness.   Abdominal: Soft. Bowel sounds are normal. She exhibits no distension and no mass. There is no tenderness. There is no rebound and no guarding.   Musculoskeletal: Normal range of motion.   Neurological: She is alert and oriented to person, place, and time.   Skin: Skin is warm and dry. No rash noted. She is not diaphoretic. No erythema. No pallor.   Psychiatric: She has a normal mood and affect. Her behavior is normal.   Nursing note and vitals reviewed.         Results Review:       Results from last 7 days  Lab Units 09/16/17  0521 09/15/17  2324 09/15/17  0526 09/14/17  0532 09/13/17  0515 09/12/17  0543 09/11/17  1949 09/11/17  0553 09/10/17  0600   SODIUM mmol/L 141  --  141 138 138 137  --  137 138   POTASSIUM mmol/L 3.7 3.5 3.4* 3.9 4.0 4.5 4.2 3.1* 3.1*   CHLORIDE mmol/L 111*  --  108 106 105 105  --  104 101   CO2 mmol/L 22.0  --  25.0 21.0* 23.0 23.0  --  27.0 26.0   BUN mg/dL 15  --  15 8 7 7  --  5* 3*   CREATININE mg/dL 0.71  --  0.72 0.71 0.79 0.69  --  0.68 0.76   GLUCOSE mg/dL 162*  --  183* 157* 86 88  --  97 96   CALCIUM mg/dL 8.1*  --  8.5 8.4 8.1* 8.3*  --  7.9* 8.3*   BILIRUBIN mg/dL 0.3  --  0.2 0.5 0.4  --   --   --   --    ALK PHOS U/L 66  --  93 102 84  --   --   --   --    ALT (SGPT) U/L 32  --  32 32 36  --   --   --   --    AST (SGOT) U/L 22  --  21 32 29  --   --   --   --           Results from last 7 days  Lab Units 09/16/17  0521 09/15/17  0526 09/14/17  0532 09/13/17  0515 09/10/17  0600   MAGNESIUM mg/dL 1.9 1.9 1.9 1.7 1.9         Results from last 7 days  Lab Units 09/16/17  0521 09/15/17  0642 09/14/17  0532 09/13/17  0515 09/12/17  0543 09/11/17  0553 09/10/17  0600   WBC 10*3/mm3 7.95 13.29* 6.58 6.76 6.48 5.69 7.66   HEMOGLOBIN g/dL 8.6* 9.5* 11.5* 10.6* 10.8* 10.2* 11.6*   HEMATOCRIT % 27.2* 29.3* 35.4 32.3* 33.0* 30.6* 35.2   PLATELETS 10*3/mm3 206 219 261 181 187 157 226       No results found for: CKTOTAL, CKMB, CKMBINDEX, TROPONINI, TROPONINT    CO2   Date Value Ref Range Status   09/16/2017 22.0 22.0 - 31.0 mmol/L Final              Imaging Results (last 7 days)     ** No results found for the last 168 hours. **                           Urine Culture   Date Value Ref Range Status   09/10/2017 No growth at 24 hours  Final           Medication Review:   Current Facility-Administered Medications   Medication Dose Route Frequency Provider Last Rate Last Dose   • acetaminophen (TYLENOL) tablet 650 mg  650 mg Oral Q4H PRN Atif Nichols MD   650 mg at 09/15/17 0022   • acidophilus (FLORANEX) tablet 1 tablet  1 tablet Oral TID Nish Morales DO   1 tablet at 09/16/17 0856   • famotidine (PEPCID) tablet 40 mg  40 mg Oral Daily Atif Nichols MD   40 mg at 09/16/17 0856   • hydrALAZINE (APRESOLINE) injection 10 mg  10 mg Intravenous Q6H PRN Hattie Barrera MD       • HYDROcodone-acetaminophen (NORCO) 5-325 MG per tablet 1 tablet  1 tablet Oral Q6H PRN Chaz Beckett MD   1 tablet at 09/16/17 0610   • HYDROmorphone (DILAUDID) injection 0.5 mg  0.5 mg Intravenous Q2H PRN Atif Nichols MD        And   • naloxone (NARCAN) injection 0.4 mg  0.4 mg Intravenous Q5 Min PRN Atif Nichols MD       • influenza vac split quad (FLUZONE QUADRIVALENT) IM suspension 0.5 mL  0.5 mL Intramuscular During Hospitalization Atif Nichols MD       • megestrol (MEGACE)  tablet 20 mg  20 mg Oral Daily Chaz Beckett MD   20 mg at 09/16/17 0856   • mesalamine (DELZICOL) delayed release capsule 400 mg  400 mg Oral TID Nish Morales, DO   400 mg at 09/16/17 0856   • methylPREDNISolone sodium succinate (SOLU-Medrol) injection 40 mg  40 mg Intravenous Q6H Nish Morales, DO   40 mg at 09/16/17 0609   • metroNIDAZOLE (FLAGYL) IVPB 500 mg  500 mg Intravenous Q8H Nish Morales,  mL/hr at 09/16/17 0440 500 mg at 09/16/17 0440   • ondansetron (ZOFRAN) tablet 4 mg  4 mg Oral Q6H PRN Atif Nichols MD       • pneumococcal polysaccharide 23-valent (PNEUMOVAX-23) vaccine 0.5 mL  0.5 mL Intramuscular During Hospitalization Atif Nichols MD       • potassium chloride (MICRO-K) CR capsule 40 mEq  40 mEq Oral PRN Atif Nichols MD   40 mEq at 09/16/17 0440    Or   • potassium chloride (KLOR-CON) packet 40 mEq  40 mEq Oral PRN Atif Nichols MD        Or   • potassium chloride 10 mEq in 100 mL IVPB  10 mEq Intravenous Q1H PRN Atif Nichols MD   Stopped at 09/07/17 0454   • sodium chloride 0.9 % flush 1-10 mL  1-10 mL Intravenous PRN Atif Nichols MD       • sodium chloride 0.9 % infusion  50 mL/hr Intravenous Continuous Chaz Beckett MD 50 mL/hr at 09/15/17 1837 50 mL/hr at 09/15/17 1837   • vancomycin (VANCOCIN) 50 mg/ml oral solution 250 mg  250 mg Oral Q6H Nish Morales, DO   250 mg at 09/16/17 0609         Assessment/Plan     Principal Problem:    C. difficile colitis  Active Problems:    Lower abdominal pain    Inflammatory bowel disease    Hypokalemia          -Tolerating oral diet.  -Continue IV antibiotics.  -GI follow-up appreciated.  -We'll continue steroids.  -Continues to complain of having loose bowel movements.  -Had 5 such episodes in matter of 5 hours.  -DVT and GI prophylaxis in place.  -Continue PT/OT as tolerated.  -We'll continue monitoring patient hospital setting and treat patient as course dictates.          This document has been  electronically signed by Hattie Barrera MD on September 16, 2017 10:49 AM        EMR Dragon/Transcription disclaimer:   Dictated utilizing Dragon dictation.

## 2017-09-16 NOTE — PLAN OF CARE
Problem: Patient Care Overview (Adult)  Goal: Plan of Care Review  Outcome: Ongoing (interventions implemented as appropriate)    09/16/17 0740   Coping/Psychosocial Response Interventions   Plan Of Care Reviewed With patient   Patient Care Overview   Progress no change   Outcome Evaluation   Outcome Summary/Follow up Plan pt still having episodes of diarrhea       Goal: Adult Individualization and Mutuality  Outcome: Ongoing (interventions implemented as appropriate)  Goal: Discharge Needs Assessment  Outcome: Ongoing (interventions implemented as appropriate)    Problem: Fluid Volume Deficit (Adult)  Goal: Fluid/Electrolyte Balance  Outcome: Ongoing (interventions implemented as appropriate)

## 2017-09-16 NOTE — THERAPY TREATMENT NOTE
Acute Care - Occupational Therapy Treatment Note  [facility]     Patient Name: Damaris Sánchez  : 1945  MRN: [id number]  Today's Date: 2017  Onset of Illness/Injury or Date of Surgery Date: 17  Date of Referral to OT: 17  Referring Physician: Dr. Barrera      Admit Date: 2017    Visit Dx:     ICD-10-CM ICD-9-CM   1. Lower abdominal pain R10.30 789.09   2. Leukocytosis, unspecified type D72.829 288.60   3. Hypokalemia E87.6 276.8   4. C. difficile colitis A04.7 008.45   5. Impaired physical mobility Z74.09 781.99   6. Impaired mobility and ADLs Z74.09 799.89     Patient Active Problem List   Diagnosis   • Lower abdominal pain   • C. difficile colitis   • Inflammatory bowel disease   • Hypokalemia             Adult Rehabilitation Note       17 1412 09/15/17 1418 09/15/17 1046    Rehab Assessment/Intervention    Discipline occupational therapy assistant  -CS occupational therapy assistant  -CS physical therapy assistant  -SEJAL    Document Type therapy note (daily note)  -CS therapy note (daily note)  -CS therapy note (daily note)  -SEJAL    Subjective Information agree to therapy  -CS agree to therapy  -CS agree to therapy  -SEJAL    Patient Effort, Rehab Treatment   good  -SEJAL    Precautions/Limitations fall precautions  -CS fall precautions  -CS fall precautions  -SEJAL    Recorded by [CS] MADDI Hare/JARAD [CS] MADDI Hare/JARAD [SEJAL] Shalom Ross, PTA    Vital Signs    Pre Systolic BP Rehab   159  -SEJAL    Pre Treatment Diastolic BP   79  -SEJAL    Post Systolic BP Rehab  161  -  -SEJAL    Post Treatment Diastolic BP  76  -CS 72  -SEJAL    Pretreatment Heart Rate (beats/min)   92  -SEJAL    Posttreatment Heart Rate (beats/min)  100  -CS 96  -SEJAL    Pre SpO2 (%)   97  -SEJAL    O2 Delivery Pre Treatment   room air  -SEJAL    Post SpO2 (%)  95  -CS 98  -SEJAL    O2 Delivery Post Treatment  room air  -CS room air  -SEJAL    Pre Patient Position Sitting  -CS Supine  -CS Supine  -SEJAL    Intra Patient  Position Standing  -CS Sitting  -CS     Post Patient Position Sitting  -CS Sitting  -CS Sitting  -SEJAL    Recorded by [CS] HILARY HareA/JARAD [CS] MADDI Hare/JARAD [SEJAL] Shalom Ross PTA    Pain Assessment    Pain Assessment 0-10  -CS 0-10  -CS No/denies pain  -SEJAL    Pain Score 3  -CS 2  -CS     Post Pain Score 4  -CS 2  -CS     Pain Location Back  -CS Abdomen  -CS     Recorded by [CS] MADDI Hare/JARAD [CS] HILARY HareA/JARAD [SEJAL] Shalom Ross PTA    Vision Assessment/Intervention    Visual Impairment   WFL with corrective lenses  -SEJAL    Recorded by   [SEJAL] Shalom Ross PTA    Cognitive Assessment/Intervention    Current Cognitive/Communication Assessment functional  -CS functional  -CS functional  -SEJAL    Orientation Status oriented x 4  -CS oriented x 4  -CS oriented x 4  -SEJAL    Follows Commands/Answers Questions 100% of the time  -% of the time  -% of the time  -SEJAL    Personal Safety Interventions   gait belt;muscle strengthening facilitated;nonskid shoes/slippers when out of bed;supervised activity  -SEJAL    Recorded by [CS] HILARY HareA/JARAD [CS] HILARY HareA/JARAD [SEJAL] Shalom Ross PTA    Bed Mobility, Assessment/Treatment    Bed Mobility, Assistive Device   bed rails;head of bed elevated  -SEJAL    Bed Mob, Supine to Sit, [place]  conditional independence  -CS conditional independence  -SEJAL    Recorded by  [CS] MADDI Hare/JAARD [SEJAL] Shalom Ross PTA    Transfer Assessment/Treatment    Transfers, Sit-Stand [place] independent  -CS  independent  -SEJAL    Transfers, Stand-Sit [place] independent  -CS  independent  -SEJAL    Transfers, Sit-Stand-Sit, Assist Device   --   no AD  -SEJAL    Toilet Transfer, [place] independent   x2  -CS  independent  -SEJAL    Toilet Transfer, Assistive Device   other (see comments)   no AD  -SEJAL    Transfer, Comment   pt completed 2 BR t/fs this tx. pt completed her own pericare. indep w/ t/fs.   -SEJAL     Recorded by [CS] JAM Hare  [SEJAL] Shalom Ross PTA    Gait Assessment/Treatment    Gait, [place] Level   independent;supervision required  -SEJAL    Gait, Assistive Device   other (see comments)   no AD  -SEJAL    Gait, Distance (Feet)   570   + multiple short distances in room  -    Gait, Gait Deviations   --   fast irving  -SEJAL    Gait, Comment   1 self corrected, minor LOB while turning  -SEJAL    Recorded by   [SEJAL] Shalom Ross PTA    Stairs Assessment/Treatment    Number of Stairs   5  -SEJAL    Stairs, Handrail Location   both sides  -    Stairs, [place] Level   supervision required  -SEJAL    Stairs, Technique Used   step to step (ascending);step to step (descending)  -    Stairs, Comment   2 attempts  -SEJAL    Recorded by   [SEJAL] Shalom Ross PTA    Functional Mobility    Functional Mobility- Ind. Level independent  -CS      Functional Mobility-Distance (Feet) 15  -CS      Recorded by [CS] MADDI Hare/L      Toileting Assessment/Training    Toileting Assess/Train, Assistive Device raised toilet seat  -CS      Toileting Assess/Train, Position sitting;standing  -CS      Toileting Assess/Train, Indepen Level independent   x2  -CS      Recorded by [CS] JAM Hare      Balance Skills Training    Standing-Balance Activities Reaching for objects;Reaching across midline  -CS      Standing Balance # of Minutes 5  -CS      Recorded by [CS] MADDI Hare/L      Therapy Exercises    Bilateral Lower Extremities   AROM:;20 reps;standing;heel raises;hip abduction/adduction;mini squats  -SEJAL    Bilateral Upper Extremity AROM:;20 reps;sitting;elbow flexion/extension;hand pumps;pronation/supination;shoulder extension/flexion;shoulder ER/IR  -CS AROM:;20 reps;sitting;elbow flexion/extension;hand pumps;pronation/supination;shoulder extension/flexion;shoulder ER/IR  -CS     BUE Resistance manual resistance- minimal  -CS theraband  -CS     Recorded by [CS] Madhavi Watters  MADDI/L [CS] MADDI Hare/JARAD [SEJAL] Shalom Ross PTA    Positioning and Restraints    Pre-Treatment Position in bed  -CS in bed  -CS in bed  -SEJAL    Post Treatment Position bed  -CS bed  -CS bed  -SEJAL    In Bed sitting EOB;with family/caregiver  -CS sitting EOB;call light within reach;with family/caregiver  -CS sitting;call light within reach;encouraged to call for assist   all needs met  -SEJAL    Recorded by [CS] MADDI Hare/JARAD [CS] MADDI Hare/JARAD [SEJAL] Shalom Ross PTA      09/14/17 1309 09/14/17 0740       Rehab Assessment/Intervention    Discipline physical therapy assistant  -SEJAL occupational therapy assistant  -KD     Document Type therapy note (daily note)  -SEJAL therapy note (daily note)  -KD     Subjective Information agree to therapy  -SEJAL agree to therapy  -KD     Patient Effort, Rehab Treatment good  -SEJAL      Precautions/Limitations fall precautions  -SEJAL fall precautions  -KD     Recorded by [SEJAL] Shalom Ross PTA [KD] MADDI Kelley/L     Vital Signs    Pre Systolic BP Rehab 140  -SEJAL 117  -KD     Pre Treatment Diastolic BP 76  -SEJAL 64  -KD     Intra Systolic BP Rehab  142  -KD     Intra Treatment Diastolic   -SEJAL 73  -KD     Post Systolic BP Rehab 73  -SEJAL 173  -KD     Post Treatment Diastolic BP  77  -KD     Pretreatment Heart Rate (beats/min) 100  -SEJAL 93  -KD     Intratreatment Heart Rate (beats/min) 104  -SEJAL 94  -KD     Posttreatment Heart Rate (beats/min) 111  -SEJAL 101  -KD     Pre SpO2 (%) 99  -SEJAL 95  -KD     O2 Delivery Pre Treatment room air  -SEJAL room air  -KD     Intra SpO2 (%) 99  -SEJAL 97  -KD     O2 Delivery Intra Treatment room air  -SEJAL room air  -KD     Post SpO2 (%) 99  -SEJAL 97  -KD     O2 Delivery Post Treatment room air  -SEJAL room air  -KD     Pre Patient Position Supine  -SEJAL Supine  -KD     Intra Patient Position  Standing  -KD     Post Patient Position Sitting  -SEJAL Sitting  -KD     Recorded by [SEJAL] Shalom Ross PTA [KD] HILARY KelleyA/JARAD      Pain Assessment    Pain Assessment No/denies pain  -SEJAL No/denies pain  -KD     Recorded by [SEJAL] Shalom Ross PTA [KD] Swetha Kitchen, LINDA/L     Vision Assessment/Intervention    Visual Impairment WFL with corrective lenses  -SEJAL      Recorded by [SEJAL] Shalom Ross PTA      Cognitive Assessment/Intervention    Current Cognitive/Communication Assessment functional  -SEJAL functional  -KD     Orientation Status oriented x 4  -SEJAL oriented x 4  -KD     Follows Commands/Answers Questions 100% of the time  -SEJAL 100% of the time  -KD     Personal Safety Interventions gait belt;muscle strengthening facilitated;nonskid shoes/slippers when out of bed;supervised activity  -SEJAL      Recorded by [SEJAL] Shalom Ross PTA [KD] Swetha Kitchen, LINDA/L     Bed Mobility, Assessment/Treatment    Bed Mobility, Assistive Device bed rails;head of bed elevated  -SEJAL head of bed elevated  -KD     Bed Mobility, Roll Right, [place]  independent  -KD     Bed Mobility, Scoot/Bridge, [place]  independent  -KD     Bed Mob, Supine to Sit, [place] conditional independence  -SEJAL      Bed Mob, Sidelying to Sit, [place]  independent  -KD     Bed Mob, Sit to Sidelying, [place]  independent  -KD     Recorded by [SEJAL] Shalom Ross PTA [KD] Swetha Kitchen, LINDA/L     Transfer Assessment/Treatment    Transfers, Sit-Stand [place] independent  -SEJAL independent  -KD     Transfers, Stand-Sit [place] independent  -SEJAL independent  -KD     Transfers, Sit-Stand-Sit, Assist Device --   no AD  -SEJAL --   none  -KD     Toilet Transfer, [place]  independent  -KD     Toilet Transfer, Assistive Device  --   none  -KD     Transfer, Comment multiple sit-stands.   -SEJAL Pt completed 1 set x 5 reps sit<>stands  -KD     Recorded by [SEJAL] Shalom Ross PTA [KD] Swetha Kitchen, LINDA/L     Gait Assessment/Treatment    Gait, [place] Level supervision required  -SEJAL      Gait, Assistive Device --   no AD  -SEJAL      Gait, Distance (Feet)  200  -SEJAL      Gait, Comment fast irving and slightly unsteady. somewhat impulsive.   -SEJAL      Recorded by [SEJAL] Shalom Ross, PTA      Stairs Assessment/Treatment    Number of Stairs 5  -SEJAL      Stairs, Handrail Location none  -SEJAL      Stairs, [place] Level minimum assist (75% patient effort)  -SEJAL      Stairs, Technique Used step to step (ascending);step to step (descending)   edu on step-to for safety.   -SEJAL      Stairs, Comment 3 trails w/o handrail min A. 1 trial w/ dianna hr, SBA. pt states her son will build her hand rails.   -SEJAL      Recorded by [SEJAL] Shalom Ross, PTA      Functional Mobility    Functional Mobility- Ind. Level  independent  -KD     Recorded by  [KD] HILARY KelleyA/L     Upper Body Bathing Assessment/Training    UB Bathing Assess/Train, [place] Level  independent  -KD     Recorded by  [KD] HILARY KelleyA/L     Lower Body Bathing Assessment/Training    LB Bathing Assess/Train, [place] Level  independent  -KD     Recorded by  [KD] HILARY KelleyA/L     Upper Body Dressing Assessment/Training    UB Dressing Assess/Train, Clothing Type  donning:;hospital gown  -KD     UB Dressing Assess/Train, Position  edge of bed;sitting  -KD     UB Dressing Assess/Train, [place]  independent  -KD     Recorded by  [KD] Swetha Kitchen LINDA/L     Lower Body Dressing Assessment/Training    LB Dressing Assess/Train, Clothing Type  donning:;slipper socks  -KD     LB Dressing Assess/Train, Position  edge of bed;sitting  -KD     LB Dressing Assess/Train, [place]  independent  -KD     Recorded by  [KD] HILARY KelleyA/L     Toileting Assessment/Training    Toileting Assess/Train, Assistive Device  grab bars  -KD     Toileting Assess/Train, Position  sitting  -KD     Toileting Assess/Train, Indepen Level  independent  -KD     Recorded by  [KD] HILARY KelleyA/L     Grooming Assessment/Training    Grooming Assess/Train, Position  sink side;standing  -KD     Grooming  Assess/Train, Indepen Level  independent  -KD     Recorded by  [KD] JAM Kelley     Balance Skills Training    Standing-Balance Activities  --   ADL activity  -KD     Standing Balance # of Minutes  5  -KD     Recorded by  [KD] MADDI Kelley/L     Therapy Exercises    Bilateral Lower Extremities AROM:;20 reps;supine;ankle pumps/circles;hip abduction/adduction;heel slides;SAQ;SLR  -SEJAL      Bilateral Upper Extremity  AROM:;20 reps;sitting;elbow flexion/extension;hand pumps;pronation/supination;shoulder abduction/adduction;shoulder extension/flexion;shoulder ER/IR  -KD     BUE Resistance  manual resistance- minimal   2lb HW  -KD     Recorded by [SEJAL] Shalom Ross PTA [KD] MADDI Kelley/L     Positioning and Restraints    Pre-Treatment Position in bed  -SEJAL in bed  -KD     Post Treatment Position bed  -SEJAL bed  -KD     In Bed sitting EOB;call light within reach;encouraged to call for assist;with family/caregiver   all needs met.   -SEJAL sitting;call light within reach;encouraged to call for assist;exit alarm on  -KD     Recorded by [SEJAL] Shalom Ross PTA [KD] JAM Kelley       User Key  (r) = Recorded By, (t) = Taken By, (c) = Cosigned By    Initials Name Effective Dates    SEJAL Ross PTA 10/17/16 -     KD MADDI Kelley/L 10/17/16 -     CS HILARY HareA/JARAD 10/17/16 -                 OT Goals       09/16/17 1526 09/15/17 1548 09/14/17 0740    Dynamic Standing Balance OT LTG    Dynamic Standing Balance OT LTG, Date Goal Reviewed 09/16/17  -CS 09/15/17  -CS 09/14/17  -KD    Dynamic Standing Balance OT LTG, Outcome   goal met  -KD    Patient Education OT STG    Patient Education OT STG, Date Goal Reviewed 09/16/17  -CS 09/15/17  -CS 09/14/17  -KD    Patient Education OT STG Outcome   goal not met  -KD    Bathing OT LTG    Bathing Goal OT LTG, Date Goal Reviewed 09/16/17  -CS 09/15/17  -CS 09/14/17  -KD    Bathing Goal OT LTG, Outcome   goal not met  -KD    Home  Management OT STG    Home Mgmt Goal OT STG, Date Goal Reviewed 09/16/17  -CS 09/15/17  -CS 09/14/17  -KD    Home Mgmt Goal OT STG, Outcome Achieved   goal not met  -KD      09/13/17 1349          Dynamic Standing Balance OT LTG    Dynamic Standing Balance OT LTG, Date Established 09/06/17  -SG      Dynamic Standing Balance OT LTG, Time to Achieve 2 wks  -SG      Dynamic Standing Balance OT LTG, [place] Level supervision required  -SG      Dynamic Standing Balance OT LTG, Additional Goal Resident to Independent and safe With dynamic standing balance to complete higher level ADL's.  -SG      Patient Education OT STG    Patient Education OT STG, Date Established 09/13/17  -SG      Patient Education OT STG, Time to Achieve 5 - 7 days  -SG      Patient Education OT STG, Education Type HEP  -SG      Patient Education OT STG, Additional Goal T band exercises.  -SG      Bathing OT LTG    Bathing Goal OT LTG, Date Established 09/13/17  -SG      Bathing Goal OT LTG, Time to Achieve 2 wks  -SG      Bathing Goal OT LTG, [place] Level conditional independence  -SG      Bathing Goal OT LTG, Assist Device shower chair with back  -SG      Bathing Goal OT LTG, Additional Goal Independent and safe   -SG      Home Management OT STG    Home Mgmt Goal OT STG, Date Established 09/13/17  -SG      Home Mgmt Goal OT STG, Time To Achieve 1 wk  -SG      Home Mgmt Goal OT STG, [place] Level conditional independence  -SG      Home Mgmt Goal OT STG, Additional Goal Resident to be CI in room, for gathering of items for all personal care, with 0 LOB and safe.  -SG        User Key  (r) = Recorded By, (t) = Taken By, (c) = Cosigned By    Initials Name Provider Type    KD MADDI Kelley/L Occupational Therapy Assistant    MADDI Zamora/JARAD Occupational Therapy Assistant    MYLA Patricia OT Occupational Therapist          Occupational Therapy Education     Title: PT OT SLP Therapies (Active)     Topic: Occupational  Therapy (Done)     Point: ADL training (Done)    Description: Instruct learner(s) on proper safety adaptation and remediation techniques during self care or transfers.   Instruct in proper use of assistive devices.    Learning Progress Summary    Learner Readiness Method Response Comment Documented by Status   Patient Acceptance E,TB,D VU   09/16/17 1526 Done    Acceptance E,TB,D,H VU Pt was educated on HEP and home safety.  09/15/17 1547 Done    Acceptance TB VU   09/14/17 1144 Done    Eager E,TB LORE   09/13/17 1346 Done               Point: Home exercise program (Done)    Description: Instruct learner(s) on appropriate technique for monitoring, assisting and/or progressing therapeutic exercises/activities.    Learning Progress Summary    Learner Readiness Method Response Comment Documented by Status   Patient Acceptance E,TB,D VU   09/16/17 1526 Done    Acceptance E,TB,D,H VU Pt was educated on HEP and home safety.  09/15/17 1547 Done    Eager E,TB LORE   09/13/17 1346 Done               Point: Precautions (Done)    Description: Instruct learner(s) on prescribed precautions during self-care and functional transfers.    Learning Progress Summary    Learner Readiness Method Response Comment Documented by Status   Patient Acceptance E,TB,D VU   09/16/17 1526 Done    Acceptance E,TB,D,H VU Pt was educated on HEP and home safety.  09/15/17 1547 Done    Eager E,SHARI TORREZ   09/13/17 1346 Done               Point: Body mechanics (Done)    Description: Instruct learner(s) on proper positioning and spine alignment during self-care, functional mobility activities and/or exercises.    Learning Progress Summary    Learner Readiness Method Response Comment Documented by Status   Patient Acceptance E,TB,D VU   09/16/17 1526 Done    Acceptance E,TB,D,H VU Pt was educated on HEP and home safety.  09/15/17 1547 Done    Eager E,SHARI [facility] [place] 1346 Done                      User Key     Initials Effective Dates  Name Provider Type Discipline    KD 10/17/16 -  JAM Kelley Occupational Therapy Assistant OT    CS 10/17/16 -  JAM Hare Occupational Therapy Assistant OT    SG 08/03/17 -  Farrah Patricia OT Occupational Therapist OT                  OT Recommendation and Plan  Anticipated Discharge Disposition: home with home health  Planned Therapy Interventions: activity intolerance, ADL retraining, IADL retraining, energy conservation, home exercise program, neuromuscular re-education, strengthening, transfer training  Therapy Frequency:  (3/14 times/Wk)  Plan of Care Review  Plan Of Care Reviewed With: patient  Progress: improving  Outcome Summary/Follow up Plan: Pt tolerated tx well this date. Pt was I with toilet t/f. No goals met this tx. Continue POC.        Outcome Measures       09/16/17 1500 09/15/17 1500 09/15/17 1046    How much help from another person do you currently need...    Turning from your back to your side while in flat bed without using bedrails?   4  -SEJAL    Moving from lying on back to sitting on the side of a flat bed without bedrails?   4  -SEJAL    Moving to and from a bed to a chair (including a wheelchair)?   4  -SEJAL    Standing up from a chair using your arms (e.g., wheelchair, bedside chair)?   4  -SEJAL    Climbing 3-5 steps with a railing?   3  -SEJAL    To walk in hospital room?   4  -SEJAL    AM-PAC 6 Clicks Score   23  -SEJAL    How much help from another is currently needed...    Putting on and taking off regular lower body clothing? 4  -CS 4  -CS     Bathing (including washing, rinsing, and drying) 4  -CS 4  -CS     Toileting (which includes using toilet bed pan or urinal) 4  -CS 4  -CS     Putting on and taking off regular upper body clothing 4  -CS 4  -CS     Taking care of personal grooming (such as brushing teeth) 4  -CS 4  -CS     Eating meals 4  -CS 4  -CS     Score 24  -CS 24  -CS     Functional Assessment    Outcome Measure Options AM-PAC 6 Clicks Daily Activity (OT)  -CS  -[facility] 6 Clicks Daily Activity (OT)  -[facility] 6 Clicks Basic Mobility (PT)  -      09/14/17 1309 09/14/17 0740       How much help from another person do you currently need...    Turning from your back to your side while in flat bed without using bedrails? 4  -SEJAL      Moving from lying on back to sitting on the side of a flat bed without bedrails? 4  -SEJAL      Moving to and from a bed to a chair (including a wheelchair)? 3  -SEJAL      Standing up from a chair using your arms (e.g., wheelchair, bedside chair)? 4  -SEJAL      Climbing 3-5 steps with a railing? 3  -SEJAL      To walk in hospital room? 3  -SEJAL      AM-[facility] 6 Clicks Score 21  -SEJAL      How much help from another is currently needed...    Putting on and taking off regular lower body clothing?  4  -KD     Bathing (including washing, rinsing, and drying)  4  -KD     Toileting (which includes using toilet bed pan or urinal)  4  -KD     Putting on and taking off regular upper body clothing  4  -KD     Taking care of personal grooming (such as brushing teeth)  4  -KD     Eating meals  4  -KD     Score  24  -KD     Functional Assessment    Outcome Measure Options AM-[facility] 6 Clicks Basic Mobility (PT)  -        User Key  (r) = Recorded By, (t) = Taken By, (c) = Cosigned By    Initials Name Provider Type     Shalom Ross, BRUNO Physical Therapy Assistant    KD MADDI Kelley/L Occupational Therapy Assistant    JAM Zamora Occupational Therapy Assistant           Time Calculation:         Time Calculation- OT       09/16/17 1527          Time Calculation- OT    OT Start Time 1412  -CS      OT Stop Time 1505  -      OT Time Calculation (min) 53 min  -      Total Timed Code Minutes- OT 53 minute(s)  -CS      OT Received On 09/16/17  -        User Key  (r) = Recorded By, (t) = Taken By, (c) = Cosigned By    Initials Name Provider Type    MADDI Zamora/JARAD Occupational Therapy Assistant           Therapy Charges for Today     Code  Description Service Date Service Provider Modifiers Qty    28711606535 HC OT SELF CARE/MGMT/TRAIN EA 15 MIN 9/15/2017 Madhavi JARAD Watters LINDA/L GO 1    77000070395 HC OT THER PROC EA 15 MIN 9/15/2017 Madhavi JARAD Watters LINDA/L GO 2    26853922167 HC OT SELF CARE/MGMT/TRAIN EA 15 MIN 9/16/2017 Madhavi JARAD Watters LINDA/L GO 2    62577953575 HC OT THER PROC EA 15 MIN 9/16/2017 Madhavi HILARY GarciaA/L GO 1    42513213345 HC OT THERAPEUTIC ACT EA 15 MIN 9/16/2017 Madhavi ABRAHAM HILARY WattersA/L GO 1          OT G-codes  OT Professional Judgement Used?: Yes  OT Functional Scales Options: AM-PAC 6 Clicks Daily Activity (OT)  Score: 23  Functional Limitation: Self care  Self Care Current Status (): At least 1 percent but less than 20 percent impaired, limited or restricted  Self Care Goal Status (): 0 percent impaired, limited or restricted    MADDI Hare/JARAD  9/16/2017

## 2017-09-17 NOTE — THERAPY TREATMENT NOTE
Acute Care - Physical Therapy Treatment Note  [facility]     Patient Name: Damaris Sánchez  : 1945  MRN: [id number]  Today's Date: 2017  Onset of Illness/Injury or Date of Surgery Date: 17  Date of Referral to PT: 17  Referring Physician: Dr. Barrera    Admit Date: 2017    Visit Dx:    ICD-10-CM ICD-9-CM   1. Lower abdominal pain R10.30 789.09   2. Leukocytosis, unspecified type D72.829 288.60   3. Hypokalemia E87.6 276.8   4. C. difficile colitis A04.7 008.45   5. Impaired physical mobility Z74.09 781.99   6. Impaired mobility and ADLs Z74.09 799.89     Patient Active Problem List   Diagnosis   • Lower abdominal pain   • C. difficile colitis   • Inflammatory bowel disease   • Hypokalemia               Adult Rehabilitation Note       17 0810 17 1556 17 1412    Rehab Assessment/Intervention    Discipline physical therapy assistant  -AM physical therapy assistant  -TA occupational therapy assistant  -CS    Document Type therapy note (daily note)  -AM therapy note (daily note)  -TA therapy note (daily note)  -CS    Subjective Information agree to therapy;complains of;pain  -AM agree to therapy  -TA agree to therapy  -CS    Patient Effort, Rehab Treatment good  -AM good  -TA     Symptoms Noted During/After Treatment increased pain  -AM      Precautions/Limitations no known precautions/limitations  -AM fall precautions  -TA fall precautions  -CS    Equipment Issued to Patient gait belt  -AM      Recorded by [AM] Cong Zavala PTA [TA] Sera Bowens PTA [CS] Madhavi Watters, LINDA/L    Vital Signs    Pre Systolic BP Rehab 147  -AM      Pre Treatment Diastolic BP 67  -AM      Post Systolic BP Rehab 127  -AM      Post Treatment Diastolic BP 94  -AM      Pretreatment Heart Rate (beats/min) 84  -AM 84  -TA     Posttreatment Heart Rate (beats/min) 104  -AM 87  -TA     Pre SpO2 (%) 97  -AM 96  -TA     O2 Delivery Pre Treatment room air  -AM room air  -TA     Post SpO2 (%) 97   -AM 97  -TA     O2 Delivery Post Treatment room air  -AM      Pre Patient Position Standing  -AM Supine  -TA Sitting  -CS    Intra Patient Position Sitting  -AM  Standing  -CS    Post Patient Position Sitting  -AM Supine  -TA Sitting  -CS    Recorded by [AM] Cong Zavala PTA [TA] Sera Bowens PTA [CS] Madhavi Watters, LINDA/L    Pain Assessment    Pain Assessment 0-10  -AM No/denies pain  -TA 0-10  -CS    Pain Score 3  -AM  3  -CS    Post Pain Score 5  -AM  4  -CS    Pain Type Acute pain  -AM      Pain Location Abdomen  -AM  Back  -CS    Pain Orientation Left  -AM      Pain Descriptors Sore  -AM      Pain Frequency Constant/continuous  -AM      Date Pain First Started 09/06/17  -AM      Clinical Progression Gradually improving  -AM      Patient's Stated Pain Goal No pain  -AM      Pain Intervention(s) Ambulation/increased activity  -AM      Result of Injury No  -AM      Work-Related Injury No  -AM      Multiple Pain Sites No  -AM      Recorded by [AM] Cong Zavala PTA [TA] Sera Bowens PTA [CS] Madhavi Watters, LINDA/L    Cognitive Assessment/Intervention    Current Cognitive/Communication Assessment functional  -AM functional  -TA functional  -CS    Orientation Status oriented x 4  -AM oriented x 4  -TA oriented x 4  -CS    Follows Commands/Answers Questions 100% of the time  -% of the time  -% of the time  -CS    Personal Safety  WNL/WFL  -TA     Personal Safety Interventions gait belt;nonskid shoes/slippers when out of bed;supervised activity  -AM gait belt;nonskid shoes/slippers when out of bed  -TA     Recorded by [AM] Cong Zavala PTA [TA] Sera Bowens PTA [CS] Madhavi Watters, LINDA/L    ROM (Range of Motion)    General ROM no range of motion deficits identified  -AM      Recorded by [AM] Cong Zavala PTA      Bed Mobility, Assessment/Treatment    Bed Mobility, Roll Right, [place] not tested  -AM independent  -TA     Bed Mobility, Scoot/Bridge, [place] not  tested  -AM independent  -TA     Bed Mob, Supine to Sit, [place] independent  -AM independent  -TA     Bed Mob, Sit to Supine, [place] independent  -AM independent  -TA     Bed Mob, Sidelying to Sit, [place] not tested  -AM      Bed Mob, Sit to Sidelying, [place] not tested  -AM      Recorded by [AM] Cong Zavala PTA [TA] Sera Bowens PTA     Transfer Assessment/Treatment    Transfers, Bed-Chair [place] independent  -AM      Transfers, Chair-Bed [place] independent  -AM      Transfers, Sit-Stand [place] independent  -AM independent  -TA independent  -CS    Transfers, Stand-Sit [place] independent  -AM independent  -TA independent  -CS    Transfers, Sit-Stand-Sit, Assist Device  other (see comments)   no AD  -TA     Toilet Transfer, [place] independent  -AM independent  -TA independent   x2  -CS    Walk-In Shower Transfer, [place] not tested  -AM      Bathtub Transfer, [place] not tested  -AM      Transfer, Maintain Weight Bearing Status able to maintain weight bearing status  -AM      Recorded by [AM] Cong Zavala PTA [TA] Sera Bownes, PTA [CS] Madhavi Watters, LINDA/L    Gait Assessment/Treatment    Gait, [place] Level independent  -AM independent  -TA     Gait, Distance (Feet) 500  -  -TA     Gait, Gait Pattern Analysis swing-through gait  -AM      Gait, Maintain Weight Bearing Status able to maintain weight bearing status  -AM      Recorded by [AM] Cong Zavala PTA [TA] Sera Bowens PTA     Stairs Assessment/Treatment    Number of Stairs 3   X2 attempts  -AM      Stairs, Handrail Location both sides  -AM      Stairs, [place] Level conditional independence  -AM      Stairs, Technique Used step to step (ascending);step to step (descending)  -AM      Stairs, Maintain Weight Bearing Status able to maintain weight bearing status  -AM      Recorded by [AM] Cong Zavala PTA      Functional Mobility    Functional Mobility-  Ind. Level   independent  -CS    Functional Mobility-Distance (Feet)   15  -CS    Recorded by   [CS] JAM Hare    Toileting Assessment/Training    Toileting Assess/Train, Assistive Device   raised toilet seat  -CS    Toileting Assess/Train, Position   sitting;standing  -CS    Toileting Assess/Train, Indepen Level   independent   x2  -CS    Recorded by   [CS] JAM Hare    Balance Skills Training    Standing-Balance Activities   Reaching for objects;Reaching across midline  -CS    Standing Balance # of Minutes   5  -CS    Recorded by   [CS] JAM Hare    Therapy Exercises    Bilateral Upper Extremity   AROM:;20 reps;sitting;elbow flexion/extension;hand pumps;pronation/supination;shoulder extension/flexion;shoulder ER/IR  -CS    BUE Resistance   manual resistance- minimal  -CS    Recorded by   [CS] JAM Hare    Positioning and Restraints    Pre-Treatment Position standing in room  -AM in bed  -TA in bed  -CS    Post Treatment Position bed  -AM bed  -TA bed  -CS    In Bed sitting EOB;call light within reach;encouraged to call for assist  -AM supine;call light within reach  -TA sitting EOB;with family/caregiver  -CS    Recorded by [AM] Cong Zavala PTA [TA] Sera Bowens PTA [CS] JAM Hare      09/15/17 1418 09/15/17 1046 09/14/17 1309    Rehab Assessment/Intervention    Discipline occupational therapy assistant  -CS physical therapy assistant  -SEJAL physical therapy assistant  -SEJAL    Document Type therapy note (daily note)  -CS therapy note (daily note)  -SEJAL therapy note (daily note)  -SEJAL    Subjective Information agree to therapy  -CS agree to therapy  -SEJAL agree to therapy  -SEJAL    Patient Effort, Rehab Treatment  good  -SEJAL good  -SEJAL    Precautions/Limitations fall precautions  -CS fall precautions  -SEJAL fall precautions  -SEJAL    Recorded by [CS] JAM Hare [SEJAL] Shalom Ross, PTA [SEJAL] Shalom Ross PTA    Vital Signs    Pre  Systolic BP Rehab  159  -SEJAL 140  -SEJAL    Pre Treatment Diastolic BP  79  -SEJAL 76  -SEJAL    Intra Treatment Diastolic BP   156  -SEJAL    Post Systolic BP Rehab 161  -  -SEJAL 73  -SEJAL    Post Treatment Diastolic BP 76  -CS 72  -SEJAL     Pretreatment Heart Rate (beats/min)  92  -SEJAL 100  -SEJAL    Intratreatment Heart Rate (beats/min)   104  -SEJAL    Posttreatment Heart Rate (beats/min) 100  -CS 96  -SEJAL 111  -SEJAL    Pre SpO2 (%)  97  -SEJAL 99  -SEJAL    O2 Delivery Pre Treatment  room air  -SEJAL room air  -SEJAL    Intra SpO2 (%)   99  -SEJAL    O2 Delivery Intra Treatment   room air  -SEJAL    Post SpO2 (%) 95  -CS 98  -SEJAL 99  -SEJAL    O2 Delivery Post Treatment room air  -CS room air  -SEJAL room air  -SEJAL    Pre Patient Position Supine  -CS Supine  -SEJAL Supine  -SEJAL    Intra Patient Position Sitting  -CS      Post Patient Position Sitting  -CS Sitting  -SEJAL Sitting  -SEJAL    Recorded by [CS] MADDI Hare/L [SEJAL] Shalom Ross PTA [SEJAL] Shalom Ross PTA    Pain Assessment    Pain Assessment 0-10  -CS No/denies pain  -SEJAL No/denies pain  -SEJAL    Pain Score 2  -CS      Post Pain Score 2  -CS      Pain Location Abdomen  -CS      Recorded by [CS] MADDI Hare/JARAD [SEJAL] Shalom Ross PTA [SEJAL] Shalom Ross PTA    Vision Assessment/Intervention    Visual Impairment  WFL with corrective lenses  -SEJAL WFL with corrective lenses  -SEJAL    Recorded by  [SEJAL] hSalom Ross PTA [SEJAL] Shalom Ross PTA    Cognitive Assessment/Intervention    Current Cognitive/Communication Assessment functional  -CS functional  -SEJAL functional  -SEJAL    Orientation Status oriented x 4  -CS oriented x 4  -SEJAL oriented x 4  -SEJAL    Follows Commands/Answers Questions 100% of the time  -% of the time  -SEJAL 100% of the time  -SEJAL    Personal Safety Interventions  gait belt;muscle strengthening facilitated;nonskid shoes/slippers when out of bed;supervised activity  -SEJAL gait belt;muscle strengthening facilitated;nonskid shoes/slippers when out of bed;supervised  activity  -SEJAL    Recorded by [CS] MADDI Hare/L [SEJAL] Shalom Ross PTA [SEJAL] Shalom Ross PTA    Bed Mobility, Assessment/Treatment    Bed Mobility, Assistive Device  bed rails;head of bed elevated  -SEJAL bed rails;head of bed elevated  -SEJAL    Bed Mob, Supine to Sit, [place] conditional independence  -CS conditional independence  -SEJAL conditional independence  -SEJAL    Recorded by [CS] MADDI Hare/L [SEJAL] Shalom Ross PTA [SEJAL] Shalom Ross PTA    Transfer Assessment/Treatment    Transfers, Sit-Stand [place]  independent  -SEJAL independent  -SEJAL    Transfers, Stand-Sit [place]  independent  -SEJAL independent  -SEJAL    Transfers, Sit-Stand-Sit, Assist Device  --   no AD  -SEJAL --   no AD  -SEJAL    Toilet Transfer, [place]  independent  -SEJAL     Toilet Transfer, Assistive Device  other (see comments)   no AD  -SEJAL     Transfer, Comment  pt completed 2 BR t/fs this tx. pt completed her own pericare. indep w/ t/fs.   -SEJAL multiple sit-stands.   -SEJAL    Recorded by  [SEJAL] Shalom Ross PTA [SEJAL] Shalom Ross PTA    Gait Assessment/Treatment    Gait, [place] Level  independent;supervision required  -SEJAL supervision required  -SEJAL    Gait, Assistive Device  other (see comments)   no AD  -SEJAL --   no AD  -SEJAL    Gait, Distance (Feet)  570   + multiple short distances in room  -SEJAL 200  -SEJAL    Gait, Gait Deviations  --   fast irving  -SEJAL     Gait, Comment  1 self corrected, minor LOB while turning  -SEJAL fast irving and slightly unsteady. somewhat impulsive.   -SEJAL    Recorded by  [SEJAL] Shalom Ross PTA [SEJAL] Shalom Ross PTA    Stairs Assessment/Treatment    Number of Stairs  5  -SEJAL 5  -SEJAL    Stairs, Handrail Location  both sides  -SEJAL none  -SEJAL    Stairs, [place] Level  supervision required  -SEJAL minimum assist (75% patient effort)  -SEJAL    Stairs, Technique Used  step to step (ascending);step to step (descending)  -SEJAL step to step (ascending);step to step (descending)   edu on  step-to for safety.   -SEJAL    Stairs, Comment  2 attempts  -SEJAL 3 trails w/o handrail min A. 1 trial w/ dianna hr, SBA. pt states her son will build her hand rails.   -SEJAL    Recorded by  [SEJAL] Shalom Ross PTA [SEJAL] Shalom Ross PTA    Therapy Exercises    Bilateral Lower Extremities  AROM:;20 reps;standing;heel raises;hip abduction/adduction;mini squats  -SEJAL AROM:;20 reps;supine;ankle pumps/circles;hip abduction/adduction;heel slides;SAQ;SLR  -SEJAL    Bilateral Upper Extremity AROM:;20 reps;sitting;elbow flexion/extension;hand pumps;pronation/supination;shoulder extension/flexion;shoulder ER/IR  -CS      BUE Resistance theraband  -CS      Recorded by [CS] JAM Hare [SEJAL] Shalom Ross PTA [SEJAL] Shalom Ross PTA    Positioning and Restraints    Pre-Treatment Position in bed  - in bed  -SEJAL in bed  -SEJAL    Post Treatment Position bed  - bed  -SEJAL bed  -SEJAL    In Bed sitting EOB;call light within reach;with family/caregiver  -CS sitting;call light within reach;encouraged to call for assist   all needs met  -SEJAL sitting EOB;call light within reach;encouraged to call for assist;with family/caregiver   all needs met.   -SEJAL    Recorded by [CS] JAM Hare [SEJAL] Shalom Ross PTA [SEJAL] Shalom Ross PTA      User Key  (r) = Recorded By, (t) = Taken By, (c) = Cosigned By    Initials Name Effective Dates    TA Sera Bowens, BRUNO 10/17/16 -     SEJAL Shalom Ross PTA 10/17/16 -     AM Cong Zavala, BRUNO 10/17/16 -     CS JAM Hare 10/17/16 -                 IP PT Goals       09/17/17 0810 09/16/17 1635 09/16/17 1556    Transfer Training PT STG    Transfer Training PT STG, Outcome  goal met  -TA     Gait Training PT STG    Gait Training Goal PT STG, Outcome   goal met  -TA    Stair Training PT LTG    Stair Training Goal PT LTG, Date Goal Reviewed 09/17/17  -AM      Stair Training Goal PT LTG, Outcome goal met  -AM      Physical Therapy PT LTG    Physical Therapy PT LTG,  Date Goal Reviewed 09/17/17  -AM      Physical Therapy PT LTG, Outcome goal met  -AM        09/15/17 1046 09/14/17 1309 09/13/17 0955    Transfer Training PT STG    Transfer Training PT STG, Date Established   09/13/17  -CZ    Transfer Training PT STG, Time to Achieve   2 days  -CZ    Transfer Training PT STG, Activity Type   bed to chair /chair to bed;sit to stand/stand to sit  -CZ    Transfer Training PT STG, [place] Level   independent  -CZ    Transfer Training PT STG, Date Goal Reviewed 09/15/17  -SEJAL 09/14/17  -SEJAL 09/13/17  -CZ    Transfer Training PT STG, Outcome goal ongoing  -SEJAL goal ongoing  -SEAJL goal ongoing  -CZ    Gait Training PT STG    Gait Training Goal PT STG, Date Established   09/13/17  -CZ    Gait Training Goal PT STG, Time to Achieve   2 days  -CZ    Gait Training Goal PT STG, [place] Level   independent  -CZ    Gait Training Goal PT STG, Distance to Achieve   200'x1.  -CZ    Gait Training Goal PT STG, Additional Goal   No AD.   -CZ    Gait Training Goal PT STG, Date Goal Reviewed 09/15/17  -SEJAL 09/14/17  -SEJAL 09/13/17  -CZ    Gait Training Goal PT STG, Outcome goal ongoing  -SEJAL goal ongoing  -SEJAL goal ongoing  -CZ    Stair Training PT LTG    Stair Training Goal PT LTG, Date Established   09/13/17  -CZ    Stair Training Goal PT LTG, Time to Achieve   by discharge  -CZ    Stair Training Goal PT LTG, Number of Steps   2  -CZ    Stair Training Goal PT LTG, [place] Level   conditional independence  -CZ    Stair Training Goal PT LTG, Assist Device   2 handrails  -CZ    Stair Training Goal PT LTG, Date Goal Reviewed 09/15/17  -SEJAL 09/14/17  -SEJAL 09/13/17  -CZ    Stair Training Goal PT LTG, Outcome goal ongoing  -SEJAL goal ongoing  -SEJAL goal ongoing  -CZ    Physical Therapy PT LTG    Physical Therapy PT LTG, Time to Achieve   by discharge  -CZ    Physical Therapy PT LTG, Activity Type   [place] fall risk assessment.   -CZ    Physical Therapy PT LTG, Addisional Goal   Score >/= 24/28 or low fall  risk.   -CZ    Physical Therapy PT LTG, Date Goal Reviewed 09/15/17  -SEJAL 09/14/17  -SEJAL 09/13/17  -    Physical Therapy PT LTG, Outcome goal ongoing  - goal ongoing  - goal ongoing  -      User Key  (r) = Recorded By, (t) = Taken By, (c) = Cosigned By    Initials Name Provider Type    TAMARA Bowens, PTA Physical Therapy Assistant    SEJAL Ross, PTA Physical Therapy Assistant     Cong Zavala, PTA Physical Therapy Assistant     Jesus Shahid, PT Physical Therapist          Physical Therapy Education     Title: PT OT SLP Therapies (Active)     Topic: Physical Therapy (Active)     Point: Mobility training (Active)    Learning Progress Summary    Learner Readiness Method Response Comment Documented by Status   Patient Acceptance E NR   09/16/17 1635 Active    Acceptance E NR   09/15/17 1309 Active    Acceptance E NR stair taining as well as gait and t/f training.  09/14/17 1410 Active               Point: Home exercise program (Done)    Learning Progress Summary    Learner Readiness Method Response Comment Documented by Status   Patient Eager E,TB,D,H VU,DU HEP given AM 09/17/17 0810 Done               Point: Precautions (Active)    Learning Progress Summary    Learner Readiness Method Response Comment Documented by Status   Patient Acceptance E NR   09/16/17 1635 Active    Acceptance E NR [place] assessed: 22/28 or moderate fall risk.  Discussed falls, risk and stepping response with patient and her son.  09/13/17 1148 Active   Family Acceptance E NR [place] assessed: 22/28 or moderate fall risk.  Discussed falls, risk and stepping response with patient and her son.  09/13/17 1148 Active                      User Key     Initials Effective Dates Name Provider Type Discipline    TA 10/17/16 -  Sera Bowens, BRUNO Physical Therapy Assistant PT     10/17/16 -  Shalom Ross, PTA Physical Therapy Assistant PT    AM 10/17/16 -  oCng Zavala, PTA Physical Therapy Assistant PT     CZ 02/17/17 -  Jesus Shahid, PT Physical Therapist PT                    PT Recommendation and Plan  Anticipated Discharge Disposition: home with home health  Planned Therapy Interventions: balance training, bed mobility training, gait training, patient/family education, stair training, strengthening, stretching, transfer training  PT Frequency: other (see comments) (5-14x/week)  Plan of Care Review  Plan Of Care Reviewed With: patient, family  Progress: progress toward functional goals as expected  Outcome Summary/Follow up Plan: Pt met all PT goals this date. Pt ind w/all mobility (in/out bed, amb 500 ft and up/down steps). HEP given. Pt would benefit from ?HH/OP PT once discharged from this facility.           Outcome Measures       09/17/17 0810 09/16/17 1600 09/16/17 1500    How much help from another person do you currently need...    Turning from your back to your side while in flat bed without using bedrails? 4  -AM 4  -TA     Moving from lying on back to sitting on the side of a flat bed without bedrails? 4  -AM 4  -TA     Moving to and from a bed to a chair (including a wheelchair)? 4  -AM 4  -TA     Standing up from a chair using your arms (e.g., wheelchair, bedside chair)? 4  -AM 4  -TA     Climbing 3-5 steps with a railing? 4  -AM 3  -TA     To walk in hospital room? 4  -AM 4  -TA     AM-PAC 6 Clicks Score 24  -AM 23  -TA     How much help from another is currently needed...    Putting on and taking off regular lower body clothing?   4  -CS    Bathing (including washing, rinsing, and drying)   4  -CS    Toileting (which includes using toilet bed pan or urinal)   4  -CS    Putting on and taking off regular upper body clothing   4  -CS    Taking care of personal grooming (such as brushing teeth)   4  -CS    Eating meals   4  -CS    Score   24  -CS    [place] Assessment    [place] Assessment yes  -AM      Sitting Balance 1  -AM      Arises 2  -AM      Attempts to Rise 2  -AM      Immediate Standing  "Balance (first 5 sec) 2  -AM      Standing Balance 2  -AM      Sternal Nudge (feet close together) 1  -AM      Eyes Closed (feet close together) 1  -AM      Turning 360 Degrees- Steps 1  -AM      Turning 360 Degrees- Steadiness 1  -AM      Sitting Down 2  -AM      [place] Balance Score 15  -AM      Gait Initiation (immediate after told \"go\") 1  -AM      Step Length- Right Swing 1  -AM      Step Length- Left Swing 1  -AM      Foot Clearance- Right Foot 1  -AM      Foot Clearance- Left Foot 1  -AM      Step Symmetry 1  -AM      Step Continuity 1  -AM      Path (excursion) 2  -AM      Trunk 2  -AM      Base of Support 1  -AM      Gait Score 12  -AM      [place] Total Score 27  -AM      [place] Assistive Device --   none  -AM      Functional Assessment    Outcome Measure Options AM-PAC 6 Clicks Basic Mobility (PT);[place]etti  -AM AM-PAC 6 Clicks Basic Mobility (PT)  -TA AM-PAC 6 Clicks Daily Activity (OT)  -CS      09/15/17 1500 09/15/17 1046 09/14/17 1309    How much help from another person do you currently need...    Turning from your back to your side while in flat bed without using bedrails?  4  -SEJAL 4  -SEJAL    Moving from lying on back to sitting on the side of a flat bed without bedrails?  4  -SEJAL 4  -SEJAL    Moving to and from a bed to a chair (including a wheelchair)?  4  -SEJAL 3  -SEJAL    Standing up from a chair using your arms (e.g., wheelchair, bedside chair)?  4  -SEJAL 4  -SEJAL    Climbing 3-5 steps with a railing?  3  -SEJAL 3  -SEJAL    To walk in hospital room?  4  -SEJAL 3  -SEJAL    AM-PAC 6 Clicks Score  23  -SEJAL 21  -SEJAL    How much help from another is currently needed...    Putting on and taking off regular lower body clothing? 4  -CS      Bathing (including washing, rinsing, and drying) 4  -CS      Toileting (which includes using toilet bed pan or urinal) 4  -CS      Putting on and taking off regular upper body clothing 4  -CS      Taking care of personal grooming (such as brushing teeth) 4  -CS      Eating meals 4  -CS      " Score 24  -CS      Functional Assessment    Outcome Measure Options AM-PAC 6 Clicks Daily Activity (OT)  -CS AM-PAC 6 Clicks Basic Mobility (PT)  -SEJAL AM-PAC 6 Clicks Basic Mobility (PT)  -SEJAL      User Key  (r) = Recorded By, (t) = Taken By, (c) = Cosigned By    Initials Name Provider Type    TA Sera Bowens, PTA Physical Therapy Assistant    SEJAL Ross, PTA Physical Therapy Assistant    AM Cong Zavala, BRUNO Physical Therapy Assistant    MADDI Zamora/JARAD Occupational Therapy Assistant           Time Calculation:         PT Charges       09/17/17 0810          Time Calculation    Start Time 0810  -AM      Stop Time 0850  -AM      Time Calculation (min) 40 min  -AM      PT Received On 09/17/17  -AM      PT - Next Appointment 09/18/17  -AM      Time Calculation- PT    Total Timed Code Minutes- PT 40 minute(s)  -AM        User Key  (r) = Recorded By, (t) = Taken By, (c) = Cosigned By    Initials Name Provider Type    AM Cong Zavala PTA Physical Therapy Assistant          Therapy Charges for Today     Code Description Service Date Service Provider Modifiers Qty    42314700458 HC GAIT TRAINING EA 15 MIN 9/17/2017 Cong Zavala PTA GP 1    19743154710 HC PT THER PROC EA 15 MIN 9/17/2017 Cong Zavala PTA GP 1    42887293734 HC PT SELF CARE/MGMT/TRAIN EA 15 MIN 9/17/2017 Cong Zavala, BRUNO GP 1          PT G-Codes  PT Professional Judgement Used?: Yes  Outcome Measure Options: AM-PAC 6 Clicks Basic Mobility (PT), [place]  Score: 22  Functional Limitation: Mobility: Walking and moving around  Mobility: Walking and Moving Around Current Status (): At least 20 percent but less than 40 percent impaired, limited or restricted  Mobility: Walking and Moving Around Goal Status (): At least 1 percent but less than 20 percent impaired, limited or restricted    Cong Zavala PTA  9/17/2017

## 2017-09-17 NOTE — PLAN OF CARE
Problem: Patient Care Overview (Adult)  Goal: Plan of Care Review  Outcome: Ongoing (interventions implemented as appropriate)    09/17/17 0221   Coping/Psychosocial Response Interventions   Plan Of Care Reviewed With patient   Patient Care Overview   Progress improving   Outcome Evaluation   Outcome Summary/Follow up Plan pt still having loose/soft stools       Goal: Adult Individualization and Mutuality  Outcome: Ongoing (interventions implemented as appropriate)  Goal: Discharge Needs Assessment  Outcome: Ongoing (interventions implemented as appropriate)    Problem: Fluid Volume Deficit (Adult)  Goal: Fluid/Electrolyte Balance  Outcome: Ongoing (interventions implemented as appropriate)

## 2017-09-17 NOTE — THERAPY DISCHARGE NOTE
Acute Care - Physical Therapy Treatment Note/Discharge  [facility]     Patient Name: Damaris Sánchez  : 1945  MRN: [id number]  Today's Date: 2017  Onset of Illness/Injury or Date of Surgery Date: 17  Date of Referral to PT: 17  Referring Physician: Dr. Barrera    Admit Date: 2017    Visit Dx:    ICD-10-CM ICD-9-CM   1. Lower abdominal pain R10.30 789.09   2. Leukocytosis, unspecified type D72.829 288.60   3. Hypokalemia E87.6 276.8   4. C. difficile colitis A04.7 008.45   5. Impaired physical mobility Z74.09 781.99   6. Impaired mobility and ADLs Z74.09 799.89     Patient Active Problem List   Diagnosis   • Lower abdominal pain   • C. difficile colitis   • Inflammatory bowel disease   • Hypokalemia       Physical Therapy Education     Title: PT OT SLP Therapies (Done)     Topic: Physical Therapy (Resolved)     Point: Mobility training (Resolved)    Learning Progress Summary    Learner Readiness Method Response Comment Documented by Status   Patient Acceptance E NR  TA 17 1635 Active    Acceptance E NR  SEJAL 09/15/17 1309 Active    Acceptance E NR stair taining as well as gait and t/f training.  17 1410 Active               Point: Home exercise program (Resolved)    Learning Progress Summary    Learner Readiness Method Response Comment Documented by Status   Patient Eager E,TB,D,H VU,DU HEP given AM 17 0810 Done               Point: Precautions (Resolved)    Learning Progress Summary    Learner Readiness Method Response Comment Documented by Status   Patient Acceptance E NR  TA 17 1635 Active    Acceptance E NR Tinetti assessed: 22/28 or moderate fall risk.  Discussed falls, risk and stepping response with patient and her son.  17 1148 Active   Family Acceptance E NR Tinetti assessed: 22/28 or moderate fall risk.  Discussed falls, risk and stepping response with patient and her son.  17 1148 Active                      User Key     Initials  Effective Dates Name Provider Type Discipline    TA 10/17/16 -  Sera Bowens, PTA Physical Therapy Assistant PT    SEJAL 10/17/16 -  Shalom Ross, PTA Physical Therapy Assistant PT    AM 10/17/16 -  Cong Zavala, PTA Physical Therapy Assistant PT    CZ 02/17/17 -  Jesus Shahid, PT Physical Therapist PT                    IP PT Goals       09/17/17 0810 09/16/17 1635 09/16/17 1556    Transfer Training PT STG    Transfer Training PT STG, Outcome  goal met  -TA     Gait Training PT STG    Gait Training Goal PT STG, Outcome   goal met  -TA    Stair Training PT LTG    Stair Training Goal PT LTG, Date Goal Reviewed 09/17/17  -AM      Stair Training Goal PT LTG, Outcome goal met  -AM      Physical Therapy PT LTG    Physical Therapy PT LTG, Date Goal Reviewed 09/17/17  -AM      Physical Therapy PT LTG, Outcome goal met  -AM        09/15/17 1046 09/14/17 1309 09/13/17 0955    Transfer Training PT STG    Transfer Training PT STG, Date Established   09/13/17  -CZ    Transfer Training PT STG, Time to Achieve   2 days  -CZ    Transfer Training PT STG, Activity Type   bed to chair /chair to bed;sit to stand/stand to sit  -CZ    Transfer Training PT STG, [place] Level   independent  -CZ    Transfer Training PT STG, Date Goal Reviewed 09/15/17  -SEJAL 09/14/17  -SEJAL 09/13/17  -CZ    Transfer Training PT STG, Outcome goal ongoing  -SEJAL goal ongoing  -SEJAL goal ongoing  -CZ    Gait Training PT STG    Gait Training Goal PT STG, Date Established   09/13/17  -CZ    Gait Training Goal PT STG, Time to Achieve   2 days  -CZ    Gait Training Goal PT STG, [place] Level   independent  -CZ    Gait Training Goal PT STG, Distance to Achieve   200'x1.  -CZ    Gait Training Goal PT STG, Additional Goal   No AD.   -CZ    Gait Training Goal PT STG, Date Goal Reviewed 09/15/17  -SEJAL 09/14/17  -SEJAL 09/13/17  -CZ    Gait Training Goal PT STG, Outcome goal ongoing  -SEJAL goal ongoing  -SEJAL goal ongoing  -CZ    Stair Training PT LTG    Stair  Training Goal PT LTG, Date Established   09/13/17  -    Stair Training Goal PT LTG, Time to Achieve   by discharge  -    Stair Training Goal PT LTG, Number of Steps   2  -    Stair Training Goal PT LTG, [place] Level   conditional independence  -    Stair Training Goal PT LTG, Assist Device   2 handrails  -    Stair Training Goal PT LTG, Date Goal Reviewed 09/15/17  -SEJAL 09/14/17  -SEJAL 09/13/17  -    Stair Training Goal PT LTG, Outcome goal ongoing  -SEJAL goal ongoing  -SEJAL goal ongoing  -    Physical Therapy PT LTG    Physical Therapy PT LTG, Time to Achieve   by discharge  -    Physical Therapy PT LTG, Activity Type   Tinetti fall risk assessment.   -    Physical Therapy PT LTG, Addisional Goal   Score >/= 24/28 or low fall risk.   -    Physical Therapy PT LTG, Date Goal Reviewed 09/15/17  -SEJAL 09/14/17  -SEJAL 09/13/17  -    Physical Therapy PT LTG, Outcome goal ongoing  -SEJAL goal ongoing  -SEJAL goal ongoing  -      User Key  (r) = Recorded By, (t) = Taken By, (c) = Cosigned By    Initials Name Provider Type    TA Sera Bowens, PTA Physical Therapy Assistant    SEJAL Shalom Ross, PTA Physical Therapy Assistant    CHANI Zavala, PTA Physical Therapy Assistant     Jesus Shahid, PT Physical Therapist              Adult Rehabilitation Note       09/17/17 0810 09/16/17 1556 09/16/17 1412    Rehab Assessment/Intervention    Discipline physical therapy assistant  -AM physical therapy assistant  -TA occupational therapy assistant  -CS    Document Type therapy note (daily note)  -AM therapy note (daily note)  -TA therapy note (daily note)  -CS    Subjective Information agree to therapy;complains of;pain  -AM agree to therapy  -TA agree to therapy  -CS    Patient Effort, Rehab Treatment good  -AM good  -TA     Symptoms Noted During/After Treatment increased pain  -AM      Precautions/Limitations no known precautions/limitations  -AM fall precautions  -TA fall precautions  -CS    Equipment  Issued to Patient gait belt  -AM      Recorded by [AM] Cong Zavala PTA [TA] Sera Bowens PTA [CS] Madhavi Watters, LINDA/L    Vital Signs    Pre Systolic BP Rehab 147  -AM      Pre Treatment Diastolic BP 67  -AM      Post Systolic BP Rehab 127  -AM      Post Treatment Diastolic BP 94  -AM      Pretreatment Heart Rate (beats/min) 84  -AM 84  -TA     Posttreatment Heart Rate (beats/min) 104  -AM 87  -TA     Pre SpO2 (%) 97  -AM 96  -TA     O2 Delivery Pre Treatment room air  -AM room air  -TA     Post SpO2 (%) 97  -AM 97  -TA     O2 Delivery Post Treatment room air  -AM      Pre Patient Position Standing  -AM Supine  -TA Sitting  -CS    Intra Patient Position Sitting  -AM  Standing  -CS    Post Patient Position Sitting  -AM Supine  -TA Sitting  -CS    Recorded by [AM] Cong Zavala PTA [TA] Sera Bowens PTA [CS] HILARY HareA/L    Pain Assessment    Pain Assessment 0-10  -AM No/denies pain  -TA 0-10  -CS    Pain Score 3  -AM  3  -CS    Post Pain Score 5  -AM  4  -CS    Pain Type Acute pain  -AM      Pain Location Abdomen  -AM  Back  -CS    Pain Orientation Left  -AM      Pain Descriptors Sore  -AM      Pain Frequency Constant/continuous  -AM      Date Pain First Started 09/06/17  -AM      Clinical Progression Gradually improving  -AM      Patient's Stated Pain Goal No pain  -AM      Pain Intervention(s) Ambulation/increased activity  -AM      Result of Injury No  -AM      Work-Related Injury No  -AM      Multiple Pain Sites No  -AM      Recorded by [AM] Cong Zavala PTA [TA] Sera Bowens PTA [CS] Madhavi Watters LINDA/L    Cognitive Assessment/Intervention    Current Cognitive/Communication Assessment functional  -AM functional  -TA functional  -CS    Orientation Status oriented x 4  -AM oriented x 4  -TA oriented x 4  -CS    Follows Commands/Answers Questions 100% of the time  -% of the time  -% of the time  -CS    Personal Safety  WNL/WFL  -TA     Personal Safety  Interventions gait belt;nonskid shoes/slippers when out of bed;supervised activity  -AM gait belt;nonskid shoes/slippers when out of bed  -TA     Recorded by [AM] Cong Zavala PTA [TA] Sera Bowens, BRUNO [CS] Madhavi Watters, LINDA/L    ROM (Range of Motion)    General ROM no range of motion deficits identified  -AM      Recorded by [AM] Cong Zavala PTA      Bed Mobility, Assessment/Treatment    Bed Mobility, Roll Right, [place] not tested  -AM independent  -TA     Bed Mobility, Scoot/Bridge, [place] not tested  -AM independent  -TA     Bed Mob, Supine to Sit, [place] independent  -AM independent  -TA     Bed Mob, Sit to Supine, [place] independent  -AM independent  -TA     Bed Mob, Sidelying to Sit, [place] not tested  -AM      Bed Mob, Sit to Sidelying, [place] not tested  -AM      Recorded by [AM] Cong Zavala PTA [TA] Sera Bowens PTA     Transfer Assessment/Treatment    Transfers, Bed-Chair [place] independent  -AM      Transfers, Chair-Bed [place] independent  -AM      Transfers, Sit-Stand [place] independent  -AM independent  -TA independent  -CS    Transfers, Stand-Sit [place] independent  -AM independent  -TA independent  -CS    Transfers, Sit-Stand-Sit, Assist Device  other (see comments)   no AD  -TA     Toilet Transfer, [place] independent  -AM independent  -TA independent   x2  -CS    Walk-In Shower Transfer, [place] not tested  -AM      Bathtub Transfer, [place] not tested  -AM      Transfer, Maintain Weight Bearing Status able to maintain weight bearing status  -AM      Recorded by [AM] Cong Zavala PTA [TA] Sera Bowens, PTA [CS] Madhavi Watters, LINDA/L    Gait Assessment/Treatment    Gait, [place] Level independent  -AM independent  -TA     Gait, Distance (Feet) 500  -  -TA     Gait, Gait Pattern Analysis swing-through gait  -AM      Gait, Maintain Weight Bearing Status able to maintain weight bearing  status  -AM      Recorded by [AM] Cong Zavala PTA [TA] Sera Bowens PTA     Stairs Assessment/Treatment    Number of Stairs 3   X2 attempts  -AM      Stairs, Handrail Location both sides  -AM      Stairs, [place] Level conditional independence  -AM      Stairs, Technique Used step to step (ascending);step to step (descending)  -AM      Stairs, Maintain Weight Bearing Status able to maintain weight bearing status  -AM      Recorded by [AM] Cong Zavala PTA      Functional Mobility    Functional Mobility- Ind. Level   independent  -CS    Functional Mobility-Distance (Feet)   15  -CS    Recorded by   [CS] JAM Hare    Toileting Assessment/Training    Toileting Assess/Train, Assistive Device   raised toilet seat  -CS    Toileting Assess/Train, Position   sitting;standing  -CS    Toileting Assess/Train, Indepen Level   independent   x2  -CS    Recorded by   [CS] JAM Hare    Balance Skills Training    Standing-Balance Activities   Reaching for objects;Reaching across midline  -CS    Standing Balance # of Minutes   5  -CS    Recorded by   [CS] JAM Hare    Therapy Exercises    Bilateral Upper Extremity   AROM:;20 reps;sitting;elbow flexion/extension;hand pumps;pronation/supination;shoulder extension/flexion;shoulder ER/IR  -CS    BUE Resistance   manual resistance- minimal  -CS    Recorded by   [CS] JAM Hare    Positioning and Restraints    Pre-Treatment Position standing in room  -AM in bed  -TA in bed  -CS    Post Treatment Position bed  -AM bed  -TA bed  -CS    In Bed sitting EOB;call light within reach;encouraged to call for assist  -AM supine;call light within reach  -TA sitting EOB;with family/caregiver  -CS    Recorded by [AM] Cong Zavala PTA [TA] Sera Bowens PTA [CS] JAM Hare      09/15/17 1418 09/15/17 1046 09/14/17 1309    Rehab Assessment/Intervention    Discipline occupational therapy assistant  -CS physical  therapy assistant  -SEJAL physical therapy assistant  -SEJAL    Document Type therapy note (daily note)  -CS therapy note (daily note)  -SEJAL therapy note (daily note)  -SEJAL    Subjective Information agree to therapy  -CS agree to therapy  -SEJAL agree to therapy  -SEJAL    Patient Effort, Rehab Treatment  good  -SEJAL good  -SEJAL    Precautions/Limitations fall precautions  -CS fall precautions  -SEJAL fall precautions  -SEJAL    Recorded by [CS] MADDI Hare/L [SEJAL] Shalom Ross PTA [SEJAL] Shalom Ross PTA    Vital Signs    Pre Systolic BP Rehab  159  -SEJAL 140  -SEJAL    Pre Treatment Diastolic BP  79  -SEJAL 76  -SEJAL    Intra Treatment Diastolic BP   156  -SEJAL    Post Systolic BP Rehab 161  -  -SEJAL 73  -SEJAL    Post Treatment Diastolic BP 76  -CS 72  -SEJAL     Pretreatment Heart Rate (beats/min)  92  -SEJAL 100  -SEJAL    Intratreatment Heart Rate (beats/min)   104  -SEJAL    Posttreatment Heart Rate (beats/min) 100  -CS 96  -SEJAL 111  -SEJAL    Pre SpO2 (%)  97  -SEJAL 99  -SEJAL    O2 Delivery Pre Treatment  room air  -SEJAL room air  -SEJAL    Intra SpO2 (%)   99  -SEJAL    O2 Delivery Intra Treatment   room air  -SEJAL    Post SpO2 (%) 95  -CS 98  -SEJAL 99  -SEJAL    O2 Delivery Post Treatment room air  -CS room air  -SEJAL room air  -SEJAL    Pre Patient Position Supine  -CS Supine  -SEJAL Supine  -SEJAL    Intra Patient Position Sitting  -CS      Post Patient Position Sitting  -CS Sitting  -SEJAL Sitting  -SEJAL    Recorded by [CS] MADDI Hare/JARAD [SEJAL] Shalom Ross PTA [SEJAL] Shalom Ross PTA    Pain Assessment    Pain Assessment 0-10  -CS No/denies pain  -SEJAL No/denies pain  -SEJAL    Pain Score 2  -CS      Post Pain Score 2  -CS      Pain Location Abdomen  -CS      Recorded by [CS] MADDI Hare/JARAD [SEJAL] Shalom Ross PTA [SEJAL] Shalom Ross PTA    Vision Assessment/Intervention    Visual Impairment  WFL with corrective lenses  -SEJAL WFL with corrective lenses  -SEJAL    Recorded by  [SEJAL] Shalom Ross PTA [SEJAL] Shalom Ross PTA    Cognitive  Assessment/Intervention    Current Cognitive/Communication Assessment functional  -CS functional  -SEJAL functional  -SEJAL    Orientation Status oriented x 4  -CS oriented x 4  -SEJAL oriented x 4  -SEJAL    Follows Commands/Answers Questions 100% of the time  -% of the time  -SEJAL 100% of the time  -SEJAL    Personal Safety Interventions  gait belt;muscle strengthening facilitated;nonskid shoes/slippers when out of bed;supervised activity  -SEJAL gait belt;muscle strengthening facilitated;nonskid shoes/slippers when out of bed;supervised activity  -SEJAL    Recorded by [CS] MADDI Hare/JARAD [SEJAL] Shalom Ross PTA [SEJAL] Shalom Ross PTA    Bed Mobility, Assessment/Treatment    Bed Mobility, Assistive Device  bed rails;head of bed elevated  -SEJAL bed rails;head of bed elevated  -SEJAL    Bed Mob, Supine to Sit, [place] conditional independence  -CS conditional independence  -SEJAL conditional independence  -SEJAL    Recorded by [CS] MADDI Hare/JARAD [SEJAL] Shalom Ross PTA [SEJAL] Shalom Ross PTA    Transfer Assessment/Treatment    Transfers, Sit-Stand [place]  independent  -SEJAL independent  -SEJAL    Transfers, Stand-Sit [place]  independent  -SEJAL independent  -SEJAL    Transfers, Sit-Stand-Sit, Assist Device  --   no AD  -SEJAL --   no AD  -SEJAL    Toilet Transfer, [place]  independent  -SEJAL     Toilet Transfer, Assistive Device  other (see comments)   no AD  -SEJAL     Transfer, Comment  pt completed 2 BR t/fs this tx. pt completed her own pericare. indep w/ t/fs.   -SEJAL multiple sit-stands.   -SEJAL    Recorded by  [SEJAL] Shalom Ross PTA [SEJAL] Shalom Ross PTA    Gait Assessment/Treatment    Gait, [place] Level  independent;supervision required  -SEJAL supervision required  -SEJAL    Gait, Assistive Device  other (see comments)   no AD  -SEJAL --   no AD  -SEJAL    Gait, Distance (Feet)  570   + multiple short distances in room  -SEJAL 200  -SEJAL    Gait, Gait Deviations  --   fast irving  -SEJAL     Gait, Comment  1 self  corrected, minor LOB while turning  -SEJAL fast irving and slightly unsteady. somewhat impulsive.   -SEJAL    Recorded by  [SEJAL] Shalom Ross PTA [SEJAL] Shalom Ross PTA    Stairs Assessment/Treatment    Number of Stairs  5  -SEJAL 5  -SEJAL    Stairs, Handrail Location  both sides  -SEJAL none  -SEJAL    Stairs, [place] Level  supervision required  -SEJAL minimum assist (75% patient effort)  -SEJAL    Stairs, Technique Used  step to step (ascending);step to step (descending)  -SEJAL step to step (ascending);step to step (descending)   edu on step-to for safety.   -SEJAL    Stairs, Comment  2 attempts  -SEJAL 3 trails w/o handrail min A. 1 trial w/ dianna hr, SBA. pt states her son will build her hand rails.   -SEJAL    Recorded by  [SEJAL] Shalom Ross PTA [SEJAL] Shalom Ross PTA    Therapy Exercises    Bilateral Lower Extremities  AROM:;20 reps;standing;heel raises;hip abduction/adduction;mini squats  - AROM:;20 reps;supine;ankle pumps/circles;hip abduction/adduction;heel slides;SAQ;SLR  -SEJAL    Bilateral Upper Extremity AROM:;20 reps;sitting;elbow flexion/extension;hand pumps;pronation/supination;shoulder extension/flexion;shoulder ER/IR  -CS      BUE Resistance theraband  -CS      Recorded by [CS] MADDI Hare/JARAD [SEJAL] Shalom Ross PTA [SEJAL] Shalom Ross PTA    Positioning and Restraints    Pre-Treatment Position in bed  - in bed  - in bed  -    Post Treatment Position bed  - bed  - bed  -    In Bed sitting EOB;call light within reach;with family/caregiver  - sitting;call light within reach;encouraged to call for assist   all needs met  - sitting EOB;call light within reach;encouraged to call for assist;with family/caregiver   all needs met.   -SEJAL    Recorded by [CS] MADDI Hare/JARAD [SEJAL] Shalom Ross PTA [SEJAL] Shalom Ross PTA      User Key  (r) = Recorded By, (t) = Taken By, (c) = Cosigned By    Initials Name Effective Dates    TAMARA Bowens PTA 10/17/16 -     SEJAL Ross PTA  10/17/16 -     AM Cong Zavala, PTA 10/17/16 -     CS Madhavi Watters, LINDA/JARAD 10/17/16 -           PT Recommendation and Plan  Anticipated Discharge Disposition: home with home health  Planned Therapy Interventions: balance training, bed mobility training, gait training, patient/family education, stair training, strengthening, stretching, transfer training  PT Frequency: other (see comments) (5-14x/week)  Plan of Care Review  Plan Of Care Reviewed With: patient, family  Progress: progress toward functional goals as expected  Outcome Summary/Follow up Plan: Pt met all PT goals this date. Pt ind w/all mobility (in/out bed, amb 500 ft and up/down steps). HEP given. Pt would benefit from ?HH/OP PT once discharged from this facility.           Outcome Measures       09/17/17 0810 09/16/17 1600 09/16/17 1500    How much help from another person do you currently need...    Turning from your back to your side while in flat bed without using bedrails? 4  -AM 4  -TA     Moving from lying on back to sitting on the side of a flat bed without bedrails? 4  -AM 4  -TA     Moving to and from a bed to a chair (including a wheelchair)? 4  -AM 4  -TA     Standing up from a chair using your arms (e.g., wheelchair, bedside chair)? 4  -AM 4  -TA     Climbing 3-5 steps with a railing? 4  -AM 3  -TA     To walk in hospital room? 4  -AM 4  -TA     AM-PAC 6 Clicks Score 24  -AM 23  -TA     How much help from another is currently needed...    Putting on and taking off regular lower body clothing?   4  -CS    Bathing (including washing, rinsing, and drying)   4  -CS    Toileting (which includes using toilet bed pan or urinal)   4  -CS    Putting on and taking off regular upper body clothing   4  -CS    Taking care of personal grooming (such as brushing teeth)   4  -CS    Eating meals   4  -CS    Score   24  -CS    Tinetti Assessment    Tinetti Assessment yes  -AM      Sitting Balance 1  -AM      Arises 2  -AM      Attempts to Rise 2  -AM    "   Immediate Standing Balance (first 5 sec) 2  -AM      Standing Balance 2  -AM      Sternal Nudge (feet close together) 1  -AM      Eyes Closed (feet close together) 1  -AM      Turning 360 Degrees- Steps 1  -AM      Turning 360 Degrees- Steadiness 1  -AM      Sitting Down 2  -AM      Tinetti Balance Score 15  -AM      Gait Initiation (immediate after told \"go\") 1  -AM      Step Length- Right Swing 1  -AM      Step Length- Left Swing 1  -AM      Foot Clearance- Right Foot 1  -AM      Foot Clearance- Left Foot 1  -AM      Step Symmetry 1  -AM      Step Continuity 1  -AM      Path (excursion) 2  -AM      Trunk 2  -AM      Base of Support 1  -AM      Gait Score 12  -AM      Tinetti Total Score 27  -AM      Tinetti Assistive Device --   none  -AM      Functional Assessment    Outcome Measure Options AM-PAC 6 Clicks Basic Mobility (PT);Tinetti  -AM AM-PAC 6 Clicks Basic Mobility (PT)  -TA AM-PAC 6 Clicks Daily Activity (OT)  -CS      09/15/17 1500 09/15/17 1046 09/14/17 1309    How much help from another person do you currently need...    Turning from your back to your side while in flat bed without using bedrails?  4  -SEJAL 4  -SEJAL    Moving from lying on back to sitting on the side of a flat bed without bedrails?  4  -SEJAL 4  -SEJAL    Moving to and from a bed to a chair (including a wheelchair)?  4  -SEJAL 3  -SEJAL    Standing up from a chair using your arms (e.g., wheelchair, bedside chair)?  4  -SEJAL 4  -SEJAL    Climbing 3-5 steps with a railing?  3  -SEJAL 3  -SEJAL    To walk in hospital room?  4  -SEJAL 3  -SEJAL    AM-PAC 6 Clicks Score  23  -SEJAL 21  -SEJAL    How much help from another is currently needed...    Putting on and taking off regular lower body clothing? 4  -CS      Bathing (including washing, rinsing, and drying) 4  -CS      Toileting (which includes using toilet bed pan or urinal) 4  -CS      Putting on and taking off regular upper body clothing 4  -CS      Taking care of personal grooming (such as brushing teeth) 4  -CS      " Eating meals 4  -CS      Score 24  -CS      Functional Assessment    Outcome Measure Options AM-PAC 6 Clicks Daily Activity (OT)  -CS AM-PAC 6 Clicks Basic Mobility (PT)  -SEJAL AM-PAC 6 Clicks Basic Mobility (PT)  -SEJAL      User Key  (r) = Recorded By, (t) = Taken By, (c) = Cosigned By    Initials Name Provider Type    TA Sera Bowens, PTA Physical Therapy Assistant    SEJAL Ross, PTA Physical Therapy Assistant    AM Cong Zavala, BRUNO Physical Therapy Assistant    MADDI Zamora/JARAD Occupational Therapy Assistant           Time Calculation:         PT Charges       09/17/17 0810          Time Calculation    Start Time 0810  -AM      Stop Time 0850  -AM      Time Calculation (min) 40 min  -AM      PT Received On 09/17/17  -AM      PT - Next Appointment 09/18/17  -AM      Time Calculation- PT    Total Timed Code Minutes- PT 40 minute(s)  -AM        User Key  (r) = Recorded By, (t) = Taken By, (c) = Cosigned By    Initials Name Provider Type    AM Cong Zavala PTA Physical Therapy Assistant          Therapy Charges for Today     Code Description Service Date Service Provider Modifiers Qty    27931845202 HC GAIT TRAINING EA 15 MIN 9/17/2017 Cong Zavala PTA GP 1    60676985594 HC PT THER PROC EA 15 MIN 9/17/2017 Cong Zavala PTA GP 1    84871622382 HC PT SELF CARE/MGMT/TRAIN EA 15 MIN 9/17/2017 Cong Zavala PTA GP 1          PT G-Codes  PT Professional Judgement Used?: Yes  Outcome Measure Options: AM-PAC 6 Clicks Basic Mobility (PT), Tinetti  Score: 22  Functional Limitation: Mobility: Walking and moving around  Mobility: Walking and Moving Around Current Status (): At least 20 percent but less than 40 percent impaired, limited or restricted  Mobility: Walking and Moving Around Goal Status (): At least 1 percent but less than 20 percent impaired, limited or restricted    PT Discharge Summary  Anticipated Discharge Disposition: home with home health  Reason for Discharge: All  goals achieved, Independent  Outcomes Achieved: Able to achieve all goals within established timeline  Discharge Destination: other (comment) (Still a patient)    Cong Zavala, PTA  9/17/2017

## 2017-09-17 NOTE — PROGRESS NOTES
[facility] Medicine Services  INPATIENT PROGRESS NOTE     LOS: 11 days   Patient Care Team:  Nish Morales DO as PCP - General (Gastroenterology)    Chief Complaint:  C. difficile colitis      Subjective     Interval History:     Patient Complaints: Patient seen and examined.  Resting comfortably.    History taken from: Patient.    Review of Systems:    Review of Systems   Constitutional: Negative for activity change, appetite change, chills, diaphoresis, fatigue and fever.   HENT: Negative for congestion, ear pain, rhinorrhea, sore throat and trouble swallowing.    Eyes: Negative for pain and visual disturbance.   Respiratory: Negative for cough, chest tightness, shortness of breath and wheezing.    Cardiovascular: Negative for chest pain, palpitations and leg swelling.   Gastrointestinal: Negative for abdominal pain, blood in stool, constipation, diarrhea, nausea and vomiting.   Endocrine: Negative for cold intolerance and heat intolerance.   Genitourinary: Negative for decreased urine volume, difficulty urinating and dysuria.   Musculoskeletal: Negative for arthralgias, back pain, gait problem and neck pain.   Skin: Negative for color change and rash.   Neurological: Negative for dizziness, syncope, weakness, light-headedness, numbness and headaches.   Hematological: Negative for adenopathy. Does not bruise/bleed easily.   Psychiatric/Behavioral: Negative for agitation, confusion and sleep disturbance. The patient is not nervous/anxious.          Objective     Vital Signs  Temp:  [97 °F (36.1 °C)-98.7 °F (37.1 °C)] 98.4 °F (36.9 °C)  Heart Rate:  [69-96] 70  Resp:  [18] 18  BP: (145-159)/(67-78) 150/67    Physical Exam:   Physical Exam   Constitutional: She is oriented to person, place, and time. She appears well-developed and well-nourished. No distress.   HENT:   Head: Normocephalic and atraumatic.   Right Ear: External ear normal.   Left Ear: External ear  normal.   Nose: Nose normal.   Eyes: Conjunctivae and EOM are normal. Pupils are equal, round, and reactive to light. Right eye exhibits no discharge. Left eye exhibits no discharge. No scleral icterus.   Neck: Normal range of motion. Neck supple.   Cardiovascular: Normal rate, regular rhythm, normal heart sounds and intact distal pulses.  Exam reveals no gallop and no friction rub.    No murmur heard.  Pulmonary/Chest: Effort normal and breath sounds normal. No respiratory distress. She has no wheezes. She has no rales. She exhibits no tenderness.   Abdominal: Soft. Bowel sounds are normal. She exhibits no distension and no mass. There is no tenderness. There is no rebound and no guarding.   Musculoskeletal: Normal range of motion.   Neurological: She is alert and oriented to person, place, and time.   Skin: Skin is warm and dry. No rash noted. She is not diaphoretic. No erythema. No pallor.   Psychiatric: She has a normal mood and affect. Her behavior is normal.   Nursing note and vitals reviewed.         Results Review:       Results from last 7 days  Lab Units 09/17/17  0543 09/16/17  0521 09/15/17  2324 09/15/17  0526 09/14/17  0532 09/13/17  0515 09/12/17  0543  09/11/17  0553   SODIUM mmol/L 140 141  --  141 138 138 137  --  137   POTASSIUM mmol/L 3.2* 3.7 3.5 3.4* 3.9 4.0 4.5  < > 3.1*   CHLORIDE mmol/L 109 111*  --  108 106 105 105  --  104   CO2 mmol/L 20.0* 22.0  --  25.0 21.0* 23.0 23.0  --  27.0   BUN mg/dL 15 15  --  15 8 7 7  --  5*   CREATININE mg/dL 0.64 0.71  --  0.72 0.71 0.79 0.69  --  0.68   GLUCOSE mg/dL 190* 162*  --  183* 157* 86 88  --  97   CALCIUM mg/dL 8.1* 8.1*  --  8.5 8.4 8.1* 8.3*  --  7.9*   BILIRUBIN mg/dL 0.4 0.3  --  0.2 0.5 0.4  --   --   --    ALK PHOS U/L 64 66  --  93 102 84  --   --   --    ALT (SGPT) U/L 33 32  --  32 32 36  --   --   --    AST (SGOT) U/L 19 22  --  21 32 29  --   --   --    < > = values in this interval not displayed.      Results from last 7 days  Lab  Units 09/17/17  0543 09/16/17  0521 09/15/17  0526 09/14/17  0532 09/13/17  0515   MAGNESIUM mg/dL 1.8 1.9 1.9 1.9 1.7         Results from last 7 days  Lab Units 09/17/17  0543 09/16/17  0521 09/15/17  0642 09/14/17  0532 09/13/17  0515 09/12/17  0543 09/11/17  0553   WBC 10*3/mm3 8.04 7.95 13.29* 6.58 6.76 6.48 5.69   HEMOGLOBIN g/dL 9.1* 8.6* 9.5* 11.5* 10.6* 10.8* 10.2*   HEMATOCRIT % 27.5* 27.2* 29.3* 35.4 32.3* 33.0* 30.6*   PLATELETS 10*3/mm3 229 206 219 261 181 187 157       No results found for: CKTOTAL, CKMB, CKMBINDEX, TROPONINI, TROPONINT    CO2   Date Value Ref Range Status   09/17/2017 20.0 (L) 22.0 - 31.0 mmol/L Final              Imaging Results (last 7 days)     ** No results found for the last 168 hours. **                                    Medication Review:   Current Facility-Administered Medications   Medication Dose Route Frequency Provider Last Rate Last Dose   • acetaminophen (TYLENOL) tablet 650 mg  650 mg Oral Q4H PRN Atif Nichols MD   650 mg at 09/17/17 0051   • acidophilus (FLORANEX) tablet 1 tablet  1 tablet Oral TID Nish Morales DO   1 tablet at 09/17/17 0941   • famotidine (PEPCID) tablet 40 mg  40 mg Oral Daily Atif Nichols MD   40 mg at 09/17/17 0941   • hydrALAZINE (APRESOLINE) injection 10 mg  10 mg Intravenous Q6H PRN Hattie Barrera MD       • HYDROcodone-acetaminophen (NORCO) 5-325 MG per tablet 1 tablet  1 tablet Oral Q6H PRN Chaz Beckett MD   1 tablet at 09/17/17 0949   • influenza vac split quad (FLUZONE QUADRIVALENT) IM suspension 0.5 mL  0.5 mL Intramuscular During Hospitalization Atif Nichols MD       • megestrol (MEGACE) tablet 20 mg  20 mg Oral Daily Chaz Beckett MD   20 mg at 09/17/17 0941   • mesalamine (DELZICOL) delayed release capsule 400 mg  400 mg Oral TID Nish Morales DO   400 mg at 09/17/17 1056   • methylPREDNISolone sodium succinate (SOLU-Medrol) injection 40 mg  40 mg Intravenous Q6H Nish Morales DO   40 mg at  09/17/17 0600   • metroNIDAZOLE (FLAGYL) IVPB 500 mg  500 mg Intravenous Q8H Nish Morales,  mL/hr at 09/16/17 0440 500 mg at 09/17/17 0431   • naloxone (NARCAN) injection 0.4 mg  0.4 mg Intravenous Q5 Min PRN Atif Nichols MD       • ondansetron (ZOFRAN) tablet 4 mg  4 mg Oral Q6H PRN Atif Nichols MD       • pneumococcal polysaccharide 23-valent (PNEUMOVAX-23) vaccine 0.5 mL  0.5 mL Intramuscular During Hospitalization Atif Nichols MD       • potassium chloride (MICRO-K) CR capsule 40 mEq  40 mEq Oral PRN Atif Nichols MD   40 mEq at 09/17/17 0949    Or   • potassium chloride (KLOR-CON) packet 40 mEq  40 mEq Oral PRN Atif Nichols MD        Or   • potassium chloride 10 mEq in 100 mL IVPB  10 mEq Intravenous Q1H PRN Atif Nichols MD   Stopped at 09/07/17 0454   • sodium chloride 0.9 % flush 1-10 mL  1-10 mL Intravenous PRN Atif Nichols MD       • sodium chloride 0.9 % infusion  50 mL/hr Intravenous Continuous Chaz Beckett MD 50 mL/hr at 09/16/17 1647 50 mL/hr at 09/16/17 1647   • vancomycin (VANCOCIN) 50 mg/ml oral solution 250 mg  250 mg Oral Q6H Nish Morales, DO   250 mg at 09/17/17 1324         Assessment/Plan     Principal Problem:    C. difficile colitis  Active Problems:    Lower abdominal pain    Inflammatory bowel disease    Hypokalemia          -Continue IV antibiotics and steroids.  -Patient continues to have loose bowel movements had 7 such episodes in the last couple hours.  -We'll continue PT OT.  -GI follow-up appreciated.  -DVT and GI prophylaxis in place.  -We'll continue monitoring patient hospital setting and treat patient as course dictates.          This document has been electronically signed by Hattie Barrera MD on September 17, 2017 1:39 PM        EMR Dragon/Transcription disclaimer:   Dictated utilizing Dragon dictation.

## 2017-09-18 NOTE — PLAN OF CARE
Problem: Patient Care Overview (Adult)  Goal: Plan of Care Review  Outcome: Ongoing (interventions implemented as appropriate)    09/18/17 1149 09/18/17 1813   Coping/Psychosocial Response Interventions   Plan Of Care Reviewed With patient --    Patient Care Overview   Progress improving --    Outcome Evaluation   Outcome Summary/Follow up Plan --  pt vss. Pt still having loose stool       Goal: Adult Individualization and Mutuality  Outcome: Ongoing (interventions implemented as appropriate)  Goal: Discharge Needs Assessment  Outcome: Ongoing (interventions implemented as appropriate)    Problem: Fluid Volume Deficit (Adult)  Goal: Fluid/Electrolyte Balance  Outcome: Ongoing (interventions implemented as appropriate)

## 2017-09-18 NOTE — THERAPY TREATMENT NOTE
Acute Care - Occupational Therapy Treatment Note  [facility]     Patient Name: Damaris Sánchez  : 1945  MRN: [id number]  Today's Date: 2017  Onset of Illness/Injury or Date of Surgery Date: 17  Date of Referral to OT: 17  Referring Physician: Dr. Barrera      Admit Date: 2017    Visit Dx:     ICD-10-CM ICD-9-CM   1. Lower abdominal pain R10.30 789.09   2. Leukocytosis, unspecified type D72.829 288.60   3. Hypokalemia E87.6 276.8   4. C. difficile colitis A04.7 008.45   5. Impaired physical mobility Z74.09 781.99   6. Impaired mobility and ADLs Z74.09 799.89     Patient Active Problem List   Diagnosis   • Lower abdominal pain   • C. difficile colitis   • Inflammatory bowel disease   • Hypokalemia             Adult Rehabilitation Note       17 0835 17 0810 17 1556    Rehab Assessment/Intervention    Discipline occupational therapy assistant  -CS physical therapy assistant  -AM physical therapy assistant  -TA    Document Type therapy note (daily note)  -CS therapy note (daily note)  -AM therapy note (daily note)  -TA    Subjective Information agree to therapy  -CS agree to therapy;complains of;pain  -AM agree to therapy  -TA    Patient Effort, Rehab Treatment  good  -AM good  -TA    Symptoms Noted During/After Treatment  increased pain  -AM     Precautions/Limitations  no known precautions/limitations  -AM fall precautions  -TA    Equipment Issued to Patient  gait belt  -AM     Recorded by [CS] MADDI Hare/JARAD [AM] Cong Zavala PTA [TA] Sera Bowens PTA    Vital Signs    Pre Systolic BP Rehab 130  -  -AM     Pre Treatment Diastolic BP 72  -CS 67  -AM     Post Systolic BP Rehab  127  -AM     Post Treatment Diastolic BP  94  -AM     Pretreatment Heart Rate (beats/min) 85  -CS 84  -AM 84  -TA    Posttreatment Heart Rate (beats/min)  104  -AM 87  -TA    Pre SpO2 (%) 98  -CS 97  -AM 96  -TA    O2 Delivery Pre Treatment room air  -CS room air  -AM room  air  -TA    Post SpO2 (%)  97  -AM 97  -TA    O2 Delivery Post Treatment  room air  -AM     Pre Patient Position Supine  -CS Standing  -AM Supine  -TA    Intra Patient Position Sitting  -CS Sitting  -AM     Post Patient Position Sitting  -CS Sitting  -AM Supine  -TA    Recorded by [CS] MADDI Hare/JARAD [AM] Cong Zavala PTA [TA] Sera Bowens PTA    Pain Assessment    Pain Assessment 0-10  -CS 0-10  -AM No/denies pain  -TA    Pain Score 2  -CS 3  -AM     Post Pain Score  5  -AM     Pain Type  Acute pain  -AM     Pain Location Abdomen  -CS Abdomen  -AM     Pain Orientation Left  -CS Left  -AM     Pain Descriptors  Sore  -AM     Pain Frequency  Constant/continuous  -AM     Date Pain First Started  09/06/17  -AM     Clinical Progression  Gradually improving  -AM     Patient's Stated Pain Goal  No pain  -AM     Pain Intervention(s)  Ambulation/increased activity  -AM     Result of Injury  No  -AM     Work-Related Injury  No  -AM     Multiple Pain Sites  No  -AM     Recorded by [CS] MADDI Hare/JARAD [AM] Cong Zavala PTA [TA] Sera Bowens PTA    Cognitive Assessment/Intervention    Current Cognitive/Communication Assessment functional  -CS functional  -AM functional  -TA    Orientation Status oriented x 4  -CS oriented x 4  -AM oriented x 4  -TA    Follows Commands/Answers Questions 100% of the time  -% of the time  -% of the time  -TA    Personal Safety   WNL/WFL  -TA    Personal Safety Interventions  gait belt;nonskid shoes/slippers when out of bed;supervised activity  -AM gait belt;nonskid shoes/slippers when out of bed  -TA    Recorded by [CS] MADDI Hare/JARAD [AM] Cong Zavala PTA [TA] Sera Bowens PTA    ROM (Range of Motion)    General ROM  no range of motion deficits identified  -AM     Recorded by  [AM] Cong Zavala PTA     Bed Mobility, Assessment/Treatment    Bed Mobility, Roll Right, [place]  not tested  -AM independent  -TA    Bed Mobility,  Scoot/Bridge, [place]  not tested  -AM independent  -TA    Bed Mob, Supine to Sit, [place] independent  -CS independent  -AM independent  -TA    Bed Mob, Sit to Supine, [place] independent  -CS independent  -AM independent  -TA    Bed Mob, Sidelying to Sit, [place]  not tested  -AM     Bed Mob, Sit to Sidelying, [place]  not tested  -AM     Recorded by [CS] MADDI Hare/L [AM] Cong Zavala PTA [TA] Sera Bowens PTA    Transfer Assessment/Treatment    Transfers, Bed-Chair [place]  independent  -AM     Transfers, Chair-Bed [place]  independent  -AM     Transfers, Sit-Stand [place] independent  -CS independent  -AM independent  -TA    Transfers, Stand-Sit [place] independent  -CS independent  -AM independent  -TA    Transfers, Sit-Stand-Sit, Assist Device   other (see comments)   no AD  -TA    Toilet Transfer, [place] independent   x3  -CS independent  -AM independent  -TA    Walk-In Shower Transfer, [place]  not tested  -AM     Bathtub Transfer, [place]  not tested  -AM     Transfer, Maintain Weight Bearing Status  able to maintain weight bearing status  -AM     Recorded by [CS] MADDI Hare/L [AM] Cong Zavala PTA [TA] Sera Bowens PTA    Gait Assessment/Treatment    Gait, [place] Level  independent  -AM independent  -TA    Gait, Distance (Feet)  500  -  -TA    Gait, Gait Pattern Analysis  swing-through gait  -AM     Gait, Maintain Weight Bearing Status  able to maintain weight bearing status  -AM     Recorded by  [AM] Cong Zavala PTA [TA] Sera Bowens PTA    Stairs Assessment/Treatment    Number of Stairs  3   X2 attempts  -AM     Stairs, Handrail Location  both sides  -AM     Stairs, [place] Level  conditional independence  -AM     Stairs, Technique Used  step to step (ascending);step to step (descending)  -AM     Stairs, Maintain Weight Bearing Status  able to maintain weight bearing status  -AM      Recorded by  [AM] Cong Zavala PTA     Functional Mobility    Functional Mobility- Ind. Level independent  -CS      Functional Mobility-Distance (Feet) 10  -CS      Recorded by [CS] JAM Hare      Lower Body Bathing Assessment/Training    LB Bathing Assess/Train, Comment pt deferred  -CS      Recorded by [CS] JAM Hare      Toileting Assessment/Training    Toileting Assess/Train, Assistive Device raised toilet seat  -CS      Toileting Assess/Train, Position sitting;standing  -CS      Toileting Assess/Train, Indepen Level independent   x3  -CS      Recorded by [CS] JAM Hare      Grooming Assessment/Training    Grooming Assess/Train, Position standing;sink side  -CS      Grooming Assess/Train, Indepen Level independent  -CS      Recorded by [CS] JAM Hare      Therapy Exercises    Bilateral Upper Extremity AROM:;20 reps;sitting;elbow flexion/extension;hand pumps;pronation/supination;shoulder extension/flexion;shoulder ER/IR  -CS      BUE Resistance manual resistance- minimal  -CS      Recorded by [CS] JAM Hare      Positioning and Restraints    Pre-Treatment Position in bed  -CS standing in room  -AM in bed  -TA    Post Treatment Position bed  -CS bed  -AM bed  -TA    In Bed sitting EOB;with family/caregiver  -CS sitting EOB;call light within reach;encouraged to call for assist  -AM supine;call light within reach  -TA    Recorded by [CS] JAM Hare [AM] Cong Zavala PTA [TA] Sera Bowens PTA      09/16/17 1412 09/15/17 1418       Rehab Assessment/Intervention    Discipline occupational therapy assistant  -CS occupational therapy assistant  -CS     Document Type therapy note (daily note)  -CS therapy note (daily note)  -CS     Subjective Information agree to therapy  -CS agree to therapy  -CS     Precautions/Limitations fall precautions  -CS fall precautions  -CS     Recorded by [CS] MADDI Hare/JARAD [CS]  JAM Hare     Vital Signs    Post Systolic BP Rehab  161  -CS     Post Treatment Diastolic BP  76  -CS     Posttreatment Heart Rate (beats/min)  100  -CS     Post SpO2 (%)  95  -CS     O2 Delivery Post Treatment  room air  -CS     Pre Patient Position Sitting  -CS Supine  -CS     Intra Patient Position Standing  -CS Sitting  -CS     Post Patient Position Sitting  -CS Sitting  -CS     Recorded by [CS] JAM Hare [CS] JAM Hare     Pain Assessment    Pain Assessment 0-10  -CS 0-10  -CS     Pain Score 3  -CS 2  -CS     Post Pain Score 4  -CS 2  -CS     Pain Location Back  -CS Abdomen  -CS     Recorded by [CS] JAM Hare [CS] JAM Hare     Cognitive Assessment/Intervention    Current Cognitive/Communication Assessment functional  -CS functional  -CS     Orientation Status oriented x 4  -CS oriented x 4  -CS     Follows Commands/Answers Questions 100% of the time  -% of the time  -CS     Recorded by [CS] JAM Hare [CS] JAM Hare     Bed Mobility, Assessment/Treatment    Bed Mob, Supine to Sit, [place]  conditional independence  -CS     Recorded by  [CS] JAM Hare     Transfer Assessment/Treatment    Transfers, Sit-Stand [place] independent  -CS      Transfers, Stand-Sit [place] independent  -CS      Toilet Transfer, [place] independent   x2  -CS      Recorded by [CS] JAM Hare      Functional Mobility    Functional Mobility- Ind. Level independent  -CS      Functional Mobility-Distance (Feet) 15  -CS      Recorded by [CS] JAM Hare      Toileting Assessment/Training    Toileting Assess/Train, Assistive Device raised toilet seat  -CS      Toileting Assess/Train, Position sitting;standing  -CS      Toileting Assess/Train, Indepen Level independent   x2  -CS      Recorded by [CS] JAM Hare      Balance Skills Training    Standing-Balance  Activities Reaching for objects;Reaching across midline  -CS      Standing Balance # of Minutes 5  -CS      Recorded by [CS] JAM Hare      Therapy Exercises    Bilateral Upper Extremity AROM:;20 reps;sitting;elbow flexion/extension;hand pumps;pronation/supination;shoulder extension/flexion;shoulder ER/IR  -CS AROM:;20 reps;sitting;elbow flexion/extension;hand pumps;pronation/supination;shoulder extension/flexion;shoulder ER/IR  -CS     BUE Resistance manual resistance- minimal  -CS theraband  -CS     Recorded by [CS] JAM Hare [CS] JAM Hare     Positioning and Restraints    Pre-Treatment Position in bed  -CS in bed  -CS     Post Treatment Position bed  -CS bed  -CS     In Bed sitting EOB;with family/caregiver  -CS sitting EOB;call light within reach;with family/caregiver  -CS     Recorded by [CS] JAM Hare [CS] JAM Hare       User Key  (r) = Recorded By, (t) = Taken By, (c) = Cosigned By    Initials Name Effective Dates    TA Sera Bowens, PTA 10/17/16 -     AM Cong Zavala, PTA 10/17/16 -     CS JAM Hare 10/17/16 -                 OT Goals       09/18/17 1149 09/16/17 1526 09/15/17 1548    Dynamic Standing Balance OT LTG    Dynamic Standing Balance OT LTG, Date Goal Reviewed 09/18/17  -CS 09/16/17  -CS 09/15/17  -CS    Patient Education OT STG    Patient Education OT STG, Date Goal Reviewed 09/18/17  -CS 09/16/17  -CS 09/15/17  -CS    Bathing OT LTG    Bathing Goal OT LTG, Date Goal Reviewed 09/18/17  -CS 09/16/17  -CS 09/15/17  -CS    Home Management OT STG    Home Mgmt Goal OT STG, Date Goal Reviewed 09/18/17  -CS 09/16/17  -CS 09/15/17  -CS      09/14/17 0740 09/13/17 1349       Dynamic Standing Balance OT LTG    Dynamic Standing Balance OT LTG, Date Established  09/06/17  -SG     Dynamic Standing Balance OT LTG, Time to Achieve  2 wks  -SG     Dynamic Standing Balance OT LTG, [place] Level  supervision  required  -SG     Dynamic Standing Balance OT LTG, Additional Goal  Resident to Independent and safe With dynamic standing balance to complete higher level ADL's.  -SG     Dynamic Standing Balance OT LTG, Date Goal Reviewed 09/14/17  -KD      Dynamic Standing Balance OT LTG, Outcome goal met  -KD      Patient Education OT STG    Patient Education OT STG, Date Established  09/13/17  -SG     Patient Education OT STG, Time to Achieve  5 - 7 days  -SG     Patient Education OT STG, Education Type  HEP  -SG     Patient Education OT STG, Additional Goal  T band exercises.  -SG     Patient Education OT STG, Date Goal Reviewed 09/14/17  -KD      Patient Education OT STG Outcome goal not met  -KD      Bathing OT LTG    Bathing Goal OT LTG, Date Established  09/13/17  -SG     Bathing Goal OT LTG, Time to Achieve  2 wks  -SG     Bathing Goal OT LTG, [place] Level  conditional independence  -SG     Bathing Goal OT LTG, Assist Device  shower chair with back  -SG     Bathing Goal OT LTG, Additional Goal  Independent and safe   -SG     Bathing Goal OT LTG, Date Goal Reviewed 09/14/17  -KD      Bathing Goal OT LTG, Outcome goal not met  -KD      Home Management OT STG    Home Mgmt Goal OT STG, Date Established  09/13/17  -SG     Home Mgmt Goal OT STG, Time To Achieve  1 wk  -SG     Home Mgmt Goal OT STG, [place] Level  conditional independence  -SG     Home Mgmt Goal OT STG, Additional Goal  Resident to be CI in room, for gathering of items for all personal care, with 0 LOB and safe.  -SG     Home Mgmt Goal OT STG, Date Goal Reviewed 09/14/17  -KD      Home Mgmt Goal OT STG, Outcome Achieved goal not met  -KD        User Key  (r) = Recorded By, (t) = Taken By, (c) = Cosigned By    Initials Name Provider Type    KD MADDI Kelley/L Occupational Therapy Assistant    MADDI Zamora/JARAD Occupational Therapy Assistant    MYLA Patricia OT Occupational Therapist          Occupational Therapy Education     Title:  PT OT SLP Therapies (Active)     Topic: Occupational Therapy (Active)     Point: ADL training (Active)    Description: Instruct learner(s) on proper safety adaptation and remediation techniques during self care or transfers.   Instruct in proper use of assistive devices.    Learning Progress Summary    Learner Readiness Method Response Comment Documented by Status   Patient Acceptance E,TB,D NR   09/18/17 1149 Active    Acceptance E,TB,D VU   09/16/17 1526 Done    Acceptance E,TB,D,H VU Pt was educated on HEP and home safety.  09/15/17 1547 Done    Acceptance TB VU   09/14/17 1144 Done    Eager E,TB DU  SG 09/13/17 1346 Done               Point: Home exercise program (Active)    Description: Instruct learner(s) on appropriate technique for monitoring, assisting and/or progressing therapeutic exercises/activities.    Learning Progress Summary    Learner Readiness Method Response Comment Documented by Status   Patient Acceptance E,TB,D NR   09/18/17 1149 Active    Acceptance E,TB,D VU   09/16/17 1526 Done    Acceptance E,TB,D,H VU Pt was educated on HEP and home safety.  09/15/17 1547 Done    Eager E,TB DU   09/13/17 1346 Done               Point: Precautions (Active)    Description: Instruct learner(s) on prescribed precautions during self-care and functional transfers.    Learning Progress Summary    Learner Readiness Method Response Comment Documented by Status   Patient Acceptance E,TB,D NR   09/18/17 1149 Active    Acceptance E,TB,D VU   09/16/17 1526 Done    Acceptance E,TB,D,H VU Pt was educated on HEP and home safety.  09/15/17 1547 Done    Eager E,TB DU   09/13/17 1346 Done               Point: Body mechanics (Active)    Description: Instruct learner(s) on proper positioning and spine alignment during self-care, functional mobility activities and/or exercises.    Learning Progress Summary    Learner Readiness Method Response Comment Documented by Status   Patient Acceptance E,TB,D NR    09/18/17 1149 Active    Acceptance E,TB,D VU   09/16/17 1526 Done    Acceptance E,TB,D,H VU Pt was educated on HEP and home safety.  09/15/17 1547 Done    Eager ETB LORE  SG 09/13/17 1346 Done                      User Key     Initials Effective Dates Name Provider Type Discipline     10/17/16 -  MADDI Kelley/L Occupational Therapy Assistant OT    CS 10/17/16 -  MADDI Hare/JARAD Occupational Therapy Assistant OT    SG 08/03/17 -  Farrah Patricia OT Occupational Therapist OT                  OT Recommendation and Plan  Anticipated Discharge Disposition: home with home health  Planned Therapy Interventions: activity intolerance, ADL retraining, IADL retraining, energy conservation, home exercise program, neuromuscular re-education, strengthening, transfer training  Therapy Frequency:  (3/14 times/Wk)  Plan of Care Review  Plan Of Care Reviewed With: patient  Progress: improving  Outcome Summary/Follow up Plan: Pt tolerated tx well this date. Pt was I with toilet t/f. No goals met this tx. Continue POC.        Outcome Measures       09/18/17 1100 09/17/17 0810 09/16/17 1600    How much help from another person do you currently need...    Turning from your back to your side while in flat bed without using bedrails?  4  -AM 4  -TA    Moving from lying on back to sitting on the side of a flat bed without bedrails?  4  -AM 4  -TA    Moving to and from a bed to a chair (including a wheelchair)?  4  -AM 4  -TA    Standing up from a chair using your arms (e.g., wheelchair, bedside chair)?  4  -AM 4  -TA    Climbing 3-5 steps with a railing?  4  -AM 3  -TA    To walk in hospital room?  4  -AM 4  -TA    AM-PAC 6 Clicks Score  24  -AM 23  -TA    How much help from another is currently needed...    Putting on and taking off regular lower body clothing? 4  -CS      Bathing (including washing, rinsing, and drying) 4  -CS      Toileting (which includes using toilet bed pan or urinal) 4  -CS      Putting on and  "taking off regular upper body clothing 4  -CS      Taking care of personal grooming (such as brushing teeth) 4  -CS      Eating meals 4  -CS      Score 24  -CS      Tinetti Assessment    T[place]etti Assessment  yes  -AM     Sitting Balance  1  -AM     Arises  2  -AM     Attempts to Rise  2  -AM     Immediate Standing Balance (first 5 sec)  2  -AM     Standing Balance  2  -AM     Sternal Nudge (feet close together)  1  -AM     Eyes Closed (feet close together)  1  -AM     Turning 360 Degrees- Steps  1  -AM     Turning 360 Degrees- Steadiness  1  -AM     Sitting Down  2  -AM     T[place]etti Balance Score  15  -AM     Gait Initiation (immediate after told \"go\")  1  -AM     Step Length- Right Swing  1  -AM     Step Length- Left Swing  1  -AM     Foot Clearance- Right Foot  1  -AM     Foot Clearance- Left Foot  1  -AM     Step Symmetry  1  -AM     Step Continuity  1  -AM     Path (excursion)  2  -AM     Trunk  2  -AM     Base of Support  1  -AM     Gait Score  12  -AM     Tinetti Total Score  27  -AM     Tinetti Assistive Device  --   none  -AM     Functional Assessment    Outcome Measure Options AM-PAC 6 Clicks Daily Activity (OT)  -CS AM-PAC 6 Clicks Basic Mobility (PT);[place]  -AM AM-PAC 6 Clicks Basic Mobility (PT)  -TA      09/16/17 1500 09/15/17 1500       How much help from another is currently needed...    Putting on and taking off regular lower body clothing? 4  -CS 4  -CS     Bathing (including washing, rinsing, and drying) 4  -CS 4  -CS     Toileting (which includes using toilet bed pan or urinal) 4  -CS 4  -CS     Putting on and taking off regular upper body clothing 4  -CS 4  -CS     Taking care of personal grooming (such as brushing teeth) 4  -CS 4  -CS     Eating meals 4  -CS 4  -CS     Score 24  -CS 24  -CS     Functional Assessment    Outcome Measure Options AM-PAC 6 Clicks Daily Activity (OT)  -CS AM-PAC 6 Clicks Daily Activity (OT)  -CS       User Key  (r) = Recorded By, (t) = Taken By, (c) = Cosigned By    " Initials Name Provider Type    TA Sera Bowens, BRUNO Physical Therapy Assistant    AM Cong Zavala PTA Physical Therapy Assistant     MADDI Hare/JARAD Occupational Therapy Assistant           Time Calculation:         Time Calculation- OT       09/18/17 1152          Time Calculation- OT    OT Start Time 1007  -CS      OT Stop Time 1108  -CS      OT Time Calculation (min) 61 min  -CS      Total Timed Code Minutes- OT 61 minute(s)  -CS      OT Received On 09/18/17  -CS        User Key  (r) = Recorded By, (t) = Taken By, (c) = Cosigned By    Initials Name Provider Type    CS MADDI Hare/JARAD Occupational Therapy Assistant           Therapy Charges for Today     Code Description Service Date Service Provider Modifiers Qty    37026741360 HC OT SELF CARE/MGMT/TRAIN EA 15 MIN 9/18/2017 MADDI Hare/L GO 2    62689464748 HC OT THER PROC EA 15 MIN 9/18/2017 MADDI Hare/L GO 2          OT G-codes  OT Professional Judgement Used?: Yes  OT Functional Scales Options: AM-PAC 6 Clicks Daily Activity (OT)  Score: 23  Functional Limitation: Self care  Self Care Current Status (): At least 1 percent but less than 20 percent impaired, limited or restricted  Self Care Goal Status (): 0 percent impaired, limited or restricted    JAM Hare  9/18/2017

## 2017-09-18 NOTE — PLAN OF CARE
Problem: Patient Care Overview (Adult)  Goal: Plan of Care Review  Outcome: Ongoing (interventions implemented as appropriate)    09/18/17 0413   Coping/Psychosocial Response Interventions   Plan Of Care Reviewed With patient   Patient Care Overview   Progress progress toward functional goals as expected   Outcome Evaluation   Outcome Summary/Follow up Plan no changes this shift       Goal: Adult Individualization and Mutuality  Outcome: Ongoing (interventions implemented as appropriate)  Goal: Discharge Needs Assessment  Outcome: Ongoing (interventions implemented as appropriate)    Problem: Fluid Volume Deficit (Adult)  Goal: Fluid/Electrolyte Balance  Outcome: Ongoing (interventions implemented as appropriate)

## 2017-09-18 NOTE — PLAN OF CARE
Problem: Patient Care Overview (Adult)  Goal: Plan of Care Review  Outcome: Ongoing (interventions implemented as appropriate)    09/18/17 1149   Coping/Psychosocial Response Interventions   Plan Of Care Reviewed With patient   Patient Care Overview   Progress improving   Outcome Evaluation   Outcome Summary/Follow up Plan Pt tolerated tx well this date. Pt was I with toilet t/f. No goals met this tx. Continue POC.         Problem: Inpatient Occupational Therapy  Goal: Dynamic Standing Balance Goal LTG-OT  Outcome: Ongoing (interventions implemented as appropriate)    09/13/17 1349 09/14/17 0740 09/18/17 1149   Dynamic Standing Balance OT LTG   Dynamic Standing Balance OT LTG, Date Established 09/06/17 --  --    Dynamic Standing Balance OT LTG, Time to Achieve 2 wks --  --    Dynamic Standing Balance OT LTG, [place] Level supervision required --  --    Dynamic Standing Balance OT LTG, Additional Goal Resident to Independent and safe With dynamic standing balance to complete higher level ADL's. --  --    Dynamic Standing Balance OT LTG, Date Goal Reviewed --  --  09/18/17   Dynamic Standing Balance OT LTG, Outcome --  goal met --        Goal: Patient Education Goal STG- OT  Outcome: Ongoing (interventions implemented as appropriate)    09/13/17 1349 09/14/17 0740 09/18/17 1149   Patient Education OT STG   Patient Education OT STG, Date Established 09/13/17 --  --    Patient Education OT STG, Time to Achieve 5 - 7 days --  --    Patient Education OT STG, Education Type HEP --  --    Patient Education OT STG, Additional Goal T band exercises. --  --    Patient Education OT STG, Date Goal Reviewed --  --  09/18/17   Patient Education OT STG Outcome --  goal not met --        Goal: Bathing Goal LTG- OT  Outcome: Ongoing (interventions implemented as appropriate)    09/13/17 1349 09/14/17 0740 09/18/17 1149   Bathing OT LTG   Bathing Goal OT LTG, Date Established 09/13/17 --  --    Bathing Goal OT LTG, Time to  Achieve 2 wks --  --    Bathing Goal OT LTG, [place] Level conditional independence --  --    Bathing Goal OT LTG, Assist Device shower chair with back --  --    Bathing Goal OT LTG, Additional Goal Independent and safe  --  --    Bathing Goal OT LTG, Date Goal Reviewed --  --  09/18/17   Bathing Goal OT LTG, Outcome --  goal not met --        Goal: Home Management Goal STG- OT  Outcome: Ongoing (interventions implemented as appropriate)    09/13/17 1349 09/14/17 0740 09/18/17 1149   Home Management OT STG   Home Mgmt Goal OT STG, Date Established 09/13/17 --  --    Home Mgmt Goal OT STG, Time To Achieve 1 wk --  --    Home Mgmt Goal OT STG, [place] Level conditional independence --  --    Home Mgmt Goal OT STG, Additional Goal Resident to be CI in room, for gathering of items for all personal care, with 0 LOB and safe. --  --    Home Mgmt Goal OT STG, Date Goal Reviewed --  --  09/18/17   Home Mgmt Goal OT STG, Outcome Achieved --  goal not met --

## 2017-09-18 NOTE — PROGRESS NOTES
[facility]  INPATIENT PROGRESS NOTE     LOS: 12 days   Patient Care Team:  Nish Morales DO as PCP - General (Gastroenterology)    Chief Complaint: Persistent diarrhea.       Subjective     Interval History:   Patient continues to have diarrhea although less in severity.  She is tolerating prescribed diet and denies any blood in the stool.  The stool is occasionally getting formed.  She is equally less deconditioned and doing well with physiotherapy.  .    History taken from: Patient.    Review of Systems:    Review of Systems   Constitutional: Negative for activity change, appetite change, chills, diaphoresis, fatigue and fever.   HENT: Negative for congestion, ear pain, rhinorrhea, sore throat and trouble swallowing.    Eyes: Negative for pain and visual disturbance.   Respiratory: Negative for cough, chest tightness, shortness of breath and wheezing.    Cardiovascular: Negative for chest pain, palpitations and leg swelling.   Gastrointestinal: Positive for diarrhea. Negative for abdominal pain, blood in stool, constipation, nausea and vomiting.   Endocrine: Negative for cold intolerance and heat intolerance.   Genitourinary: Negative for decreased urine volume, difficulty urinating and dysuria.   Musculoskeletal: Negative for arthralgias, back pain, gait problem and neck pain.   Skin: Negative for color change and rash.   Neurological: Negative for dizziness, syncope, weakness, light-headedness, numbness and headaches.   Hematological: Negative for adenopathy. Does not bruise/bleed easily.   Psychiatric/Behavioral: Negative for agitation, confusion and sleep disturbance. The patient is not nervous/anxious.          Objective     Vital Signs  Temp:  [97.3 °F (36.3 °C)-98.7 °F (37.1 °C)] 97.3 °F (36.3 °C)  Heart Rate:  [63-92] 92  Resp:  [18-20] 20  BP: (139-170)/(67-80) 139/67    Physical Exam:   Physical Exam   Constitutional: She is oriented to  person, place, and time. She appears well-developed and well-nourished. No distress.   HENT:   Head: Normocephalic and atraumatic.   Right Ear: External ear normal.   Left Ear: External ear normal.   Nose: Nose normal.   Eyes: Conjunctivae and EOM are normal. Pupils are equal, round, and reactive to light. Right eye exhibits no discharge. Left eye exhibits no discharge. No scleral icterus.   Neck: Normal range of motion. Neck supple.   Cardiovascular: Normal rate, regular rhythm, normal heart sounds and intact distal pulses.  Exam reveals no gallop and no friction rub.    No murmur heard.  Pulmonary/Chest: Effort normal and breath sounds normal. No respiratory distress. She has no wheezes. She has no rales. She exhibits no tenderness.   Abdominal: Soft. Bowel sounds are normal. She exhibits no distension and no mass. There is no tenderness. There is no rebound and no guarding.   Musculoskeletal: Normal range of motion.   Neurological: She is alert and oriented to person, place, and time.   Skin: Skin is warm and dry. No rash noted. She is not diaphoretic. No erythema. No pallor.   Psychiatric: She has a normal mood and affect. Her behavior is normal.   Nursing note and vitals reviewed.         Results Review:         Results from last 7 days  Lab Units 09/18/17  0516 09/17/17  0543 09/16/17  0521 09/15/17  2324 09/15/17  0526 09/14/17  0532 09/13/17  0515 09/12/17  0543   SODIUM mmol/L 141 140 141  --  141 138 138 137   POTASSIUM mmol/L 4.0 3.2* 3.7 3.5 3.4* 3.9 4.0 4.5   CHLORIDE mmol/L 108 109 111*  --  108 106 105 105   CO2 mmol/L 24.0 20.0* 22.0  --  25.0 21.0* 23.0 23.0   BUN mg/dL 15 15 15  --  15 8 7 7   CREATININE mg/dL 0.62 0.64 0.71  --  0.72 0.71 0.79 0.69   GLUCOSE mg/dL 159* 190* 162*  --  183* 157* 86 88   CALCIUM mg/dL 8.3* 8.1* 8.1*  --  8.5 8.4 8.1* 8.3*   BILIRUBIN mg/dL 0.4 0.4 0.3  --  0.2 0.5 0.4  --    ALK PHOS U/L 64 64 66  --  93 102 84  --    ALT (SGPT) U/L 32 33 32  --  32 32 36  --    AST  (SGOT) U/L 18 19 22  --  21 32 29  --          Results from last 7 days  Lab Units 09/18/17  0516 09/17/17  0543 09/16/17  0521 09/15/17  0526 09/14/17  0532 09/13/17  0515   MAGNESIUM mg/dL 1.9 1.8 1.9 1.9 1.9 1.7         Results from last 7 days  Lab Units 09/18/17  0516 09/17/17  0543 09/16/17  0521 09/15/17  0642 09/14/17  0532 09/13/17  0515 09/12/17  0543   WBC 10*3/mm3 7.92 8.04 7.95 13.29* 6.58 6.76 6.48   HEMOGLOBIN g/dL 9.5* 9.1* 8.6* 9.5* 11.5* 10.6* 10.8*   HEMATOCRIT % 29.3* 27.5* 27.2* 29.3* 35.4 32.3* 33.0*   PLATELETS 10*3/mm3 270 229 206 219 261 181 187       No results found for: CKTOTAL, CKMB, CKMBINDEX, TROPONINI, TROPONINT    CO2   Date Value Ref Range Status   09/18/2017 24.0 22.0 - 31.0 mmol/L Final              Imaging Results (last 7 days)     ** No results found for the last 168 hours. **                                    Medication Review:   Current Facility-Administered Medications   Medication Dose Route Frequency Provider Last Rate Last Dose   • acetaminophen (TYLENOL) tablet 650 mg  650 mg Oral Q4H PRN Atif Nichols MD   650 mg at 09/17/17 0051   • acidophilus (FLORANEX) tablet 1 tablet  1 tablet Oral TID Nish Morales DO   1 tablet at 09/18/17 0819   • famotidine (PEPCID) tablet 40 mg  40 mg Oral Daily Atif Nichols MD   40 mg at 09/18/17 0819   • hydrALAZINE (APRESOLINE) injection 10 mg  10 mg Intravenous Q6H PRN Hattie Barrera MD   10 mg at 09/18/17 0506   • HYDROcodone-acetaminophen (NORCO) 5-325 MG per tablet 1 tablet  1 tablet Oral Q6H PRN Hattie Barrera MD   1 tablet at 09/18/17 0506   • influenza vac split quad (FLUZONE QUADRIVALENT) IM suspension 0.5 mL  0.5 mL Intramuscular During Hospitalization Atif Nichols MD       • megestrol (MEGACE) tablet 20 mg  20 mg Oral Daily Chaz Beckett MD   20 mg at 09/18/17 0819   • mesalamine (DELZICOL) delayed release capsule 400 mg  400 mg Oral TID Nish Morales DO   400 mg at 09/18/17 0819   •  methylPREDNISolone sodium succinate (SOLU-Medrol) injection 40 mg  40 mg Intravenous Q6H Nish Morales, DO   40 mg at 09/18/17 0607   • metroNIDAZOLE (FLAGYL) IVPB 500 mg  500 mg Intravenous Q8H Nish Morales,  mL/hr at 09/16/17 0440 500 mg at 09/18/17 0506   • naloxone (NARCAN) injection 0.4 mg  0.4 mg Intravenous Q5 Min PRN Atif Nichols MD       • ondansetron (ZOFRAN) tablet 4 mg  4 mg Oral Q6H PRN Atif Nichols MD       • pneumococcal polysaccharide 23-valent (PNEUMOVAX-23) vaccine 0.5 mL  0.5 mL Intramuscular During Hospitalization Atif Nichols MD       • potassium chloride (MICRO-K) CR capsule 40 mEq  40 mEq Oral PRN Atif Nichols MD   40 mEq at 09/17/17 2145    Or   • potassium chloride (KLOR-CON) packet 40 mEq  40 mEq Oral PRN Atif Nichols MD        Or   • potassium chloride 10 mEq in 100 mL IVPB  10 mEq Intravenous Q1H PRN Atif Nichols MD   Stopped at 09/07/17 0454   • sodium chloride 0.9 % flush 1-10 mL  1-10 mL Intravenous PRN Atif Nichols MD       • sodium chloride 0.9 % infusion  50 mL/hr Intravenous Continuous Chaz Beckett MD 50 mL/hr at 09/17/17 1535 50 mL/hr at 09/17/17 1535   • vancomycin (VANCOCIN) 50 mg/ml oral solution 250 mg  250 mg Oral Q6H Nish Morales, DO   250 mg at 09/18/17 0607         Assessment/Plan     Principal Problem:    C. difficile colitis  Active Problems:    Lower abdominal pain    Inflammatory bowel disease    Hypokalemia          -Continue IV antibiotics,steroids and oral vancomycin.  -Diarrhea is slowly improving.  - Hypokalemia has resolved.  -We'll continue PT OT.  -GI follow-up appreciated.  -DVT and GI prophylaxis in place.  -We'll continue monitoring patient hospital setting and treat patient as course dictates.  - Likely discharge in the next 2-3 days.      This document has been electronically signed by Atif Nichols MD on September 18, 2017 10:23 AM        EMR Dragon/Transcription disclaimer:   Dictated  utilizing Dragon dictation.

## 2017-09-19 NOTE — PROGRESS NOTES
[facility]  INPATIENT PROGRESS NOTE     LOS: 13 days   Patient Care Team:  Nish Morales DO as PCP - General (Gastroenterology)    Chief Complaint: Persistent diarrhea.       Subjective     Interval History:   Patient continues to slowly improve and diarrhea is less in severity.  She is tolerating prescribed diet and denies any blood in the stool. She remains less deconditioned and doing well with physiotherapy.  .    History taken from: Patient.    Review of Systems:    Review of Systems   Constitutional: Negative for activity change, appetite change, chills, diaphoresis, fatigue and fever.   HENT: Negative for congestion, ear pain, rhinorrhea, sore throat and trouble swallowing.    Eyes: Negative for pain and visual disturbance.   Respiratory: Negative for cough, chest tightness, shortness of breath and wheezing.    Cardiovascular: Negative for chest pain, palpitations and leg swelling.   Gastrointestinal: Positive for diarrhea. Negative for abdominal pain, blood in stool, constipation, nausea and vomiting.   Endocrine: Negative for cold intolerance and heat intolerance.   Genitourinary: Negative for decreased urine volume, difficulty urinating and dysuria.   Musculoskeletal: Negative for arthralgias, back pain, gait problem and neck pain.   Skin: Negative for color change and rash.   Neurological: Negative for dizziness, syncope, weakness, light-headedness, numbness and headaches.   Hematological: Negative for adenopathy. Does not bruise/bleed easily.   Psychiatric/Behavioral: Negative for agitation, confusion and sleep disturbance. The patient is not nervous/anxious.          Objective     Vital Signs  Temp:  [96.6 °F (35.9 °C)-98.6 °F (37 °C)] 97.8 °F (36.6 °C)  Heart Rate:  [] 71  Resp:  [18-20] 18  BP: (135-170)/(67-80) 137/67    Physical Exam:   Physical Exam   Constitutional: She is oriented to person, place, and time. She appears  well-developed and well-nourished. No distress.   HENT:   Head: Normocephalic and atraumatic.   Right Ear: External ear normal.   Left Ear: External ear normal.   Nose: Nose normal.   Eyes: Conjunctivae and EOM are normal. Pupils are equal, round, and reactive to light. Right eye exhibits no discharge. Left eye exhibits no discharge. No scleral icterus.   Neck: Normal range of motion. Neck supple.   Cardiovascular: Normal rate, regular rhythm, normal heart sounds and intact distal pulses.  Exam reveals no gallop and no friction rub.    No murmur heard.  Pulmonary/Chest: Effort normal and breath sounds normal. No respiratory distress. She has no wheezes. She has no rales. She exhibits no tenderness.   Abdominal: Soft. Bowel sounds are normal. She exhibits no distension and no mass. There is no tenderness. There is no rebound and no guarding.   Musculoskeletal: Normal range of motion.   Neurological: She is alert and oriented to person, place, and time.   Skin: Skin is warm and dry. No rash noted. She is not diaphoretic. No erythema. No pallor.   Psychiatric: She has a normal mood and affect. Her behavior is normal.   Nursing note and vitals reviewed.         Results Review:         Results from last 7 days  Lab Units 09/19/17  0530 09/18/17  0516 09/17/17  0543 09/16/17  0521 09/15/17  2324 09/15/17  0526 09/14/17  0532 09/13/17  0515   SODIUM mmol/L 139 141 140 141  --  141 138 138   POTASSIUM mmol/L 3.3* 4.0 3.2* 3.7 3.5 3.4* 3.9 4.0   CHLORIDE mmol/L 110 108 109 111*  --  108 106 105   CO2 mmol/L 20.0* 24.0 20.0* 22.0  --  25.0 21.0* 23.0   BUN mg/dL 15 15 15 15  --  15 8 7   CREATININE mg/dL 0.57 0.62 0.64 0.71  --  0.72 0.71 0.79   GLUCOSE mg/dL 153* 159* 190* 162*  --  183* 157* 86   CALCIUM mg/dL 8.2* 8.3* 8.1* 8.1*  --  8.5 8.4 8.1*   BILIRUBIN mg/dL 0.5 0.4 0.4 0.3  --  0.2 0.5 0.4   ALK PHOS U/L 56 64 64 66  --  93 102 84   ALT (SGPT) U/L 30 32 33 32  --  32 32 36   AST (SGOT) U/L 15 18 19 22  --  21 32 29          Results from last 7 days  Lab Units 09/19/17  0530 09/18/17  0516 09/17/17  0543 09/16/17  0521 09/15/17  0526 09/14/17  0532 09/13/17  0515   MAGNESIUM mg/dL 1.8 1.9 1.8 1.9 1.9 1.9 1.7         Results from last 7 days  Lab Units 09/19/17  0530 09/18/17  0516 09/17/17  0543 09/16/17  0521 09/15/17  0642 09/14/17  0532 09/13/17  0515   WBC 10*3/mm3 9.19 7.92 8.04 7.95 13.29* 6.58 6.76   HEMOGLOBIN g/dL 9.0* 9.5* 9.1* 8.6* 9.5* 11.5* 10.6*   HEMATOCRIT % 28.3* 29.3* 27.5* 27.2* 29.3* 35.4 32.3*   PLATELETS 10*3/mm3 248 270 229 206 219 261 181       No results found for: CKTOTAL, CKMB, CKMBINDEX, TROPONINI, TROPONINT    CO2   Date Value Ref Range Status   09/19/2017 20.0 (L) 22.0 - 31.0 mmol/L Final              Imaging Results (last 7 days)     ** No results found for the last 168 hours. **                                    Medication Review:   Current Facility-Administered Medications   Medication Dose Route Frequency Provider Last Rate Last Dose   • acetaminophen (TYLENOL) tablet 650 mg  650 mg Oral Q4H PRN Atif Nichols MD   650 mg at 09/18/17 2057   • acidophilus (FLORANEX) tablet 1 tablet  1 tablet Oral TID Nish Morales DO   1 tablet at 09/19/17 0848   • famotidine (PEPCID) tablet 40 mg  40 mg Oral Daily Atif Nichols MD   40 mg at 09/19/17 0848   • hydrALAZINE (APRESOLINE) injection 10 mg  10 mg Intravenous Q6H PRN Hattie Barrera MD   10 mg at 09/18/17 0506   • HYDROcodone-acetaminophen (NORCO) 5-325 MG per tablet 1 tablet  1 tablet Oral Q6H PRN Hattie Barrera MD   1 tablet at 09/19/17 0556   • influenza vac split quad (FLUZONE QUADRIVALENT) IM suspension 0.5 mL  0.5 mL Intramuscular During Hospitalization Atif Nichols MD       • mesalamine (DELZICOL) delayed release capsule 400 mg  400 mg Oral TID Nish Morales DO   400 mg at 09/19/17 0848   • methylPREDNISolone sodium succinate (SOLU-Medrol) injection 20 mg  20 mg Intravenous Q6H Nish Morales DO   20 mg  at 09/19/17 0556   • naloxone (NARCAN) injection 0.4 mg  0.4 mg Intravenous Q5 Min PRN Atif Nichols MD       • ondansetron (ZOFRAN) tablet 4 mg  4 mg Oral Q6H PRN Atif Nichols MD       • pneumococcal polysaccharide 23-valent (PNEUMOVAX-23) vaccine 0.5 mL  0.5 mL Intramuscular During Hospitalization Atif Nichols MD       • potassium chloride (MICRO-K) CR capsule 40 mEq  40 mEq Oral PRN Atif Nichols MD   40 mEq at 09/19/17 1113    Or   • potassium chloride (KLOR-CON) packet 40 mEq  40 mEq Oral PRN Atif Nichols MD        Or   • potassium chloride 10 mEq in 100 mL IVPB  10 mEq Intravenous Q1H PRN Atif Nichols MD   Stopped at 09/07/17 0454   • sodium chloride 0.9 % flush 1-10 mL  1-10 mL Intravenous PRN Atif Nichols MD       • vancomycin (VANCOCIN) 50 mg/ml oral solution 250 mg  250 mg Oral Q6H Nish Morales DO   250 mg at 09/19/17 0556         Assessment/Plan     Principal Problem:    C. difficile colitis  Active Problems:    Lower abdominal pain    Inflammatory bowel disease    Hypokalemia          -Continue IV steroids and oral vancomycin.  -Diarrhea continues to improve.  - Hypokalemia persists but repletion is ongoing.    -Continue PT OT.  -GI follow-up appreciated.  -DVT and GI prophylaxis in place.  - Likely discharge in the next 2 days.      This document has been electronically signed by Atif Nichols MD on September 19, 2017 11:18 AM        EMR Dragon/Transcription disclaimer:   Dictated utilizing Dragon dictation.

## 2017-09-19 NOTE — PLAN OF CARE
Problem: Patient Care Overview (Adult)  Goal: Plan of Care Review  Outcome: Outcome(s) achieved Date Met:  09/19/17 09/19/17 6796   Coping/Psychosocial Response Interventions   Plan Of Care Reviewed With patient;caregiver   Patient Care Overview   Progress improving   Outcome Evaluation   Outcome Summary/Follow up Plan Pt's Gi symptoms are improving and her po intake is excellent with intake averaging ~93% for the last 12 meals. Rd will monitor tx plans for any changes.

## 2017-09-19 NOTE — THERAPY TREATMENT NOTE
Acute Care - Occupational Therapy Treatment Note  [facility]     Patient Name: Damaris Sánchez  : 1945  MRN: [id number]  Today's Date: 2017  Onset of Illness/Injury or Date of Surgery Date: 17  Date of Referral to OT: 17  Referring Physician: Dr. Barrera      Admit Date: 2017    Visit Dx:     ICD-10-CM ICD-9-CM   1. Lower abdominal pain R10.30 789.09   2. Leukocytosis, unspecified type D72.829 288.60   3. Hypokalemia E87.6 276.8   4. C. difficile colitis A04.7 008.45   5. Impaired physical mobility Z74.09 781.99   6. Impaired mobility and ADLs Z74.09 799.89     Patient Active Problem List   Diagnosis   • Lower abdominal pain   • C. difficile colitis   • Inflammatory bowel disease   • Hypokalemia             Adult Rehabilitation Note       17 1355 17 0835 17 0810    Rehab Assessment/Intervention    Discipline occupational therapy assistant  -KD occupational therapy assistant  -CS physical therapy assistant  -AM    Document Type therapy note (daily note)  -KD therapy note (daily note)  -CS therapy note (daily note)  -AM    Subjective Information agree to therapy  -KD agree to therapy  -CS agree to therapy;complains of;pain  -AM    Patient Effort, Rehab Treatment   good  -AM    Symptoms Noted During/After Treatment   increased pain  -AM    Precautions/Limitations   no known precautions/limitations  -AM    Equipment Issued to Patient   gait belt  -AM    Recorded by [KD] MADDI Kelley/JARAD [CS] MADDI Hare/JARAD [AM] Cong Zavala, PTA    Vital Signs    Pre Systolic BP Rehab 142  -  -  -AM    Pre Treatment Diastolic BP 62  -KD 72  -CS 67  -AM    Post Systolic BP Rehab   127  -AM    Post Treatment Diastolic BP   94  -AM    Pretreatment Heart Rate (beats/min) 62  -KD 85  -CS 84  -AM    Posttreatment Heart Rate (beats/min) 75  -KD  104  -AM    Pre SpO2 (%) 96  -KD 98  -CS 97  -AM    O2 Delivery Pre Treatment room air  -KD room air  -CS room air  -AM     Post SpO2 (%) 97  -KD  97  -AM    O2 Delivery Post Treatment room air  -KD  room air  -AM    Pre Patient Position Supine  -KD Supine  -CS Standing  -AM    Intra Patient Position Standing  -KD Sitting  -CS Sitting  -AM    Post Patient Position Sitting  -KD Sitting  -CS Sitting  -AM    Recorded by [KD] HILARY KelleyA/L [CS] HILARY HareA/JARAD [AM] Cong Zavala PTA    Pain Assessment    Pain Assessment No/denies pain  -KD 0-10  -CS 0-10  -AM    Pain Score  2  -CS 3  -AM    Post Pain Score   5  -AM    Pain Type   Acute pain  -AM    Pain Location  Abdomen  -CS Abdomen  -AM    Pain Orientation  Left  -CS Left  -AM    Pain Descriptors   Sore  -AM    Pain Frequency   Constant/continuous  -AM    Date Pain First Started   09/06/17  -AM    Clinical Progression   Gradually improving  -AM    Patient's Stated Pain Goal   No pain  -AM    Pain Intervention(s)   Ambulation/increased activity  -AM    Result of Injury   No  -AM    Work-Related Injury   No  -AM    Multiple Pain Sites   No  -AM    Recorded by [KD] HILARY KelleyA/L [CS] HILARY HareA/JARAD [AM] Cong Zavala PTA    Cognitive Assessment/Intervention    Current Cognitive/Communication Assessment functional  -KD functional  -CS functional  -AM    Orientation Status oriented x 4  -KD oriented x 4  -CS oriented x 4  -AM    Follows Commands/Answers Questions 100% of the time  -% of the time  -% of the time  -AM    Personal Safety WNL/WFL  -KD      Personal Safety Interventions   gait belt;nonskid shoes/slippers when out of bed;supervised activity  -AM    Recorded by [KD] MADDI Kelley/JARAD [CS] MADDI Hare/JARAD [AM] Cong Zavala PTA    ROM (Range of Motion)    General ROM   no range of motion deficits identified  -AM    Recorded by   [AM] Cong Zavala PTA    Bed Mobility, Assessment/Treatment    Bed Mobility, Roll Right, [place]   not tested  -AM    Bed Mobility, Scoot/Bridge, [place]   not tested  -AM     Bed Mob, Supine to Sit, [place] independent  -KD independent  -CS independent  -AM    Bed Mob, Sit to Supine, [place] independent  -KD independent  -CS independent  -AM    Bed Mob, Sidelying to Sit, [place] independent  -KD  not tested  -AM    Bed Mob, Sit to Sidelying, [place] independent  -KD  not tested  -AM    Recorded by [KD] Swetha Kitchen, LINDA/L [CS] HILARY HareA/L [AM] Cong Zavala PTA    Transfer Assessment/Treatment    Transfers, Bed-Chair [place]   independent  -AM    Transfers, Chair-Bed [place]   independent  -AM    Transfers, Sit-Stand [place] independent  -KD independent  -CS independent  -AM    Transfers, Stand-Sit [place] independent  -KD independent  -CS independent  -AM    Transfers, Sit-Stand-Sit, Assist Device --   none  -KD      Toilet Transfer, [place]  independent   x3  -CS independent  -AM    Walk-In Shower Transfer, [place]   not tested  -AM    Bathtub Transfer, [place]   not tested  -AM    Transfer, Maintain Weight Bearing Status   able to maintain weight bearing status  -AM    Recorded by [KD] HILARY KelleyA/L [CS] Madhavi Watters, LINDA/L [AM] Cong Zavala PTA    Gait Assessment/Treatment    Gait, [place] Level   independent  -AM    Gait, Distance (Feet)   500  -AM    Gait, Gait Pattern Analysis   swing-through gait  -AM    Gait, Maintain Weight Bearing Status   able to maintain weight bearing status  -AM    Recorded by   [AM] Cong Zavala PTA    Stairs Assessment/Treatment    Number of Stairs   3   X2 attempts  -AM    Stairs, Handrail Location   both sides  -AM    Stairs, [place] Level   conditional independence  -AM    Stairs, Technique Used   step to step (ascending);step to step (descending)  -AM    Stairs, Maintain Weight Bearing Status   able to maintain weight bearing status  -AM    Recorded by   [AM] Cong Zavala PTA    Functional Mobility    Functional Mobility- Ind. Level   independent  -CS     Functional Mobility-Distance (Feet)  10  -CS     Recorded by  [CS] MADDI Hare/L     Upper Body Bathing Assessment/Training    UB Bathing Assess/Train Assistive Device bath mitt  -KD      UB Bathing Assess/Train, [place] Level independent  -KD      Recorded by [KD] MADDI Kelley/L      Lower Body Bathing Assessment/Training    LB Bathing Assess/Train Assistive Device bath mitt  -KD      LB Bathing Assess/Train, [place] Level independent  -KD      LB Bathing Assess/Train, Comment  pt deferred  -CS     Recorded by [KD] MADDI Kelley/JARAD [CS] MADDI Hare/L     Upper Body Dressing Assessment/Training    UB Dressing Assess/Train, Clothing Type doffing:;donning:;t-shirt  -KD      UB Dressing Assess/Train, Position edge of bed;sitting  -KD      Recorded by [KD] MADDI Kelley/L      Lower Body Dressing Assessment/Training    LB Dressing Assess/Train, Clothing Type doffing:;donning:;slipper socks;pants  -KD      LB Dressing Assess/Train, [place] independent  -KD      Recorded by [KD] MADDI Kelley/L      Toileting Assessment/Training    Toileting Assess/Train, Assistive Device  raised toilet seat  -CS     Toileting Assess/Train, Position  sitting;standing  -CS     Toileting Assess/Train, Indepen Level  independent   x3  -CS     Recorded by  [CS] MADDI Hare/L     Grooming Assessment/Training    Grooming Assess/Train, Position  standing;sink side  -CS     Grooming Assess/Train, Indepen Level  independent  -CS     Recorded by  [CS] MADDI Hare/L     Balance Skills Training    Standing-Balance Activities --   dynamic  -KD      Standing Balance # of Minutes 3  -KD      Recorded by [KD] MADDI Kelley/L      Therapy Exercises    Bilateral Upper Extremity AROM:;20 reps;sitting;elbow flexion/extension;hand pumps;pronation/supination;shoulder abduction/adduction;shoulder ER/IR;shoulder extension/flexion;shoulder horizontal  abd/add  -KD AROM:;20 reps;sitting;elbow flexion/extension;hand pumps;pronation/supination;shoulder extension/flexion;shoulder ER/IR  -CS     BUE Resistance --   2lb HW  -KD manual resistance- minimal  -CS     Recorded by [KD] JAM Kelley [CS] MADDI Hare/JARAD     Positioning and Restraints    Pre-Treatment Position in bed  -KD in bed  -CS standing in room  -AM    Post Treatment Position bed  -KD bed  -CS bed  -AM    In Bed sitting EOB;call light within reach;encouraged to call for assist;exit alarm on  -KD sitting EOB;with family/caregiver  -CS sitting EOB;call light within reach;encouraged to call for assist  -AM    Recorded by [KD] MADDI Kelley/JARAD [CS] JAM Hare [AM] Cong Zavala PTA      09/16/17 7477          Rehab Assessment/Intervention    Discipline physical therapy assistant  -TA      Document Type therapy note (daily note)  -TA      Subjective Information agree to therapy  -TA      Patient Effort, Rehab Treatment good  -TA      Precautions/Limitations fall precautions  -TA      Recorded by [TA] Sera Bowens PTA      Vital Signs    Pretreatment Heart Rate (beats/min) 84  -TA      Posttreatment Heart Rate (beats/min) 87  -TA      Pre SpO2 (%) 96  -TA      O2 Delivery Pre Treatment room air  -TA      Post SpO2 (%) 97  -TA      Pre Patient Position Supine  -TA      Post Patient Position Supine  -TA      Recorded by [TA] Sera Bowens PTA      Pain Assessment    Pain Assessment No/denies pain  -TA      Recorded by [TA] Sera Bowens PTA      Cognitive Assessment/Intervention    Current Cognitive/Communication Assessment functional  -TA      Orientation Status oriented x 4  -TA      Follows Commands/Answers Questions 100% of the time  -TA      Personal Safety WNL/WFL  -TA      Personal Safety Interventions gait belt;nonskid shoes/slippers when out of bed  -TA      Recorded by [TA] Sera Bowens PTA      Bed Mobility, Assessment/Treatment    Bed Mobility, Roll  Right, [place] independent  -TA      Bed Mobility, Scoot/Bridge, [place] independent  -TA      Bed Mob, Supine to Sit, [place] independent  -TA      Bed Mob, Sit to Supine, [place] independent  -TA      Recorded by [TA] Sera Bowens PTA      Transfer Assessment/Treatment    Transfers, Sit-Stand [place] independent  -TA      Transfers, Stand-Sit [place] independent  -TA      Transfers, Sit-Stand-Sit, Assist Device other (see comments)   no AD  -TA      Toilet Transfer, [place] independent  -TA      Recorded by [TA] Sera Bowens PTA      Gait Assessment/Treatment    Gait, [place] Level independent  -TA      Gait, Distance (Feet) 350  -TA      Recorded by [TA] Sera Bowens PTA      Positioning and Restraints    Pre-Treatment Position in bed  -TA      Post Treatment Position bed  -TA      In Bed supine;call light within reach  -TA      Recorded by [TA] Sera Bowens PTA        User Key  (r) = Recorded By, (t) = Taken By, (c) = Cosigned By    Initials Name Effective Dates    TA Sera Bowens, BRUNO 10/17/16 -     AM Cong Zavala, PTA 10/17/16 -     KD Swetha Kitchen, LINDA/L 10/17/16 -     CS Madhavi Watters, LINDA/L 10/17/16 -                 OT Goals       09/19/17 1355 09/18/17 1149 09/16/17 1526    Dynamic Standing Balance OT LTG    Dynamic Standing Balance OT LTG, Date Established 09/19/17  -KD      Dynamic Standing Balance OT LTG, Date Goal Reviewed 09/19/17  -KD 09/18/17  -CS 09/16/17  -CS    Dynamic Standing Balance OT LTG, Outcome goal met  -KD      Patient Education OT STG    Patient Education OT STG, Date Goal Reviewed 09/19/17  -KD 09/18/17  -CS 09/16/17  -CS    Patient Education OT STG Outcome goal not met  -KD      Bathing OT LTG    Bathing Goal OT LTG, Date Goal Reviewed 09/19/17  -KD 09/18/17  -CS 09/16/17  -CS    Bathing Goal OT LTG, Outcome goal met  -KD      Home Management OT STG    Home Mgmt Goal OT STG, Date Goal Reviewed 09/19/17  -KD 09/18/17   -CS 09/16/17  -CS    Home Mgmt Goal OT STG, Outcome Achieved goal not met  -KD        09/15/17 1548 09/14/17 0740 09/13/17 1349    Dynamic Standing Balance OT LTG    Dynamic Standing Balance OT LTG, Date Established   09/06/17  -SG    Dynamic Standing Balance OT LTG, Time to Achieve   2 wks  -SG    Dynamic Standing Balance OT LTG, [place] Level   supervision required  -SG    Dynamic Standing Balance OT LTG, Additional Goal   Resident to Independent and safe With dynamic standing balance to complete higher level ADL's.  -SG    Dynamic Standing Balance OT LTG, Date Goal Reviewed 09/15/17  -CS 09/14/17  -KD     Dynamic Standing Balance OT LTG, Outcome  goal met  -KD     Patient Education OT STG    Patient Education OT STG, Date Established   09/13/17  -SG    Patient Education OT STG, Time to Achieve   5 - 7 days  -SG    Patient Education OT STG, Education Type   HEP  -SG    Patient Education OT STG, Additional Goal   T band exercises.  -SG    Patient Education OT STG, Date Goal Reviewed 09/15/17  -CS 09/14/17  -KD     Patient Education OT STG Outcome  goal not met  -KD     Bathing OT LTG    Bathing Goal OT LTG, Date Established   09/13/17  -SG    Bathing Goal OT LTG, Time to Achieve   2 wks  -SG    Bathing Goal OT LTG, [place] Level   conditional independence  -SG    Bathing Goal OT LTG, Assist Device   shower chair with back  -SG    Bathing Goal OT LTG, Additional Goal   Independent and safe   -SG    Bathing Goal OT LTG, Date Goal Reviewed 09/15/17  -CS 09/14/17  -KD     Bathing Goal OT LTG, Outcome  goal not met  -KD     Home Management OT STG    Home Mgmt Goal OT STG, Date Established   09/13/17  -SG    Home Mgmt Goal OT STG, Time To Achieve   1 wk  -SG    Home Mgmt Goal OT STG, [place] Level   conditional independence  -SG    Home Mgmt Goal OT STG, Additional Goal   Resident to be CI in room, for gathering of items for all personal care, with 0 LOB and safe.  -SG    Home Mgmt Goal OT STG, Date Goal  Reviewed 09/15/17  - 09/14/17  -     Home Mgmt Goal OT STG, Outcome Achieved  goal not met  -KD       User Key  (r) = Recorded By, (t) = Taken By, (c) = Cosigned By    Initials Name Provider Type    MADDI Dhaliwal/L Occupational Therapy Assistant     MADDI Hare/JARAD Occupational Therapy Assistant    MYLA Patricia OT Occupational Therapist          Occupational Therapy Education     Title: PT OT SLP Therapies (Active)     Topic: Occupational Therapy (Active)     Point: ADL training (Done)    Description: Instruct learner(s) on proper safety adaptation and remediation techniques during self care or transfers.   Instruct in proper use of assistive devices.    Learning Progress Summary    Learner Readiness Method Response Comment Documented by Status   Patient Acceptance E VU   09/19/17 1451 Done    Acceptance E,TB,D NR   09/18/17 1149 Active    Acceptance E,TB,D VU   09/16/17 1526 Done    Acceptance E,TB,D,H VU Pt was educated on HEP and home safety.  09/15/17 1547 Done    Acceptance TB VU   09/14/17 1144 Done    Eager E,TB DU   09/13/17 1346 Done               Point: Home exercise program (Active)    Description: Instruct learner(s) on appropriate technique for monitoring, assisting and/or progressing therapeutic exercises/activities.    Learning Progress Summary    Learner Readiness Method Response Comment Documented by Status   Patient Acceptance E,TB,D NR   09/18/17 1149 Active    Acceptance E,TB,D VU   09/16/17 1526 Done    Acceptance E,TB,D,H VU Pt was educated on HEP and home safety.  09/15/17 1547 Done    Eager E,TB DU   09/13/17 1346 Done               Point: Precautions (Active)    Description: Instruct learner(s) on prescribed precautions during self-care and functional transfers.    Learning Progress Summary    Learner Readiness Method Response Comment Documented by Status   Patient Acceptance E,TB,D NR   09/18/17 1149 Active    Acceptance E,TB,D VU    09/16/17 1526 Done    Acceptance E,TB,D,H VU Pt was educated on HEP and home safety.  09/15/17 1547 Done    Eager E,TB DU   09/13/17 1346 Done               Point: Body mechanics (Active)    Description: Instruct learner(s) on proper positioning and spine alignment during self-care, functional mobility activities and/or exercises.    Learning Progress Summary    Learner Readiness Method Response Comment Documented by Status   Patient Acceptance E,TB,D NR   09/18/17 1149 Active    Acceptance E,TB,D VU   09/16/17 1526 Done    Acceptance E,TB,D,H VU Pt was educated on HEP and home safety.  09/15/17 1547 Done    Eager ETB DU   09/13/17 1346 Done                      User Key     Initials Effective Dates Name Provider Type Discipline     10/17/16 -  MADDI Kelley/L Occupational Therapy Assistant OT     10/17/16 -  MADDI Hare/JARAD Occupational Therapy Assistant OT     08/03/17 -  Farrah Patricia OT Occupational Therapist OT                  OT Recommendation and Plan  Anticipated Discharge Disposition: home with home health  Planned Therapy Interventions: activity intolerance, ADL retraining, IADL retraining, energy conservation, home exercise program, neuromuscular re-education, strengthening, transfer training  Therapy Frequency:  (3/14 times/Wk)  Plan of Care Review  Outcome Summary/Follow up Plan: Pt indep with ADL's. SBA sit-stands, pt met 2 new goals this tx and harsh tx well        Outcome Measures       09/19/17 1355 09/18/17 1100 09/17/17 0810    How much help from another person do you currently need...    Turning from your back to your side while in flat bed without using bedrails?   4  -AM    Moving from lying on back to sitting on the side of a flat bed without bedrails?   4  -AM    Moving to and from a bed to a chair (including a wheelchair)?   4  -AM    Standing up from a chair using your arms (e.g., wheelchair, bedside chair)?   4  -AM    Climbing 3-5 steps with a railing?   4  " -AM    To walk in hospital room?   4  -AM    AM-PAC 6 Clicks Score   24  -AM    How much help from another is currently needed...    Putting on and taking off regular lower body clothing? 4  -KD 4  -CS     Bathing (including washing, rinsing, and drying) 4  -KD 4  -CS     Toileting (which includes using toilet bed pan or urinal) 4  -KD 4  -CS     Putting on and taking off regular upper body clothing 4  -KD 4  -CS     Taking care of personal grooming (such as brushing teeth) 4  -KD 4  -CS     Eating meals 4  -KD 4  -CS     Score 24  -KD 24  -CS     Tinetti Assessment    Tinetti Assessment   yes  -AM    Sitting Balance   1  -AM    Arises   2  -AM    Attempts to Rise   2  -AM    Immediate Standing Balance (first 5 sec)   2  -AM    Standing Balance   2  -AM    Sternal Nudge (feet close together)   1  -AM    Eyes Closed (feet close together)   1  -AM    Turning 360 Degrees- Steps   1  -AM    Turning 360 Degrees- Steadiness   1  -AM    Sitting Down   2  -AM    Tinetti Balance Score   15  -AM    Gait Initiation (immediate after told \"go\")   1  -AM    Step Length- Right Swing   1  -AM    Step Length- Left Swing   1  -AM    Foot Clearance- Right Foot   1  -AM    Foot Clearance- Left Foot   1  -AM    Step Symmetry   1  -AM    Step Continuity   1  -AM    Path (excursion)   2  -AM    Trunk   2  -AM    Base of Support   1  -AM    Gait Score   12  -AM    Tinetti Total Score   27  -AM    Tinetti Assistive Device   --   none  -AM    Functional Assessment    Outcome Measure Options  AM-PAC 6 Clicks Daily Activity (OT)  -CS AM-PAC 6 Clicks Basic Mobility (PT);Tinetti  -AM      09/16/17 1600 09/16/17 1500       How much help from another person do you currently need...    Turning from your back to your side while in flat bed without using bedrails? 4  -TA      Moving from lying on back to sitting on the side of a flat bed without bedrails? 4  -TA      Moving to and from a bed to a chair (including a wheelchair)? 4  -TA      Standing " up from a chair using your arms (e.g., wheelchair, bedside chair)? 4  -TA      Climbing 3-5 steps with a railing? 3  -TA      To walk in hospital room? 4  -TA      AM-PAC 6 Clicks Score 23  -TA      How much help from another is currently needed...    Putting on and taking off regular lower body clothing?  4  -CS     Bathing (including washing, rinsing, and drying)  4  -CS     Toileting (which includes using toilet bed pan or urinal)  4  -CS     Putting on and taking off regular upper body clothing  4  -CS     Taking care of personal grooming (such as brushing teeth)  4  -CS     Eating meals  4  -CS     Score  24  -CS     Functional Assessment    Outcome Measure Options AM-PAC 6 Clicks Basic Mobility (PT)  -TA AM-PAC 6 Clicks Daily Activity (OT)  -CS       User Key  (r) = Recorded By, (t) = Taken By, (c) = Cosigned By    Initials Name Provider Type    TA Sera Bowens, BRUNO Physical Therapy Assistant    AM Cong Zavala PTA Physical Therapy Assistant    MADDI Dhaliwal/L Occupational Therapy Assistant    MADDI Zamora/JARAD Occupational Therapy Assistant           Time Calculation:         Time Calculation- OT       09/19/17 1451          Time Calculation- OT    OT Start Time 1355  -KD      OT Stop Time 1440  -KD      OT Time Calculation (min) 45 min  -KD      Total Timed Code Minutes- OT 45 minute(s)  -KD      OT Received On 09/19/17  -KD        User Key  (r) = Recorded By, (t) = Taken By, (c) = Cosigned By    Initials Name Provider Type     MADDI Kelley/L Occupational Therapy Assistant           Therapy Charges for Today     Code Description Service Date Service Provider Modifiers Qty    89159417573 HC OT THER PROC EA 15 MIN 9/19/2017 MADDI Kelley/L GO 1    36781148481 HC OT THERAPEUTIC ACT EA 15 MIN 9/19/2017 MADDI Kelley/L GO 1    18375411554 HC OT SELF CARE/MGMT/TRAIN EA 15 MIN 9/19/2017 MADDI Kelley/L GO 1          OT G-codes  OT Professional Judgement  Used?: Yes  OT Functional Scales Options: AM-PAC 6 Clicks Daily Activity (OT)  Score: 23  Functional Limitation: Self care  Self Care Current Status (): At least 1 percent but less than 20 percent impaired, limited or restricted  Self Care Goal Status (): 0 percent impaired, limited or restricted    MADDI Kelley/JARAD  9/19/2017

## 2017-09-19 NOTE — CONSULTS
"Adult Nutrition  Assessment    Patient Name:  Damaris Sánchez  YOB: 1945  MRN: [id number]  Admit Date:  9/6/2017    Assessment Date:  9/19/2017          Reason for Assessment       09/19/17 1345    Reason for Assessment    Reason For Assessment/Visit follow up protocol              Nutrition/Diet History       09/19/17 1346    Nutrition/Diet History    Typical Food/Fluid Intake Pt reports that her appetite \"came back in full force\"  She is eating well and the diarrhea is getting better              Labs/Tests/Procedures/Meds       09/19/17 1347    Labs/Tests/Procedures/Meds    Labs/Tests Review Reviewed    Medication Review Reviewed, pertinent            Physical Findings       09/19/17 1347    Physical Appearance    Gastrointestinal diarrhea   but better              Nutrition Prescription Ordered       09/19/17 1353    Nutrition Prescription PO    Current PO Diet Regular    Fluid Consistency Thin    Supplement Yougurt    Supplement Frequency 3 times a day    Common Modifiers GI Soft/Strawberry            Evaluation of Received Nutrient/Fluid Intake       09/19/17 1400    PO Evaluation    Number of Days PO Intake Evaluated Other (comment)    Number of Meals 12    % PO Intake ~93% average            Comments:  Excellent intake of meals and yogurt with meals.  Intake averaging ~93% for the last 12 documented meals. Gi symptoms are improving with diarrhea and frequency much less.  RD will monitor for any changes.  Anticipate being d/cristhian this week          Electronically signed by:  Candelaria Avila RD  09/19/17 2:08 PM  "

## 2017-09-19 NOTE — PLAN OF CARE
Problem: Patient Care Overview (Adult)  Goal: Plan of Care Review  Outcome: Ongoing (interventions implemented as appropriate)    09/19/17 1703   Coping/Psychosocial Response Interventions   Plan Of Care Reviewed With patient   Patient Care Overview   Progress improving   Outcome Evaluation   Outcome Summary/Follow up Plan pt vss. Pain well managed this shift,       Goal: Adult Individualization and Mutuality  Outcome: Ongoing (interventions implemented as appropriate)  Goal: Discharge Needs Assessment  Outcome: Ongoing (interventions implemented as appropriate)    Problem: Fluid Volume Deficit (Adult)  Goal: Fluid/Electrolyte Balance  Outcome: Ongoing (interventions implemented as appropriate)

## 2017-09-19 NOTE — PROGRESS NOTES
Nish Morales DO,[facility]  Gastroenterology  Hepatology  Endoscopy  Board Certified in Internal Medicine and gastroenterology  [address] [facility], [address]  [place]. [address]  - [phone]   F - [phone]     GASTROENTEROLOGY PROGRESS NOTE   NISH MORAELS DO.         SUBJECTIVE:   9/18/2017  Chief Complaint:     Subjective  : Stool is starting to form up.  Putty inconsistency.  No significant abdominal pain.  Still with some left lower quadrant pain.  There has been no fevers or chills.  Reviewed with the patient and son today regarding the prognosis and treatment of ulcerative colitis.  Biopsies confirm the patient has ulcerative colitis.    Complains of swelling in the lower extremities.  Was given a diuretic pill today.      Excellent appetite.       CURRENT MEDICATIONS/OBJECTIVE/VS/PE:     Current Medications:     Current Facility-Administered Medications   Medication Dose Route Frequency Provider Last Rate Last Dose   • acetaminophen (TYLENOL) tablet 650 mg  650 mg Oral Q4H PRN Atif Nichols MD   650 mg at 09/17/17 0051   • acidophilus (FLORANEX) tablet 1 tablet  1 tablet Oral TID Nish Morales DO   1 tablet at 09/18/17 1656   • famotidine (PEPCID) tablet 40 mg  40 mg Oral Daily Atif Nichols MD   40 mg at 09/18/17 0819   • hydrALAZINE (APRESOLINE) injection 10 mg  10 mg Intravenous Q6H PRN Hattie Barrera MD   10 mg at 09/18/17 0506   • HYDROcodone-acetaminophen (NORCO) 5-325 MG per tablet 1 tablet  1 tablet Oral Q6H PRN Hattie Barrera MD   1 tablet at 09/18/17 1241   • influenza vac split quad (FLUZONE QUADRIVALENT) IM suspension 0.5 mL  0.5 mL Intramuscular During Hospitalization Atif Nichols MD       • megestrol (MEGACE) tablet 20 mg  20 mg Oral Daily Chaz Beckett MD   20 mg at 09/18/17 0819   • mesalamine (DELZICOL) delayed release capsule 400 mg  400 mg Oral TID Nish Morales DO   400 mg at 09/18/17 1656   • methylPREDNISolone sodium succinate  (SOLU-Medrol) injection 40 mg  40 mg Intravenous Q6H Nish Morales, DO   40 mg at 09/18/17 1747   • metroNIDAZOLE (FLAGYL) IVPB 500 mg  500 mg Intravenous Q8H Nish Morales,  mL/hr at 09/16/17 0440 500 mg at 09/18/17 1242   • naloxone (NARCAN) injection 0.4 mg  0.4 mg Intravenous Q5 Min PRN Atif Nichols MD       • ondansetron (ZOFRAN) tablet 4 mg  4 mg Oral Q6H PRN Atif Nichols MD       • pneumococcal polysaccharide 23-valent (PNEUMOVAX-23) vaccine 0.5 mL  0.5 mL Intramuscular During Hospitalization Atif Nichols MD       • potassium chloride (MICRO-K) CR capsule 40 mEq  40 mEq Oral PRN Atif Nichols MD   40 mEq at 09/17/17 2145    Or   • potassium chloride (KLOR-CON) packet 40 mEq  40 mEq Oral PRN Atif Nichols MD        Or   • potassium chloride 10 mEq in 100 mL IVPB  10 mEq Intravenous Q1H PRN Atif Nichols MD   Stopped at 09/07/17 0454   • sodium chloride 0.9 % flush 1-10 mL  1-10 mL Intravenous PRN Atif Nichols MD       • sodium chloride 0.9 % infusion  50 mL/hr Intravenous Continuous Chaz Beckett MD 50 mL/hr at 09/18/17 1657 50 mL/hr at 09/18/17 1657   • vancomycin (VANCOCIN) 50 mg/ml oral solution 250 mg  250 mg Oral Q6H Nish Morales, DO   250 mg at 09/18/17 1702       Objective     Physical Exam:   Temp:  [96.9 °F (36.1 °C)-98.7 °F (37.1 °C)] 96.9 °F (36.1 °C)  Heart Rate:  [] 114  Resp:  [18-20] 20  BP: (131-170)/(67-80) 138/76     Physical Exam:  General Appearance:    Alert, cooperative, in no acute distress   Head:    Normocephalic, without obvious abnormality, atraumatic   Eyes:            Lids and lashes normal, conjunctivae and sclerae normal, no   icterus, no pallor, corneas clear, PERRLA   Ears:    Ears appear intact with no abnormalities noted   Throat:   No oral lesions, no thrush, oral mucosa moist   Neck:   No adenopathy, supple, trachea midline, no thyromegaly, no     carotid bruit, no JVD   Back:     No kyphosis present, no scoliosis  present, no skin lesions,       erythema or scars, no tenderness to percussion or                   palpation,   range of motion normal   Lungs:     Clear to auscultation,respirations regular, even and                   unlabored    Heart:    Regular rhythm and normal rate, normal S1 and S2, no            murmur, no gallop, no rub, no click   Breast Exam:    Deferred   Abdomen:     Normal bowel sounds, no masses, no organomegaly, soft        non-tender, non-distended, no guarding, no rebound                 tenderness   Genitalia:    Deferred   Extremities:   Moves all extremities well, no edema, no cyanosis, no              redness   Pulses:   Pulses palpable and equal bilaterally   Skin:   No bleeding, bruising or rash   Lymph nodes:   No palpable adenopathy   Neurologic:   Cranial nerves 2 - 12 grossly intact, sensation intact, DTR        present and equal bilaterally      Results Review:     Lab Results (last 24 hours)     Procedure Component Value Units Date/Time    CBC & Differential [[id number]] Collected:  09/18/17 0516    Specimen:  Blood Updated:  09/18/17 0616    Narrative:       The following orders were created for panel order CBC & Differential.  Procedure                               Abnormality         Status                     ---------                               -----------         ------                     CBC Auto Differential[[id number]]        Abnormal            Final result                 Please view results for these tests on the individual orders.    CBC Auto Differential [[id number]]  (Abnormal) Collected:  09/18/17 0516    Specimen:  Blood Updated:  09/18/17 0616     WBC 7.92 10*3/mm3      RBC 3.18 (L) 10*6/mm3      Hemoglobin 9.5 (L) g/dL      Hematocrit 29.3 (L) %      MCV 92.1 fL      MCH 29.9 pg      MCHC 32.4 g/dL      RDW 14.2 %      RDW-SD 47.5 (H) fl      MPV 9.5 fL      Platelets 270 10*3/mm3      Neutrophil % 84.2 (H) %      Lymphocyte % 9.1 (L) %      Monocyte % 4.7 %       Eosinophil % 0.0 %      Basophil % 0.1 %      Immature Grans % 1.9 (H) %      Neutrophils, Absolute 6.67 10*3/mm3      Lymphocytes, Absolute 0.72 10*3/mm3      Monocytes, Absolute 0.37 10*3/mm3      Eosinophils, Absolute 0.00 10*3/mm3      Basophils, Absolute 0.01 10*3/mm3      Immature Grans, Absolute 0.15 (H) 10*3/mm3     Magnesium [[id number]]  (Normal) Collected:  09/18/17 0516    Specimen:  Blood Updated:  09/18/17 0638     Magnesium 1.9 mg/dL     Comprehensive Metabolic Panel [[id number]]  (Abnormal) Collected:  09/18/17 0516    Specimen:  Blood Updated:  09/18/17 0645     Glucose 159 (H) mg/dL      BUN 15 mg/dL      Creatinine 0.62 mg/dL      Sodium 141 mmol/L      Potassium 4.0 mmol/L      Chloride 108 mmol/L      CO2 24.0 mmol/L      Calcium 8.3 (L) mg/dL      Total Protein 5.8 (L) g/dL      Albumin 2.90 (L) g/dL      ALT (SGPT) 32 U/L      AST (SGOT) 18 U/L      Alkaline Phosphatase 64 U/L      Total Bilirubin 0.4 mg/dL      eGFR Non African Amer 95 mL/min/1.73      Globulin 2.9 gm/dL      A/G Ratio 1.0 (L) g/dL      BUN/Creatinine Ratio 24.2     Anion Gap 9.0 mmol/L     Narrative:       The MDRD GFR formula is only valid for adults with stable renal function between ages 18 and 70.           I reviewed the patient's new clinical results.  I reviewed the patient's new imaging results and agree with the interpretation.     ASSESSMENT/PLAN:   ASSESSMENT:   1.  Universal ulcerative colitis  2.  Clostridium difficile, appears to have clinical resolution  3.  Anemia secondary to chronic blood loss  4.  Peripheral edema secondary to intravenous fluids and steroids  5.  Protein calorie malnutrition, improving    PLAN:   1.  Discontinue IV fluids  2.  Discontinue intravenous Flagyl  3.  Discontinue Megace  4.  Reduce the Solu-Medrol to 20 mg IV every 6 hours  5.  Anticipate the patient to be discharged on Thursday morning      Nish Morales DO  09/18/17  8:07 PM

## 2017-09-19 NOTE — PLAN OF CARE
Problem: Patient Care Overview (Adult)  Goal: Plan of Care Review  Outcome: Ongoing (interventions implemented as appropriate)    09/19/17 1355 09/19/17 1407   Coping/Psychosocial Response Interventions   Plan Of Care Reviewed With --  patient;caregiver   Patient Care Overview   Progress --  improving   Outcome Evaluation   Outcome Summary/Follow up Plan Pt indep with ADL's. SBA sit-stands, pt met 2 new goals this tx and harsh tx well --        Goal: Discharge Needs Assessment  Outcome: Ongoing (interventions implemented as appropriate)    09/12/17 1516 09/13/17 1155 09/13/17 1349   Discharge Needs Assessment   Concerns To Be Addressed --  --  --    Readmission Within The Last 30 Days no previous admission in last 30 days --  --    Equipment Needed After Discharge none --  --    Discharge Disposition --  --  home healthcare service   Current Health   Anticipated Changes Related to Illness none --  --    Self-Care   Equipment Currently Used at Home --  none --    Living Environment   Transportation Available --  family or friend will provide --      09/17/17 0221   Discharge Needs Assessment   Concerns To Be Addressed no discharge needs identified   Readmission Within The Last 30 Days --    Equipment Needed After Discharge --    Discharge Disposition --    Current Health   Anticipated Changes Related to Illness --    Self-Care   Equipment Currently Used at Home --    Living Environment   Transportation Available --          Problem: Inpatient Occupational Therapy  Goal: Dynamic Standing Balance Goal LTG-OT  Outcome: Outcome(s) achieved Date Met:  09/19/17 09/13/17 1349 09/19/17 1355   Dynamic Standing Balance OT LTG   Dynamic Standing Balance OT LTG, Date Established --  09/19/17   Dynamic Standing Balance OT LTG, Time to Achieve 2 wks --    Dynamic Standing Balance OT LTG, [place] Level supervision required --    Dynamic Standing Balance OT LTG, Additional Goal Resident to Independent and safe With dynamic  standing balance to complete higher level ADL's. --    Dynamic Standing Balance OT LTG, Date Goal Reviewed --  09/19/17   Dynamic Standing Balance OT LTG, Outcome --  goal met       Goal: Patient Education Goal STG- OT  Outcome: Ongoing (interventions implemented as appropriate)    09/13/17 1349 09/19/17 1355   Patient Education OT STG   Patient Education OT STG, Date Established 09/13/17 --    Patient Education OT STG, Time to Achieve 5 - 7 days --    Patient Education OT STG, Education Type HEP --    Patient Education OT STG, Additional Goal T band exercises. --    Patient Education OT STG, Date Goal Reviewed --  09/19/17   Patient Education OT STG Outcome --  goal not met       Goal: Bathing Goal LTG- OT  Outcome: Outcome(s) achieved Date Met:  09/19/17 09/13/17 1349 09/19/17 1355   Bathing OT LTG   Bathing Goal OT LTG, Date Established 09/13/17 --    Bathing Goal OT LTG, Time to Achieve 2 wks --    Bathing Goal OT LTG, [place] Level conditional independence --    Bathing Goal OT LTG, Assist Device shower chair with back --    Bathing Goal OT LTG, Additional Goal Independent and safe  --    Bathing Goal OT LTG, Date Goal Reviewed --  09/19/17   Bathing Goal OT LTG, Outcome --  goal met       Goal: Home Management Goal STG- OT  Outcome: Ongoing (interventions implemented as appropriate)    09/13/17 1349 09/19/17 1355   Home Management OT STG   Home Mgmt Goal OT STG, Date Established 09/13/17 --    Home Mgmt Goal OT STG, Time To Achieve 1 wk --    Home Mgmt Goal OT STG, [place] Level conditional independence --    Home Mgmt Goal OT STG, Additional Goal Resident to be CI in room, for gathering of items for all personal care, with 0 LOB and safe. --    Home Mgmt Goal OT STG, Date Goal Reviewed --  09/19/17   Home Mgmt Goal OT STG, Outcome Achieved --  goal not met

## 2017-09-20 NOTE — PLAN OF CARE
Problem: Patient Care Overview (Adult)  Goal: Plan of Care Review  Outcome: Ongoing (interventions implemented as appropriate)    09/20/17 0325   Coping/Psychosocial Response Interventions   Plan Of Care Reviewed With patient   Patient Care Overview   Progress improving   Outcome Evaluation   Outcome Summary/Follow up Plan Able to use restroom to void without diarrhea. Pain managed.       Goal: Adult Individualization and Mutuality  Outcome: Ongoing (interventions implemented as appropriate)  Goal: Discharge Needs Assessment  Outcome: Ongoing (interventions implemented as appropriate)    Problem: Fluid Volume Deficit (Adult)  Goal: Fluid/Electrolyte Balance  Outcome: Ongoing (interventions implemented as appropriate)

## 2017-09-20 NOTE — PLAN OF CARE
Problem: Patient Care Overview (Adult)  Goal: Plan of Care Review  Outcome: Ongoing (interventions implemented as appropriate)    09/20/17 1456   Coping/Psychosocial Response Interventions   Plan Of Care Reviewed With patient   Patient Care Overview   Progress improving   Outcome Evaluation   Outcome Summary/Follow up Plan pt vss. Pt states having fewer BMs       Goal: Adult Individualization and Mutuality  Outcome: Ongoing (interventions implemented as appropriate)  Goal: Discharge Needs Assessment  Outcome: Ongoing (interventions implemented as appropriate)    Problem: Fluid Volume Deficit (Adult)  Goal: Fluid/Electrolyte Balance  Outcome: Ongoing (interventions implemented as appropriate)

## 2017-09-20 NOTE — PROGRESS NOTES
Herbert Sanchez DO,[facility]  Gastroenterology  Hepatology  Endoscopy  Board Certified in Internal Medicine and gastroenterology  [address] [facility], [address]  [place]. [address]  - [phone]   F [phone]     GASTROENTEROLOGY PROGRESS NOTE   HERBERT SANCHEZ DO.         SUBJECTIVE:   9/19/2017  Chief Complaint:     Subjective  : Has had less problems with loose stools.  More consistency.  Able to urinate without having bowel movements.  Pain in abdomen better.  No fevers or chills.  Less fatigue and malaise.    Less swelling of the legs.  Less pain of the legs.         CURRENT MEDICATIONS/OBJECTIVE/VS/PE:     Current Medications:     Current Facility-Administered Medications   Medication Dose Route Frequency Provider Last Rate Last Dose   • acetaminophen (TYLENOL) tablet 650 mg  650 mg Oral Q4H PRN Atif Nichols MD   650 mg at 09/18/17 2057   • acidophilus (FLORANEX) tablet 1 tablet  1 tablet Oral TID Herbert Sanchez DO   1 tablet at 09/19/17 1730   • famotidine (PEPCID) tablet 40 mg  40 mg Oral Daily Atif Nichols MD   40 mg at 09/19/17 0848   • hydrALAZINE (APRESOLINE) injection 10 mg  10 mg Intravenous Q6H PRN Hattie Barrera MD   10 mg at 09/18/17 0506   • HYDROcodone-acetaminophen (NORCO) 5-325 MG per tablet 1 tablet  1 tablet Oral Q6H PRN Hattie Barrera MD   1 tablet at 09/19/17 1251   • influenza vac split quad (FLUZONE QUADRIVALENT) IM suspension 0.5 mL  0.5 mL Intramuscular During Hospitalization Atif Nichols MD       • mesalamine (DELZICOL) delayed release capsule 400 mg  400 mg Oral TID Herbert Sanchez DO   400 mg at 09/19/17 1730   • methylPREDNISolone sodium succinate (SOLU-Medrol) injection 20 mg  20 mg Intravenous Q6H Herbert Sanchez DO   20 mg at 09/19/17 1730   • naloxone (NARCAN) injection 0.4 mg  0.4 mg Intravenous Q5 Min PRN Atif Nichols MD       • ondansetron (ZOFRAN) tablet 4 mg  4 mg Oral Q6H PRN Atif Nichols MD       •  pneumococcal polysaccharide 23-valent (PNEUMOVAX-23) vaccine 0.5 mL  0.5 mL Intramuscular During Hospitalization Atif Nichols MD       • potassium chloride (MICRO-K) CR capsule 40 mEq  40 mEq Oral PRN Atif Nichols MD   40 mEq at 09/19/17 1537    Or   • potassium chloride (KLOR-CON) packet 40 mEq  40 mEq Oral PRN Atif Nichols MD        Or   • potassium chloride 10 mEq in 100 mL IVPB  10 mEq Intravenous Q1H PRN Atif Nichols MD   Stopped at 09/07/17 0454   • sodium chloride 0.9 % flush 1-10 mL  1-10 mL Intravenous PRN Atif Nichols MD       • vancomycin (VANCOCIN) 50 mg/ml oral solution 250 mg  250 mg Oral Q6H Nish Morales DO   250 mg at 09/19/17 1730       Objective     Physical Exam:   Temp:  [96.6 °F (35.9 °C)-98.6 °F (37 °C)] 97.9 °F (36.6 °C)  Heart Rate:  [56-84] 65  Resp:  [18-20] 18  BP: (135-170)/(67-80) 135/71     Physical Exam:  General Appearance:    Alert, cooperative, in no acute distress   Head:    Normocephalic, without obvious abnormality, atraumatic   Eyes:            Lids and lashes normal, conjunctivae and sclerae normal, no   icterus, no pallor, corneas clear, PERRLA   Ears:    Ears appear intact with no abnormalities noted   Throat:   No oral lesions, no thrush, oral mucosa moist   Neck:   No adenopathy, supple, trachea midline, no thyromegaly, no     carotid bruit, no JVD   Back:     No kyphosis present, no scoliosis present, no skin lesions,       erythema or scars, no tenderness to percussion or                   palpation,   range of motion normal   Lungs:     Clear to auscultation,respirations regular, even and                   unlabored    Heart:    Regular rhythm and normal rate, normal S1 and S2, no            murmur, no gallop, no rub, no click   Breast Exam:    Deferred   Abdomen:     Normal bowel sounds, no masses, no organomegaly, soft        non-tender, non-distended, no guarding, no rebound                 tenderness   Genitalia:    Deferred    Extremities:   Moves all extremities well, no edema, no cyanosis, no              redness   Pulses:   Pulses palpable and equal bilaterally   Skin:   No bleeding, bruising or rash   Lymph nodes:   No palpable adenopathy   Neurologic:   Cranial nerves 2 - 12 grossly intact, sensation intact, DTR        present and equal bilaterally      Results Review:     Lab Results (last 24 hours)     Procedure Component Value Units Date/Time    Comprehensive Metabolic Panel [[id number]]  (Abnormal) Collected:  09/19/17 0530    Specimen:  Blood Updated:  09/19/17 0624     Glucose 153 (H) mg/dL      BUN 15 mg/dL      Creatinine 0.57 mg/dL      Sodium 139 mmol/L      Potassium 3.3 (L) mmol/L      Chloride 110 mmol/L      CO2 20.0 (L) mmol/L      Calcium 8.2 (L) mg/dL      Total Protein 5.6 (L) g/dL      Albumin 2.70 (L) g/dL      ALT (SGPT) 30 U/L      AST (SGOT) 15 U/L      Alkaline Phosphatase 56 U/L      Total Bilirubin 0.5 mg/dL      eGFR Non African Amer 104 mL/min/1.73      Globulin 2.9 gm/dL      A/G Ratio 0.9 (L) g/dL      BUN/Creatinine Ratio 26.3 (H)     Anion Gap 9.0 mmol/L     Narrative:       The MDRD GFR formula is only valid for adults with stable renal function between ages 18 and 70.    Magnesium [[id number]]  (Normal) Collected:  09/19/17 0530    Specimen:  Blood Updated:  09/19/17 0624     Magnesium 1.8 mg/dL     CBC & Differential [[id number]] Collected:  09/19/17 0530    Specimen:  Blood Updated:  09/19/17 0632    Narrative:       The following orders were created for panel order CBC & Differential.  Procedure                               Abnormality         Status                     ---------                               -----------         ------                     CBC Auto Differential[[id number]]        Abnormal            Final result                 Please view results for these tests on the individual orders.    CBC Auto Differential [[id number]]  (Abnormal) Collected:  09/19/17 0530    Specimen:  Blood  Updated:  09/19/17 0632     WBC 9.19 10*3/mm3      RBC 3.08 (L) 10*6/mm3      Hemoglobin 9.0 (L) g/dL      Hematocrit 28.3 (L) %      MCV 91.9 fL      MCH 29.2 pg      MCHC 31.8 g/dL      RDW 14.5 %      RDW-SD 48.0 (H) fl      MPV 10.1 fL      Platelets 248 10*3/mm3      Neutrophil % 81.1 (H) %      Lymphocyte % 9.7 (L) %      Monocyte % 7.0 %      Eosinophil % 0.0 %      Basophil % 0.1 %      Immature Grans % 2.1 (H) %      Neutrophils, Absolute 7.46 10*3/mm3      Lymphocytes, Absolute 0.89 10*3/mm3      Monocytes, Absolute 0.64 10*3/mm3      Eosinophils, Absolute 0.00 10*3/mm3      Basophils, Absolute 0.01 10*3/mm3      Immature Grans, Absolute 0.19 (H) 10*3/mm3      nRBC 0.0 /100 WBC     Potassium [[id number]]  (Normal) Collected:  09/19/17 1934    Specimen:  Blood Updated:  09/19/17 1955     Potassium 3.9 mmol/L            I reviewed the patient's new clinical results.  I reviewed the patient's new imaging results and agree with the interpretation.     ASSESSMENT/PLAN:   ASSESSMENT:   1.  Universal ulcerative colitis  2.  Clostridium difficile, appears to have clinical resolution  3.  Anemia secondary to chronic blood loss  4.  Peripheral edema secondary to intravenous fluids and steroids  5.  Protein calorie malnutrition, improving    PLAN:   1.  Reduce the Solu-Medrol to 20 mg IV every 8 hours  2.  Anticipate discharge on Thursday morning  3.  Upon discharge with discharge on 20 mg prednisone twice a day, Lialda 1.2 g 4 pills daily as well as iron therapy      Nish Morales DO  09/19/17  8:12 PM

## 2017-09-20 NOTE — PLAN OF CARE
Problem: Patient Care Overview (Adult)  Goal: Plan of Care Review  Outcome: Ongoing (interventions implemented as appropriate)    09/20/17 0325 09/20/17 1410   Coping/Psychosocial Response Interventions   Plan Of Care Reviewed With patient --    Patient Care Overview   Progress improving --    Outcome Evaluation   Outcome Summary/Follow up Plan --  No new goals met this date       Goal: Discharge Needs Assessment  Outcome: Ongoing (interventions implemented as appropriate)    09/12/17 1516 09/13/17 1155 09/13/17 1349   Discharge Needs Assessment   Concerns To Be Addressed --  --  --    Readmission Within The Last 30 Days no previous admission in last 30 days --  --    Equipment Needed After Discharge none --  --    Discharge Disposition --  --  home healthcare service   Discharge Planning Comments transitional visit --  --    Current Health   Anticipated Changes Related to Illness none --  --    Self-Care   Equipment Currently Used at Home --  none --    Living Environment   Transportation Available --  family or friend will provide --      09/17/17 0221   Discharge Needs Assessment   Concerns To Be Addressed no discharge needs identified   Readmission Within The Last 30 Days --    Equipment Needed After Discharge --    Discharge Disposition --    Discharge Planning Comments --    Current Health   Anticipated Changes Related to Illness --    Self-Care   Equipment Currently Used at Home --    Living Environment   Transportation Available --          Problem: Inpatient Occupational Therapy  Goal: Patient Education Goal STG- OT  Outcome: Ongoing (interventions implemented as appropriate)    09/13/17 1349 09/20/17 1410   Patient Education OT STG   Patient Education OT STG, Date Established --  09/20/17   Patient Education OT STG, Time to Achieve 5 - 7 days --    Patient Education OT STG, Education Type HEP --    Patient Education OT STG, Additional Goal T band exercises. --    Patient Education OT STG, Date Goal  Reviewed --  09/20/17   Patient Education OT STG Outcome --  goal not met       Goal: Home Management Goal STG- OT  Outcome: Ongoing (interventions implemented as appropriate)    09/13/17 1349 09/20/17 1410   Home Management OT STG   Home Mgmt Goal OT STG, Date Established 09/13/17 --    Home Mgmt Goal OT STG, Time To Achieve 1 wk --    Home Mgmt Goal OT STG, [place] conditional independence --    Home Mgmt Goal OT STG, Additional Goal Resident to be CI in room, for gathering of items for all personal care, with 0 LOB and safe. --    Home Mgmt Goal OT STG, Date Goal Reviewed --  09/20/17   Home Mgmt Goal OT STG, Outcome Achieved --  goal not met

## 2017-09-20 NOTE — PROGRESS NOTES
[facility] Medicine Services  INPATIENT PROGRESS NOTE     LOS: 14 days   Patient Care Team:  Nish Morales DO as PCP - General (Gastroenterology)    Chief Complaint: Persistent diarrhea.       Subjective     Interval History:   Patient continues to improve and diarrhea frequency is much less.  She continues to tolerate prescribed diet and denies any blood in the stool. She voices no new complaints and is much less deconditioned.  .    History taken from: Patient.    Review of Systems:    Review of Systems   Constitutional: Negative for activity change, appetite change, chills, diaphoresis, fatigue and fever.   HENT: Negative for congestion, ear pain, rhinorrhea, sore throat and trouble swallowing.    Eyes: Negative for pain and visual disturbance.   Respiratory: Negative for cough, chest tightness, shortness of breath and wheezing.    Cardiovascular: Negative for chest pain, palpitations and leg swelling.   Gastrointestinal: Positive for diarrhea. Negative for abdominal pain, blood in stool, constipation, nausea and vomiting.   Endocrine: Negative for cold intolerance and heat intolerance.   Genitourinary: Negative for decreased urine volume, difficulty urinating and dysuria.   Musculoskeletal: Negative for arthralgias, back pain, gait problem and neck pain.   Skin: Negative for color change and rash.   Neurological: Negative for dizziness, syncope, weakness, light-headedness, numbness and headaches.   Hematological: Negative for adenopathy. Does not bruise/bleed easily.   Psychiatric/Behavioral: Negative for agitation, confusion and sleep disturbance. The patient is not nervous/anxious.          Objective     Vital Signs  Temp:  [96.2 °F (35.7 °C)-98.5 °F (36.9 °C)] 96.2 °F (35.7 °C)  Heart Rate:  [] 106  Resp:  [18-20] 20  BP: (131-178)/(60-88) 131/60    Physical Exam:   Physical Exam   Constitutional: She is oriented to person, place, and time. She appears  well-developed and well-nourished. No distress.   HENT:   Head: Normocephalic and atraumatic.   Right Ear: External ear normal.   Left Ear: External ear normal.   Nose: Nose normal.   Eyes: Conjunctivae and EOM are normal. Pupils are equal, round, and reactive to light. Right eye exhibits no discharge. Left eye exhibits no discharge. No scleral icterus.   Neck: Normal range of motion. Neck supple.   Cardiovascular: Normal rate, regular rhythm, normal heart sounds and intact distal pulses.  Exam reveals no gallop and no friction rub.    No murmur heard.  Pulmonary/Chest: Effort normal and breath sounds normal. No respiratory distress. She has no wheezes. She has no rales. She exhibits no tenderness.   Abdominal: Soft. Bowel sounds are normal. She exhibits no distension and no mass. There is no tenderness. There is no rebound and no guarding.   Musculoskeletal: Normal range of motion.   Neurological: She is alert and oriented to person, place, and time.   Skin: Skin is warm and dry. No rash noted. She is not diaphoretic. No erythema. No pallor.   Psychiatric: She has a normal mood and affect. Her behavior is normal.   Nursing note and vitals reviewed.         Results Review:         Results from last 7 days  Lab Units 09/20/17  0542 09/19/17  1934 09/19/17  0530 09/18/17  0516 09/17/17  0543 09/16/17  0521 09/15/17  2324 09/15/17  0526 09/14/17  0532   SODIUM mmol/L 138  --  139 141 140 141  --  141 138   POTASSIUM mmol/L 3.5 3.9 3.3* 4.0 3.2* 3.7 3.5 3.4* 3.9   CHLORIDE mmol/L 108  --  110 108 109 111*  --  108 106   CO2 mmol/L 23.0  --  20.0* 24.0 20.0* 22.0  --  25.0 21.0*   BUN mg/dL 17  --  15 15 15 15  --  15 8   CREATININE mg/dL 0.60  --  0.57 0.62 0.64 0.71  --  0.72 0.71   GLUCOSE mg/dL 115*  --  153* 159* 190* 162*  --  183* 157*   CALCIUM mg/dL 8.2*  --  8.2* 8.3* 8.1* 8.1*  --  8.5 8.4   BILIRUBIN mg/dL 0.6  --  0.5 0.4 0.4 0.3  --  0.2 0.5   ALK PHOS U/L 55  --  56 64 64 66  --  93 102   ALT (SGPT) U/L 39   --  30 32 33 32  --  32 32   AST (SGOT) U/L 19  --  15 18 19 22  --  21 32         Results from last 7 days  Lab Units 09/20/17  0542 09/19/17  0530 09/18/17  0516 09/17/17  0543 09/16/17  0521 09/15/17  0526 09/14/17  0532   MAGNESIUM mg/dL 2.1 1.8 1.9 1.8 1.9 1.9 1.9         Results from last 7 days  Lab Units 09/20/17  0542 09/19/17  0530 09/18/17  0516 09/17/17  0543 09/16/17  0521 09/15/17  0642 09/14/17  0532   WBC 10*3/mm3 8.27 9.19 7.92 8.04 7.95 13.29* 6.58   HEMOGLOBIN g/dL 9.8* 9.0* 9.5* 9.1* 8.6* 9.5* 11.5*   HEMATOCRIT % 30.2* 28.3* 29.3* 27.5* 27.2* 29.3* 35.4   PLATELETS 10*3/mm3 263 248 270 229 206 219 261       No results found for: CKTOTAL, CKMB, CKMBINDEX, TROPONINI, TROPONINT    CO2   Date Value Ref Range Status   09/20/2017 23.0 22.0 - 31.0 mmol/L Final              Imaging Results (last 7 days)     ** No results found for the last 168 hours. **                                    Medication Review:   Current Facility-Administered Medications   Medication Dose Route Frequency Provider Last Rate Last Dose   • acetaminophen (TYLENOL) tablet 650 mg  650 mg Oral Q4H PRN Atif Nichols MD   650 mg at 09/19/17 2222   • acidophilus (FLORANEX) tablet 1 tablet  1 tablet Oral TID Nish Morales DO   1 tablet at 09/20/17 0830   • famotidine (PEPCID) tablet 40 mg  40 mg Oral Daily Atif Nichols MD   40 mg at 09/20/17 0830   • hydrALAZINE (APRESOLINE) injection 10 mg  10 mg Intravenous Q6H PRN Hattie Barrera MD   10 mg at 09/20/17 0343   • HYDROcodone-acetaminophen (NORCO) 5-325 MG per tablet 1 tablet  1 tablet Oral Q6H PRN Hattie Barrera MD   1 tablet at 09/20/17 0614   • influenza vac split quad (FLUZONE QUADRIVALENT) IM suspension 0.5 mL  0.5 mL Intramuscular During Hospitalization Atif Nichols MD       • mesalamine (DELZICOL) delayed release capsule 400 mg  400 mg Oral TID Nish Morales DO   400 mg at 09/20/17 0830   • methylPREDNISolone sodium succinate  (SOLU-Medrol) injection 20 mg  20 mg Intravenous Q12H Nish Morales, DO   20 mg at 09/20/17 0614   • naloxone (NARCAN) injection 0.4 mg  0.4 mg Intravenous Q5 Min PRN Atif Nichols MD       • ondansetron (ZOFRAN) tablet 4 mg  4 mg Oral Q6H PRN Atif Nichols MD       • pneumococcal polysaccharide 23-valent (PNEUMOVAX-23) vaccine 0.5 mL  0.5 mL Intramuscular During Hospitalization Atif Nichols MD       • potassium chloride (MICRO-K) CR capsule 40 mEq  40 mEq Oral PRN Atif Nichols MD   40 mEq at 09/19/17 1537    Or   • potassium chloride (KLOR-CON) packet 40 mEq  40 mEq Oral PRN Atif Nichols MD        Or   • potassium chloride 10 mEq in 100 mL IVPB  10 mEq Intravenous Q1H PRN Atif Nichols MD   Stopped at 09/07/17 0454   • sodium chloride 0.9 % flush 1-10 mL  1-10 mL Intravenous PRN Atif Nichols MD       • vancomycin (VANCOCIN) 50 mg/ml oral solution 250 mg  250 mg Oral Q6H Nish Morales, DO   250 mg at 09/20/17 0614         Assessment/Plan     Principal Problem:    C. difficile colitis  Active Problems:    Lower abdominal pain    Inflammatory bowel disease    Hypokalemia          -Continue IV steroids and oral vancomycin.  - Hypokalemia has resolved with potassium repletion.      -Continue PT OT.  -GI follow-up appreciated.  -DVT and GI prophylaxis in place.  - Likely discharge in a.m.       This document has been electronically signed by Atif Nichols MD on September 20, 2017 10:28 AM        EMR Dragon/Transcription disclaimer:   Dictated utilizing Dragon dictation.

## 2017-09-20 NOTE — THERAPY TREATMENT NOTE
Acute Care - Occupational Therapy Treatment Note  [facility]     Patient Name: Damaris Sánchez  : 1945  MRN: [id number]  Today's Date: 2017  Onset of Illness/Injury or Date of Surgery Date: 17  Date of Referral to OT: 17  Referring Physician: Dr. Barrera      Admit Date: 2017    Visit Dx:     ICD-10-CM ICD-9-CM   1. Lower abdominal pain R10.30 789.09   2. Leukocytosis, unspecified type D72.829 288.60   3. Hypokalemia E87.6 276.8   4. C. difficile colitis A04.7 008.45   5. Impaired physical mobility Z74.09 781.99   6. Impaired mobility and ADLs Z74.09 799.89     Patient Active Problem List   Diagnosis   • Lower abdominal pain   • C. difficile colitis   • Inflammatory bowel disease   • Hypokalemia             Adult Rehabilitation Note       17 1420 17 1355 17 0835    Rehab Assessment/Intervention    Discipline occupational therapy assistant  -KD occupational therapy assistant  -KD occupational therapy assistant  -CS    Document Type therapy note (daily note)  -KD therapy note (daily note)  -KD therapy note (daily note)  -CS    Subjective Information agree to therapy  -KD agree to therapy  -KD agree to therapy  -CS    Recorded by [KD] JAM Kelley [KD] JAM Kelley [CS] MADDI Hare/L    Vital Signs    Pre Systolic BP Rehab 154  -  -  -CS    Pre Treatment Diastolic BP 72  -KD 62  -KD 72  -CS    Pretreatment Heart Rate (beats/min) 74  -KD 62  -KD 85  -CS    Posttreatment Heart Rate (beats/min) 80  -KD 75  -KD     Pre SpO2 (%) 98  -KD 96  -KD 98  -CS    O2 Delivery Pre Treatment room air  -KD room air  -KD room air  -CS    Post SpO2 (%) 96  -KD 97  -KD     O2 Delivery Post Treatment room air  -KD room air  -KD     Pre Patient Position Supine  -KD Supine  -KD Supine  -CS    Intra Patient Position Standing  -KD Standing  -KD Sitting  -CS    Post Patient Position Sitting  -KD Sitting  -KD Sitting  -CS    Recorded by [SANDRA] Swetha VILLALPANDO  Fidelina LINDA/L [KD] Swetha Kitchen, ILNDA/L [CS] Madhavi Watters, LINDA/L    Pain Assessment    Pain Assessment No/denies pain  -KD No/denies pain  -KD 0-10  -CS    Pain Score   2  -CS    Pain Location   Abdomen  -CS    Pain Orientation   Left  -CS    Recorded by [KD] Swetha Kitchen, LINDA/L [KD] Swetha Kitchen, LINDA/L [CS] Madhavi Watters, LINDA/L    Cognitive Assessment/Intervention    Current Cognitive/Communication Assessment functional  -KD functional  -KD functional  -CS    Orientation Status oriented x 4  -KD oriented x 4  -KD oriented x 4  -CS    Follows Commands/Answers Questions 100% of the time  -% of the time  -% of the time  -CS    Personal Safety WNL/WFL  -KD WNL/WFL  -KD     Recorded by [KD] Swetha Kitchen, LINDA/L [KD] Swetha Kitchen, LINDA/L [CS] Madhavi Watters, LINDA/L    Bed Mobility, Assessment/Treatment    Bed Mob, Supine to Sit, [place] independent  -KD independent  -KD independent  -CS    Bed Mob, Sit to Supine, [place] independent  -KD independent  -KD independent  -CS    Bed Mob, Sidelying to Sit, [place] independent  -KD independent  -KD     Bed Mob, Sit to Sidelying, [place] independent  -KD independent  -KD     Recorded by [KD] Swetha Kitchen, LINDA/L [KD] Swetha Kitchen, LINDA/L [CS] Madhavi Watters, LINDA/L    Transfer Assessment/Treatment    Transfers, Sit-Stand [place] independent  -KD independent  -KD independent  -CS    Transfers, Stand-Sit [place] independent  -KD independent  -KD independent  -CS    Transfers, Sit-Stand-Sit, Assist Device --   none  -KD --   none  -KD     Toilet Transfer, [place] independent  -KD  independent   x3  -CS    Recorded by [KD] Swetha Kitchen, LINDA/L [KD] Swetha Kitchen, LINDA/L [CS] Madhavi Watters, LINDA/L    Functional Mobility    Functional Mobility- Ind. Level independent  -KD  independent  -CS    Functional Mobility- Device --   none  -KD      Functional Mobility-Distance (Feet) --   15  -KD  10  -CS     Recorded by [KD] HILARY KelleyA/L  [CS] HILARY HareA/L    Upper Body Bathing Assessment/Training    UB Bathing Assess/Train Assistive Device  bath mitt  -KD     UB Bathing Assess/Train, [place] Level  independent  -KD     Recorded by  [KD] HILARY KelleyA/L     Lower Body Bathing Assessment/Training    LB Bathing Assess/Train Assistive Device  bath mitt  -KD     LB Bathing Assess/Train, [place] Level  independent  -KD     LB Bathing Assess/Train, Comment   pt deferred  -CS    Recorded by  [KD] HILARY KelleyA/L [CS] HILARY HareA/L    Upper Body Dressing Assessment/Training    UB Dressing Assess/Train, Clothing Type  doffing:;donning:;t-shirt  -KD     UB Dressing Assess/Train, Position  edge of bed;sitting  -KD     Recorded by  [KD] HILARY KelleyA/L     Lower Body Dressing Assessment/Training    LB Dressing Assess/Train, Clothing Type donning:;shorts  -KD doffing:;donning:;slipper socks;pants  -KD     LB Dressing Assess/Train, [place] independent  -KD independent  -KD     Recorded by [KD] HILARY KelleyA/L [KD] MADDI Kelley/L     Toileting Assessment/Training    Toileting Assess/Train, Assistive Device   raised toilet seat  -CS    Toileting Assess/Train, Position   sitting;standing  -CS    Toileting Assess/Train, Indepen Level   independent   x3  -CS    Recorded by   [CS] MADDI Hare/L    Grooming Assessment/Training    Grooming Assess/Train, Assistive Device other (see comments)   wash hands  -KD      Grooming Assess/Train, Position sink side;standing  -KD  standing;sink side  -CS    Grooming Assess/Train, Indepen Level independent  -KD  independent  -CS    Recorded by [KD] HILARY KelleyA/JARAD  [CS] MADDI Hare/L    Balance Skills Training    Standing-Balance Activities  --   dynamic  -KD     Standing Balance # of Minutes  3  -KD     Recorded by  [KD] HILARY KelleyA/L     Therapy Exercises    Bilateral Upper  Extremity AROM:;20 reps;sitting;standing;elbow flexion/extension;pronation/supination;shoulder abduction/adduction;shoulder extension/flexion;shoulder ER/IR;shoulder horizontal abd/add  -KD AROM:;20 reps;sitting;elbow flexion/extension;hand pumps;pronation/supination;shoulder abduction/adduction;shoulder ER/IR;shoulder extension/flexion;shoulder horizontal abd/add  -KD AROM:;20 reps;sitting;elbow flexion/extension;hand pumps;pronation/supination;shoulder extension/flexion;shoulder ER/IR  -CS    BUE Resistance manual resistance- minimal  -KD --   2lb HW  -KD manual resistance- minimal  -CS    Recorded by [KD] JAM Kelley [KD] MADDI Kelley/JARAD [CS] JAM Hare    Positioning and Restraints    Pre-Treatment Position in bed  -KD in bed  -KD in bed  -CS    Post Treatment Position bed  -KD bed  -KD bed  -CS    In Bed sitting EOB;call light within reach;encouraged to call for assist  -KD sitting EOB;call light within reach;encouraged to call for assist;exit alarm on  -KD sitting EOB;with family/caregiver  -CS    Recorded by [KD] JAM Kelley [KD] MADDI Kelley/JARAD [CS] JAM Hare      User Key  (r) = Recorded By, (t) = Taken By, (c) = Cosigned By    Initials Name Effective Dates     JAM Kelley 10/17/16 -     CS JAM Hare 10/17/16 -                 OT Goals       09/20/17 1410 09/19/17 1355 09/18/17 1149    Dynamic Standing Balance OT LTG    Dynamic Standing Balance OT LTG, Date Established  09/19/17  -KD     Dynamic Standing Balance OT LTG, Date Goal Reviewed  09/19/17  -KD 09/18/17  -CS    Dynamic Standing Balance OT LTG, Outcome  goal met  -KD     Patient Education OT STG    Patient Education OT STG, Date Established 09/20/17  -KD      Patient Education OT STG, Date Goal Reviewed 09/20/17  -KD 09/19/17  -KD 09/18/17  -CS    Patient Education OT STG Outcome goal not met  -KD goal not met  -KD     Bathing OT LTG    Bathing Goal OT LTG,  Date Goal Reviewed  09/19/17  -KD 09/18/17  -CS    Bathing Goal OT LTG, Outcome  goal met  -KD     Home Management OT STG    Home Mgmt Goal OT STG, Date Goal Reviewed 09/20/17  -KD 09/19/17  -KD 09/18/17  -CS    Home Mgmt Goal OT STG, Outcome Achieved goal not met  -KD goal not met  -KD       09/16/17 1526 09/15/17 1548 09/14/17 0740    Dynamic Standing Balance OT LTG    Dynamic Standing Balance OT LTG, Date Goal Reviewed 09/16/17  -CS 09/15/17  -CS 09/14/17  -KD    Dynamic Standing Balance OT LTG, Outcome   goal met  -KD    Patient Education OT STG    Patient Education OT STG, Date Goal Reviewed 09/16/17  -CS 09/15/17  -CS 09/14/17  -KD    Patient Education OT STG Outcome   goal not met  -KD    Bathing OT LTG    Bathing Goal OT LTG, Date Goal Reviewed 09/16/17  -CS 09/15/17  -CS 09/14/17  -KD    Bathing Goal OT LTG, Outcome   goal not met  -KD    Home Management OT STG    Home Mgmt Goal OT STG, Date Goal Reviewed 09/16/17  -CS 09/15/17  -CS 09/14/17  -KD    Home Mgmt Goal OT STG, Outcome Achieved   goal not met  -KD      09/13/17 1349          Dynamic Standing Balance OT LTG    Dynamic Standing Balance OT LTG, Date Established 09/06/17  -SG      Dynamic Standing Balance OT LTG, Time to Achieve 2 wks  -SG      Dynamic Standing Balance OT LTG, [place] Level supervision required  -SG      Dynamic Standing Balance OT LTG, Additional Goal Resident to Independent and safe With dynamic standing balance to complete higher level ADL's.  -SG      Patient Education OT STG    Patient Education OT STG, Date Established 09/13/17  -SG      Patient Education OT STG, Time to Achieve 5 - 7 days  -SG      Patient Education OT STG, Education Type HEP  -SG      Patient Education OT STG, Additional Goal T band exercises.  -SG      Bathing OT LTG    Bathing Goal OT LTG, Date Established 09/13/17  -SG      Bathing Goal OT LTG, Time to Achieve 2 wks  -SG      Bathing Goal OT LTG, [place] Level conditional independence  -SG       Bathing Goal OT LTG, Assist Device shower chair with back  -SG      Bathing Goal OT LTG, Additional Goal Independent and safe   -SG      Home Management OT STG    Home Mgmt Goal OT STG, Date Established 09/13/17  -SG      Home Mgmt Goal OT STG, Time To Achieve 1 wk  -SG      Home Mgmt Goal OT STG, [place] Level conditional independence  -SG      Home Mgmt Goal OT STG, Additional Goal Resident to be CI in room, for gathering of items for all personal care, with 0 LOB and safe.  -SG        User Key  (r) = Recorded By, (t) = Taken By, (c) = Cosigned By    Initials Name Provider Type     MADDI Kelley/L Occupational Therapy Assistant     MADDI Hare/JARAD Occupational Therapy Assistant    MYLA Patricia OT Occupational Therapist          Occupational Therapy Education     Title: PT OT SLP Therapies (Active)     Topic: Occupational Therapy (Active)     Point: ADL training (Done)    Description: Instruct learner(s) on proper safety adaptation and remediation techniques during self care or transfers.   Instruct in proper use of assistive devices.    Learning Progress Summary    Learner Readiness Method Response Comment Documented by Status   Patient Acceptance D DU,NR   09/20/17 1455 Done    Acceptance E VU   09/19/17 1451 Done    Acceptance E,TB,D NR   09/18/17 1149 Active    Acceptance E,TB,D VU   09/16/17 1526 Done    Acceptance E,TB,D,H VU Pt was educated on HEP and home safety.  09/15/17 1547 Done    Acceptance TB VU   09/14/17 1144 Done    Eager CARLEYTB LORE   09/13/17 1346 Done               Point: Home exercise program (Active)    Description: Instruct learner(s) on appropriate technique for monitoring, assisting and/or progressing therapeutic exercises/activities.    Learning Progress Summary    Learner Readiness Method Response Comment Documented by Status   Patient Acceptance E,TB,D NR   09/18/17 1149 Active    Acceptance E,TB,D VU   09/16/17 1526 Done    Acceptance E,TB,D,H  VU Pt was educated on HEP and home safety.  09/15/17 1547 Done    Eager E,TB DU   09/13/17 1346 Done               Point: Precautions (Active)    Description: Instruct learner(s) on prescribed precautions during self-care and functional transfers.    Learning Progress Summary    Learner Readiness Method Response Comment Documented by Status   Patient Acceptance E,TB,D NR   09/18/17 1149 Active    Acceptance E,TB,D VU   09/16/17 1526 Done    Acceptance E,TB,D,H VU Pt was educated on HEP and home safety.  09/15/17 1547 Done    Eager E,TB DU   09/13/17 1346 Done               Point: Body mechanics (Done)    Description: Instruct learner(s) on proper positioning and spine alignment during self-care, functional mobility activities and/or exercises.    Learning Progress Summary    Learner Readiness Method Response Comment Documented by Status   Patient Acceptance D DU,NR   09/20/17 1455 Done    Acceptance E,TB,D NR   09/18/17 1149 Active    Acceptance E,TB,D VU   09/16/17 1526 Done    Acceptance E,TB,D,H VU Pt was educated on HEP and home safety.  09/15/17 1547 Done    Eager E,TB DU   09/13/17 1346 Done                      User Key     Initials Effective Dates Name Provider Type Discipline     10/17/16 -  JAM Kelley Occupational Therapy Assistant OT     10/17/16 -  JAM Hare Occupational Therapy Assistant OT     08/03/17 -  Farrah Patricia OT Occupational Therapist OT                  OT Recommendation and Plan  Anticipated Discharge Disposition: home with home health  Planned Therapy Interventions: activity intolerance, ADL retraining, IADL retraining, energy conservation, home exercise program, neuromuscular re-education, strengthening, transfer training  Therapy Frequency:  (3/14 times/Wk)  Plan of Care Review  Outcome Summary/Follow up Plan: No new goals met this date        Outcome Measures       09/20/17 1420 09/19/17 1355 09/18/17 1100    How much help from  another is currently needed...    Putting on and taking off regular lower body clothing? 4  -KD 4  -KD 4  -CS    Bathing (including washing, rinsing, and drying) 4  -KD 4  -KD 4  -CS    Toileting (which includes using toilet bed pan or urinal) 4  -KD 4  -KD 4  -CS    Putting on and taking off regular upper body clothing 4  -KD 4  -KD 4  -CS    Taking care of personal grooming (such as brushing teeth) 4  -KD 4  -KD 4  -CS    Eating meals 4  -KD 4  -KD 4  -CS    Score 24  -KD 24  -KD 24  -CS    Functional Assessment    Outcome Measure Options   AM-PAC 6 Clicks Daily Activity (OT)  -CS      User Key  (r) = Recorded By, (t) = Taken By, (c) = Cosigned By    Initials Name Provider Type    MADDI Dhaliwal/L Occupational Therapy Assistant    MADDI Zamora/JARAD Occupational Therapy Assistant           Time Calculation:         Time Calculation- OT       09/20/17 1455          Time Calculation- OT    OT Start Time 1420  -KD      OT Stop Time 1443  -KD      OT Time Calculation (min) 23 min  -KD      Total Timed Code Minutes- OT 23 minute(s)  -KD      OT Received On 09/20/17  -KD        User Key  (r) = Recorded By, (t) = Taken By, (c) = Cosigned By    Initials Name Provider Type    MADDI Dhaliwal/L Occupational Therapy Assistant           Therapy Charges for Today     Code Description Service Date Service Provider Modifiers Qty    89198186095 HC OT THER PROC EA 15 MIN 9/19/2017 JAM Kelley GO 1    40995855339 HC OT THERAPEUTIC ACT EA 15 MIN 9/19/2017 JAM Kelley GO 1    44551798314 HC OT SELF CARE/MGMT/TRAIN EA 15 MIN 9/19/2017 JAM Kelley GO 1    50125590680 HC OT THER PROC EA 15 MIN 9/20/2017 JAM Kelley GO 1    90132807517 HC OT SELF CARE/MGMT/TRAIN EA 15 MIN 9/20/2017 JAM Kelley GO 1          OT G-codes  OT Professional Judgement Used?: Yes  OT Functional Scales Options: AM-PAC 6 Clicks Daily Activity (OT)  Score: 23  Functional Limitation:  Self care  Self Care Current Status (): At least 1 percent but less than 20 percent impaired, limited or restricted  Self Care Goal Status (): 0 percent impaired, limited or restricted    MADDI Kelley/JARAD  9/20/2017

## 2017-09-21 NOTE — DISCHARGE INSTRUCTIONS
Please go back Dr. Morales office and  medication (Lialda) patient already have an appointment with Dr. Morales. Keep appointment unless office call and tell you otherwise. Off will call you.    Dr. Morley office will call you for an appointment.

## 2017-09-21 NOTE — THERAPY DISCHARGE NOTE
Acute Care - Occupational Therapy Discharge Summary  [facility]     Patient Name: Damaris Sánchez  : 1945  MRN: [id number]    Today's Date: 2017  Onset of Illness/Injury or Date of Surgery Date: 17    Date of Referral to OT: 17  Referring Physician: Dr. Barrera      Admit Date: 2017        OT Recommendation and Plan    Visit Dx:    ICD-10-CM ICD-9-CM   1. Lower abdominal pain R10.30 789.09   2. Leukocytosis, unspecified type D72.829 288.60   3. Hypokalemia E87.6 276.8   4. C. difficile colitis A04.7 008.45   5. Impaired physical mobility Z74.09 781.99   6. Impaired mobility and ADLs Z74.09 799.89   7. Inflammatory bowel disease K63.89 558.9                     OT Goals       17 1333 17 1410 17 1355    Dynamic Standing Balance OT LTG    Dynamic Standing Balance OT LTG, Date Established   17  -KD    Dynamic Standing Balance OT LTG, Date Goal Reviewed   17  -KD    Dynamic Standing Balance OT LTG, Outcome   goal met  -KD    Dynamic Standing Balance OT LTG, Reason Goal Not Met discharged from [facility]      Patient Education OT STG    Patient Education OT STG, Date Established  17  -KD     Patient Education OT STG, Date Goal Reviewed  17  -KD 17  -KD    Patient Education OT STG Outcome  goal not met  -KD goal not met  -KD    Patient Education OT STG, Reason Goal Not Met discharged from [facility]  -      Bathing OT LTG    Bathing Goal OT LTG, Date Goal Reviewed   17  -KD    Bathing Goal OT LTG, Outcome   goal met  -KD    Bathing Goal OT LTG, Reason Goal Not Met discharged from [facility]      Home Management OT STG    Home Mgmt Goal OT STG, Date Goal Reviewed  17  -KD 17  -KD    Home Mgmt Goal OT STG, Outcome Achieved  goal not met  -KD goal not met  -    Home Mgmt Goal OT STG, Reason Goal Not Met discharged from [facility]  -        17 1149 17 1526 09/15/17 1548    Dynamic Standing Balance OT LTG     Dynamic Standing Balance OT LTG, Date Goal Reviewed 09/18/17  -CS 09/16/17  -CS 09/15/17  -CS    Patient Education OT STG    Patient Education OT STG, Date Goal Reviewed 09/18/17  -CS 09/16/17  -CS 09/15/17  -CS    Bathing OT LTG    Bathing Goal OT LTG, Date Goal Reviewed 09/18/17  -CS 09/16/17  -CS 09/15/17  -CS    Home Management OT STG    Home Mgmt Goal OT STG, Date Goal Reviewed 09/18/17  -CS 09/16/17  -CS 09/15/17  -CS      09/14/17 0740 09/13/17 1349       Dynamic Standing Balance OT LTG    Dynamic Standing Balance OT LTG, Date Established  09/06/17  -SG     Dynamic Standing Balance OT LTG, Time to Achieve  2 wks  -SG     Dynamic Standing Balance OT LTG, [place] Level  supervision required  -SG     Dynamic Standing Balance OT LTG, Additional Goal  Resident to Independent and safe With dynamic standing balance to complete higher level ADL's.  -SG     Dynamic Standing Balance OT LTG, Date Goal Reviewed 09/14/17  -KD      Dynamic Standing Balance OT LTG, Outcome goal met  -KD      Patient Education OT STG    Patient Education OT STG, Date Established  09/13/17  -SG     Patient Education OT STG, Time to Achieve  5 - 7 days  -SG     Patient Education OT STG, Education Type  HEP  -SG     Patient Education OT STG, Additional Goal  T band exercises.  -SG     Patient Education OT STG, Date Goal Reviewed 09/14/17  -KD      Patient Education OT STG Outcome goal not met  -KD      Bathing OT LTG    Bathing Goal OT LTG, Date Established  09/13/17  -SG     Bathing Goal OT LTG, Time to Achieve  2 wks  -SG     Bathing Goal OT LTG, [place] Level  conditional independence  -SG     Bathing Goal OT LTG, Assist Device  shower chair with back  -SG     Bathing Goal OT LTG, Additional Goal  Independent and safe   -SG     Bathing Goal OT LTG, Date Goal Reviewed 09/14/17  -KD      Bathing Goal OT LTG, Outcome goal not met  -KD      Home Management OT STG    Home Mgmt Goal OT STG, Date Established  09/13/17  -SG     Home Mgmt  Goal OT STG, Time To Achieve  1 wk  -SG     Home Mgmt Goal OT STG, [place] Level  conditional independence  -SG     Home Mgmt Goal OT STG, Additional Goal  Resident to be CI in room, for gathering of items for all personal care, with 0 LOB and safe.  -SG     Home Mgmt Goal OT STG, Date Goal Reviewed 09/14/17  -KD      Home Mgmt Goal OT STG, Outcome Achieved goal not met  -KD        User Key  (r) = Recorded By, (t) = Taken By, (c) = Cosigned By    Initials Name Provider Type     Trudi Milton OTR/L Occupational Therapist    MADDI Dhaliwal/L Occupational Therapy Assistant    MADDI Zamora/L Occupational Therapy Assistant    MYLA Patricia OT Occupational Therapist                Outcome Measures       09/20/17 1420 09/19/17 1355       How much help from another is currently needed...    Putting on and taking off regular lower body clothing? 4  -KD 4  -KD     Bathing (including washing, rinsing, and drying) 4  -KD 4  -KD     Toileting (which includes using toilet bed pan or urinal) 4  -KD 4  -KD     Putting on and taking off regular upper body clothing 4  -KD 4  -KD     Taking care of personal grooming (such as brushing teeth) 4  -KD 4  -KD     Eating meals 4  -KD 4  -KD     Score 24  -KD 24  -KD       User Key  (r) = Recorded By, (t) = Taken By, (c) = Cosigned By    Initials Name Provider Type    MADDI Dhaliwal/L Occupational Therapy Assistant              OT Discharge Summary  Reason for Discharge: Discharge from facility, Per MD order  Outcomes Achieved: Patient able to partially acheive established goals  Discharge Destination: Home      Trudi Milton OTR/L  9/21/2017

## 2017-09-21 NOTE — PLAN OF CARE
Problem: Inpatient Occupational Therapy  Goal: Dynamic Standing Balance Goal LTG-OT  Outcome: Outcome(s) achieved Date Met:  09/21/17 09/13/17 1349 09/19/17 1355 09/21/17 1333   Dynamic Standing Balance OT LTG   Dynamic Standing Balance OT LTG, Date Established --  09/19/17 --    Dynamic Standing Balance OT LTG, Time to Achieve 2 wks --  --    Dynamic Standing Balance OT LTG, [place] Level supervision required --  --    Dynamic Standing Balance OT LTG, Additional Goal Resident to Independent and safe With dynamic standing balance to complete higher level ADL's. --  --    Dynamic Standing Balance OT LTG, Date Goal Reviewed --  09/19/17 --    Dynamic Standing Balance OT LTG, Outcome --  goal met --    Dynamic Standing Balance OT LTG, Reason Goal Not Met --  --  discharged from facility       Goal: Patient Education Goal STG- OT  Outcome: Unable to achieve outcome(s) by discharge Date Met:  09/21/17 09/13/17 1349 09/20/17 1410 09/21/17 1333   Patient Education OT STG   Patient Education OT STG, Date Established --  09/20/17 --    Patient Education OT STG, Time to Achieve 5 - 7 days --  --    Patient Education OT STG, Education Type HEP --  --    Patient Education OT STG, Additional Goal T band exercises. --  --    Patient Education OT STG, Date Goal Reviewed --  09/20/17 --    Patient Education OT STG Outcome --  goal not met --    Patient Education OT STG, Reason Goal Not Met --  --  discharged from facility       Goal: Bathing Goal LTG- OT  Outcome: Outcome(s) achieved Date Met:  09/21/17 09/13/17 1349 09/19/17 1355 09/21/17 1333   Bathing OT LTG   Bathing Goal OT LTG, Date Established 09/13/17 --  --    Bathing Goal OT LTG, Time to Achieve 2 wks --  --    Bathing Goal OT LTG, [place] Level conditional independence --  --    Bathing Goal OT LTG, Assist Device shower chair with back --  --    Bathing Goal OT LTG, Additional Goal Independent and safe  --  --    Bathing Goal OT LTG, Date Goal Reviewed  --  09/19/17 --    Bathing Goal OT LTG, Outcome --  goal met --    Bathing Goal OT LTG, Reason Goal Not Met --  --  discharged from facility       Goal: Home Management Goal STG- OT  Outcome: Unable to achieve outcome(s) by discharge Date Met:  09/21/17 09/13/17 1349 09/20/17 1410 09/21/17 1333   Home Management OT STG   Home Mgmt Goal OT STG, Date Established 09/13/17 --  --    Home Mgmt Goal OT STG, Time To Achieve 1 wk --  --    Home Mgmt Goal OT STG, [place] Level conditional independence --  --    Home Mgmt Goal OT STG, Additional Goal Resident to be CI in room, for gathering of items for all personal care, with 0 LOB and safe. --  --    Home Mgmt Goal OT STG, Date Goal Reviewed --  09/20/17 --    Home Mgmt Goal OT STG, Outcome Achieved --  goal not met --    Home Mgmt Goal OT STG, Reason Goal Not Met --  --  discharged from facility

## 2017-09-21 NOTE — DISCHARGE SUMMARY
[facility] Medicine Services  DISCHARGE SUMMARY       Date of Admission: 9/6/2017  Date of Discharge:  9/21/2017  Primary Care Physician: Nish Morales DO    Presenting Problem/History of Present Illness:  Hypokalemia [E87.6]  Lower abdominal pain [R10.30]  Leukocytosis, unspecified type [D72.829]   Patient is 72-year-old female with history of ulcerative colitis who was seen at the emergency department due to 2-3 week history of progressively worsening bloody diarrhea associated with nausea, vomiting and lower quadrant pain.    CT scan of the abdomen and pelvis showed colonic thickening with inflammatory changes and patient had leukocytosis with white count of 16,000 with left shift.  She was also noted to have a low potassium of 2.6.  LFTs were unremarkable.    Final Discharge Diagnoses:  Hospital Problem List     * (Principal)C. difficile colitis    Overview Addendum 9/10/2017 10:36 AM by Chaz Beckett MD     09/10/17.  Flagyl day 4.  Oral vancomycin day 2.  Stools slowing some, monitor.  Discuss with GI tomorrow.    09/09/17.  Flagyl day 3.  Monitor for improvement.  Add by mouth vancomycin as needed.    09/08/17.  Continue flagyl.  GI consulted.  Discussed.  Will see patient.  Await recommendations.    09/07/17.  C diff positive.  Currently on oral flagyl.  Monitor.    Microbiology Results (last 10 days)     Procedure Component Value - Date/Time    Clostridium Difficile Toxin [[id number]] Collected:  09/07/17 0512    Lab Status:  Final result Specimen:  Stool from Per Rectum Updated:  09/07/17 0720    Narrative:       The following orders were created for panel order Clostridium Difficile Toxin.  Procedure                               Abnormality         Status                     ---------                               -----------         ------                     Clostridium Difficile Tox...[[id number]]                      Final result                 Please view  results for these tests on the individual orders.    Clostridium Difficile Toxin, PCR [[id number]] Collected:  09/07/17 0512    Lab Status:  Final result Specimen:  Stool from Per Rectum Updated:  09/07/17 0720     C. Difficile Toxins by PCR Positive      contact precautions requested         Narrative:       This test is performed by utilizing real time polymerace chain recation (PCR).     Blood Culture [[id number]]  (Normal) Collected:  09/06/17 2252    Lab Status:  Preliminary result Specimen:  Blood from Arm, Left Updated:  09/07/17 1101     Blood Culture No growth at less than 24 hours    Blood Culture [[id number]]  (Normal) Collected:  09/06/17 2109    Lab Status:  Preliminary result Specimen:  Blood from Arm, Left Updated:  09/07/17 1001     Blood Culture No growth at less than 24 hours    Urine Culture [[id number]]  (Normal) Collected:  09/06/17 2103    Lab Status:  Preliminary result Specimen:  Urine from Urine, Clean Catch Updated:  09/07/17 0627     Urine Culture Culture in progress                 Lower abdominal pain    Overview Addendum 9/10/2017 10:36 AM by Chaz Beckett MD     09/10/17.  Again.  Better today.    09/08/17.  Improved.  Plan to feed today.    09/07/17.  C diff colitis.  By mouth flagyl.    Imaging Results (last 24 hours)     Procedure Component Value Units Date/Time    CT Abdomen Pelvis Without Contrast [[id number]] Collected:  09/06/17 1929     Updated:  09/06/17 2008    Narrative:         EXAMINATION:  Computed Tomography           REGION:    Abdomen / Pelvis                   INDICATION:    Diarrhea, left lower quadrant pain      HISTORY:  BENJAMIN. IMAGING:    none          TECHNIQUE:      - reconstructions:    axial, coronal, sagittal      - contrast:      oral:  no ;   intravenous:  no     - Please note:        - Lack of IV contrast limits assessment of solid organ  parenchyma, urinary system, or vascular structures.        - Lack of oral contrast limits assessment of GI tract  structures.           This exam was performed according to the departmental  dose-optimization program which includes automated exposure  control, adjustment of the mA and/or kV according to patient size  and/or use of iterative reconstruction technique.                COMMENTS:    THORAX (INFERIOR):  The lung bases are clear.     The pleura is without fluid or a mass.     The heart size is normal size and there is no pericardial fluid.    ABDOMEN:  Limited assessment of the solid organ parenchyma is grossly  unremarkable demonstrating no evidence of organomegaly.    Enlarged gallbladder.  Limited assessment of the viscera demonstrates overall normal  caliber, however, suggests the presence of circumferential wall  thickening with the distal ascending colon, transverse colon, and  descending colon.    No evidence of free fluid or free intraperitoneal air.     The osseous structures are grossly unremarkable for age.    RETROPERITONEUM:  Limited assessment of the kidneys demonstrates overall normal  size.     Limited assessment of the ureters demonstrates normal caliber and  course.    The adrenal glands are of normal size and contour.     No gross evidence of significant retroperitoneal adenopathy.  The vascular structures are grossly within normal limits for age.    PELVIS:  Limited assessment of the viscera is grossly unremarkable  demonstrating normal caliber bowel loops.     No evidence of free fluid or free intraperitoneal air.     The osseous structures are grossly unremarkable for age.     The vascular structures are grossly within normal limits for age.            .          Impression:       CONCLUSION:     1. Limited examination due to the lack of intravenous and oral  contrast.        2. There are normal caliber bowel loops however there is  suggestion of circumferential wall thickening associated with the  distal ascending colon, transverse colon, and majority of  descending colon. This degree of wall thickening suggests  a  diffuse inflammatory process.  3. The gallbladder is enlarged, and nearly hydropic. Correlation  with ultrasound suggested.                            Electronically signed by:  CYNDIE Simpson MD  9/6/2017 8:07 PM  CDT Workstation: [id number]                 Inflammatory bowel disease (Chronic)    Overview Signed 9/7/2017  2:34 PM by Chaz Beckett MD     Per patient Chron's disease.  Monitor.         Hypokalemia    Overview Signed 9/10/2017 10:35 AM by Chaz Beckett MD     09/10/17.  Low again today.  Magnesium normal.  Replace.  Recheck.    Results from last 7 days  Lab Units 09/10/17  0600 09/09/17  0616 09/08/17  0539 09/07/17  1004 09/07/17  0519  09/06/17  1715   POTASSIUM mmol/L 3.1* 3.8 4.0 4.0 4.3  < > 2.6*   MAGNESIUM mg/dL 1.9  --   --   --   --   --  2.0   < > = values in this interval not displayed.                  Consults:   Consults     Date and Time Order Name Status Description    9/12/2017 1902 Inpatient Consult to Gastroenterology      9/7/2017 1445 Inpatient Consult to Gastroenterology Completed     9/6/2017 2043 Hospitalist (on-call MD unless specified)      9/6/2017 2039 Hospitalist (on-call MD unless specified)            Procedures Performed: Procedure(s):  FLEXIBLE SIGMOIDOSCOPY WITH BIOPSY                Pertinent Test Results: GI panel showed evidence of C. difficile colitis.  Cultures showed methicillin-resistant Staphylococcus.    Chief Complaint on Day of Discharge: None    Hospital Course:  The patient was admitted, commenced on IV hydration, potassium repletion, broad-spectrum antibiotics and initially made nothing by mouth.  Antimicrobial therapy was changed to Flagyl and vancomycin following outcome of GI panel and blood cultures.    She was seen by the gastroenterologist and commenced on steroids and mesalamine post flexible sigmoidoscopy.  She was also transitioned to oral vancomycin and completed a full course of intravenous Flagyl.  Hypokalemia did resolve with  "potassium repletion.    Patient made a steady improvement and was less symptomatic at the time of discharge.  She was transitioned to oral steroid, recommended iron supplementation and Lialda on discharge.  Dr. Morales is to provide patient with Lialda.    Patient also benefited from physical therapy secondary to deconditioning and  will be seen for follow-up by home health on discharge.     Condition on Discharge:  Stable and improved    Physical Exam on Discharge:  /78 (BP Location: Left arm, Patient Position: Lying)  Pulse 66  Temp 98.6 °F (37 °C) (Oral)   Resp 18  Ht 63\" (160 cm)  Wt 153 lb (69.4 kg)  SpO2 97%  BMI 27.1 kg/m2  Physical Exam   Constitutional: She is oriented to person, place, and time. She appears well-developed and well-nourished. She is cooperative. No distress.   HENT:   Head: Normocephalic and atraumatic.   Right Ear: External ear normal.   Left Ear: External ear normal.   Nose: Nose normal.   Mouth/Throat: Oropharynx is clear and moist.   Eyes: Conjunctivae and EOM are normal. Pupils are equal, round, and reactive to light. Right eye exhibits no discharge. Left eye exhibits no discharge. No scleral icterus.   Neck: Normal range of motion. Neck supple. No JVD present. No thyromegaly present.   Cardiovascular: Normal rate, regular rhythm, normal heart sounds and intact distal pulses.  Exam reveals no gallop and no friction rub.    No murmur heard.  Pulmonary/Chest: Effort normal and breath sounds normal. No stridor. No respiratory distress. She has no wheezes. She has no rales. She exhibits no tenderness.   Abdominal: Soft. Bowel sounds are normal. She exhibits no distension and no mass. There is no tenderness. There is no rebound and no guarding. No hernia.   Musculoskeletal: Normal range of motion. She exhibits no edema, tenderness or deformity.   Neurological: She is alert and oriented to person, place, and time. She has normal reflexes.   Skin: Skin is warm and dry. No rash " noted. She is not diaphoretic. No erythema. No pallor.   Psychiatric: She has a normal mood and affect. Her behavior is normal. Judgment and thought content normal.   Nursing note and vitals reviewed.        Discharge Disposition:  Home or Self Care    Discharge Medications:   Damaris Sánchez   Home Medication Instructions RICKY:[id number]    Printed on:09/21/17 1008   Medication Information                      acidophilus (FLORANEX) tablet tablet  Take 1 tablet by mouth 3 (Three) Times a Day.             famotidine (PEPCID) 40 MG tablet  Take 1 tablet by mouth Daily.             ferrous sulfate 324 (65 Fe) MG tablet delayed-release EC tablet  Take 1 tablet by mouth 2 (Two) Times a Day With Meals.             HYDROcodone-acetaminophen (NORCO) 5-325 MG per tablet  Take 1 tablet by mouth Every 6 (Six) Hours As Needed for Moderate Pain  for up to 6 days.             mesalamine (DELZICOL) 400 MG capsule delayed-release delayed release capsule  Take 1 capsule by mouth 3 (Three) Times a Day.             predniSONE (DELTASONE) 20 MG tablet  Take 1 tablet by mouth 2 (Two) Times a Day.             vancomycin 50 mg/mL in sterile water (preservative free) injection oral solution  Take 5 mL by mouth Every 6 (Six) Hours for 6 days. Indications: Clostridium Difficile Infection                 Discharge Diet:  low-residue fiber diet    Activity at Discharge:  as tolerated    Discharge Care Plan/Instructions: Given    Follow-up Appointments:   No future appointments.  Follow-up with Dr. Morales in 2-4 weeks.  Test Results Pending at Discharge:     Atif Nichols MD  09/21/17  10:01 AM    Time: 45 minutes

## 2017-12-04 PROBLEM — R53.1 WEAKNESS: Status: ACTIVE | Noted: 2017-01-01

## 2017-12-04 PROBLEM — K51.90 ULCERATIVE COLITIS (HCC): Chronic | Status: ACTIVE | Noted: 2017-01-01

## 2017-12-04 NOTE — ED PROVIDER NOTES
"Subjective   HPI Comments: Patient presents to emergency department for a fall last night, chronic diarrhea and weakness.  Denies any significant cardiac history, LOC, head trauma.  Patient also has a history of being treated for C. Diff but states Dr Morales stated she did not have that but rather ulcerative colitis.  She completed C Diff treatment anyways.      Patient is a 72 y.o. female presenting with fall and diarrhea.   History provided by:  Patient   used: No    Fall   Mechanism of injury: fall    Mechanism of injury comment:  \"just lost my balance\"  Injury location:  Torso  Torso injury location: denies injury.  Incident location:  Home  Time since incident:  12 hours  Arrived directly from scene: no    Fall:     Fall occurred:  Walking    Impact surface: carpeted floor.    Point of impact:  Head and back    Entrapped after fall: no    Suspicion of alcohol use: no    Suspicion of drug use: no    Associated symptoms: abdominal pain (lower abdominal)    Associated symptoms: no back pain, no blindness, no chest pain, no difficulty breathing, no headaches, no hearing loss, no loss of consciousness, no nausea, no neck pain, no seizures and no vomiting    Abdominal pain:     Location:  LLQ    Quality: aching and cramping      Quality: not bloating, not burning, not dull, not gnawing, not heavy, no pressure, not sharp, not shooting, not stabbing, no stiffness, not tearing, not throbbing and not tugging      Severity:  Moderate (chronic)    Onset quality:  Sudden    Timing:  Intermittent    Progression:  Unchanged    Chronicity:  Recurrent  Risk factors: no AICD, no anticoagulation therapy, no asthma, no beta blocker therapy, no CABG, no CAD, no CHF, no COPD, no diabetes, no dialysis, no hemophilia, no kidney disease, no pacemaker, no past MI, not pregnant and no steroid use    Diarrhea   The primary symptoms include abdominal pain (lower abdominal) and diarrhea. Primary symptoms do not include " "fever, nausea or vomiting.   The illness does not include chills, constipation or back pain.       Review of Systems   Constitutional: Negative for chills and fever.   HENT: Negative for hearing loss.    Eyes: Negative for blindness.   Cardiovascular: Negative for chest pain.   Gastrointestinal: Positive for abdominal pain (lower abdominal) and diarrhea. Negative for abdominal distention, blood in stool, constipation, nausea and vomiting.   Musculoskeletal: Negative for back pain and neck pain.   Allergic/Immunologic: Positive for immunocompromised state (on humira).   Neurological: Negative for dizziness, seizures, loss of consciousness, syncope, speech difficulty, weakness, light-headedness and headaches.       Past Medical History:   Diagnosis Date   • Ulcerative colitis        Allergies   Allergen Reactions   • Aspirin Other (See Comments)     Not allergic, but has had issues in the past with bleeding ulcers caused by ibuprofen.       Past Surgical History:   Procedure Laterality Date   • COLONOSCOPY     • SIGMOIDOSCOPY N/A 9/13/2017    Procedure: FLEXIBLE SIGMOIDOSCOPY WITH BIOPSY;  Surgeon: Nish Morales DO;  Location: [facility] ENDOSCOPY;  Service:        History reviewed. No pertinent family history.    Social History     Social History   • Marital status:      Spouse name: N/A   • Number of children: N/A   • Years of education: N/A     Social History Main Topics   • Smoking status: Former Smoker   • Smokeless tobacco: Never Used   • Alcohol use No   • Drug use: No   • Sexual activity: Not Asked     Other Topics Concern   • None     Social History Narrative   • None           Objective      /57 (BP Location: Left arm, Patient Position: Lying)  Pulse 100  Temp 97.4 °F (36.3 °C) (Oral)   Resp 18  Ht 63\" (160 cm)  Wt 131 lb (59.4 kg)  SpO2 92%  BMI 23.21 kg/m2    Physical Exam   Constitutional: She is oriented to person, place, and time. She appears well-developed and well-nourished. No " distress.   HENT:   Head: Normocephalic and atraumatic.   Eyes: EOM are normal. Pupils are equal, round, and reactive to light.   Cardiovascular: Normal rate, regular rhythm, normal heart sounds and intact distal pulses.    Pulmonary/Chest: Effort normal and breath sounds normal. No respiratory distress. She has no wheezes.   Abdominal: Soft. Bowel sounds are normal. She exhibits no distension and no mass. There is tenderness (lower abdomen, mostly LLQ). There is no guarding.   Neurological: She is alert and oriented to person, place, and time.   Skin: Skin is warm and dry.   Psychiatric: She has a normal mood and affect. Her behavior is normal. Thought content normal.   Nursing note and vitals reviewed.      ECG 12 Lead    Date/Time: 12/4/2017 11:15 AM  Performed by: MAX FONTANEZ  Authorized by: MAX FONTANEZ   Comparison: not compared with previous ECG   Rhythm: sinus tachycardia  Rate: tachycardic  BPM: 115  Other findings: LAE  Clinical impression: abnormal ECG               ED Course  ED Course   Comment By Time   Dr Blount looked at EKG and did not notice anything acute needing immediate intervention. Max Fontanez PA-C 12/04 1114   Paged Hospitalist. Max Fontanez PA-C 12/04 1122   Discussed with Dr Saravia who agreed to admit patient. Max Fontanez PA-C 12/04 1141      Results for orders placed or performed during the hospital encounter of 12/04/17   Influenza Antigen, Rapid - Swab, Nasopharynx   Result Value Ref Range    Influenza A Ag, EIA Negative Negative    Influenza B Ag, EIA Negative Negative   Comprehensive Metabolic Panel   Result Value Ref Range    Glucose 110 (H) 60 - 100 mg/dL    BUN 20 7 - 21 mg/dL    Creatinine 1.08 (H) 0.50 - 1.00 mg/dL    Sodium 128 (L) 137 - 145 mmol/L    Potassium 4.2 3.5 - 5.1 mmol/L    Chloride 91 (L) 95 - 110 mmol/L    CO2 25.0 22.0 - 31.0 mmol/L    Calcium 8.9 8.4 - 10.2 mg/dL    Total Protein 7.1 6.3 - 8.6 g/dL    Albumin 3.40 3.40 -  4.80 g/dL    ALT (SGPT) 26 9 - 52 U/L    AST (SGOT) 33 14 - 36 U/L    Alkaline Phosphatase 124 38 - 126 U/L    Total Bilirubin 0.7 0.2 - 1.3 mg/dL    eGFR Non African Amer 50 39 - 90 mL/min/1.73    Globulin 3.7 (H) 2.3 - 3.5 gm/dL    A/G Ratio 0.9 (L) 1.1 - 1.8 g/dL    BUN/Creatinine Ratio 18.5 7.0 - 25.0    Anion Gap 12.0 5.0 - 15.0 mmol/L   Lipase   Result Value Ref Range    Lipase 65 23 - 300 U/L   Lactic Acid, Plasma   Result Value Ref Range    Lactate 2.3 (C) 0.5 - 2.0 mmol/L   Troponin   Result Value Ref Range    Troponin I 0.195 (C) <=0.034 ng/mL   CBC Auto Differential   Result Value Ref Range    WBC 17.84 (H) 3.20 - 9.80 10*3/mm3    RBC 3.97 3.77 - 5.16 10*6/mm3    Hemoglobin 12.1 12.0 - 15.5 g/dL    Hematocrit 35.5 35.0 - 45.0 %    MCV 89.4 80.0 - 98.0 fL    MCH 30.5 26.5 - 34.0 pg    MCHC 34.1 31.4 - 36.0 g/dL    RDW 15.2 (H) 11.5 - 14.5 %    RDW-SD 49.8 (H) 36.4 - 46.3 fl    MPV 9.1 8.0 - 12.0 fL    Platelets 236 150 - 450 10*3/mm3    Neutrophil % 82.2 (H) 37.0 - 80.0 %    Lymphocyte % 9.1 (L) 10.0 - 50.0 %    Monocyte % 4.0 0.0 - 12.0 %    Eosinophil % 0.2 0.0 - 7.0 %    Basophil % 0.2 0.0 - 2.0 %    Immature Grans % 4.3 (H) 0.0 - 0.5 %    Neutrophils, Absolute 14.68 (H) 2.00 - 8.60 10*3/mm3    Lymphocytes, Absolute 1.62 0.60 - 4.20 10*3/mm3    Monocytes, Absolute 0.71 0.00 - 0.90 10*3/mm3    Eosinophils, Absolute 0.04 0.00 - 0.70 10*3/mm3    Basophils, Absolute 0.03 0.00 - 0.20 10*3/mm3    Immature Grans, Absolute 0.76 (H) 0.00 - 0.02 10*3/mm3   Light Blue Top   Result Value Ref Range    Extra Tube hold for add-on    Green Top (Gel)   Result Value Ref Range    Extra Tube Hold for add-ons.    Lavender Top   Result Value Ref Range    Extra Tube hold for add-on    Gold Top - SST   Result Value Ref Range    Extra Tube Hold for add-ons.      Ct Abdomen Pelvis With Contrast    Result Date: 12/4/2017  Narrative: CT abdomen, pelvis with contrast. CLINICAL INDICATION: Lower abdominal pain. History of ulcerative  colitis.   COMPARISON: CT September 6, 2017.   TECHNIQUE: Oral and nonionic IV contrast. 80 mL Isovue-300. Helical scanning with axial and coronal reformations. Soft tissue, lung, and bone windows reviewed. This exam was performed according to our departmental dose-optimization program, which includes automated exposure control, adjustment of the mA and/or kV according to patient size and/or use of iterative reconstruction technique. ABDOMEN CT FINDINGS: The visualized lung bases show minor dependent changes. Subtle area of relatively decreased CT attenuation adjacent falciform ligament. This has been described as a pseudolesion rather than true pathology and is of incidental significance. (Series 2 images 24-26.) Liver is otherwise unremarkable. Benign calcified granulomata in the spleen. Small low CT attenuation lesion within the spleen either cyst or hemangioma. The spleen is otherwise unremarkable. The gallbladder,  pancreas, adrenal glands  are normal in appearance. Nine cyst upper pole left kidney. Kidneys otherwise unremarkable. There is diffuse bowel l wall thickening involving the entire colon compatible with patient's known diagnosis of ulcerative colitis. Normal small bowel. No evidence of backwash ileitis. Normal appendix. No evidence of pathologically enlarged nodes, free air, or free fluid. Degenerative changes lumbar spine, L4-L5. Left-sided lumbar scoliotic curvature. The bones are otherwise unremarkable. PELVIS CT FINDINGS: There are no pelvic masses.    No evidence of pathologically enlarged nodes, free air, or free fluid. The bones are grossly unremarkable.     Impression: CONCLUSION: Diffuse bowel wall thickening involving the entire colon compatible with pancolitis. No evidence of any colonic obstruction. Normal small bowel. No evidence of backwash ileitis. Normal appendix. Small area of decreased CT attenuation adjacent to falciform ligament in the liver (this is a pseudolesion, of incidental  significance). Small low-density lesion within the spleen either cyst or hemangioma. Benign cyst upper pole left kidney. Degenerative changes lumbar spine left-sided lumbar scoliotic curvature. CT abdomen, pelvis with contrast is otherwise unremarkable. Electronically signed by:  Jarad Peoples MD  12/4/2017 12:18 PM [facility] Workstation: [id number]                [facility]    Final diagnoses:   Weakness   Ulcerative colitis with complication, unspecified location   NSTEMI (non-ST elevated myocardial infarction)   Lower abdominal pain            Carmine Ponce PA-C  12/04/17 9428

## 2017-12-04 NOTE — ED NOTES
Hygiene performed, incontinent of stool, changed diaper and linens     Glenna CAMILLA Vo  12/04/17 6024

## 2017-12-04 NOTE — H&P
History and Physical  Sony Burris MD  Hospitalist      Patient Care Team:  DESTINY Arreguin as PCP - General (Nurse Practitioner)    Chief complaint   Chief Complaint   Patient presents with   • Fall   • Weakness   • Diarrhea       Subjective     Patient is a 72 y.o. female admitted for worsening lower abdominal pain, cramping, accompanied by looser stools, sometimes bloody. She has a longstanding history of ulcerative colitis, at times controlled, sometimes not.    He appetite has been poorer, she has experienced minimal nausea. Food intake makes her symptoms worse.    History  Past Medical History:   Diagnosis Date   • Ulcerative colitis      Past Surgical History:   Procedure Laterality Date   • COLONOSCOPY     • SIGMOIDOSCOPY N/A 9/13/2017     Family History   Problem Relation Age of Onset   • Hypertension Father      Social History   Substance Use Topics   • Smoking status: Former Smoker   • Smokeless tobacco: Never Used   • Alcohol use No     Prescriptions Prior to Admission   Medication Sig Dispense Refill Last Dose   • acidophilus (FLORANEX) tablet tablet Take 1 tablet by mouth 3 (Three) Times a Day. 90 tablet 1    • Adalimumab (HUMIRA) 10 MG/0.2ML Prefilled Syringe Kit Inject  under the skin.      • famotidine (PEPCID) 40 MG tablet Take 1 tablet by mouth Daily. 30 tablet 1    • ferrous sulfate 324 (65 Fe) MG tablet delayed-release EC tablet Take 1 tablet by mouth 2 (Two) Times a Day With Meals. 60 tablet 1    • mesalamine (DELZICOL) 400 MG capsule delayed-release delayed release capsule Take 1 capsule by mouth 3 (Three) Times a Day. 90 capsule 1    • predniSONE (DELTASONE) 20 MG tablet Take 1 tablet by mouth 2 (Two) Times a Day. 60 tablet 1    • traMADol (ULTRAM) 50 MG tablet Take 50 mg by mouth Every 6 (Six) Hours As Needed for Moderate Pain .        Allergies:  Review of patient's allergies indicates no known allergies.    Review of Systems  Review of Systems   Constitutional: Positive for fatigue.  Negative for fever.   Respiratory: Positive for cough.    Gastrointestinal: Positive for abdominal distention, abdominal pain, blood in stool, diarrhea and nausea. Negative for constipation and vomiting.   Genitourinary: Positive for frequency. Negative for dysuria and urgency.   Musculoskeletal: Positive for back pain. Negative for neck pain.   Skin: Positive for pallor. Negative for rash.   Neurological: Positive for dizziness, weakness and light-headedness. Negative for syncope.   Psychiatric/Behavioral: Negative for agitation, behavioral problems and confusion.   All other systems reviewed and are negative.      Objective     Vital Signs  Temp:  [97.4 °F (36.3 °C)] 97.4 °F (36.3 °C)  Heart Rate:  [100-112] 106  Resp:  [18] 18  BP: (101-112)/(57-64) 101/57    Physical Exam:  Physical Exam   Constitutional: She is oriented to person, place, and time. She appears well-developed and well-nourished. No distress.   HENT:   Head: Normocephalic and atraumatic.   Eyes: EOM are normal. Pupils are equal, round, and reactive to light. No scleral icterus.   Neck: Normal range of motion. Neck supple.   Cardiovascular: Normal rate and regular rhythm.    Pulmonary/Chest: Effort normal and breath sounds normal. No respiratory distress. She has no wheezes.   Abdominal: Soft. Bowel sounds are normal. She exhibits distension. There is tenderness. There is no rebound and no guarding.   Musculoskeletal: Normal range of motion. She exhibits no edema or tenderness.   Neurological: She is alert and oriented to person, place, and time. No cranial nerve deficit. Coordination normal.   Skin: Skin is warm and dry. No erythema.   Psychiatric: She has a normal mood and affect. Her behavior is normal.   Vitals reviewed.      Results Review:   Lab Results (last 24 hours)     Procedure Component Value Units Date/Time    CBC & Differential [[id number]] Collected:  12/04/17 0959    Specimen:  Blood Updated:  12/04/17 1016    Narrative:       The  following orders were created for panel order CBC & Differential.  Procedure                               Abnormality         Status                     ---------                               -----------         ------                     CBC Auto Differential[[id number]]        Abnormal            Final result                 Please view results for these tests on the individual orders.    CBC Auto Differential [[id number]]  (Abnormal) Collected:  12/04/17 0959    Specimen:  Blood Updated:  12/04/17 1016     WBC 17.84 (H) 10*3/mm3      RBC 3.97 10*6/mm3      Hemoglobin 12.1 g/dL      Hematocrit 35.5 %      MCV 89.4 fL      MCH 30.5 pg      MCHC 34.1 g/dL      RDW 15.2 (H) %      RDW-SD 49.8 (H) fl      MPV 9.1 fL      Platelets 236 10*3/mm3      Neutrophil % 82.2 (H) %      Lymphocyte % 9.1 (L) %      Monocyte % 4.0 %      Eosinophil % 0.2 %      Basophil % 0.2 %      Immature Grans % 4.3 (H) %      Neutrophils, Absolute 14.68 (H) 10*3/mm3      Lymphocytes, Absolute 1.62 10*3/mm3      Monocytes, Absolute 0.71 10*3/mm3      Eosinophils, Absolute 0.04 10*3/mm3      Basophils, Absolute 0.03 10*3/mm3      Immature Grans, Absolute 0.76 (H) 10*3/mm3     Comprehensive Metabolic Panel [[id number]]  (Abnormal) Collected:  12/04/17 0959    Specimen:  Blood Updated:  12/04/17 1032     Glucose 110 (H) mg/dL      BUN 20 mg/dL      Creatinine 1.08 (H) mg/dL      Sodium 128 (L) mmol/L      Potassium 4.2 mmol/L      Chloride 91 (L) mmol/L      CO2 25.0 mmol/L      Calcium 8.9 mg/dL      Total Protein 7.1 g/dL      Albumin 3.40 g/dL      ALT (SGPT) 26 U/L      AST (SGOT) 33 U/L      Alkaline Phosphatase 124 U/L      Total Bilirubin 0.7 mg/dL      eGFR Non African Amer 50 mL/min/1.73      Globulin 3.7 (H) gm/dL      A/G Ratio 0.9 (L) g/dL      BUN/Creatinine Ratio 18.5     Anion Gap 12.0 mmol/L     Narrative:       The MDRD GFR formula is only valid for adults with stable renal function between ages 18 and 70.    Lipase [[id number]]   (Normal) Collected:  12/04/17 0959    Specimen:  Blood Updated:  12/04/17 1032     Lipase 65 U/L     Lactic Acid, Plasma [[id number]]  (Abnormal) Collected:  12/04/17 0959    Specimen:  Blood Updated:  12/04/17 1035     Lactate 2.3 (C) mmol/L     Troponin [[id number]]  (Abnormal) Collected:  12/04/17 0959    Specimen:  Blood Updated:  12/04/17 1050     Troponin I 0.195 (C) ng/mL     Influenza Antigen, Rapid - Swab, Nasopharynx [[id number]]  (Normal) Collected:  12/04/17 1043    Specimen:  Swab from Nasopharynx Updated:  12/04/17 1100     Influenza A Ag, EIA Negative     Influenza B Ag, EIA Negative    [place] Draw [[id number]] Collected:  12/04/17 0959    Specimen:  Blood Updated:  12/04/17 1101    Narrative:       The following orders were created for panel order [place] Draw.  Procedure                               Abnormality         Status                     ---------                               -----------         ------                     Light Blue Top[[id number]]                                   Final result               Green Top (Gel)[[id number]]                                  Final result               Lavender Top[[id number]]                                     Final result               Gold Top - SST[[id number]]                                   Final result                 Please view results for these tests on the individual orders.    Light Blue Top [[id number]] Collected:  12/04/17 0959    Specimen:  Blood Updated:  12/04/17 1101     Extra Tube hold for add-on      Auto resulted       Green Top (Gel) [[id number]] Collected:  12/04/17 0959    Specimen:  Blood Updated:  12/04/17 1101     Extra Tube Hold for add-ons.      Auto resulted.       Lavender Top [[id number]] Collected:  12/04/17 0959    Specimen:  Blood Updated:  12/04/17 1101     Extra Tube hold for add-on      Auto resulted       Gold Top - SST [[id number]] Collected:  12/04/17 0959    Specimen:  Blood Updated:  12/04/17 1101     Extra Tube  Hold for add-ons.      Auto resulted.       Blood Culture - Blood, [[id number]] Collected:  12/04/17 1118    Specimen:  Blood from Arm, Left Updated:  12/04/17 1119    Blood Culture - Blood, [[id number]] Collected:  12/04/17 1110    Specimen:  Blood from Arm, Right Updated:  12/04/17 1120    Lactic Acid, Reflex Timer [[id number]] Collected:  12/04/17 0959    Specimen:  Blood Updated:  12/04/17 1346     Extra Tube Hold for add-ons.      Auto resulted.       Gastrointestinal Panel, PCR - Stool, Per Rectum [[id number]]  (Normal) Collected:  12/04/17 1303    Specimen:  Stool from Per Rectum Updated:  12/04/17 1446     Campylobacter Not Detected     Clostridium difficile (toxin A/B) Not Detected     Plesiomonas shigelloides Not Detected     Salmonella Not Detected     Vibrio Not Detected     Vibrio cholerae Not Detected     Yersinia enterocolitica Not Detected     Enteroaggregative E. coli (EAEC) Not Detected     Enteropathogenic E. coli (EPEC) Not Detected     Enterotoxigenic E. coli (ETEC) lt/st Not Detected     Shiga-like toxin-producing E. coli (STEC) stx1/stx2 Not Detected     E. coli O157 Not Detected     Shigella/Enteroinvasive E. coli (EIEC) Not Detected     Cryptosporidium Not Detected     Cyclospora cayetanensis Not Detected     Entamoeba histolytica Not Detected     Giardia lamblia Not Detected     Adenovirus F40/41 Not Detected     Astrovirus Not Detected     Norovirus GI/GII Not Detected     Rotavirus A Not Detected     Sapovirus (I, II, IV or V) Not Detected    Narrative:         Testing performed by multiplex PCR system.          Imaging Results (last 24 hours)     Procedure Component Value Units Date/Time    CT Abdomen Pelvis With Contrast [[id number]] Collected:  12/04/17 1145     Updated:  12/04/17 1219    Narrative:       CT abdomen, pelvis with contrast.       CLINICAL INDICATION: Lower abdominal pain. History of ulcerative  colitis.       COMPARISON: CT September 6, 2017.       TECHNIQUE: Oral and  nonionic IV contrast. 80 mL Isovue-300.  Helical scanning with axial and coronal reformations. Soft  tissue, lung, and bone windows reviewed.    This exam was performed according to our departmental  dose-optimization program, which includes automated exposure  control, adjustment of the mA and/or kV according to patient size  and/or use of iterative reconstruction technique.    ABDOMEN CT FINDINGS: The visualized lung bases show minor  dependent changes. Subtle area of relatively decreased CT  attenuation adjacent falciform ligament. This has been described  as a pseudolesion rather than true pathology and is of incidental  significance. (Series 2 images 24-26.) Liver is otherwise  unremarkable. Benign calcified granulomata in the spleen. Small  low CT attenuation lesion within the spleen either cyst or  hemangioma. The spleen is otherwise unremarkable. The  gallbladder,  pancreas, adrenal glands  are normal in appearance.  Nine cyst upper pole left kidney. Kidneys otherwise unremarkable.    There is diffuse bowel l wall thickening involving the entire  colon compatible with patient's known diagnosis of ulcerative  colitis. Normal small bowel. No evidence of backwash ileitis.  Normal appendix. No evidence of pathologically enlarged nodes,  free air, or free fluid. Degenerative changes lumbar spine,  L4-L5. Left-sided lumbar scoliotic curvature.  The bones are otherwise unremarkable.    PELVIS CT FINDINGS: There are no pelvic masses.    No evidence of  pathologically enlarged nodes, free air, or free fluid.     The bones are grossly unremarkable.      Impression:       CONCLUSION: Diffuse bowel wall thickening involving the entire  colon compatible with pancolitis. No evidence of any colonic  obstruction. Normal small bowel. No evidence of backwash ileitis.  Normal appendix.    Small area of decreased CT attenuation adjacent to falciform  ligament in the liver (this is a pseudolesion, of incidental  significance).  Small low-density lesion within the spleen either  cyst or hemangioma. Benign cyst upper pole left kidney.  Degenerative changes lumbar spine left-sided lumbar scoliotic  curvature. CT abdomen, pelvis with contrast is otherwise  unremarkable.    Electronically signed by:  Jarad Peoples MD  12/4/2017 12:18 PM CST  Workstation: [id number]          Assessment/Plan     Principal Problem:    Lower abdominal pain  Active Problems:    Ulcerative colitis    Start IV fluid, IV antibiotics, symptomatic treatment. Consult GI.    Sony Burris MD  12/04/17  3:44 PM

## 2017-12-05 NOTE — PLAN OF CARE
Problem: Patient Care Overview (Adult)  Goal: Plan of Care Review  Outcome: Ongoing (interventions implemented as appropriate)    12/05/17 1410   Coping/Psychosocial Response Interventions   Plan Of Care Reviewed With patient;caregiver;family   Patient Care Overview   Progress no change   Outcome Evaluation   Outcome Summary/Follow up Plan New Assessment. Pt with hx of Ulcerative colitis with diarhea. Will add Ensure clear while on clear liquid diet due to recent wt loss of ~5#

## 2017-12-05 NOTE — CONSULTS
"Adult Nutrition  Assessment    Patient Name:  Damaris Sánchez  YOB: 1945  MRN: [id number]  Admit Date:  12/4/2017    Assessment Date:  12/5/2017    Comments:  Pt admitted with Ulcerative colitis with diarrhea for ~ the last 3 weeks.  SHe has lost ~5# in the last week due to diarrhea, decreased oral intake, and abd pain.  Gi following and pt is on steroids and an abx.  Rd will monitor hospital course and po tolerance          Reason for Assessment       12/05/17 1402    Reason for Assessment    Reason For Assessment/Visit identified at risk by screening criteria    Identified At Risk By Screening Criteria MST SCORE 2+    Gastrointestinal Ulcerative colitis;Diarrhea    Other Ulcerative colitis with Gi follow up                Nutrition/Diet History       12/05/17 1403    Nutrition/Diet History    Typical Food/Fluid Intake Pt currently reports a decreased appetite with hx of diarrhea.  She rpeorts that she has lost 5# in the last week but cannot tell me how much wt she has lost over the last 6 months.  She has had diarrhea off and on since Sept.              Labs/Tests/Procedures/Meds       12/05/17 1405    Labs/Tests/Procedures/Meds    Labs/Tests Review Reviewed;Na+;Glucose;Alb    Medication Review Reviewed, pertinent;Steroid;Antibiotic            Physical Findings       12/05/17 1406    Physical Findings/Assessment    Additional Documentation Physical Appearance (Group)    Physical Appearance    Gastrointestinal diarrhea   abd pain            Estimated/Assessed Needs       12/05/17 1406    Calculation Measurements    Weight Used For Calculations 57.6 kg (127 lb)    Height Used for Calculations 1.6 m (5' 3\")            Nutrition Prescription Ordered       12/05/17 1406    Nutrition Prescription PO    Current PO Diet Clear Liquid    Fluid Consistency Thin            Evaluation of Received Nutrient/Fluid Intake       12/05/17 1407    PO Evaluation    Number of Days PO Intake Evaluated Insufficient Data    "         Electronically signed by:  Candelaria Avila RD  12/05/17 2:11 PM

## 2017-12-05 NOTE — CONSULTS
Herbert Sanchez DO,[facility]  Gastroenterology  Hepatology  Endoscopy  Board Certified in Internal Medicine and gastroenterology  [address], [address]  [place]. [address]   [phone]   F [phone]     GASTROENTEROLOGY CONSULT NOTE   HERBERT SANCHEZ DO.         SUBJECTIVE:   12/4/2017    Name: Damaris Sánchez  DOD: 1945    REASON FOR CONSULT:  Acute flare of ulcerative colitis    Chief Complaint:     Chief Complaint   Patient presents with   • Fall   • Weakness - Generalized   • Diarrhea       Subjective : Diarrhea, abdominal pain, rectal bleeding, known history of significant ulcerative colitis    Patient is 72 y.o. female, known history of ulcerative colitis by limited sigmoidoscopy on steroids as well as Humira, presents with progressive diarrhea, fatigue, abdominal pain.    The patient was diagnosed several years ago with ulcerative colitis by colonoscopy.  Was unwilling to start medical treatments.  She developed increasing amounts of symptoms and came to the hospital approximately 1 month ago with acute exacerbation.  Has been on steroids since that time at high doses.  She was down to 20 mg per day and then had a recent flare and was increased to 40 mg.  She finally agreed to the beginning of Humira.  Her first loading shot was given last week.  She has diarrhea that occurs every 30 minutes.  She has abdominal pain.  We have discussed with her options to include total proctocolectomy.      ROS/HISTORY/ CURRENT MEDICATIONS/OBJECTIVE/VS/PE:   Review of Systems:   Review of Systems   Constitutional: Positive for activity change, appetite change and fatigue.   HENT: Negative.    Eyes: Negative.    Respiratory: Negative.    Gastrointestinal: Positive for abdominal pain, anal bleeding, blood in stool, nausea and vomiting.   Endocrine: Positive for cold intolerance and heat intolerance.   Genitourinary: Negative.    Musculoskeletal: Positive for arthralgias and back pain.    Allergic/Immunologic: Positive for immunocompromised state.   Neurological: Negative.    Hematological: Positive for adenopathy. Bruises/bleeds easily.   Psychiatric/Behavioral: The patient is nervous/anxious.        History:     Past Medical History:   Diagnosis Date   • Ulcerative colitis      Past Surgical History:   Procedure Laterality Date   • COLONOSCOPY     • SIGMOIDOSCOPY N/A 9/13/2017     Family History   Problem Relation Age of Onset   • Hypertension Father      Social History   Substance Use Topics   • Smoking status: Former Smoker   • Smokeless tobacco: Never Used   • Alcohol use No     Prescriptions Prior to Admission   Medication Sig Dispense Refill Last Dose   • acidophilus (FLORANEX) tablet tablet Take 1 tablet by mouth 3 (Three) Times a Day. 90 tablet 1    • Adalimumab (HUMIRA) 10 MG/0.2ML Prefilled Syringe Kit Inject  under the skin.      • Calcium Carbonate-Vitamin D (CALCIUM 600+D) 600-200 MG-UNIT tablet Take 1 tablet by mouth 2 (Two) Times a Day.      • famotidine (PEPCID) 40 MG tablet Take 1 tablet by mouth Daily. 30 tablet 1    • ferrous sulfate 324 (65 Fe) MG tablet delayed-release EC tablet Take 1 tablet by mouth 2 (Two) Times a Day With Meals. 60 tablet 1    • mesalamine (DELZICOL) 400 MG capsule delayed-release delayed release capsule Take 1 capsule by mouth 3 (Three) Times a Day. 90 capsule 1    • predniSONE (DELTASONE) 20 MG tablet Take 1 tablet by mouth 2 (Two) Times a Day. 60 tablet 1    • traMADol (ULTRAM) 50 MG tablet Take 50 mg by mouth Every 6 (Six) Hours As Needed for Moderate Pain .        Allergies:  Review of patient's allergies indicates no known allergies.    I have reviewed the patients medical history, surgical history and family history in the available medical record system.     Current Medications:     Current Facility-Administered Medications   Medication Dose Route Frequency Provider Last Rate Last Dose   • levoFLOXacin (LEVAQUIN) 500 mg/100 mL D5W (premix)  (LEVAQUIN) 500 mg  500 mg Intravenous Q24H Sony Burris MD   500 mg at 12/04/17 1926   • mesalamine (DELZICOL) delayed release capsule 400 mg  400 mg Oral TID Sony Burris MD   400 mg at 12/04/17 1638   • methylPREDNISolone sodium succinate (SOLU-Medrol) injection 20 mg  20 mg Intravenous Q8H Sony Burris MD   20 mg at 12/04/17 1638   • metroNIDAZOLE (FLAGYL) IVPB 500 mg  500 mg Intravenous Q8H Sony Burris MD   500 mg at 12/04/17 1638   • morphine injection 2 mg  2 mg Intravenous Q2H PRN Sony Burris MD   2 mg at 12/04/17 1811   • sodium chloride 0.9 % flush 1-10 mL  1-10 mL Intravenous PRN Sony Burris MD       • sodium chloride 0.9 % flush 10 mL  10 mL Intravenous PRN Carmine Ponce PA-C       • sodium chloride 0.9 % infusion  100 mL/hr Intravenous Continuous Sony Burris  mL/hr at 12/04/17 1637 100 mL/hr at 12/04/17 1637       Objective     Physical Exam:   Temp:  [96.8 °F (36 °C)-97.4 °F (36.3 °C)] 96.8 °F (36 °C)  Heart Rate:  [100-112] 103  Resp:  [18] 18  BP: (101-121)/(57-64) 121/64    Physical Exam:  General Appearance:    Alert, cooperative, in no acute distress   Head:    Normocephalic, without obvious abnormality, atraumatic   Eyes:            Lids and lashes normal, conjunctivae and sclerae normal, no   icterus, no pallor, corneas clear, PERRLA   Ears:    Ears appear intact with no abnormalities noted   Throat:   No oral lesions, no thrush, oral mucosa moist   Neck:   No adenopathy, supple, trachea midline, no thyromegaly, no     carotid bruit, no JVD   Back:     No kyphosis present, no scoliosis present, no skin lesions,       erythema or scars, no tenderness to percussion or                   palpation,   range of motion normal   Lungs:     Clear to auscultation,respirations regular, even and                   unlabored    Heart:    Regular rhythm and normal rate, normal S1 and S2, no            murmur, no gallop, no rub, no click   Breast Exam:    Deferred   Abdomen:      Normal bowel sounds, no masses, no organomegaly, soft        non-tender, non-distended, no guarding, no rebound                 tenderness   Genitalia:    Deferred   Extremities:   Moves all extremities well, no edema, no cyanosis, no              redness   Pulses:   Pulses palpable and equal bilaterally   Skin:   No bleeding, bruising or rash   Lymph nodes:   No palpable adenopathy   Neurologic:   Cranial nerves 2 - 12 grossly intact, sensation intact, DTR        present and equal bilaterally      Results Review:     Lab Results   Component Value Date    WBC 17.84 (H) 12/04/2017    WBC 9.15 09/21/2017    WBC 8.27 09/20/2017    HGB 12.1 12/04/2017    HGB 10.3 (L) 09/21/2017    HGB 9.8 (L) 09/20/2017    HCT 35.5 12/04/2017    HCT 31.3 (L) 09/21/2017    HCT 30.2 (L) 09/20/2017     12/04/2017     09/21/2017     09/20/2017       Results from last 7 days  Lab Units 12/04/17  0959   ALK PHOS U/L 124   ALT (SGPT) U/L 26   AST (SGOT) U/L 33       Results from last 7 days  Lab Units 12/04/17  0959   BILIRUBIN mg/dL 0.7   ALK PHOS U/L 124     Lipase   Date Value Ref Range Status   12/04/2017 65 23 - 300 U/L Final     No results found for: INR       Radiology Review:  Imaging Results (last 72 hours)     Procedure Component Value Units Date/Time    CT Abdomen Pelvis With Contrast [[id number]] Collected:  12/04/17 1145     Updated:  12/04/17 1219    Narrative:       CT abdomen, pelvis with contrast.       CLINICAL INDICATION: Lower abdominal pain. History of ulcerative  colitis.       COMPARISON: CT September 6, 2017.       TECHNIQUE: Oral and nonionic IV contrast. 80 mL Isovue-300.  Helical scanning with axial and coronal reformations. Soft  tissue, lung, and bone windows reviewed.    This exam was performed according to our departmental  dose-optimization program, which includes automated exposure  control, adjustment of the mA and/or kV according to patient size  and/or use of iterative reconstruction  technique.    ABDOMEN CT FINDINGS: The visualized lung bases show minor  dependent changes. Subtle area of relatively decreased CT  attenuation adjacent falciform ligament. This has been described  as a pseudolesion rather than true pathology and is of incidental  significance. (Series 2 images 24-26.) Liver is otherwise  unremarkable. Benign calcified granulomata in the spleen. Small  low CT attenuation lesion within the spleen either cyst or  hemangioma. The spleen is otherwise unremarkable. The  gallbladder,  pancreas, adrenal glands  are normal in appearance.  Nine cyst upper pole left kidney. Kidneys otherwise unremarkable.    There is diffuse bowel l wall thickening involving the entire  colon compatible with patient's known diagnosis of ulcerative  colitis. Normal small bowel. No evidence of backwash ileitis.  Normal appendix. No evidence of pathologically enlarged nodes,  free air, or free fluid. Degenerative changes lumbar spine,  L4-L5. Left-sided lumbar scoliotic curvature.  The bones are otherwise unremarkable.    PELVIS CT FINDINGS: There are no pelvic masses.    No evidence of  pathologically enlarged nodes, free air, or free fluid.     The bones are grossly unremarkable.      Impression:       CONCLUSION: Diffuse bowel wall thickening involving the entire  colon compatible with pancolitis. No evidence of any colonic  obstruction. Normal small bowel. No evidence of backwash ileitis.  Normal appendix.    Small area of decreased CT attenuation adjacent to falciform  ligament in the liver (this is a pseudolesion, of incidental  significance). Small low-density lesion within the spleen either  cyst or hemangioma. Benign cyst upper pole left kidney.  Degenerative changes lumbar spine left-sided lumbar scoliotic  curvature. CT abdomen, pelvis with contrast is otherwise  unremarkable.    Electronically signed by:  Jarad Peoples MD  12/4/2017 12:18 PM CST  Workstation: [id number]           I reviewed the patient's  new clinical results.  I reviewed the patient's new imaging results and agree with the interpretation.     ASSESSMENT/PLAN:   ASSESSMENT:   1.  Acute flare of ulcerative colitis, not improving with aggressive medical treatments  2.  Personal history of Clostridium difficile    PLAN:   1.  Increase the Solu-Medrol to 60 mg IV every 6 hours  2.  Increase Asacol to 800 mg 3 times a day  3.  Recheck stool for C. difficile as well as cytomegalovirus  4.  If there is no improvement, this patient should be considered for a total colectomy  5.  The patient understands that this is a very serious situation that may require aggressive surgical treatments.  6.  Lomotil when necessary      Nish Morales DO  12/04/17  7:45 PM

## 2017-12-05 NOTE — PLAN OF CARE
Problem: Patient Care Overview (Adult)  Goal: Plan of Care Review  Outcome: Ongoing (interventions implemented as appropriate)  Goal: Adult Individualization and Mutuality  Outcome: Ongoing (interventions implemented as appropriate)  Goal: Discharge Needs Assessment  Outcome: Ongoing (interventions implemented as appropriate)    Problem: Pain, Acute (Adult)  Goal: Acceptable Pain Control/Comfort Level  Outcome: Ongoing (interventions implemented as appropriate)    Problem: Fall Risk (Adult)  Goal: Absence of Falls  Outcome: Ongoing (interventions implemented as appropriate)

## 2017-12-05 NOTE — PAYOR COMM NOTE
"Kira Borrero (72 y.o. Female)     Date of Birth Social Security Number Address Home Phone MRN    1945  [address] [place], [place] [address] [phone] [id number]    Church Marital Status          None        Admission Date Admission Type Admitting Provider Attending Provider Department, Room/Bed    12/4/17 Emergency Neelam Saravia MD  [facility] 3 EAST, 366/1    Discharge Date Discharge Disposition Discharge Destination                      Attending Provider: (none)    Allergies:  No Known Allergies    Isolation:  None   Infection:  None   Code Status:  FULL    Ht:  160 cm (63\")   Wt:  57.7 kg (127 lb 4.8 oz)    Admission Cmt:  None   Principal Problem:  Lower abdominal pain [R10.30]                 Active Insurance as of 12/4/2017     Primary Coverage     Payor Plan Insurance Group Employer/Plan Group    MEDICARE MEDICARE A & B      Payor Plan Address Payor Plan Phone Number Effective From Effective To    [address] [phone] 4/1/2010     [place] [address]       Subscriber Name Subscriber Birth Date Member ID       KIRA BORRERO 1945 [id number]           Secondary Coverage     Payor Plan Insurance Group Employer/Plan Group    [employer] [place]LL[place]ICAID      Payor Plan Address Payor Plan Phone Number Effective From Effective To    [address] [address] [phone] 9/6/2017     [place] [address]       Subscriber Name Subscriber Birth Date Member ID       KIRA BORRERO 1945 [id number]                 Emergency Contacts      (Rel.) Home Phone Work Phone Mobile Phone    Abdi Borrero (Son) [phone] -- --        Janee Sierra RN [facility]  [phone] phone  [phone] fax           History & Physical      Sony Burris MD at 12/4/2017 12:41 PM          History and Physical  Sony Burris MD  Hospitalist      Patient Care Team:  DESTINY Arreguin as PCP - General (Nurse Practitioner)    Chief complaint "   Chief Complaint   Patient presents with   • Fall   • Weakness   • Diarrhea       Subjective     Patient is a 72 y.o. female admitted for worsening lower abdominal pain, cramping, accompanied by looser stools, sometimes bloody. She has a longstanding history of ulcerative colitis, at times controlled, sometimes not.    He appetite has been poorer, she has experienced minimal nausea. Food intake makes her symptoms worse.    History  Past Medical History:   Diagnosis Date   • Ulcerative colitis      Past Surgical History:   Procedure Laterality Date   • COLONOSCOPY     • SIGMOIDOSCOPY N/A 9/13/2017     Family History   Problem Relation Age of Onset   • Hypertension Father      Social History   Substance Use Topics   • Smoking status: Former Smoker   • Smokeless tobacco: Never Used   • Alcohol use No     Prescriptions Prior to Admission   Medication Sig Dispense Refill Last Dose   • acidophilus (FLORANEX) tablet tablet Take 1 tablet by mouth 3 (Three) Times a Day. 90 tablet 1    • Adalimumab (HUMIRA) 10 MG/0.2ML Prefilled Syringe Kit Inject  under the skin.      • famotidine (PEPCID) 40 MG tablet Take 1 tablet by mouth Daily. 30 tablet 1    • ferrous sulfate 324 (65 Fe) MG tablet delayed-release EC tablet Take 1 tablet by mouth 2 (Two) Times a Day With Meals. 60 tablet 1    • mesalamine (DELZICOL) 400 MG capsule delayed-release delayed release capsule Take 1 capsule by mouth 3 (Three) Times a Day. 90 capsule 1    • predniSONE (DELTASONE) 20 MG tablet Take 1 tablet by mouth 2 (Two) Times a Day. 60 tablet 1    • traMADol (ULTRAM) 50 MG tablet Take 50 mg by mouth Every 6 (Six) Hours As Needed for Moderate Pain .        Allergies:  Review of patient's allergies indicates no known allergies.    Review of Systems  Review of Systems   Constitutional: Positive for fatigue. Negative for fever.   Respiratory: Positive for cough.    Gastrointestinal: Positive for abdominal distention, abdominal pain, blood in stool, diarrhea and  nausea. Negative for constipation and vomiting.   Genitourinary: Positive for frequency. Negative for dysuria and urgency.   Musculoskeletal: Positive for back pain. Negative for neck pain.   Skin: Positive for pallor. Negative for rash.   Neurological: Positive for dizziness, weakness and light-headedness. Negative for syncope.   Psychiatric/Behavioral: Negative for agitation, behavioral problems and confusion.   All other systems reviewed and are negative.      Objective     Vital Signs  Temp:  [97.4 °F (36.3 °C)] 97.4 °F (36.3 °C)  Heart Rate:  [100-112] 106  Resp:  [18] 18  BP: (101-112)/(57-64) 101/57    Physical Exam:  Physical Exam   Constitutional: She is oriented to person, place, and time. She appears well-developed and well-nourished. No distress.   HENT:   Head: Normocephalic and atraumatic.   Eyes: EOM are normal. Pupils are equal, round, and reactive to light. No scleral icterus.   Neck: Normal range of motion. Neck supple.   Cardiovascular: Normal rate and regular rhythm.    Pulmonary/Chest: Effort normal and breath sounds normal. No respiratory distress. She has no wheezes.   Abdominal: Soft. Bowel sounds are normal. She exhibits distension. There is tenderness. There is no rebound and no guarding.   Musculoskeletal: Normal range of motion. She exhibits no edema or tenderness.   Neurological: She is alert and oriented to person, place, and time. No cranial nerve deficit. Coordination normal.   Skin: Skin is warm and dry. No erythema.   Psychiatric: She has a normal mood and affect. Her behavior is normal.   Vitals reviewed.      Results Review:   Lab Results (last 24 hours)     Procedure Component Value Units Date/Time    CBC & Differential [[id number]] Collected:  12/04/17 0959    Specimen:  Blood Updated:  12/04/17 1016    Narrative:       The following orders were created for panel order CBC & Differential.  Procedure                               Abnormality         Status                      ---------                               -----------         ------                     CBC Auto Differential[[id number]]        Abnormal            Final result                 Please view results for these tests on the individual orders.    CBC Auto Differential [[id number]]  (Abnormal) Collected:  12/04/17 0959    Specimen:  Blood Updated:  12/04/17 1016     WBC 17.84 (H) 10*3/mm3      RBC 3.97 10*6/mm3      Hemoglobin 12.1 g/dL      Hematocrit 35.5 %      MCV 89.4 fL      MCH 30.5 pg      MCHC 34.1 g/dL      RDW 15.2 (H) %      RDW-SD 49.8 (H) fl      MPV 9.1 fL      Platelets 236 10*3/mm3      Neutrophil % 82.2 (H) %      Lymphocyte % 9.1 (L) %      Monocyte % 4.0 %      Eosinophil % 0.2 %      Basophil % 0.2 %      Immature Grans % 4.3 (H) %      Neutrophils, Absolute 14.68 (H) 10*3/mm3      Lymphocytes, Absolute 1.62 10*3/mm3      Monocytes, Absolute 0.71 10*3/mm3      Eosinophils, Absolute 0.04 10*3/mm3      Basophils, Absolute 0.03 10*3/mm3      Immature Grans, Absolute 0.76 (H) 10*3/mm3     Comprehensive Metabolic Panel [[id number]]  (Abnormal) Collected:  12/04/17 0959    Specimen:  Blood Updated:  12/04/17 1032     Glucose 110 (H) mg/dL      BUN 20 mg/dL      Creatinine 1.08 (H) mg/dL      Sodium 128 (L) mmol/L      Potassium 4.2 mmol/L      Chloride 91 (L) mmol/L      CO2 25.0 mmol/L      Calcium 8.9 mg/dL      Total Protein 7.1 g/dL      Albumin 3.40 g/dL      ALT (SGPT) 26 U/L      AST (SGOT) 33 U/L      Alkaline Phosphatase 124 U/L      Total Bilirubin 0.7 mg/dL      eGFR Non African Amer 50 mL/min/1.73      Globulin 3.7 (H) gm/dL      A/G Ratio 0.9 (L) g/dL      BUN/Creatinine Ratio 18.5     Anion Gap 12.0 mmol/L     Narrative:       The MDRD GFR formula is only valid for adults with stable renal function between ages 18 and 70.    Lipase [[id number]]  (Normal) Collected:  12/04/17 0959    Specimen:  Blood Updated:  12/04/17 1032     Lipase 65 U/L     Lactic Acid, Plasma [[id number]]  (Abnormal)  Collected:  12/04/17 0959    Specimen:  Blood Updated:  12/04/17 1035     Lactate 2.3 (C) mmol/L     Troponin [[id number]]  (Abnormal) Collected:  12/04/17 0959    Specimen:  Blood Updated:  12/04/17 1050     Troponin I 0.195 (C) ng/mL     Influenza Antigen, Rapid - Swab, Nasopharynx [[id number]]  (Normal) Collected:  12/04/17 1043    Specimen:  Swab from Nasopharynx Updated:  12/04/17 1100     Influenza A Ag, EIA Negative     Influenza B Ag, EIA Negative    [place] Draw [[id number]] Collected:  12/04/17 0959    Specimen:  Blood Updated:  12/04/17 1101    Narrative:       The following orders were created for panel order [place] Draw.  Procedure                               Abnormality         Status                     ---------                               -----------         ------                     Light Blue Top[[id number]]                                   Final result               Green Top (Gel)[[id number]]                                  Final result               Lavender Top[[id number]]                                     Final result               Gold Top - SST[[id number]]                                   Final result                 Please view results for these tests on the individual orders.    Light Blue Top [[id number]] Collected:  12/04/17 0959    Specimen:  Blood Updated:  12/04/17 1101     Extra Tube hold for add-on      Auto resulted       Green Top (Gel) [[id number]] Collected:  12/04/17 0959    Specimen:  Blood Updated:  12/04/17 1101     Extra Tube Hold for add-ons.      Auto resulted.       Lavender Top [[id number]] Collected:  12/04/17 0959    Specimen:  Blood Updated:  12/04/17 1101     Extra Tube hold for add-on      Auto resulted       Gold Top - SST [[id number]] Collected:  12/04/17 0959    Specimen:  Blood Updated:  12/04/17 1101     Extra Tube Hold for add-ons.      Auto resulted.       Blood Culture - Blood, [[id number]] Collected:  12/04/17 1118    Specimen:  Blood from Arm, Left  Updated:  12/04/17 1119    Blood Culture - Blood, [[id number]] Collected:  12/04/17 1110    Specimen:  Blood from Arm, Right Updated:  12/04/17 1120    Lactic Acid, Reflex Timer [[id number]] Collected:  12/04/17 0959    Specimen:  Blood Updated:  12/04/17 1346     Extra Tube Hold for add-ons.      Auto resulted.       Gastrointestinal Panel, PCR - Stool, Per Rectum [[id number]]  (Normal) Collected:  12/04/17 1303    Specimen:  Stool from Per Rectum Updated:  12/04/17 1446     Campylobacter Not Detected     Clostridium difficile (toxin A/B) Not Detected     Plesiomonas shigelloides Not Detected     Salmonella Not Detected     Vibrio Not Detected     Vibrio cholerae Not Detected     Yersinia enterocolitica Not Detected     Enteroaggregative E. coli (EAEC) Not Detected     Enteropathogenic E. coli (EPEC) Not Detected     Enterotoxigenic E. coli (ETEC) lt/st Not Detected     Shiga-like toxin-producing E. coli (STEC) stx1/stx2 Not Detected     E. coli O157 Not Detected     Shigella/Enteroinvasive E. coli (EIEC) Not Detected     Cryptosporidium Not Detected     Cyclospora cayetanensis Not Detected     Entamoeba histolytica Not Detected     Giardia lamblia Not Detected     Adenovirus F40/41 Not Detected     Astrovirus Not Detected     Norovirus GI/GII Not Detected     Rotavirus A Not Detected     Sapovirus (I, II, IV or V) Not Detected    Narrative:         Testing performed by multiplex PCR system.          Imaging Results (last 24 hours)     Procedure Component Value Units Date/Time    CT Abdomen Pelvis With Contrast [[id number]] Collected:  12/04/17 1145     Updated:  12/04/17 1219    Narrative:       CT abdomen, pelvis with contrast.       CLINICAL INDICATION: Lower abdominal pain. History of ulcerative  colitis.       COMPARISON: CT September 6, 2017.       TECHNIQUE: Oral and nonionic IV contrast. 80 mL Isovue-300.  Helical scanning with axial and coronal reformations. Soft  tissue, lung, and bone windows  reviewed.    This exam was performed according to our departmental  dose-optimization program, which includes automated exposure  control, adjustment of the mA and/or kV according to patient size  and/or use of iterative reconstruction technique.    ABDOMEN CT FINDINGS: The visualized lung bases show minor  dependent changes. Subtle area of relatively decreased CT  attenuation adjacent falciform ligament. This has been described  as a pseudolesion rather than true pathology and is of incidental  significance. (Series 2 images 24-26.) Liver is otherwise  unremarkable. Benign calcified granulomata in the spleen. Small  low CT attenuation lesion within the spleen either cyst or  hemangioma. The spleen is otherwise unremarkable. The  gallbladder,  pancreas, adrenal glands  are normal in appearance.  Nine cyst upper pole left kidney. Kidneys otherwise unremarkable.    There is diffuse bowel l wall thickening involving the entire  colon compatible with patient's known diagnosis of ulcerative  colitis. Normal small bowel. No evidence of backwash ileitis.  Normal appendix. No evidence of pathologically enlarged nodes,  free air, or free fluid. Degenerative changes lumbar spine,  L4-L5. Left-sided lumbar scoliotic curvature.  The bones are otherwise unremarkable.    PELVIS CT FINDINGS: There are no pelvic masses.    No evidence of  pathologically enlarged nodes, free air, or free fluid.     The bones are grossly unremarkable.      Impression:       CONCLUSION: Diffuse bowel wall thickening involving the entire  colon compatible with pancolitis. No evidence of any colonic  obstruction. Normal small bowel. No evidence of backwash ileitis.  Normal appendix.    Small area of decreased CT attenuation adjacent to falciform  ligament in the liver (this is a pseudolesion, of incidental  significance). Small low-density lesion within the spleen either  cyst or hemangioma. Benign cyst upper pole left kidney.  Degenerative changes  lumbar spine left-sided lumbar scoliotic  curvature. CT abdomen, pelvis with contrast is otherwise  unremarkable.    Electronically signed by:  Jarad Peoples MD  12/4/2017 12:18 PM [facility]  Workstation: [id number]          Assessment/Plan     Principal Problem:    Lower abdominal pain  Active Problems:    Ulcerative colitis    Start IV fluid, IV antibiotics, symptomatic treatment. Consult GI.    Sony Burris MD  12/04/17  3:44 PM       Electronically signed by Sony Burris MD at 12/4/2017  3:44 PM

## 2017-12-05 NOTE — PLAN OF CARE
Problem: Patient Care Overview (Adult)  Goal: Plan of Care Review  Outcome: Ongoing (interventions implemented as appropriate)    12/04/17 1801   Coping/Psychosocial Response Interventions   Plan Of Care Reviewed With patient   Patient Care Overview   Progress no change   Outcome Evaluation   Outcome Summary/Follow up Plan pt vss. Working on pain management at this time       Goal: Adult Individualization and Mutuality  Outcome: Ongoing (interventions implemented as appropriate)  Goal: Discharge Needs Assessment  Outcome: Ongoing (interventions implemented as appropriate)    Problem: Pain, Acute (Adult)  Goal: Identify Related Risk Factors and Signs and Symptoms  Outcome: Outcome(s) achieved Date Met:  12/04/17  Goal: Acceptable Pain Control/Comfort Level  Outcome: Ongoing (interventions implemented as appropriate)    Problem: Fall Risk (Adult)  Goal: Identify Related Risk Factors and Signs and Symptoms  Outcome: Outcome(s) achieved Date Met:  12/04/17  Goal: Absence of Falls  Outcome: Ongoing (interventions implemented as appropriate)

## 2017-12-06 NOTE — PROGRESS NOTES
Herbert Sanchez DO,[facility]  Gastroenterology  Hepatology  Endoscopy  Board Certified in Internal Medicine and gastroenterology  [address], [address]  [place]. [address]  - [phone]   F - [phone]     GASTROENTEROLOGY PROGRESS NOTE   HERBERT SANCHEZ DO.         SUBJECTIVE:   12/5/2017  Chief Complaint:     Subjective  : Abdominal pain is less.  Still has multiple loose stools.  Bleeding has stopped.  Now is brown stool.  No problems with any fevers or chills.  Has had no nausea or vomiting.    Even though the patient has Lomotil ordered she has not been taking it.  I discussed that with her.    C. difficile toxin is negative despite not being reported.  CMV is not back yet         CURRENT MEDICATIONS/OBJECTIVE/VS/PE:     Current Medications:     Current Facility-Administered Medications   Medication Dose Route Frequency Provider Last Rate Last Dose   • diphenoxylate-atropine (LOMOTIL) 2.5-0.025 MG per tablet 1 tablet  1 tablet Oral Q2H PRN Herbert Sanchez DO       • mesalamine (DELZICOL) delayed release capsule 800 mg  800 mg Oral TID Herbert Sanchez DO   800 mg at 12/05/17 2012   • methylPREDNISolone sodium succinate (SOLU-Medrol) injection 60 mg  60 mg Intravenous Q6H Herbert Sanchez DO   60 mg at 12/05/17 1613   • metroNIDAZOLE (FLAGYL) IVPB 500 mg  500 mg Intravenous Q8H Sony Burris MD 0 mL/hr at 12/05/17 0200 500 mg at 12/05/17 1613   • morphine injection 2 mg  2 mg Intravenous Q2H PRN Sony Burris MD   2 mg at 12/05/17 1447   • sodium chloride 0.9 % flush 1-10 mL  1-10 mL Intravenous PRN Sony Burris MD       • sodium chloride 0.9 % flush 10 mL  10 mL Intravenous PRN Carmine Ponce PA-C       • sodium chloride 0.9 % infusion  100 mL/hr Intravenous Continuous Sony Burris  mL/hr at 12/05/17 0719 100 mL/hr at 12/05/17 0719       Objective     Physical Exam:   Temp:  [96.6 °F (35.9 °C)-97.4 °F (36.3 °C)] 97.4 °F (36.3 °C)  Heart Rate:  [] 102  Resp:   [18-20] 18  BP: (111-120)/(58-72) 118/64     Physical Exam:  General Appearance:    Alert, cooperative, in no acute distress   Head:    Normocephalic, without obvious abnormality, atraumatic   Eyes:            Lids and lashes normal, conjunctivae and sclerae normal, no   icterus, no pallor, corneas clear, PERRLA   Ears:    Ears appear intact with no abnormalities noted   Throat:   No oral lesions, no thrush, oral mucosa moist   Neck:   No adenopathy, supple, trachea midline, no thyromegaly, no     carotid bruit, no JVD   Back:     No kyphosis present, no scoliosis present, no skin lesions,       erythema or scars, no tenderness to percussion or                   palpation,   range of motion normal   Lungs:     Clear to auscultation,respirations regular, even and                   unlabored    Heart:    Regular rhythm and normal rate, normal S1 and S2, no            murmur, no gallop, no rub, no click   Breast Exam:    Deferred   Abdomen:     Normal bowel sounds, no masses, no organomegaly, soft        Generalized tenderness, non-distended, no guarding, no rebound                 tenderness   Genitalia:    Deferred   Extremities:   Moves all extremities well, no edema, no cyanosis, no              redness   Pulses:   Pulses palpable and equal bilaterally   Skin:   No bleeding, bruising or rash   Lymph nodes:   No palpable adenopathy   Neurologic:   Cranial nerves 2 - 12 grossly intact, sensation intact, DTR        present and equal bilaterally      Results Review:     Lab Results (last 24 hours)     Procedure Component Value Units Date/Time    Urine Culture - Urine, Urine, Clean Catch [[id number]] Collected:  12/04/17 1837    Specimen:  Urine from Urine, Clean Catch Updated:  12/05/17 0542     Urine Culture Mixed Sam Isolated    Narrative:         Specimen contains mixed organisms of questionable pathogenicity which indicates contamination with commensal sam.  Further identification is unlikely to provide  clinically useful information.  Suggest recollection.    Comprehensive Metabolic Panel [[id number]]  (Abnormal) Collected:  12/05/17 0621    Specimen:  Blood Updated:  12/05/17 0719     Glucose 125 (H) mg/dL      BUN 11 mg/dL      Creatinine 0.66 mg/dL      Sodium 129 (L) mmol/L      Potassium 3.5 mmol/L      Chloride 101 mmol/L      CO2 20.0 (L) mmol/L      Calcium 7.5 (L) mg/dL      Total Protein 5.4 (L) g/dL      Albumin 2.40 (L) g/dL      ALT (SGPT) 28 U/L      AST (SGOT) 29 U/L      Alkaline Phosphatase 82 U/L      Total Bilirubin 0.3 mg/dL      eGFR Non African Amer 88 mL/min/1.73      Globulin 3.0 gm/dL      A/G Ratio 0.8 (L) g/dL      BUN/Creatinine Ratio 16.7     Anion Gap 8.0 mmol/L     Narrative:       The MDRD GFR formula is only valid for adults with stable renal function between ages 18 and 70.    CBC Auto Differential [[id number]]  (Abnormal) Collected:  12/05/17 0621    Specimen:  Blood Updated:  12/05/17 0746     WBC 9.27 10*3/mm3      RBC 3.13 (L) 10*6/mm3      Hemoglobin 9.5 (L) g/dL       Verified results repeat analysis        Hematocrit 28.0 (L) %      MCV 89.5 fL      MCH 30.4 pg      MCHC 33.9 g/dL      RDW 15.3 (H) %      RDW-SD 49.9 (H) fl      MPV 8.4 fL      Platelets 214 10*3/mm3      Neutrophil % 90.2 (H) %      Lymphocyte % 5.8 (L) %      Monocyte % 2.4 %      Eosinophil % 0.0 %      Basophil % 0.1 %      Immature Grans % 1.5 (H) %      Neutrophils, Absolute 8.36 10*3/mm3      Lymphocytes, Absolute 0.54 (L) 10*3/mm3      Monocytes, Absolute 0.22 10*3/mm3      Eosinophils, Absolute 0.00 10*3/mm3      Basophils, Absolute 0.01 10*3/mm3      Immature Grans, Absolute 0.14 (H) 10*3/mm3      nRBC 0.0 /100 WBC     Scan Slide [[id number]] Collected:  12/05/17 0621    Specimen:  Blood Updated:  12/05/17 0833     Anisocytosis Slight/1+     Hypochromia Slight/1+     Polychromasia Slight/1+     Toxic Granulation Mod/2+     Platelet Estimate Adequate    Blood Culture - Blood, [[id number]]  (Normal)  Collected:  12/04/17 1118    Specimen:  Blood from Arm, Left Updated:  12/05/17 1131     Blood Culture No growth at 24 hours    Blood Culture - Blood, [[id number]]  (Normal) Collected:  12/04/17 1110    Specimen:  Blood from Arm, Right Updated:  12/05/17 1131     Blood Culture No growth at 24 hours           I reviewed the patient's new clinical results.  I reviewed the patient's new imaging results and agree with the interpretation.     ASSESSMENT/PLAN:   ASSESSMENT:   1.  Acute flare of chronic ulcerative colitis    PLAN:   1.  Continue current steroids, antibiotics, 5 ASA compound  2.  Lomotil as needed  3.  At some point in time, the patient needs a full colonoscopy.  Not now with the patient's acute flare      Nish Morales DO  12/05/17  8:33 PM

## 2017-12-06 NOTE — PLAN OF CARE
Problem: Patient Care Overview (Adult)  Goal: Plan of Care Review  Outcome: Ongoing (interventions implemented as appropriate)    12/06/17 0151   Coping/Psychosocial Response Interventions   Plan Of Care Reviewed With patient   Patient Care Overview   Progress no change   Outcome Evaluation   Outcome Summary/Follow up Plan Lomotil effective. Pt very weak, unsteady gait. Couldn't rise from toilet without extensive assistance.       Goal: Adult Individualization and Mutuality  Outcome: Ongoing (interventions implemented as appropriate)  Goal: Discharge Needs Assessment  Outcome: Ongoing (interventions implemented as appropriate)    Problem: Pain, Acute (Adult)  Goal: Acceptable Pain Control/Comfort Level  Outcome: Ongoing (interventions implemented as appropriate)    Problem: Fall Risk (Adult)  Goal: Absence of Falls  Outcome: Ongoing (interventions implemented as appropriate)

## 2017-12-06 NOTE — PROGRESS NOTES
Discharge Planning Assessment  [facility]     Patient Name: Damaris Sánchez  MRN: [id number]  Today's Date: 12/6/2017    Admit Date: 12/4/2017          Discharge Needs Assessment       12/06/17 1545    Living Environment    Lives With alone    Living Arrangements house    Home Accessibility no concerns    Type of Financial/Environmental Concern none    Transportation Available family or friend will provide    Living Environment Comment Pt resides at home alone. pt has good support system.     Living Environment    Provides Primary Care For no one    Quality Of Family Relationships supportive;helpful    Able to Return to Prior Living Arrangements yes    Discharge Needs Assessment    Concerns To Be Addressed adjustment to diagnosis/illness concerns    Anticipated Changes Related to Illness none    Equipment Currently Used at Home walker, standard;grab bar;raised toilet    Discharge Facility/Level Of Care Needs home with home health    Current Discharge Risk chronically ill    Discharge Disposition still a patient            Discharge Plan       12/06/17 1546    Case Management/Social Work Plan    Additional Comments LSW assesment complete. pt resides at home alone. pt reports good support system. pt plans to return home at d/c and did not anticipate any needs. pt may benefit from home health follow up. LSW awaiting additional recomendations from MD and therapy. LSW/case mgt will follow up as consulted and complete arrangements as ordered.           Discharge Placement     No information found        Expected Discharge Date and Time     Expected Discharge Date Expected Discharge Time    Dec 8, 2017               Demographic Summary       12/06/17 1542    Referral Information    Admission Type inpatient    Referral Source high risk screening    Reason For Consult discharge planning    Record Reviewed medical record    Contact Information    Permission Granted to Share Information With     Primary Care  Physician Information    Name Michaela Beebe APRN            Functional Status       12/06/17 1544    Functional Status Prior    Ambulation 1-->assistive equipment    Transferring 1-->assistive equipment    Toileting 1-->assistive equipment    Bathing 0-->independent    Dressing 0-->independent    Eating 0-->independent    Communication 0-->understands/communicates without difficulty    Swallowing 0-->swallows foods/liquids without difficulty    IADL    Medications independent    Meal Preparation independent    Housekeeping independent    Laundry independent    Shopping independent    Oral Care independent    Activity Tolerance    Current Activity Limitations weakness severe, generalized    Usual Activity Tolerance moderate    Current Activity Tolerance fair    Cognitive/Perceptual/Developmental    Current Mental Status/Cognitive Functioning no deficits noted    Recent Changes in Mental Status/Cognitive Functioning unable to assess    Developmental Stage (Eriksson's Stages of Development) Stage 8 (65 years-death/Late Adulthood) Integrity vs. Despair            Psychosocial     None            Abuse/Neglect     None            Legal     None            Substance Abuse     None            Patient Forms     None          JD Edwards

## 2017-12-06 NOTE — PLAN OF CARE
Problem: Patient Care Overview (Adult)  Goal: Plan of Care Review  Outcome: Ongoing (interventions implemented as appropriate)    12/06/17 1735   Coping/Psychosocial Response Interventions   Plan Of Care Reviewed With patient   Outcome Evaluation   Outcome Summary/Follow up Plan PT evaluation completed. Pt transferred sit to stand with min assistance and ambulated 10 ft with RW with CGA. Function limited primarily by decreased strength, balance, and tolerance for functional mobility and activities. Pt will benefit from skilled PT to regain lost function. Anticipate pt will require additional therapy to improve independence with ambulation and transfers. If pt discharged home prior to goals being met, anticipate pt will benefit from 24/7 care with continued therapy services.         Problem: Inpatient Physical Therapy  Goal: Transfer Training Goal 1 LTG- PT  Outcome: Ongoing (interventions implemented as appropriate)    12/06/17 1735   Transfer Training PT LTG   Transfer Training PT LTG, Date Established 12/06/17   Transfer Training PT LTG, Time to Achieve by discharge   Transfer Training PT LTG, Activity Type bed to chair /chair to bed;sit to stand/stand to sit   Transfer Training PT LTG, [place] Level conditional independence   Transfer Training PT LTG, Outcome goal ongoing       Goal: Gait Training Goal LTG- PT  Outcome: Ongoing (interventions implemented as appropriate)    12/06/17 1735   Gait Training PT LTG   Gait Training Goal PT LTG, Date Established 12/06/17   Gait Training Goal PT LTG, Time to Achieve by discharge   Gait Training Goal PT LTG, [place] Level conditional independence   Gait Training Goal PT LTG, Assist Device (aad)   Gait Training Goal PT LTG, Distance to Achieve 150ft   Gait Training Goal PT LTG, Additional Goal 300ft   Gait Training Goal PT LTG, Outcome goal ongoing       Goal: Stair Training Goal LTG- PT  Outcome: Ongoing (interventions implemented as appropriate)    12/06/17 1735    Stair Training PT LTG   Stair Training Goal PT LTG, Date Established 12/06/17   Stair Training Goal PT LTG, Time to Achieve by discharge   Stair Training Goal PT LTG, Number of Steps 2   Stair Training Goal PT LTG, [place] Level conditional independence   Stair Training Goal PT LTG, Outcome goal ongoing       Goal: Strength Goal LTG- PT  Outcome: Ongoing (interventions implemented as appropriate)    12/06/17 1735   Strength Goal PT LTG   Strength Goal PT LTG, Date Established 12/06/17   Strength Goal PT LTG, Time to Achieve by discharge   Strength Goal PT LTG, Measure to Achieve Pt will tolerate 15 reps of AROM exercises   Strength Goal PT LTG, Functional Goal Pt will progress to standing exercises   Strength Goal PT LTG, Outcome goal ongoing       Goal: Patient Education Goal LTG- PT  Outcome: Ongoing (interventions implemented as appropriate)    12/06/17 1735   Patient Education PT LTG   Patient Education PT LTG, Date Established 12/06/17   Patient Education PT LTG, Time to Achieve by discharge   Patient Education PT LTG, Education Type HEP;gait;transfers;home safety   Patient Education PT LTG Outcome goal ongoing

## 2017-12-06 NOTE — PROGRESS NOTES
[facility] Medicine Services  INPATIENT PROGRESS NOTE    Length of Stay: 0  Date of Admission: 12/4/2017  Primary Care Physician: DESTINY Arreguin    Subjective   Chief Complaint: Acute ulcerative colitis    12/5/2017: Patient still has persistent abdominal pain     Review of Systems   Constitutional: Negative for activity change, appetite change, fatigue and fever.   HENT: Negative for ear pain and sore throat.    Eyes: Negative for pain and visual disturbance.   Respiratory: Negative for cough and shortness of breath.    Cardiovascular: Negative for chest pain and palpitations.   Gastrointestinal: Positive for abdominal pain. Negative for nausea.   Endocrine: Negative for cold intolerance and heat intolerance.   Genitourinary: Negative for difficulty urinating and dysuria.   Musculoskeletal: Negative for arthralgias and gait problem.   Skin: Negative for color change and rash.   Neurological: Negative for dizziness, weakness and headaches.   Hematological: Negative for adenopathy. Does not bruise/bleed easily.   Psychiatric/Behavioral: Negative for agitation, confusion and sleep disturbance.        All pertinent negatives and positives are as above. All other systems have been reviewed and are negative unless otherwise stated.     Objective    Temp:  [96.6 °F (35.9 °C)-99.1 °F (37.3 °C)] 97.4 °F (36.3 °C)  Heart Rate:  [] 94  Resp:  [18-20] 18  BP: ()/(54-72) 113/61    Physical Exam   Constitutional: She is oriented to person, place, and time. She appears well-developed and well-nourished. No distress.   HENT:   Head: Normocephalic and atraumatic.   Right Ear: External ear normal.   Left Ear: External ear normal.   Mouth/Throat: Oropharynx is clear and moist.   Eyes: Conjunctivae and EOM are normal. Pupils are equal, round, and reactive to light. Left eye exhibits no discharge. No scleral icterus.   Neck: No tracheal deviation present. No thyromegaly  present.   Cardiovascular: Normal rate, regular rhythm and normal heart sounds.  Exam reveals no gallop and no friction rub.    No murmur heard.  Pulmonary/Chest: Effort normal and breath sounds normal. No stridor. No respiratory distress. She has no wheezes. She has no rales.   Abdominal: Soft. Bowel sounds are normal. She exhibits no distension. There is no tenderness. There is no rebound.   Left-sided abdominal tenderness   Musculoskeletal: She exhibits no edema, tenderness or deformity.   Neurological: She is alert and oriented to person, place, and time. She displays normal reflexes. Coordination normal.   Skin: Skin is warm and dry. No rash noted. No erythema. No pallor.   Psychiatric: She has a normal mood and affect. Her behavior is normal. Thought content normal.           mesalamine 800 mg Oral TID   methylPREDNISolone sodium succinate 60 mg Intravenous Q6H   metroNIDAZOLE 500 mg Intravenous Q8H         Results Review:  I have reviewed the labs, radiology results, and diagnostic studies.    Laboratory Data:     Results from last 7 days  Lab Units 12/05/17  0621 12/04/17  0959   SODIUM mmol/L 129* 128*   POTASSIUM mmol/L 3.5 4.2   CHLORIDE mmol/L 101 91*   CO2 mmol/L 20.0* 25.0   BUN mg/dL 11 20   CREATININE mg/dL 0.66 1.08*   GLUCOSE mg/dL 125* 110*   CALCIUM mg/dL 7.5* 8.9   BILIRUBIN mg/dL 0.3 0.7   ALK PHOS U/L 82 124   ALT (SGPT) U/L 28 26   AST (SGOT) U/L 29 33   ANION GAP mmol/L 8.0 12.0     Estimated Creatinine Clearance: 57.9 mL/min (by C-G formula based on Cr of 0.66).            Results from last 7 days  Lab Units 12/05/17  0621 12/04/17  0959   WBC 10*3/mm3 9.27 17.84*   HEMOGLOBIN g/dL 9.5* 12.1   HEMATOCRIT % 28.0* 35.5   PLATELETS 10*3/mm3 214 236           Culture Data:   Blood Culture   Date Value Ref Range Status   12/04/2017 No growth at 24 hours  Preliminary   12/04/2017 No growth at 24 hours  Preliminary     Urine Culture   Date Value Ref Range Status   12/04/2017 Mixed Otilia Isolated   Final     No results found for: RESPCX  No results found for: WOUNDCX  No results found for: STOOLCX  No components found for: BODYFLD    Radiology Data:   Imaging Results (last 24 hours)     ** No results found for the last 24 hours. **          I have reviewed the patient current medications.     Assessment/Plan     Hospital Problem List     * (Principal)Lower abdominal pain    Overview Deleted 12/4/2017 12:39 PM by Sony Burris MD            Weakness    Ulcerative colitis (Chronic)          1.  Acute ulcerative colitis  Dr. Morales from GI is following.  Patient is on steroids, mesalamine, Flagyl.  Patient is at high risk of having sinus surgery done  Patient was to have maximum medical management.      2. Chronic diarrhea  #3.  Patient said that she did not feel dizzy or had loss of consciousness and she was at home.  Patient tripped over a step when she fell down.    DVT Prophylaxis: SCD/NAIN    Discharge Planning: I expect patient to be discharged to home in 3-4 days.          This document has been electronically signed by Neelam Saravia MD on December 5, 2017 6:44 PM

## 2017-12-06 NOTE — THERAPY EVALUATION
Acute Care - Physical Therapy Initial Evaluation  [facility]     Patient Name: Damaris Sánchez  : 1945  MRN: [id number]  Today's Date: 2017   Onset of Illness/Injury or Date of Surgery Date: 17  Date of Referral to PT: 17  Referring Physician: Dr. Neelam Saravia      Admit Date: 2017     Visit Dx:    ICD-10-CM ICD-9-CM   1. Weakness R53.1 780.79   2. Ulcerative colitis with complication, unspecified location K51.919 556.9   3. NSTEMI (non-ST elevated myocardial infarction) I21.4 410.70   4. Lower abdominal pain R10.30 789.09   5. Decreased activities of daily living (ADL) Z78.9 V49.89   6. Impaired functional mobility, balance, gait, and endurance Z74.09 V49.89     Patient Active Problem List   Diagnosis   • Lower abdominal pain   • C. difficile colitis   • Weakness   • Ulcerative colitis     Past Medical History:   Diagnosis Date   • Ulcerative colitis      Past Surgical History:   Procedure Laterality Date   • COLONOSCOPY     • SIGMOIDOSCOPY N/A 2017          PT ASSESSMENT (last 72 hours)      PT Evaluation       17 1648 17 1545    Rehab Evaluation    Document Type evaluation  -SEJAL     Subjective Information agree to therapy;complains of;pain  -SEJAL     Patient Effort, Rehab Treatment good  -SEJAL     Symptoms Noted During/After Treatment fatigue  -SEJAL     General Information    Patient Profile Review yes  -SEJAL     Onset of Illness/Injury or Date of Surgery Date 17  -SEJAL     Referring Physician Dr. Neelam Saravia  -SEJAL     General Observations Pt in recliner;noted IV  -SEJAL     Pertinent History Of Current Problem Pt admitted to [facility] with abdominal pain weakness, diarrhea s/p fall with flare up of ulcerative colitis  -SEJAL     Precautions/Limitations fall precautions  -SEJAL     Prior Level of Function independent:;all household mobility;community mobility;ADL's;home management;cleaning;cooking   dtr-in-law and sister help with driving  -SEJAL     Equipment Currently Used at  Home walker, standard;grab bar;raised toilet  -SEJAL walker, standard;grab bar;raised toilet  -CM    Plans/Goals Discussed With patient;agreed upon  -SEJAL     Risks Reviewed patient:;LOB;dizziness;increased discomfort;change in vital signs  -SEJAL     Benefits Reviewed patient:;improve function;increase independence;increase strength;increase balance;increase knowledge  -SEJAL     Barriers to Rehab none identified  -SEJAL     Living Environment    Lives With alone  -SEJAL alone  -CM    Living Arrangements house  -SEJAL house  -CM    Home Accessibility stairs to enter home  -SEJAL no concerns  -CM    Number of Stairs to Enter Home 2  -SEJAL     Stair Railings at Home outside, present at both sides  -SEJAL     Type of Financial/Environmental Concern  none  -CM    Transportation Available family or friend will provide  -SEJAL family or friend will provide  -CM    Living Environment Comment  Pt resides at home alone. pt has good support system.   -CM    Clinical Impression    Date of Referral to PT 12/06/17  -SEJAL     PT Diagnosis impaired physical mobility due to deconditioning from ulcerative colitis   -SEJAL     Prognosis per MD  -SEJAL     Patient/Family Goals Statement Be stronger;able to assist with care  -SEJAL     Criteria for Skilled Therapeutic Interventions Met yes  -SEJAL     Pathology/Pathophysiology Noted (Describe Specifically for Each System) musculoskeletal  -SEJAL     Impairments Found (describe specific impairments) aerobic capacity/endurance;gait, locomotion, and balance  -ESJAL     Functional Limitations in Following Categories (Describe Specific Limitations) self-care;home management;community/leisure  -SEJAL     Disability: Inability to Perform Actions/Activities of Required Roles (describe specific disability) community/leisure  -SEJAL     Rehab Potential good, to achieve stated therapy goals  -SEJAL     Predicted Duration of Therapy Intervention (days/wks) until discharge or goals met  -SEJAL     Vital Signs    Pre Systolic BP Rehab 106  -SEJAL     Pre  Treatment Diastolic BP 57  -     Post Systolic BP Rehab 104  -     Post Treatment Diastolic BP 60  -     Pretreatment Heart Rate (beats/min) 87  -     Posttreatment Heart Rate (beats/min) 64  -     Pre SpO2 (%) 98  -     O2 Delivery Pre Treatment room air  -     Post SpO2 (%) 99  -     O2 Delivery Post Treatment room air  -SEJAL     Pre Patient Position Sitting  -SEJAL     Post Patient Position Sitting  -     Pain Assessment    Pain Assessment 0-10  -     Pain Score 5  -     Post Pain Score 5  -     Pain Type Acute pain  -     Pain Location Abdomen  -     Pain Orientation Left  -     Pain Intervention(s) Medication (See MAR)  -     Vision Assessment/Intervention    Visual Impairment WFL with corrective lenses  -     Cognitive Assessment/Intervention    Current Cognitive/Communication Assessment functional  -     Orientation Status oriented x 4  -     Follows Commands/Answers Questions 100% of the time  -     Personal Safety WNL/WFL  -     Personal Safety Interventions fall prevention program maintained;gait belt;nonskid shoes/slippers when out of bed;supervised activity  -     ROM (Range of Motion)    General ROM no range of motion deficits identified  -     MMT (Manual Muscle Testing)    General MMT Assessment lower extremity strength deficits identified  -     General MMT Assessment Detail Please refer to OT evaluation for BUE strength detail  -     Lower Extremity    Lower Ext Manual Muscle Testing Detail BLE: 3+/5 ankles/feet, knees, 3/5 hips  -     Bed Mobility, Assessment/Treatment    Bed Mobility, Comment pt in recliner  -     Transfer Assessment/Treatment    Transfers, Sit-Stand [place] minimum assist (75% patient effort)  -     Transfers, Stand-Sit [place] contact guard assist  -     Transfers, Sit-Stand-Sit, Assist Device rolling walker  -     Gait Assessment/Treatment    Gait, [place] Level contact guard assist  -     Gait, Assistive  Device rolling walker  -SEJAL     Gait, Distance (Feet) 10  -SEJAL     Gait, Gait Deviations step length decreased;right:;left:  -SEJAL     Gait, Impairments strength decreased  -SEJAL     Sensory Assessment/Intervention    Light Touch LUE;RUE;LLE;RLE  -SEAJL     LUE Light Touch WNL  -SEJAL     RUE Light Touch WNL  -SEJAL     LLE Light Touch WNL  -SEJAL     RLE Light Touch WNL  -SEJAL     Sharp/Dull Discrimination LUE  -SEJAL     Edema Management    Edema Amount none  -SEJAL     Positioning and Restraints    Pre-Treatment Position sitting in chair/recliner  -SEJAL     Post Treatment Position chair  -SEJAL     In Chair call light within reach;encouraged to call for assist;with family/caregiver  -SEJAL       12/06/17 1016 12/04/17 1651    Rehab Evaluation    Document Type evaluation  -NN     Subjective Information agree to therapy;complains of;weakness;fatigue;pain  -NN     General Information    Patient Profile Review yes  -NN     Onset of Illness/Injury or Date of Surgery Date 12/04/17  -NN     Referring Physician Dr. Saravia  -NN     General Observations fowlers, IV site, alert  -NN     Pertinent History Of Current Problem abdominal pain, Dx: Acute ulcerative colitis  -NN     Precautions/Limitations fall precautions  -NN     Prior Level of Function independent:;all household mobility;community mobility;ADL's;home management;cooking;cleaning;driving  -NN     Equipment Currently Used at Home walker, standard;grab bar;raised toilet   does not use AE/AD   -NN none  -KE    Plans/Goals Discussed With patient;agreed upon  -NN     Risks Reviewed patient:;LOB;nausea/vomiting;dizziness;increased discomfort;change in vital signs;increased drainage;lines disloged  -NN     Benefits Reviewed patient:;improve function;increase independence;increase balance;increase strength  -NN     Barriers to Rehab none identified  -NN     Living Environment    Lives With alone  -NN alone  -KE    Living Arrangements house  -NN house  -KE    Home Accessibility bed and bath on same  level;grab bars present (bathtub);tub/shower is not walk in;stairs to enter home   walk in shower  -NN no concerns  -KE    Number of Stairs to Enter Home 2  -NN     Stair Railings at Home outside, present at both sides  -NN none  -KE    Type of Financial/Environmental Concern  none  -KE    Transportation Available  family or friend will provide  -KE    Vital Signs    Pre Systolic BP Rehab 110  -NN     Pre Treatment Diastolic BP 60  -NN     Pretreatment Heart Rate (beats/min) 82  -NN     Posttreatment Heart Rate (beats/min) 80  -NN     Pre SpO2 (%) 99  -NN     O2 Delivery Pre Treatment room air  -NN     Post SpO2 (%) 98  -NN     O2 Delivery Post Treatment room air  -NN     Pain Assessment    Pain Assessment 0-10  -NN     Pain Score 7  -NN     Pain Type Acute pain  -NN     Pain Location Abdomen  -NN     Pain Descriptors Aching  -NN     Pain Frequency Constant/continuous  -NN     Pain Intervention(s) Medication (See MAR)  -NN     Response to Interventions tolerated  -NN     Vision Assessment/Intervention    Visual Impairment WFL with corrective lenses   reading  -NN     Cognitive Assessment/Intervention    Current Cognitive/Communication Assessment functional  -NN     Orientation Status oriented x 4  -NN     Follows Commands/Answers Questions 100% of the time;able to follow multi-step instructions  -NN     Personal Safety WNL/WFL  -NN     Personal Safety Interventions fall prevention program maintained;gait belt;muscle strengthening facilitated;nonskid shoes/slippers when out of bed;supervised activity  -NN     ROM (Range of Motion)    General ROM no range of motion deficits identified  -NN     MMT (Manual Muscle Testing)    General MMT Assessment Detail BUE functionally 4/5  -NN     Bed Mobility, Assessment/Treatment    Bed Mobility, Assistive Device bed rails;head of bed elevated  -NN     Bed Mobility, Scoot/Bridge, [place] supervision required  -NN     Bed Mob, Supine to Sit, [place] supervision required   -NN     Bed Mob, Sit to Supine, [place] --   chair  -NN     Transfer Assessment/Treatment    Transfers, Sit-Stand [place] contact guard assist  -NN     Transfers, Stand-Sit [place] contact guard assist  -NN     Transfers, Sit-Stand-Sit, Assist Device rolling walker  -NN     Transfer, Safety Issues step length decreased  -NN     Transfer, Impairments strength decreased;impaired balance;postural control impaired  -NN     Motor Skills/Interventions    Additional Documentation Balance Skills Training (Group)  -NN     Balance Skills Training    Sitting-Level of Assistance Distant supervision  -NN     Sitting-Balance Support Right upper extremity supported;Left upper extremity supported;Feet supported  -NN     Standing-Level of Assistance Contact guard  -NN     Static Standing Balance Support assistive device  -NN     Gait Balance-Level of Assistance Contact guard  -NN     Gait Balance Support assistive device  -NN     Sensory Assessment/Intervention    Sensory Impairment --   Hypersensitive legs per pt.   -NN     Positioning and Restraints    Pre-Treatment Position in bed  -NN     Post Treatment Position chair  -NN     In Chair notified nsg;call light within reach;encouraged to call for assist;with family/caregiver;legs elevated  -NN       User Key  (r) = Recorded By, (t) = Taken By, (c) = Cosigned By    Initials Name Provider Type    SEJAL Guerrero, PT Physical Therapist    LINDA Kahn, RN Registered Nurse    KIRIT Bynum, LSW     BAY Brannon, OTR/L Occupational Therapist              PT Recommendation and Plan  Anticipated Discharge Disposition: inpatient rehabilitation facility (vs 24/7 care with  PT)  Planned Therapy Interventions: balance training, bed mobility training, gait training, home exercise program, patient/family education, stair training, strengthening, transfer training  PT Frequency: other (see comments) (5-14 times per week)  Plan of Care Review  Plan Of  Care Reviewed With: patient  Outcome Summary/Follow up Plan: PT evaluation completed. Pt transferred sit to stand with min assistance and ambulated 10 ft with RW with CGA. Function limited primarily by decreased strength, balance, and tolerance for functional mobility and activities. Pt will benefit from skilled PT to regain lost function.  Anticipate pt will require additional therapy to improve independence with ambulation and transfers. If pt discharged home prior to goals being met, anticipate pt will benefit  from 24/7 care with continued therapy services.          IP PT Goals       12/06/17 1735 12/06/17 1734       Transfer Training PT LTG    Transfer Training PT LTG, Date Established 12/06/17  -      Transfer Training PT LTG, Time to Achieve by discharge  -      Transfer Training PT LTG, Activity Type bed to chair /chair to bed;sit to stand/stand to sit  -      Transfer Training PT LTG, [place] Level conditional independence  -      Transfer Training PT LTG, Outcome goal ongoing  -      Gait Training PT LTG    Gait Training Goal PT LTG, Date Established 12/06/17  -      Gait Training Goal PT LTG, Time to Achieve by discharge  -      Gait Training Goal PT LTG, [place] Level conditional independence  -      Gait Training Goal PT LTG, Assist Device --   aad  -      Gait Training Goal PT LTG, Distance to Achieve 150ft  -SEJAL      Gait Training Goal PT LTG, Additional Goal 300ft  -SEJAL      Gait Training Goal PT LTG, Outcome goal ongoing  -      Stair Training PT LTG    Stair Training Goal PT LTG, Date Established 12/06/17  -      Stair Training Goal PT LTG, Time to Achieve by discharge  -      Stair Training Goal PT LTG, Number of Steps 2  -      Stair Training Goal PT LTG, [place] Level conditional independence  -      Stair Training Goal PT LTG, Outcome goal ongoing  -      Strength Goal PT LTG    Strength Goal PT LTG, Date Established 12/06/17  - (P)  12/06/17  -      Strength Goal PT LTG, Time to Achieve by discharge  -SEJAL (P)  by discharge  -SEJAL     Strength Goal PT LTG, Measure to Achieve Pt will tolerate 15 reps of AROM exercises  -SEJAL (P)  Pt will tolerate 15 reps of AROM exercises  -SEJAL     Strength Goal PT LTG, Functional Goal Pt will progress to standing exercises  -SEJAL      Strength Goal PT LTG, Outcome goal ongoing  -SEJAL      Patient Education PT LTG    Patient Education PT LTG, Date Established 12/06/17  -SEJAL (P)  12/06/17  -SEJAL     Patient Education PT LTG, Time to Achieve by discharge  -SEJAL (P)  by discharge  -SEJAL     Patient Education PT LTG, Education Type HEP;gait;transfers;home safety  -SEJAL (P)  HEP;gait;transfers;home safety  -SEJAL     Patient Education PT LTG Outcome goal ongoing  -SEJAL (P)  goal ongoing  -SEJAL       User Key  (r) = Recorded By, (t) = Taken By, (c) = Cosigned By    Initials Name Provider Type    SEJAL Guerrero, PT Physical Therapist                Outcome Measures       12/06/17 1648 12/06/17 1100       How much help from another person do you currently need...    Turning from your back to your side while in flat bed without using bedrails? 3  -SEJAL      Moving from lying on back to sitting on the side of a flat bed without bedrails? 3  -SEJAL      Moving to and from a bed to a chair (including a wheelchair)? 3  -SEJAL      Standing up from a chair using your arms (e.g., wheelchair, bedside chair)? 3  -SEJAL      Climbing 3-5 steps with a railing? 3  -SEJAL      To walk in hospital room? 3  -SEJAL      AM-PAC 6 Clicks Score 18  -SEJAL      How much help from another is currently needed...    Putting on and taking off regular lower body clothing?  3  -NN     Bathing (including washing, rinsing, and drying)  3  -NN     Toileting (which includes using toilet bed pan or urinal)  2  -NN     Putting on and taking off regular upper body clothing  3  -NN     Taking care of personal grooming (such as brushing teeth)  4  -NN     Eating meals  4  -NN     Score  19  -NN     Functional  Assessment    Outcome Measure Options AM-PAC 6 Clicks Basic Mobility (PT)  -SEJAL AM-PAC 6 Clicks Daily Activity (OT)  -NN       User Key  (r) = Recorded By, (t) = Taken By, (c) = Cosigned By    Initials Name Provider Type    SEJAL Guerrero, PT Physical Therapist    NN Ashley Brannon, OTR/L Occupational Therapist           Time Calculation:         PT Charges       12/06/17 1742          Time Calculation    Start Time 1548  -SEJAL      Stop Time 1632  -SEJAL      Time Calculation (min) 44 min  -SEJAL      PT Received On 12/06/17  -      PT Goal Re-Cert Due Date 12/18/17  -      Time Calculation- PT    Total Timed Code Minutes- PT 29 minute(s)  -        User Key  (r) = Recorded By, (t) = Taken By, (c) = Cosigned By    Initials Name Provider Type    SEJAL Guerrero, PT Physical Therapist          Therapy Charges for Today     Code Description Service Date Service Provider Modifiers Qty    29698987552 HC PT MOBILITY CURRENT 12/6/2017 Jenni Guerrero, PT GP, CK 1    85023225916 HC PT MOBILITY PROJECTED 12/6/2017 Jenni Guerrero, PT GP, CI 1    88304376711 HC PT EVAL MOD COMPLEXITY 1 12/6/2017 Jenni Guerrero, PT GP, KX 1    39528818448 HC GAIT TRAINING EA 15 MIN 12/6/2017 Jenni Guerrero, PT GP, KX 1    04600848284 HC PT THERAPEUTIC ACT EA 15 MIN 12/6/2017 Jenni Guerrero, PT GP, KX 1          PT G-Codes  PT Professional Judgement Used?: Yes  Outcome Measure Options: AM-PAC 6 Clicks Basic Mobility (PT)  Score: 18  Functional Limitation: Mobility: Walking and moving around  Mobility: Walking and Moving Around Current Status (): At least 40 percent but less than 60 percent impaired, limited or restricted  Mobility: Walking and Moving Around Goal Status (): At least 1 percent but less than 20 percent impaired, limited or restricted      Jenni Guerrero PT  12/6/2017

## 2017-12-06 NOTE — PLAN OF CARE
Problem: Patient Care Overview (Adult)  Goal: Plan of Care Review  Outcome: Outcome(s) achieved Date Met:  12/06/17 12/06/17 1607   Coping/Psychosocial Response Interventions   Plan Of Care Reviewed With patient   Patient Care Overview   Progress improving   Outcome Evaluation   Outcome Summary/Follow up Plan Patient up in chair most of day. Pain manage with PO norco. Still weak. Therapy order today and working with therapy. Still having several bowel movements. Only one dose of lomotil given today. Afebrile. Still on IV flagyl.       Goal: Adult Individualization and Mutuality  Outcome: Ongoing (interventions implemented as appropriate)  Goal: Discharge Needs Assessment  Outcome: Ongoing (interventions implemented as appropriate)    Problem: Pain, Acute (Adult)  Goal: Acceptable Pain Control/Comfort Level  Outcome: Outcome(s) achieved Date Met:  12/06/17  Pain manage with norco patient just only requiring PO pain meds.  norco order every 6 hours     Problem: Fall Risk (Adult)  Goal: Absence of Falls  Outcome: Ongoing (interventions implemented as appropriate)  No falls this shift. Fall precautions in place

## 2017-12-06 NOTE — PROGRESS NOTES
[facility] Medicine Services  INPATIENT PROGRESS NOTE    Length of Stay: 1  Date of Admission: 12/4/2017  Primary Care Physician: DESTINY Arreguin    Subjective   Chief Complaint: Acute ulcerative colitis    12/5/2017: Patient still has persistent abdominal pain     December 6, 2017: Patient is still having persistent left-sided abdominal pain.  Patient's diarrhea has improved after taking Lomotil.    Review of Systems   Constitutional: Negative for activity change, appetite change, fatigue and fever.   HENT: Negative for ear pain and sore throat.    Eyes: Negative for pain and visual disturbance.   Respiratory: Negative for cough and shortness of breath.    Cardiovascular: Negative for chest pain and palpitations.   Gastrointestinal: Positive for abdominal pain. Negative for nausea.   Endocrine: Negative for cold intolerance and heat intolerance.   Genitourinary: Negative for difficulty urinating and dysuria.   Musculoskeletal: Negative for arthralgias and gait problem.   Skin: Negative for color change and rash.   Neurological: Negative for dizziness, weakness and headaches.   Hematological: Negative for adenopathy. Does not bruise/bleed easily.   Psychiatric/Behavioral: Negative for agitation, confusion and sleep disturbance.        All pertinent negatives and positives are as above. All other systems have been reviewed and are negative unless otherwise stated.     Objective    Temp:  [96.3 °F (35.7 °C)-97.4 °F (36.3 °C)] 97.2 °F (36.2 °C)  Heart Rate:  [] 88  Resp:  [18-20] 18  BP: (106-134)/(55-73) 106/57    Physical Exam   Constitutional: She is oriented to person, place, and time. She appears well-developed and well-nourished. No distress.   HENT:   Head: Normocephalic and atraumatic.   Right Ear: External ear normal.   Left Ear: External ear normal.   Mouth/Throat: Oropharynx is clear and moist.   Eyes: Conjunctivae and EOM are normal. Pupils are equal,  round, and reactive to light. Left eye exhibits no discharge. No scleral icterus.   Neck: No tracheal deviation present. No thyromegaly present.   Cardiovascular: Normal rate, regular rhythm and normal heart sounds.  Exam reveals no gallop and no friction rub.    No murmur heard.  Pulmonary/Chest: Effort normal and breath sounds normal. No stridor. No respiratory distress. She has no wheezes. She has no rales.   Abdominal: Soft. Bowel sounds are normal. She exhibits no distension. There is no tenderness. There is no rebound.   Left-sided abdominal tenderness   Musculoskeletal: She exhibits no edema, tenderness or deformity.   Neurological: She is alert and oriented to person, place, and time. She displays normal reflexes. Coordination normal.   Skin: Skin is warm and dry. No rash noted. No erythema. No pallor.   Psychiatric: She has a normal mood and affect. Her behavior is normal. Thought content normal.           mesalamine 800 mg Oral TID   methylPREDNISolone sodium succinate 60 mg Intravenous Q6H   metroNIDAZOLE 500 mg Intravenous Q8H         Results Review:  I have reviewed the labs, radiology results, and diagnostic studies.    Laboratory Data:     Results from last 7 days  Lab Units 12/05/17  0621 12/04/17  0959   SODIUM mmol/L 129* 128*   POTASSIUM mmol/L 3.5 4.2   CHLORIDE mmol/L 101 91*   CO2 mmol/L 20.0* 25.0   BUN mg/dL 11 20   CREATININE mg/dL 0.66 1.08*   GLUCOSE mg/dL 125* 110*   CALCIUM mg/dL 7.5* 8.9   BILIRUBIN mg/dL 0.3 0.7   ALK PHOS U/L 82 124   ALT (SGPT) U/L 28 26   AST (SGOT) U/L 29 33   ANION GAP mmol/L 8.0 12.0     Estimated Creatinine Clearance: 57.9 mL/min (by C-G formula based on Cr of 0.66).            Results from last 7 days  Lab Units 12/05/17  0621 12/04/17  0959   WBC 10*3/mm3 9.27 17.84*   HEMOGLOBIN g/dL 9.5* 12.1   HEMATOCRIT % 28.0* 35.5   PLATELETS 10*3/mm3 214 236           Culture Data:   Blood Culture   Date Value Ref Range Status   12/04/2017 No growth at 2 days   Preliminary   12/04/2017 No growth at 2 days  Preliminary     Urine Culture   Date Value Ref Range Status   12/04/2017 Mixed Otilia Isolated  Final     No results found for: RESPCX  No results found for: WOUNDCX  No results found for: STOOLCX  No components found for: BODYFLD    Radiology Data:   Imaging Results (last 24 hours)     ** No results found for the last 24 hours. **          I have reviewed the patient current medications.     Assessment/Plan     Hospital Problem List     * (Principal)Lower abdominal pain    Overview Deleted 12/4/2017 12:39 PM by Sony Burris MD            Weakness    Ulcerative colitis (Chronic)          1.  Acute ulcerative colitis  Dr. Morales from GI is following.  Patient is on steroids, mesalamine, Flagyl.  Patient is at high risk of having sinus surgery done  Patient was to have maximum medical management.      2. Chronic diarrhea   Patient said that she did not feel dizzy or had loss of consciousness and she was at home.  Patient tripped over a step when she fell down.  Continue with lomotil as needed     3. Elevated Trops:  -Trending trops  -Ordered EKG         DVT Prophylaxis: SCD/NAIN    Discharge Planning: I expect patient to be discharged to home in 3-4 days.          This document has been electronically signed by Neelam Saravia MD on December 6, 2017 1:14 PM

## 2017-12-06 NOTE — THERAPY EVALUATION
Acute Care - Occupational Therapy Initial Evaluation  [facility]     Patient Name: Damaris Sánchez  : 1945  MRN: [id number]  Today's Date: 2017  Onset of Illness/Injury or Date of Surgery Date: 17  Date of Referral to OT: 17  Referring Physician: Dr. Saravia    Admit Date: 2017       ICD-10-CM ICD-9-CM   1. Weakness R53.1 780.79   2. Ulcerative colitis with complication, unspecified location K51.919 556.9   3. NSTEMI (non-ST elevated myocardial infarction) I21.4 410.70   4. Lower abdominal pain R10.30 789.09   5. Decreased activities of daily living (ADL) Z78.9 V49.89     Patient Active Problem List   Diagnosis   • Lower abdominal pain   • C. difficile colitis   • Weakness   • Ulcerative colitis     Past Medical History:   Diagnosis Date   • Ulcerative colitis      Past Surgical History:   Procedure Laterality Date   • COLONOSCOPY     • SIGMOIDOSCOPY N/A 2017          OT ASSESSMENT FLOWSHEET (last 72 hours)      OT Evaluation       17 1016 17 1651             Rehab Evaluation    Document Type evaluation  -NN        Subjective Information agree to therapy;complains of;weakness;fatigue;pain  -NN        General Information    Patient Profile Review yes  -NN        Onset of Illness/Injury or Date of Surgery Date 17  -NN        Referring Physician Dr. Saravia  -NN        General Observations fowlers, IV site, alert  -NN        Pertinent History Of Current Problem abdominal pain, Dx: Acute ulcerative colitis  -NN        Precautions/Limitations fall precautions  -NN        Prior Level of Function independent:;all household mobility;community mobility;ADL's;home management;cooking;cleaning;driving  -NN        Equipment Currently Used at Home walker, standard;grab bar;raised toilet   does not use AE/AD   -NN none  -KE       Plans/Goals Discussed With patient;agreed upon  -NN        Risks Reviewed patient:;LOB;nausea/vomiting;dizziness;increased discomfort;change in vital  signs;increased drainage;lines disloged  -NN        Benefits Reviewed patient:;improve function;increase independence;increase balance;increase strength  -NN        Barriers to Rehab none identified  -NN        Living Environment    Lives With alone  -NN alone  -KE       Living Arrangements house  -NN house  -KE       Home Accessibility bed and bath on same level;grab bars present (bathtub);tub/shower is not walk in;stairs to enter home   walk in shower  -NN no concerns  -KE       Number of Stairs to Enter Home 2  -NN        Stair Railings at Home outside, present at both sides  -NN none  -KE       Type of Financial/Environmental Concern  none  -KE       Transportation Available  family or friend will provide  -KE       Clinical Impression    Date of Referral to OT 12/06/17  -NN        OT Diagnosis decreased adl  -NN        Prognosis good  -NN        Impairments Found (describe specific impairments) aerobic capacity/endurance;gait, locomotion, and balance;ergonomics and body mechanics;posture  -NN        Patient/Family Goals Statement go home  -NN        Criteria for Skilled Therapeutic Interventions Met yes;treatment indicated  -NN        Rehab Potential good, to achieve stated therapy goals  -NN        Therapy Frequency --   3-14x/week  -NN        Predicted Duration of Therapy Intervention (days/wks) until NM  -NN        Anticipated Discharge Disposition home with home health  -NN        Functional Level Prior    Ambulation 0-->independent  -NN 0-->independent  -KE       Transferring 0-->independent  -NN 0-->independent  -KE       Toileting 0-->independent  -NN 0-->independent  -KE       Bathing 0-->independent  -NN 0-->independent  -KE       Dressing 0-->independent  -NN 0-->independent  -KE       Eating 0-->independent  -NN 0-->independent  -KE       Communication  0-->understands/communicates without difficulty  -KE       Swallowing  0-->swallows foods/liquids without difficulty  -KE       Vital Signs    Pre  Systolic BP Rehab 110  -NN        Pre Treatment Diastolic BP 60  -NN        Pretreatment Heart Rate (beats/min) 82  -NN        Posttreatment Heart Rate (beats/min) 80  -NN        Pre SpO2 (%) 99  -NN        O2 Delivery Pre Treatment room air  -NN        Post SpO2 (%) 98  -NN        O2 Delivery Post Treatment room air  -NN        Pain Assessment    Pain Assessment 0-10  -NN        Pain Score 7  -NN        Pain Type Acute pain  -NN        Pain Location Abdomen  -NN        Pain Descriptors Aching  -NN        Pain Frequency Constant/continuous  -NN        Pain Intervention(s) Medication (See MAR)  -NN        Response to Interventions tolerated  -NN        Vision Assessment/Intervention    Visual Impairment WFL with corrective lenses   reading  -NN        Cognitive Assessment/Intervention    Current Cognitive/Communication Assessment functional  -NN        Orientation Status oriented x 4  -NN        Follows Commands/Answers Questions 100% of the time;able to follow multi-step instructions  -NN        Personal Safety WNL/WFL  -NN        Personal Safety Interventions fall prevention program maintained;gait belt;muscle strengthening facilitated;nonskid shoes/slippers when out of bed;supervised activity  -NN        ROM (Range of Motion)    General ROM no range of motion deficits identified  -NN        MMT (Manual Muscle Testing)    General MMT Assessment Detail BUE functionally 4/5  -NN        Bed Mobility, Assessment/Treatment    Bed Mobility, Assistive Device bed rails;head of bed elevated  -NN        Bed Mobility, Scoot/Bridge, [place] supervision required  -NN        Bed Mob, Supine to Sit, [place] supervision required  -NN        Bed Mob, Sit to Supine, [place] --   chair  -NN        Transfer Assessment/Treatment    Transfers, Sit-Stand [place] contact guard assist  -NN        Transfers, Stand-Sit [place] contact guard assist  -NN        Transfers, Sit-Stand-Sit, Assist Device rolling walker   -NN        Transfer, Safety Issues step length decreased  -NN        Transfer, Impairments strength decreased;impaired balance;postural control impaired  -NN        Functional Mobility    Functional Mobility- Ind. Level contact guard assist  -NN        Functional Mobility- Device rolling walker  -NN        Functional Mobility-Distance (Feet) --   in room  -NN        Functional Mobility- Safety Issues step length decreased  -NN        Lower Body Bathing Assessment/Training    LB Bathing Assess/Train Assistive Device --   sponge bathing   -NN        LB Bathing Assess/Train, Position --   sidelying  -NN        LB Bathing Assess/Train, [place] Level maximum assist (25% patient effort)  -NN        LB Bathing Assess/Train, Impairments strength decreased;impaired balance  -NN        LB Bathing Assess/Train, Comment Pt. had incontinent bowel movement requring max A to complete LB bathing in sidelying postion to completely clean pt.   -NN        Toileting Assessment/Training    Toileting Assess/Train, Position --   sidelying   -NN        Toileting Assess/Train, Indepen Level maximum assist (25% patient effort)  -NN        Toileting Assess/Train, Impairments strength decreased;decreased flexibility  -NN        Toileting Assess/Train, Comment Pt. Max A to complete toileting hygiene after being incontinent of bowels  -NN        Motor Skills/Interventions    Additional Documentation Balance Skills Training (Group)  -NN        Balance Skills Training    Sitting-Level of Assistance Distant supervision  -NN        Sitting-Balance Support Right upper extremity supported;Left upper extremity supported;Feet supported  -NN        Standing-Level of Assistance Contact guard  -NN        Static Standing Balance Support assistive device  -NN        Gait Balance-Level of Assistance Contact guard  -NN        Gait Balance Support assistive device  -NN        Sensory Assessment/Intervention    Sensory Impairment --   Hypersensitive legs  per pt.   -NN        General Therapy Interventions    Planned Therapy Interventions activity intolerance;ADL retraining;balance training;bed mobility training;energy conservation;home exercise program;strengthening;transfer training  -NN        Positioning and Restraints    Pre-Treatment Position in bed  -NN        Post Treatment Position chair  -NN        In Chair notified nsg;call light within reach;encouraged to call for assist;with family/caregiver;legs elevated  -NN          User Key  (r) = Recorded By, (t) = Taken By, (c) = Cosigned By    Initials Name Effective Dates    KE Kristian Kahn RN 10/17/16 -     NN Ashley Brannon OTR/L 11/08/17 -            Occupational Therapy Education     Title: PT OT SLP Therapies (Done)     Topic: Occupational Therapy (Done)     Point: ADL training (Done)    Description: Instruct learner(s) on proper safety adaptation and remediation techniques during self care or transfers.   Instruct in proper use of assistive devices.    Learning Progress Summary    Learner Readiness Method Response Comment Documented by Status   Patient Acceptance E VU,NR Pt. educated on role of OT, safe t/f, benefit of activity, r/w safety, use of brief, progression with poc NN 12/06/17 1059 Done               Point: Home exercise program (Done)    Description: Instruct learner(s) on appropriate technique for monitoring, assisting and/or progressing therapeutic exercises/activities.    Learning Progress Summary    Learner Readiness Method Response Comment Documented by Status   Patient Acceptance E JUDE,NR Pt. educated on role of OT, safe t/f, benefit of activity, r/w safety, use of brief, progression with poc NN 12/06/17 1059 Done               Point: Body mechanics (Done)    Description: Instruct learner(s) on proper positioning and spine alignment during self-care, functional mobility activities and/or exercises.    Learning Progress Summary    Learner Readiness Method Response Comment Documented by  Status   Patient Acceptance E VU,NR Pt. educated on role of OT, safe t/f, benefit of activity, r/w safety, use of brief, progression with poc  12/06/17 1059 Done                      User Key     Initials Effective Dates Name Provider Type Discipline     11/08/17 -  Ashley Brannon, OTR/L Occupational Therapist OT                  OT Recommendation and Plan  Anticipated Discharge Disposition: home with home health  Planned Therapy Interventions: activity intolerance, ADL retraining, balance training, bed mobility training, energy conservation, home exercise program, strengthening, transfer training  Therapy Frequency:  (3-14x/week)  Plan of Care Review  Plan Of Care Reviewed With: patient  Progress: progress toward functional goals as expected  Outcome Summary/Follow up Plan: OT eval completed. Pt. incontinent of bowels upon entering room. Pt. required MAX A to complete hygiene tasks. Pt. completed sit to stand t/f and functional mobility with CGA and r/w. Pt. cont to benefit from skilled OT d/t decreased balance, decreased strength, decreased activity tolerance, decreased ind in ADLs.  Anticipated dc is home with assist and HH. 12/6 1100          OT Goals       12/06/17 1100          Transfer Training OT LTG    Transfer Training OT LTG, Date Established 12/06/17  -NN      Transfer Training OT LTG, Time to Achieve by discharge  -NN      Transfer Training OT LTG, Activity Type all transfers  -NN      Transfer Training OT LTG, [place] Level conditional independence  -NN      Transfer Training OT LTG, Assist Device walker, rolling  -NN      Strength OT LTG    Strength Goal OT LTG, Date Established 12/06/17  -NN      Strength Goal OT LTG, Time to Achieve by discharge  -NN      Strength Goal OT LTG, Measure to Achieve Pt. will complete BUE strengthening exercises all planes and joints to increase BUE strength to 5/5 for ADLs.   -NN      ADL OT LTG    ADL OT LTG, Date Established 12/06/17  -NN      ADL OT LTG,  Time to Achieve by discharge  -NN      ADL OT LTG, Activity Type ADL skills  -NN      ADL OT LTG, [place] Level independent  -NN        User Key  (r) = Recorded By, (t) = Taken By, (c) = Cosigned By    Initials Name Provider Type    BAY Brannon OTR/L Occupational Therapist                Outcome Measures       12/06/17 1100          How much help from another is currently needed...    Putting on and taking off regular lower body clothing? 3  -NN      Bathing (including washing, rinsing, and drying) 3  -NN      Toileting (which includes using toilet bed pan or urinal) 2  -NN      Putting on and taking off regular upper body clothing 3  -NN      Taking care of personal grooming (such as brushing teeth) 4  -NN      Eating meals 4  -NN      Score 19  -NN      Functional Assessment    Outcome Measure Options AM-PAC 6 Clicks Daily Activity (OT)  -NN        User Key  (r) = Recorded By, (t) = Taken By, (c) = Cosigned By    Initials Name Provider Type    BAY Brannon OTR/L Occupational Therapist          Time Calculation:   OT Start Time: 1016  OT Stop Time: 1100  OT Time Calculation (min): 44 min    Therapy Charges for Today     Code Description Service Date Service Provider Modifiers Qty    25456620339 HC OT EVAL LOW COMPLEXITY 1 12/6/2017 Ashley Brannon OTR/L GO 1    14928788258 HC OT SELF CARE/MGMT/TRAIN EA 15 MIN 12/6/2017 Ashley Brannon OTR/L GO 2    43827689012 HC OT SELFCARE CURRENT 12/6/2017 Ashley Brannon OTR/L GO, CK 1    67058997327 HC OT SELFCARE PROJECTED 12/6/2017 Ashley Brannon OTR/L GO, CI 1          OT G-codes  OT Functional Scales Options: AM-PAC 6 Clicks Daily Activity (OT)  Functional Limitation: Self care  Self Care Current Status (): At least 40 percent but less than 60 percent impaired, limited or restricted  Self Care Goal Status (): At least 1 percent but less than 20 percent impaired, limited or restricted    LIANE Lockett/JARAD  12/6/2017

## 2017-12-06 NOTE — PLAN OF CARE
Problem: Patient Care Overview (Adult)  Goal: Plan of Care Review  Outcome: Ongoing (interventions implemented as appropriate)    12/06/17 1100   Coping/Psychosocial Response Interventions   Plan Of Care Reviewed With patient   Patient Care Overview   Progress progress toward functional goals as expected   Outcome Evaluation   Outcome Summary/Follow up Plan OT john completed. Pt. incontinent of bowels upon entering room. Pt. required MAX A to complete hygiene tasks. Pt. completed sit to stand t/f and functional mobility with CGA and r/w. Pt. cont to benefit from skilled OT d/t decreased balance, decreased strength, decreased activity tolerance, decreased ind in ADLs. Anticipated dc is home with assist and HH. 12/6 1100         Problem: Inpatient Occupational Therapy  Goal: Transfer Training Goal 1 LTG- OT  Outcome: Ongoing (interventions implemented as appropriate)    12/06/17 1100   Transfer Training OT LTG   Transfer Training OT LTG, Date Established 12/06/17   Transfer Training OT LTG, Time to Achieve by discharge   Transfer Training OT LTG, Activity Type all transfers   Transfer Training OT LTG, [place] Level conditional independence   Transfer Training OT LTG, Assist Device walker, rolling       Goal: Strength Goal LTG- OT  Outcome: Ongoing (interventions implemented as appropriate)    12/06/17 1100   Strength OT LTG   Strength Goal OT LTG, Date Established 12/06/17   Strength Goal OT LTG, Time to Achieve by discharge   Strength Goal OT LTG, Measure to Achieve Pt. will complete BUE strengthening exercises all planes and joints to increase BUE strength to 5/5 for ADLs.        Goal: ADL Goal LTG- OT  Outcome: Ongoing (interventions implemented as appropriate)    12/06/17 1100   ADL OT LTG   ADL OT LTG, Date Established 12/06/17   ADL OT LTG, Time to Achieve by discharge   ADL OT LTG, Activity Type ADL skills   ADL OT LTG, [place] Level independent

## 2017-12-07 NOTE — PROGRESS NOTES
[facility]  INPATIENT PROGRESS NOTE    Length of Stay: 2  Date of Admission: 12/4/2017  Primary Care Physician: DESTINY Arreguin    Subjective   Chief Complaint: Acute ulcerative colitis    12/5/2017: Patient still has persistent abdominal pain     December 6, 2017: Patient is still having persistent left-sided abdominal pain.  Patient's diarrhea has improved after taking Lomotil.    Review of Systems   Constitutional: Negative for activity change, appetite change, fatigue and fever.   HENT: Negative for ear pain and sore throat.    Eyes: Negative for pain and visual disturbance.   Respiratory: Negative for cough and shortness of breath.    Cardiovascular: Negative for chest pain and palpitations.   Gastrointestinal: Positive for abdominal pain. Negative for nausea.   Endocrine: Negative for cold intolerance and heat intolerance.   Genitourinary: Negative for difficulty urinating and dysuria.   Musculoskeletal: Negative for arthralgias and gait problem.   Skin: Negative for color change and rash.   Neurological: Negative for dizziness, weakness and headaches.   Hematological: Negative for adenopathy. Does not bruise/bleed easily.   Psychiatric/Behavioral: Negative for agitation, confusion and sleep disturbance.        All pertinent negatives and positives are as above. All other systems have been reviewed and are negative unless otherwise stated.     Objective    Temp:  [96.8 °F (36 °C)-97.6 °F (36.4 °C)] 96.8 °F (36 °C)  Heart Rate:  [82-97] 88  Resp:  [18-20] 18  BP: ()/(53-68) 130/68    Physical Exam   Constitutional: She is oriented to person, place, and time. She appears well-developed and well-nourished. No distress.   HENT:   Head: Normocephalic and atraumatic.   Right Ear: External ear normal.   Left Ear: External ear normal.   Mouth/Throat: Oropharynx is clear and moist.   Eyes: Conjunctivae and EOM are normal. Pupils are equal, round, and  reactive to light. Left eye exhibits no discharge. No scleral icterus.   Neck: No tracheal deviation present. No thyromegaly present.   Cardiovascular: Normal rate, regular rhythm and normal heart sounds.  Exam reveals no gallop and no friction rub.    No murmur heard.  Pulmonary/Chest: Effort normal and breath sounds normal. No stridor. No respiratory distress. She has no wheezes. She has no rales.   Abdominal: Soft. Bowel sounds are normal. She exhibits no distension. There is no tenderness. There is no rebound.   Left-sided abdominal tenderness   Musculoskeletal: She exhibits no edema, tenderness or deformity.   Neurological: She is alert and oriented to person, place, and time. She displays normal reflexes. Coordination normal.   Skin: Skin is warm and dry. No rash noted. No erythema. No pallor.   Psychiatric: She has a normal mood and affect. Her behavior is normal. Thought content normal.           mesalamine 800 mg Oral TID   methylPREDNISolone sodium succinate 60 mg Intravenous Q6H   metroNIDAZOLE 500 mg Intravenous Q8H         Results Review:  I have reviewed the labs, radiology results, and diagnostic studies.    Laboratory Data:     Results from last 7 days  Lab Units 12/05/17  0621 12/04/17  0959   SODIUM mmol/L 129* 128*   POTASSIUM mmol/L 3.5 4.2   CHLORIDE mmol/L 101 91*   CO2 mmol/L 20.0* 25.0   BUN mg/dL 11 20   CREATININE mg/dL 0.66 1.08*   GLUCOSE mg/dL 125* 110*   CALCIUM mg/dL 7.5* 8.9   BILIRUBIN mg/dL 0.3 0.7   ALK PHOS U/L 82 124   ALT (SGPT) U/L 28 26   AST (SGOT) U/L 29 33   ANION GAP mmol/L 8.0 12.0     Estimated Creatinine Clearance: 57.9 mL/min (by C-G formula based on Cr of 0.66).            Results from last 7 days  Lab Units 12/05/17  0621 12/04/17  0959   WBC 10*3/mm3 9.27 17.84*   HEMOGLOBIN g/dL 9.5* 12.1   HEMATOCRIT % 28.0* 35.5   PLATELETS 10*3/mm3 214 236           Culture Data:   No results found for: BLOODCX  Urine Culture   Date Value Ref Range Status   12/04/2017 Mixed  Otilia Isolated  Final     No results found for: RESPCX  No results found for: WOUNDCX  No results found for: STOOLCX  No components found for: BODYFLD    Radiology Data:   Imaging Results (last 24 hours)     ** No results found for the last 24 hours. **          I have reviewed the patient current medications.     Assessment/Plan     Hospital Problem List     * (Principal)Lower abdominal pain    Overview Deleted 12/4/2017 12:39 PM by Sony Burris MD            Weakness    Ulcerative colitis (Chronic)          1.  Acute ulcerative colitis  Dr. Morales from GI is following.  Patient is on steroids, mesalamine, Flagyl.Taper steroids.   Patient is at high risk of having  surgery done  Patient was to have maximum medical management.      2. Chronic diarrhea   Patient said that she did not feel dizzy or had loss of consciousness and she was at home.  Patient tripped over a step when she fell down.  Continue with lomotil as needed     3. Elevated Trops:  Troponin trending downwards  -EKG shows normal sinus rhythm, nonspecific T-wave changes, prolonged QT.    4.  Hyponatremia  Could be because of chronic diarrhea  I ordered patient sodium and urine osmolarity  Dr. mazariegos was consulted      DVT Prophylaxis: SCD/NAIN    Discharge Planning: I expect patient to be discharged to home in 3-4 days.          This document has been electronically signed by Neelam Saravia MD on December 7, 2017 12:59 PM

## 2017-12-07 NOTE — PLAN OF CARE
Problem: Patient Care Overview (Adult)  Goal: Plan of Care Review  Outcome: Ongoing (interventions implemented as appropriate)    12/07/17 1005   Coping/Psychosocial Response Interventions   Plan Of Care Reviewed With patient   Patient Care Overview   Progress progress toward functional goals as expected   Outcome Evaluation   Outcome Summary/Follow up Plan pt responded well to therapy w/ increased gait to 172 ft CGA. pt lacking confidence in herself and required encouragement. pt requied min A for sup-sit and SBA to scoot in bed. pt required CGA-min A for sit-stand. pt participated in LE ther ex in supine and at EOB. no new goals met at this time. pt would continue to benefit from PT services.        Goal: Discharge Needs Assessment  Outcome: Ongoing (interventions implemented as appropriate)    12/06/17 0151 12/06/17 1545 12/06/17 1648   Discharge Needs Assessment   Concerns To Be Addressed --  adjustment to diagnosis/illness concerns --    Concerns Comments Very weak. Pt unable to rise from toilet without assistance. --  --    Discharge Facility/Level Of Care Needs --  home with home health --    Current Discharge Risk --  chronically ill --    Discharge Disposition --  still a patient --    Current Health   Anticipated Changes Related to Illness --  none --    Living Environment   Transportation Available --  --  family or friend will provide   Self-Care   Equipment Currently Used at Home --  --  walker, standard;grab bar;raised toilet         Problem: Inpatient Physical Therapy  Goal: Transfer Training Goal 1 LTG- PT  Outcome: Ongoing (interventions implemented as appropriate)    12/06/17 1735 12/07/17 1005   Transfer Training PT LTG   Transfer Training PT LTG, Date Established 12/06/17 --    Transfer Training PT LTG, Time to Achieve by discharge --    Transfer Training PT LTG, Activity Type bed to chair /chair to bed;sit to stand/stand to sit --    Transfer Training PT LTG, [place] Level conditional  independence --    Transfer Training PT LTG, Date Goal Reviewed --  12/07/17   Transfer Training PT LTG, Outcome --  goal ongoing       Goal: Gait Training Goal LTG- PT  Outcome: Ongoing (interventions implemented as appropriate)    12/06/17 1735 12/07/17 1005   Gait Training PT LTG   Gait Training Goal PT LTG, Date Established 12/06/17 --    Gait Training Goal PT LTG, Time to Achieve by discharge --    Gait Training Goal PT LTG, [place] Level conditional independence --    Gait Training Goal PT LTG, Assist Device (aad) --    Gait Training Goal PT LTG, Distance to Achieve 150ft --    Gait Training Goal PT LTG, Additional Goal 300ft --    Gait Training Goal PT LTG, Date Goal Reviewed --  12/07/17   Gait Training Goal PT LTG, Outcome --  goal ongoing       Goal: Stair Training Goal LTG- PT  Outcome: Ongoing (interventions implemented as appropriate)    12/06/17 1735 12/07/17 1005   Stair Training PT LTG   Stair Training Goal PT LTG, Date Established 12/06/17 --    Stair Training Goal PT LTG, Time to Achieve by discharge --    Stair Training Goal PT LTG, Number of Steps 2 --    Stair Training Goal PT LTG, [place] Level conditional independence --    Stair Training Goal PT LTG, Date Goal Reviewed --  12/07/17   Stair Training Goal PT LTG, Outcome --  goal ongoing       Goal: Strength Goal LTG- PT  Outcome: Ongoing (interventions implemented as appropriate)    12/06/17 1735 12/07/17 1119   Strength Goal PT LTG   Strength Goal PT LTG, Date Established 12/06/17 --    Strength Goal PT LTG, Time to Achieve by discharge --    Strength Goal PT LTG, Measure to Achieve Pt will tolerate 15 reps of AROM exercises --    Strength Goal PT LTG, Functional Goal Pt will progress to standing exercises --    Strength Goal PT LTG, Date Goal Reviewed --  12/07/17   Strength Goal PT LTG, Outcome --  goal partially met       Goal: Patient Education Goal LTG- PT  Outcome: Ongoing (interventions implemented as appropriate)    12/06/17  1735 12/07/17 1005   Patient Education PT LTG   Patient Education PT LTG, Date Established 12/06/17 --    Patient Education PT LTG, Time to Achieve by discharge --    Patient Education PT LTG, Education Type HEP;gait;transfers;home safety --    Patient Education PT LTG, Date Goal Reviewed --  12/07/17   Patient Education PT LTG Outcome --  goal ongoing

## 2017-12-07 NOTE — NURSING NOTE
Spoke with Dr. Saravia about pt son concern about megace. Pt has reported to me she does not feel like she has a decrease in appetite. No new orders received at this time.

## 2017-12-07 NOTE — THERAPY TREATMENT NOTE
Acute Care - Occupational Therapy Treatment Note  [facility]     Patient Name: Damaris Sánchez  : 1945  MRN: [id number]  Today's Date: 2017  Onset of Illness/Injury or Date of Surgery Date: 17  Date of Referral to OT: 17  Referring Physician: Dr. Neelam Saravia      Admit Date: 2017    Visit Dx:     ICD-10-CM ICD-9-CM   1. Weakness R53.1 780.79   2. Ulcerative colitis with complication, unspecified location K51.919 556.9   3. NSTEMI (non-ST elevated myocardial infarction) I21.4 410.70   4. Lower abdominal pain R10.30 789.09   5. Decreased activities of daily living (ADL) Z78.9 V49.89   6. Impaired functional mobility, balance, gait, and endurance Z74.09 V49.89     Patient Active Problem List   Diagnosis   • Lower abdominal pain   • C. difficile colitis   • Weakness   • Ulcerative colitis             Adult Rehabilitation Note       17 1420 17 1005       Rehab Assessment/Intervention    Discipline occupational therapy assistant  -CS,PG,CS2 physical therapy assistant  -SEJAL     Document Type therapy note (daily note)  -CS,PG,CS2 therapy note (daily note)  -SEJAL     Subjective Information agree to therapy  -LILIANA,PG,CS2 agree to therapy  -SEJAL     Patient Effort, Rehab Treatment adequate  -CS,PG,CS2 adequate  -SEJAL     Patient Effort, Rehab Treatment Comment  pt requires encouragement to participate.   -SEJAL     Symptoms Noted During/After Treatment  fatigue  -SEJAL     Precautions/Limitations fall precautions  -CS,PG,CS2 fall precautions  -SEJAL     Recorded by [LILIANA,PG,CS2] MADDI Hare/JARAD (r) Heather Patino OT Student (t) MADDI Hare/JARAD (c) [SEJAL] Shalom Ross, PTA     Vital Signs    Pre Systolic BP Rehab 127  -CS,PG,CS2 108  -SEJAL     Pre Treatment Diastolic BP 64  -CS,PG,CS2 56  -SEJAL     Post Systolic BP Rehab 130  -CS,PG,CS2 119  -SEJAL     Post Treatment Diastolic BP 65  -CS,PG,CS2 72  -SEJAL     Pretreatment Heart Rate (beats/min) 94  -CS,PG,CS2 89  -SEJAL     Intratreatment  Heart Rate (beats/min)  109  -SEJAL     Posttreatment Heart Rate (beats/min) 90  -CS,PG,CS2 94  -SEJAL     Pre SpO2 (%) 90  -CS,PG,CS2 98  -SEJAL     O2 Delivery Pre Treatment room air  -CS,PG,CS2 room air  -SEJAL     Intra SpO2 (%)  96  -SEJAL     O2 Delivery Intra Treatment  room air  -SEJAL     Post SpO2 (%)  98  -SEJAL     O2 Delivery Post Treatment  room air  -SEJAL     Pre Patient Position Supine  -CS,PG,CS2 Supine  -SEJAL     Intra Patient Position Supine  -CS,PG,CS2      Post Patient Position Supine  -CS,PG,CS2 Sitting  -SEJAL     Recorded by [CS,PG,CS2] HILARY HareA/JARAD (r) Heather Patino, OT Student (t) MADDI Hare/JARAD (c) [SEJAL] Shalom Ross PTA     Pain Assessment    Pain Assessment 0-10  -CS,PG,CS2 0-10  -SEJAL     Pain Score 4  -CS,PG,CS2 4  -SEJAL     Post Pain Score 4  -CS3 4  -SEJAL     Pain Type Chronic pain  -CS,PG,CS2      Pain Location Back  -CS,PG,CS2 Abdomen   and L flank  -SEJAL     Pain Intervention(s)  Medication (See MAR)  -SEJAL     Recorded by [CS,PG,CS2] MADDI Hare/JARAD (r) Heather Patino, OT Student (t) MADDI Hare/JARAD (c)  [CS3] MADDI Hare/JARAD [SEJAL] Shalom Ross PTA     Vision Assessment/Intervention    Visual Impairment WFL with corrective lenses  -CS,PG,CS2 WFL with corrective lenses  -SEJAL     Recorded by [CS,PG,CS2] MADDI Hare/JARAD (r) Heather Patino, OT Student (t) MADDI Hare/JARAD (c) [SEJAL] Shalom Ross PTA     Cognitive Assessment/Intervention    Current Cognitive/Communication Assessment functional  -CS,PG,CS2 functional  -SEJAL     Orientation Status oriented x 4  -CS,PG,CS2 oriented x 4  -SEJAL     Follows Commands/Answers Questions 100% of the time  -CS,PG,CS2 100% of the time  -SEJAL     Personal Safety WNL/WFL  -CS,PG,CS2 WNL/WFL  -SEJAL     Personal Safety Interventions gait belt;nonskid shoes/slippers when out of bed  -CS3 gait belt;muscle strengthening facilitated;nonskid shoes/slippers when out of bed;supervised activity  -SEJAL     Recorded by  [CS,PG,CS2] MADDI Hare/JARAD (r) Heather Patino, OT Student (t) MADDI Hare/JARAD (c)  [CS3] MADDI Hare/JARAD [SEJAL] Shalom Ross PTA     Bed Mobility, Assessment/Treatment    Bed Mobility, Assistive Device  bed rails;head of bed elevated  -SEJAL     Bed Mobility, Scoot/Bridge, [place]  supervision required   multiple scoots  -SEJAL     Bed Mob, Supine to Sit, [place]  minimum assist (75% patient effort)  -SEJAL     Recorded by  [SEJAL] Shalom Ross PTA     Transfer Assessment/Treatment    Transfers, Sit-Stand [place]  contact guard assist;minimum assist (75% patient effort)  -SEJAL     Transfers, Stand-Sit [place]  contact guard assist  -SEJAL     Transfers, Sit-Stand-Sit, Assist Device  rolling walker  -SEJAL     Transfer, Comment  pt requires cueing for hand placement w/ sit-stand-sit. pt completed sit-stands 6x2  -SEJAL     Recorded by  [SEJAL] Shalom Ross PTA     Gait Assessment/Treatment    Gait, [place] Level  contact guard assist  -SEJAL     Gait, Assistive Device  rolling walker  -SEJAL     Gait, Distance (Feet)  172  -SEJAL     Gait, Gait Deviations  irving decreased;step length decreased  -SEJAL     Gait, Impairments  strength decreased  -SEJAL     Gait, Comment  pt required encouragement to participate in gait training. pt stated multiple times that her legs wouldn't support her. pt felt accomplished after   -SEJAL     Recorded by  [SEJAL] Shalom Ross PTA     Stairs Assessment/Treatment    Stairs, [place] Level  not tested  -SEJAL     Recorded by  [SEJAL] Shalom Ross PTA     Upper Body Bathing Assessment/Training    UB Bathing Assess/Train, Comment deferred  -CS,PG,CS2      Recorded by [CS,PG,CS2] MADDI Hare/JARAD (r) Heather Patino, OT Student (t) MADDI Hare/L (c)      Lower Body Bathing Assessment/Training    LB Bathing Assess/Train, Comment deferred  -CS,PG,CS2      Recorded by [CS,PG,CS2] MADDI Hare/JARAD (r) Heather Patino, OT Student (t) Madhavi  MADDI Garcia/JARAD (c)      Upper Body Dressing Assessment/Training    UB Dressing Assess/Train, Comment deferred  -CS,PG,CS2      Recorded by [CS,PG,CS2] MADDI Hare/JARAD (r) Heather Patino OT Student (t) JAM Hare (c)      Lower Body Dressing Assessment/Training    LB Dressing Assess/Train, Comment deferred  -CS,PG,CS2      Recorded by [CS,PG,CS2] MADDI Hare/JARAD (r) Heather Patino, OT Student (t) MADDI Hare/L (c)      Toileting Assessment/Training    Toileting Assess/Train, Comment deferred  -CS,PG,CS2      Recorded by [CS,PG,CS2] MADDI Hare/JARAD (r) Heather Patino OT Student (t) MADDI Hare/L (c)      Grooming Assessment/Training    Grooming Assess/Train, Comment deferred  -CS,PG,CS2      Recorded by [CS,PG,CS2] MADDI Hare/JARAD (r) Heather Patino OT Student (t) MADDI Hare/L (c)      Therapy Exercises    Bilateral Lower Extremities  20 reps;supine;ankle pumps/circles;hip abduction/adduction;heel slides;sitting;LAQ;knee flexion;glut sets;AROM:;AAROM:   sit-stands 6x2.   -SEJAL     Bilateral Upper Extremity AROM:;20 reps;supine;elbow flexion/extension;pronation/supination;shoulder extension/flexion;shoulder ER/IR  -CS,PG,CS2      BUE Resistance manual resistance- minimal  -CS,PG,CS2      Recorded by [CS,PG,CS2] MADDI Hare/JARAD (r) Heather Patino OT Student (t) MADDI Hare/JARAD (c) [SEJAL] Shalom Ross PTA     Positioning and Restraints    Pre-Treatment Position in bed  -CS,PG,CS2 in bed  -SEJAL     Post Treatment Position bed  -CS,PG,CS2 bed   pt states she will sit in recliner after lunch.   -SEJAL     In Bed supine;call light within reach  -CS,PG,CS2 sitting EOB;call light within reach;encouraged to call for assist;exit alarm on;patient within staff view   all needs met. recliner prepared w/ alarm for after lunch.   -SEJAL     Recorded by [CS,PG,CS2] MADDI Hare/JARAD (r) Heather Patino OT Student (t)  Madhavi L Duong, LINDA/JARAD (c) [SEJAL] Shalom Ross, BRUNO       User Key  (r) = Recorded By, (t) = Taken By, (c) = Cosigned By    Initials Name Effective Dates    SEJAL Ross, BRUNO 10/17/16 -     CS MADDI Hare/L 10/17/16 -     PG Heather Patino, OT Student 01/31/17 -                 OT Goals       12/07/17 1545 12/06/17 1100       Transfer Training OT LTG    Transfer Training OT LTG, Date Established  12/06/17  -NN     Transfer Training OT LTG, Time to Achieve  by discharge  -NN     Transfer Training OT LTG, Activity Type  all transfers  -NN     Transfer Training OT LTG, [place] Level  conditional independence  -NN     Transfer Training OT LTG, Assist Device  walker, rolling  -NN     Transfer Training OT LTG, Date Goal Reviewed 12/07/17  -CS (r) PG (t) CS (c)      Strength OT LTG    Strength Goal OT LTG, Date Established  12/06/17  -NN     Strength Goal OT LTG, Time to Achieve  by discharge  -NN     Strength Goal OT LTG, Measure to Achieve  Pt. will complete BUE strengthening exercises all planes and joints to increase BUE strength to 5/5 for ADLs.   -NN     Strength Goal OT LTG, Date Goal Reviewed 12/07/17  -CS (r) PG (t) CS (c)      ADL OT LTG    ADL OT LTG, Date Established  12/06/17  -NN     ADL OT LTG, Time to Achieve  by discharge  -NN     ADL OT LTG, Activity Type  ADL skills  -NN     ADL OT LTG, [place] Level  independent  -NN     ADL OT LTG, Date Goal Reviewed 12/07/17  -CS (r) PG (t) CS (c)        User Key  (r) = Recorded By, (t) = Taken By, (c) = Cosigned By    Initials Name Provider Type    CS Madhavi Watters LINDA/L Occupational Therapy Assistant    BAY Brannon, OTR/L Occupational Therapist    PG Heather Patino, OT Student OT Student          Occupational Therapy Education     Title: PT OT SLP Therapies (Active)     Topic: Occupational Therapy (Done)     Point: ADL training (Done)    Description: Instruct learner(s) on proper safety adaptation and remediation  techniques during self care or transfers.   Instruct in proper use of assistive devices.    Learning Progress Summary    Learner Readiness Method Response Comment Documented by Status   Patient Acceptance E VU,NR Pt. educated on role of OT, safe t/f, benefit of activity, r/w safety, use of brief, progression with poc NN 12/06/17 1059 Done               Point: Home exercise program (Done)    Description: Instruct learner(s) on appropriate technique for monitoring, assisting and/or progressing therapeutic exercises/activities.    Learning Progress Summary    Learner Readiness Method Response Comment Documented by Status   Patient Acceptance E VU,NR Pt. educated on role of OT, safe t/f, benefit of activity, r/w safety, use of brief, progression with poc NN 12/06/17 1059 Done               Point: Body mechanics (Done)    Description: Instruct learner(s) on proper positioning and spine alignment during self-care, functional mobility activities and/or exercises.    Learning Progress Summary    Learner Readiness Method Response Comment Documented by Status   Patient Acceptance E VU,NR Pt. educated on role of OT, safe t/f, benefit of activity, r/w safety, use of brief, progression with poc NN 12/06/17 1059 Done                      User Key     Initials Effective Dates Name Provider Type Discipline    NN 11/08/17 -  Ashley Brannon, OTR/L Occupational Therapist OT                  OT Recommendation and Plan  Anticipated Discharge Disposition: home with home health  Planned Therapy Interventions: activity intolerance, ADL retraining, balance training, bed mobility training, energy conservation, home exercise program, strengthening, transfer training  Therapy Frequency:  (3-14x/week)           Outcome Measures       12/07/17 1500 12/07/17 1005 12/06/17 1648    How much help from another person do you currently need...    Turning from your back to your side while in flat bed without using bedrails?  3  -SEJAL 3  -JCA    Moving  from lying on back to sitting on the side of a flat bed without bedrails?  3  -SEJAL 3  -JCA    Moving to and from a bed to a chair (including a wheelchair)?  3  -SEJAL 3  -JCA    Standing up from a chair using your arms (e.g., wheelchair, bedside chair)?  3  -SEJAL 3  -JCA    Climbing 3-5 steps with a railing?  3  -SEJAL 3  -JCA    To walk in hospital room?  3  -SEJAL 3  -JCA    AM-PAC 6 Clicks Score  18  -SEJAL 18  -JCA    How much help from another is currently needed...    Putting on and taking off regular lower body clothing? 3  -CS (r) PG (t) CS (c)      Bathing (including washing, rinsing, and drying) 3  -CS (r) PG (t) CS (c)      Toileting (which includes using toilet bed pan or urinal) 2  -CS (r) PG (t) CS (c)      Putting on and taking off regular upper body clothing 3  -CS (r) PG (t) CS (c)      Taking care of personal grooming (such as brushing teeth) 4  -CS (r) PG (t) CS (c)      Eating meals 4  -CS (r) PG (t) CS (c)      Score 19  -CS (r) PG (t)      Functional Assessment    Outcome Measure Options  AM-PAC 6 Clicks Basic Mobility (PT)  -SEJAL AM-PAC 6 Clicks Basic Mobility (PT)  -A      12/06/17 1100          How much help from another is currently needed...    Putting on and taking off regular lower body clothing? 3  -NN      Bathing (including washing, rinsing, and drying) 3  -NN      Toileting (which includes using toilet bed pan or urinal) 2  -NN      Putting on and taking off regular upper body clothing 3  -NN      Taking care of personal grooming (such as brushing teeth) 4  -NN      Eating meals 4  -NN      Score 19  -NN      Functional Assessment    Outcome Measure Options AM-PAC 6 Clicks Daily Activity (OT)  -NN        User Key  (r) = Recorded By, (t) = Taken By, (c) = Cosigned By    Initials Name Provider Type    JCA Jenni Guerrero, PT Physical Therapist    SEJAL Ross, PTA Physical Therapy Assistant    CS Madhavi Watters, LINDA/L Occupational Therapy Assistant    NN Ashley Brannon, OTR/L Occupational  Therapist    PG Heather Patino, OT Student OT Student           Time Calculation:         Time Calculation- OT       12/07/17 1601          Time Calculation- OT    OT Start Time 1420  -CS      OT Stop Time 1453  -CS      OT Time Calculation (min) 33 min  -CS      Total Timed Code Minutes- OT 33 minute(s)  -CS      OT Received On 12/07/17  -        User Key  (r) = Recorded By, (t) = Taken By, (c) = Cosigned By    Initials Name Provider Type     JAM Hare Occupational Therapy Assistant           Therapy Charges for Today     Code Description Service Date Service Provider Modifiers Qty    [id number] HC OT THER PROC EA 15 MIN 12/7/2017 JAM Hare GO, KX 2          OT G-codes  OT Functional Scales Options: AM-PAC 6 Clicks Daily Activity (OT)  Functional Limitation: Self care  Self Care Current Status (): At least 40 percent but less than 60 percent impaired, limited or restricted  Self Care Goal Status (): At least 1 percent but less than 20 percent impaired, limited or restricted    JAM Hare  12/7/2017

## 2017-12-07 NOTE — PLAN OF CARE
Problem: Patient Care Overview (Adult)  Goal: Plan of Care Review  Outcome: Ongoing (interventions implemented as appropriate)    12/07/17 7071   Coping/Psychosocial Response Interventions   Plan Of Care Reviewed With patient   Patient Care Overview   Progress no change   Outcome Evaluation   Outcome Summary/Follow up Plan Pt worked with therapy; urine sample sent; VS stable       Goal: Adult Individualization and Mutuality  Outcome: Ongoing (interventions implemented as appropriate)  Goal: Discharge Needs Assessment  Outcome: Ongoing (interventions implemented as appropriate)    Problem: Pain, Acute (Adult)  Goal: Acceptable Pain Control/Comfort Level  Outcome: Ongoing (interventions implemented as appropriate)    Problem: Fall Risk (Adult)  Goal: Absence of Falls  Outcome: Ongoing (interventions implemented as appropriate)

## 2017-12-07 NOTE — PLAN OF CARE
Problem: Patient Care Overview (Adult)  Goal: Plan of Care Review  Outcome: Ongoing (interventions implemented as appropriate)    12/07/17 1545   Outcome Evaluation   Outcome Summary/Follow up Plan Pt completed BUE strengthening exercises utilizing 1# dumbell x20 reps in all available planes of motion. Pt deferred bathing this date. Continue w/POC.          Problem: Inpatient Occupational Therapy  Goal: Transfer Training Goal 1 LTG- OT  Outcome: Ongoing (interventions implemented as appropriate)    12/06/17 1100 12/07/17 1545   Transfer Training OT LTG   Transfer Training OT LTG, Date Established 12/06/17 --    Transfer Training OT LTG, Time to Achieve by discharge --    Transfer Training OT LTG, Activity Type all transfers --    Transfer Training OT LTG, [place] Level conditional independence --    Transfer Training OT LTG, Assist Device walker, rolling --    Transfer Training OT LTG, Date Goal Reviewed --  12/07/17       Goal: Strength Goal LTG- OT  Outcome: Ongoing (interventions implemented as appropriate)    12/06/17 1100 12/07/17 1545   Strength OT LTG   Strength Goal OT LTG, Date Established 12/06/17 --    Strength Goal OT LTG, Time to Achieve by discharge --    Strength Goal OT LTG, Measure to Achieve Pt. will complete BUE strengthening exercises all planes and joints to increase BUE strength to 5/5 for ADLs.  --    Strength Goal OT LTG, Date Goal Reviewed --  12/07/17       Goal: ADL Goal LTG- OT  Outcome: Ongoing (interventions implemented as appropriate)    12/06/17 1100 12/07/17 1545   ADL OT LTG   ADL OT LTG, Date Established 12/06/17 --    ADL OT LTG, Time to Achieve by discharge --    ADL OT LTG, Activity Type ADL skills --    ADL OT LTG, [place] Level independent --    ADL OT LTG, Date Goal Reviewed --  12/07/17

## 2017-12-07 NOTE — PROGRESS NOTES
Nish Morales DO,[facility]  Gastroenterology  Hepatology  Endoscopy  Board Certified in Internal Medicine and gastroenterology  [address] [facility], [address]  [place]. [address]  T- [phone]   F - [phone]     GASTROENTEROLOGY PROGRESS NOTE   NISH MORALES DO.         SUBJECTIVE:   12/6/2017  Chief Complaint:     Subjective  : Abdominal pain, diarrhea are much better.  The Lomotil is helpful.  Has had problems with sleeping.  No further rectal bleeding.  No fevers or chills.CMV testing is negative.  No complications with the use of the intravenous steroids    .      CURRENT MEDICATIONS/OBJECTIVE/VS/PE:     Current Medications:     Current Facility-Administered Medications   Medication Dose Route Frequency Provider Last Rate Last Dose   • diphenoxylate-atropine (LOMOTIL) 2.5-0.025 MG per tablet 1 tablet  1 tablet Oral Q2H PRN Nish Morales DO   1 tablet at 12/06/17 1022   • HYDROcodone-acetaminophen (NORCO)  MG per tablet 1 tablet  1 tablet Oral Q6H PRN Neelam Saravia MD   1 tablet at 12/06/17 1549   • mesalamine (DELZICOL) delayed release capsule 800 mg  800 mg Oral TID Nish Morales DO   800 mg at 12/06/17 1545   • methylPREDNISolone sodium succinate (SOLU-Medrol) injection 60 mg  60 mg Intravenous Q6H Nish Morales DO   60 mg at 12/06/17 1545   • metroNIDAZOLE (FLAGYL) IVPB 500 mg  500 mg Intravenous Q8H Sony Burris MD 0 mL/hr at 12/05/17 0200 500 mg at 12/06/17 1545   • morphine injection 2 mg  2 mg Intravenous Q2H PRN Sony Burris MD   2 mg at 12/05/17 2036   • sodium chloride 0.9 % flush 1-10 mL  1-10 mL Intravenous PRN Sony Burris MD       • sodium chloride 0.9 % flush 10 mL  10 mL Intravenous PRN Carmine Ponce PA-C       • sodium chloride 0.9 % infusion  100 mL/hr Intravenous Continuous Sony Burris  mL/hr at 12/06/17 0738 100 mL/hr at 12/06/17 0738       Objective     Physical Exam:   Temp:  [96.3 °F (35.7 °C)-97.2 °F (36.2 °C)] 96.9 °F (36.1  °C)  Heart Rate:  [] 83  Resp:  [18-20] 18  BP: (106-134)/(55-73) 110/60     Physical Exam:  General Appearance:    Alert, cooperative, in no acute distress   Head:    Normocephalic, without obvious abnormality, atraumatic   Eyes:            Lids and lashes normal, conjunctivae and sclerae normal, no   icterus, no pallor, corneas clear, PERRLA   Ears:    Ears appear intact with no abnormalities noted   Throat:   No oral lesions, no thrush, oral mucosa moist   Neck:   No adenopathy, supple, trachea midline, no thyromegaly, no     carotid bruit, no JVD   Back:     No kyphosis present, no scoliosis present, no skin lesions,       erythema or scars, no tenderness to percussion or                   palpation,   range of motion normal   Lungs:     Clear to auscultation,respirations regular, even and                   unlabored    Heart:    Regular rhythm and normal rate, normal S1 and S2, no            murmur, no gallop, no rub, no click   Breast Exam:    Deferred   Abdomen:     Normal bowel sounds, no masses, no organomegaly, soft        non-tender, non-distended, no guarding, no rebound                 tenderness   Genitalia:    Deferred   Extremities:   Moves all extremities well, no edema, no cyanosis, no              redness   Pulses:   Pulses palpable and equal bilaterally   Skin:   No bleeding, bruising or rash   Lymph nodes:   No palpable adenopathy   Neurologic:   Cranial nerves 2 - 12 grossly intact, sensation intact, DTR        present and equal bilaterally      Results Review:     Lab Results (last 24 hours)     Procedure Component Value Units Date/Time    CMV IgG IgM [[id number]]  (Abnormal) Collected:  12/04/17 0959    Specimen:  Blood Updated:  12/06/17 0722     CMV IgG >10.00 (H) U/mL                                      Negative          <0.60                                 Equivocal   0.60 - 0.69                                 Positive          >0.69        CMV IgM <30.0 AU/mL                                        Negative         <30.0                                  Equivocal  30.0 - 34.9                                  Positive         >34.9  A positive result is generally indicative of acute  infection, reactivation or persistent IgM production.       Narrative:       Performed at:  [address]  [id number]  : Ramo Ivey PhD, Phone:  [phone]    Blood Culture - Blood, [[id number]]  (Normal) Collected:  12/04/17 1118    Specimen:  Blood from Arm, Left Updated:  12/06/17 1131     Blood Culture No growth at 2 days    Blood Culture - Blood, [[id number]]  (Normal) Collected:  12/04/17 1110    Specimen:  Blood from Arm, Right Updated:  12/06/17 1131     Blood Culture No growth at 2 days    Troponin [[id number]]  (Abnormal) Collected:  12/06/17 1322    Specimen:  Blood Updated:  12/06/17 1356     Troponin I 0.065 (H) ng/mL     Troponin [[id number]]  (Abnormal) Collected:  12/06/17 1538    Specimen:  Blood Updated:  12/06/17 1607     Troponin I 0.059 (H) ng/mL            I reviewed the patient's new clinical results.  I reviewed the patient's new imaging results and agree with the interpretation.     ASSESSMENT/PLAN:   ASSESSMENT:   1.  Acute flare of chronic ulcerative colitis, universal    PLAN:   1.  Continue the high-dose steroids  2.  Tomorrow if the patient is stable, reduce the steroids to 40 mg IV every 6 hours  3.  Low-residue diet     Nish Morales DO  12/06/17  6:29 PM

## 2017-12-07 NOTE — THERAPY TREATMENT NOTE
Acute Care - Physical Therapy Treatment Note  [facility]     Patient Name: Damaris Sánchez  : 1945  MRN: [id number]  Today's Date: 2017  Onset of Illness/Injury or Date of Surgery Date: 17  Date of Referral to PT: 17  Referring Physician: Dr. Neelam Saravia    Admit Date: 2017    Visit Dx:    ICD-10-CM ICD-9-CM   1. Weakness R53.1 780.79   2. Ulcerative colitis with complication, unspecified location K51.919 556.9   3. NSTEMI (non-ST elevated myocardial infarction) I21.4 410.70   4. Lower abdominal pain R10.30 789.09   5. Decreased activities of daily living (ADL) Z78.9 V49.89   6. Impaired functional mobility, balance, gait, and endurance Z74.09 V49.89     Patient Active Problem List   Diagnosis   • Lower abdominal pain   • C. difficile colitis   • Weakness   • Ulcerative colitis               Adult Rehabilitation Note       17 1005          Rehab Assessment/Intervention    Discipline physical therapy assistant  -SEJAL      Document Type therapy note (daily note)  -SEJAL      Subjective Information agree to therapy  -SEJAL      Patient Effort, Rehab Treatment adequate  -SEJAL      Patient Effort, Rehab Treatment Comment pt requires encouragement to participate.   -SEJAL      Symptoms Noted During/After Treatment fatigue  -SEJAL      Precautions/Limitations fall precautions  -SEJAL      Recorded by [SEJAL] Shalom Ross PTA      Vital Signs    Pre Systolic BP Rehab 108  -SEJAL      Pre Treatment Diastolic BP 56  -SEJAL      Post Systolic BP Rehab 119  -SEJAL      Post Treatment Diastolic BP 72  -SEJAL      Pretreatment Heart Rate (beats/min) 89  -SEJAL      Intratreatment Heart Rate (beats/min) 109  -SEJAL      Posttreatment Heart Rate (beats/min) 94  -SEJAL      Pre SpO2 (%) 98  -SEJAL      O2 Delivery Pre Treatment room air  -SEJAL      Intra SpO2 (%) 96  -SEJAL      O2 Delivery Intra Treatment room air  -SEJAL      Post SpO2 (%) 98  -SEJAL      O2 Delivery Post Treatment room air  -SEJAL      Pre Patient Position Supine  -SEJAL       Post Patient Position Sitting  -SEJAL      Recorded by [SEJAL] Shalom Ross PTA      Pain Assessment    Pain Assessment 0-10  -SEJAL      Pain Score 4  -SEJAL      Post Pain Score 4  -SEJAL      Pain Location Abdomen   and L flank  -      Pain Intervention(s) Medication (See MAR)  -SEJAL      Recorded by [SEJAL] Shalom Ross PTA      Vision Assessment/Intervention    Visual Impairment WFL with corrective lenses  -SEJAL      Recorded by [SEJAL] Shalom Ross PTA      Cognitive Assessment/Intervention    Current Cognitive/Communication Assessment functional  -SEJAL      Orientation Status oriented x 4  -SEJAL      Follows Commands/Answers Questions 100% of the time  -SEJAL      Personal Safety WNL/WFL  -SEJAL      Personal Safety Interventions gait belt;muscle strengthening facilitated;nonskid shoes/slippers when out of bed;supervised activity  -SEJAL      Recorded by [SEJAL] Shalom Ross PTA      Bed Mobility, Assessment/Treatment    Bed Mobility, Assistive Device bed rails;head of bed elevated  -SEJAL      Bed Mobility, Scoot/Bridge, [place] supervision required   multiple scoots  -SEJAL      Bed Mob, Supine to Sit, [place] minimum assist (75% patient effort)  -SEJAL      Recorded by [SEJAL] Shalom Ross PTA      Transfer Assessment/Treatment    Transfers, Sit-Stand [place] contact guard assist;minimum assist (75% patient effort)  -SEJAL      Transfers, Stand-Sit [place] contact guard assist  -SEJAL      Transfers, Sit-Stand-Sit, Assist Device rolling walker  -SEJAL      Transfer, Comment pt requires cueing for hand placement w/ sit-stand-sit. pt completed sit-stands 6x2  -SEJAL      Recorded by [SEJAL] Shalom Ross PTA      Gait Assessment/Treatment    Gait, [place] Level contact guard assist  -SEJAL      Gait, Assistive Device rolling walker  -      Gait, Distance (Feet) 172  -SEJAL      Gait, Gait Deviations irving decreased;step length decreased  -SEJAL      Gait, Impairments strength decreased  -SEJAL      Gait, Comment pt required  encouragement to participate in gait training. pt stated multiple times that her legs wouldn't support her. pt felt accomplished after   -SEJAL      Recorded by [SEJAL] Shalom Ross PTA      Stairs Assessment/Treatment    Stairs, [place] Level not tested  -SEJAL      Recorded by [SEJAL] Shalom Ross PTA      Therapy Exercises    Bilateral Lower Extremities 20 reps;supine;ankle pumps/circles;hip abduction/adduction;heel slides;sitting;LAQ;knee flexion;glut sets;AROM:;AAROM:   sit-stands 6x2.   -SEJAL      Recorded by [SEJAL] Shalom Ross PTA      Positioning and Restraints    Pre-Treatment Position in bed  -SEJAL      Post Treatment Position bed   pt states she will sit in recliner after lunch.   -SEJAL      In Bed sitting EOB;call light within reach;encouraged to call for assist;exit alarm on;patient within staff view   all needs met. recliner prepared w/ alarm for after lunch.   -SEJAL      Recorded by [SEJAL] Shalom Ross PTA        User Key  (r) = Recorded By, (t) = Taken By, (c) = Cosigned By    Initials Name Effective Dates    SEJAL Ross PTA 10/17/16 -                 IP PT Goals       12/07/17 1119 12/07/17 1005 12/06/17 1735    Transfer Training PT LTG    Transfer Training PT LTG, Date Established   12/06/17  -    Transfer Training PT LTG, Time to Achieve   by discharge  -    Transfer Training PT LTG, Activity Type   bed to chair /chair to bed;sit to stand/stand to sit  -    Transfer Training PT LTG, [place] Level   conditional independence  -SEJAL    Transfer Training PT  LTG, Date Goal Reviewed  12/07/17  -A     Transfer Training PT LTG, Outcome  goal ongoing  -A goal ongoing  -    Gait Training PT LTG    Gait Training Goal PT LTG, Date Established   12/06/17  -    Gait Training Goal PT LTG, Time to Achieve   by discharge  -    Gait Training Goal PT LTG, [place] Level   conditional independence  -SEJAL    Gait Training Goal PT LTG, Assist Device   --   aad  -    Gait Training Goal PT  LTG, Distance to Achieve   150ft  -    Gait Training Goal PT LTG, Additional Goal   300ft  -    Gait Training Goal PT LTG, Date Goal Reviewed  12/07/17  -[facility]     Gait Training Goal PT LTG, Outcome  goal ongoing  -A goal ongoing  -    Stair Training PT LTG    Stair Training Goal PT LTG, Date Established   12/06/17  -    Stair Training Goal PT LTG, Time to Achieve   by discharge  -    Stair Training Goal PT LTG, Number of Steps   2  -    Stair Training Goal PT LTG, [place] Level   conditional independence  -    Stair Training Goal PT LTG, Date Goal Reviewed  12/07/17  -[facility]     Stair Training Goal PT LTG, Outcome  goal ongoing  -A goal ongoing  -    Strength Goal PT LTG    Strength Goal PT LTG, Date Established   12/06/17  -    Strength Goal PT LTG, Time to Achieve   by discharge  -    Strength Goal PT LTG, Measure to Achieve   Pt will tolerate 15 reps of AROM exercises  -    Strength Goal PT LTG, Functional Goal   Pt will progress to standing exercises  -    Strength Goal PT LTG, Date Goal Reviewed 12/07/17  -[facility]      Strength Goal PT LTG, Outcome goal partially met  -[facility]  goal ongoing  -    Patient Education PT LTG    Patient Education PT LTG, Date Established   12/06/17  -    Patient Education PT LTG, Time to Achieve   by discharge  -    Patient Education PT LTG, Education Type   HEP;gait;transfers;home safety  -    Patient Education PT LTG, Date Goal Reviewed  12/07/17  -[facility]     Patient Education PT LTG Outcome  goal ongoing  -[facility] goal ongoing  -      12/06/17 1734          Strength Goal PT LTG    Strength Goal PT LTG, Date Established (P)  12/06/17  -      Strength Goal PT LTG, Time to Achieve (P)  by discharge  -      Strength Goal PT LTG, Measure to Achieve (P)  Pt will tolerate 15 reps of AROM exercises  -      Patient Education PT LTG    Patient Education PT LTG, Date Established (P)  12/06/17  -      Patient Education PT LTG, Time to Achieve (P)  by discharge   -      Patient Education PT LTG, Education Type (P)  HEP;gait;transfers;home safety  -      Patient Education PT LTG Outcome (P)  goal ongoing  -        User Key  (r) = Recorded By, (t) = Taken By, (c) = Cosigned By    Initials Name Provider Type     Jenni Guerrero, PT Physical Therapist    CHRISTOPHER Ross PTA Physical Therapy Assistant          Physical Therapy Education     Title: PT OT SLP Therapies (Active)     Topic: Physical Therapy (Active)     Point: Mobility training (Active)    Learning Progress Summary    Learner Readiness Method Response Comment Documented by Status   Patient Acceptance E NR   12/07/17 1119 Active                      User Key     Initials Effective Dates Name Provider Type [facility] 10/17/16 -  Shalom Ross PTA Physical Therapy Assistant PT                    PT Recommendation and Plan  Anticipated Discharge Disposition: inpatient rehabilitation facility (vs 24/7 care with  PT)  Planned Therapy Interventions: balance training, bed mobility training, gait training, home exercise program, patient/family education, stair training, strengthening, transfer training  PT Frequency: other (see comments) (5-14 times per week)  Plan of Care Review  Plan Of Care Reviewed With: patient  Progress: progress toward functional goals as expected  Outcome Summary/Follow up Plan: pt responded well to therapy w/ increased gait to 172 ft CGA. pt lacking confidence in herself and required encouragement. pt requied min A for sup-sit and SBA to scoot in bed. pt required CGA-min A for sit-stand. pt participated in LE ther ex in supine and at EOB. no new goals met at this time. pt would continue to benefit from PT services.           Outcome Measures       12/07/17 1005 12/06/17 1648 12/06/17 1100    How much help from another person do you currently need...    Turning from your back to your side while in flat bed without using bedrails? 3  -SEJAL 3  -JCA     Moving from lying on back to  sitting on the side of a flat bed without bedrails? 3  -SEJAL 3  -JCA     Moving to and from a bed to a chair (including a wheelchair)? 3  -SEJAL 3  -JCA     Standing up from a chair using your arms (e.g., wheelchair, bedside chair)? 3  -SEJAL 3  -JCA     Climbing 3-5 steps with a railing? 3  -SEJAL 3  -JCA     To walk in hospital room? 3  -SEJAL 3  -JCA     AM-PAC 6 Clicks Score 18  -SEJAL 18  -JCA     How much help from another is currently needed...    Putting on and taking off regular lower body clothing?   3  -NN    Bathing (including washing, rinsing, and drying)   3  -NN    Toileting (which includes using toilet bed pan or urinal)   2  -NN    Putting on and taking off regular upper body clothing   3  -NN    Taking care of personal grooming (such as brushing teeth)   4  -NN    Eating meals   4  -NN    Score   19  -NN    Functional Assessment    Outcome Measure Options AM-PAC 6 Clicks Basic Mobility (PT)  -SEJAL AM-PAC 6 Clicks Basic Mobility (PT)  -JCA AM-PAC 6 Clicks Daily Activity (OT)  -NN      User Key  (r) = Recorded By, (t) = Taken By, (c) = Cosigned By    Initials Name Provider Type    [facility] Jenni Guerrero, PT Physical Therapist    SEJAL Ross PTA Physical Therapy Assistant    NN Ashley Brannon, OTR/L Occupational Therapist           Time Calculation:         PT Charges       12/07/17 1123          Time Calculation    Start Time 1005  -SEJAL      Stop Time 1058  -      Time Calculation (min) 53 min  -      Time Calculation- PT    Total Timed Code Minutes- PT 53 minute(s)  -        User Key  (r) = Recorded By, (t) = Taken By, (c) = Cosigned By    Initials Name Provider Type     Shalom Ross PTA Physical Therapy Assistant          Therapy Charges for Today     Code Description Service Date Service Provider Modifiers Qty    79356309837 HC GAIT TRAINING EA 15 MIN 12/7/2017 Shalom Ross PTA GP, KX 1    97884043104 HC PT THER PROC EA 15 MIN 12/7/2017 Shalom Ross PTA GP, KX 2    [id number]  PT  THERAPEUTIC ACT EA 15 MIN 12/7/2017 Shalom Ross PTA GP, KX 1          PT G-Codes  PT Professional Judgement Used?: Yes  Outcome Measure Options: AM-PAC 6 Clicks Basic Mobility (PT)  Score: 18  Functional Limitation: Mobility: Walking and moving around  Mobility: Walking and Moving Around Current Status (): At least 40 percent but less than 60 percent impaired, limited or restricted  Mobility: Walking and Moving Around Goal Status (): At least 1 percent but less than 20 percent impaired, limited or restricted    Shalom Ross PTA  12/7/2017

## 2017-12-07 NOTE — SIGNIFICANT NOTE
12/07/17 1610   Rehab Treatment   Treatment Not Performed patient/family declined treatment  (pt declined PM tx due to fatigue)

## 2017-12-07 NOTE — CONSULTS
Adult Nutrition  Assessment    Patient Name:  Damaris Noguera Sánchez  YOB: 1945  MRN: [id number]  Admit Date:  12/4/2017    Assessment Date:  12/7/2017    Comments:  Pt reports improved appetite. Diarrhea is much less. Likes the ensure clear. RD will cont to monitor.                             Electronically signed by:  Ritu Bella RD  12/07/17 1:03 PM

## 2017-12-08 NOTE — PLAN OF CARE
Problem: Patient Care Overview (Adult)  Goal: Plan of Care Review  Outcome: Ongoing (interventions implemented as appropriate)    12/08/17 0131 12/08/17 0844   Coping/Psychosocial Response Interventions   Plan Of Care Reviewed With patient --    Patient Care Overview   Progress no change --    Outcome Evaluation   Outcome Summary/Follow up Plan --  Pt harsh tx well this AM. Pt completed 2 sets x 10 reps ther ex long sitting in bed with 1lb HW in all planes with RB's PRN.       Goal: Discharge Needs Assessment  Outcome: Ongoing (interventions implemented as appropriate)    12/06/17 0151 12/06/17 1545 12/06/17 1648   Discharge Needs Assessment   Concerns To Be Addressed --  --  --    Concerns Comments Very weak. Pt unable to rise from toilet without assistance. --  --    Discharge Facility/Level Of Care Needs --  home with home health --    Current Discharge Risk --  chronically ill --    Discharge Disposition --  still a patient --    Current Health   Anticipated Changes Related to Illness --  none --    Living Environment   Transportation Available --  --  family or friend will provide   Self-Care   Equipment Currently Used at Home --  --  walker, standard;grab bar;raised toilet     12/07/17 8359   Discharge Needs Assessment   Concerns To Be Addressed no discharge needs identified   Concerns Comments --    Discharge Facility/Level Of Care Needs --    Current Discharge Risk --    Discharge Disposition --    Current Health   Anticipated Changes Related to Illness --    Living Environment   Transportation Available --    Self-Care   Equipment Currently Used at Home --          Problem: Inpatient Occupational Therapy  Goal: Transfer Training Goal 1 LTG- OT  Outcome: Ongoing (interventions implemented as appropriate)    12/08/17 0937   Transfer Training OT LTG   Transfer Training OT LTG, Date Goal Reviewed 12/08/17   Transfer Training OT LTG, Outcome goal not met       Goal: Strength Goal LTG- OT  Outcome: Ongoing  (interventions implemented as appropriate)    12/06/17 1100 12/08/17 0937   Strength OT LTG   Strength Goal OT LTG, Date Established 12/06/17 --    Strength Goal OT LTG, Time to Achieve by discharge --    Strength Goal OT LTG, Measure to Achieve Pt. will complete BUE strengthening exercises all planes and joints to increase BUE strength to 5/5 for ADLs.  --    Strength Goal OT LTG, Date Goal Reviewed --  12/08/17   Strength Goal OT LTG, Outcome --  goal not met       Goal: ADL Goal LTG- OT  Outcome: Ongoing (interventions implemented as appropriate)    12/06/17 1100 12/08/17 0937   ADL OT LTG   ADL OT LTG, Date Established 12/06/17 --    ADL OT LTG, Time to Achieve by discharge --    ADL OT LTG, Activity Type ADL skills --    ADL OT LTG, [place] Level independent --    ADL OT LTG, Date Goal Reviewed --  12/08/17   ADL OT LTG, Outcome --  goal not met

## 2017-12-08 NOTE — SIGNIFICANT NOTE
12/08/17 1100   Rehab Treatment   Discipline physical therapy assistant   Treatment Not Performed patient unavailable for treatment  (Pt off unit for stress test - will check back in pm.)   Recommendation   PT - Next Appointment 12/08/17

## 2017-12-08 NOTE — SIGNIFICANT NOTE
"   12/08/17 1315   Rehab Treatment   Discipline physical therapy assistant   Treatment Not Performed patient/family declined treatment  (Pt stated, \"I am completely exhausted - this morning wore me out.\")   Recommendation   PT - Next Appointment 12/09/17     "

## 2017-12-08 NOTE — PROGRESS NOTES
"[facility]  [address] [place] [address]. [address]  T - [phone]  F - [phone]     Progress Note          PATIENT  DEMOGRAPHICS   PATIENT NAME: Damaris Sánchez                      PHYSICIAN: Sylvia Islas MD  : 1945  MRN: [id number]   LOS: 3 days    Patient Care Team:  DESTINY Arreguin as PCP - General (Nurse Practitioner)  Subjective   SUBJECTIVE   Still has diarrhea and abdominal pain         Objective   OBJECTIVE   Vital Signs  Temp:  [96.9 °F (36.1 °C)-97.6 °F (36.4 °C)] 96.9 °F (36.1 °C)  Heart Rate:  [] 91  Resp:  [18] 18  BP: (108-115)/(56-60) 110/60    Flowsheet Rows         First Filed Value    Admission Height  160 cm (63\") Documented at 2017 0939    Admission Weight  59.4 kg (131 lb) Documented at 2017 0939           I/O last 3 completed shifts:  In: 6667 [P.O.:960; I.V.:5707]  Out: 400 [Urine:400]    PHYSICAL EXAM    Physical Exam   Constitutional: She is oriented to person, place, and time. She appears well-developed.   HENT:   Head: Normocephalic.   Eyes: Pupils are equal, round, and reactive to light.   Cardiovascular: Normal rate, regular rhythm and normal heart sounds.    Pulmonary/Chest: Effort normal and breath sounds normal.   Abdominal: Soft. Bowel sounds are normal. There is tenderness.   Neurological: She is alert and oriented to person, place, and time.       RESULTS   Results Review:      Results from last 7 days  Lab Units 17  1923 17  0621 17  0959   SODIUM mmol/L 136* 129* 128*   POTASSIUM mmol/L  --  3.5 4.2   CHLORIDE mmol/L  --  101 91*   CO2 mmol/L  --  20.0* 25.0   BUN mg/dL  --  11 20   CREATININE mg/dL  --  0.66 1.08*   CALCIUM mg/dL  --  7.5* 8.9   BILIRUBIN mg/dL  --  0.3 0.7   ALK PHOS U/L  --  82 124   ALT (SGPT) U/L  --  28 26   AST (SGOT) U/L  --  29 33   GLUCOSE mg/dL  --  125* 110*       Estimated Creatinine Clearance: 57.9 mL/min (by C-G formula based on Cr of 0.66).            Results " from last 7 days  Lab Units 12/07/17  1739   URIC ACID mg/dL 4.9         Results from last 7 days  Lab Units 12/05/17  0621 12/04/17  0959   WBC 10*3/mm3 9.27 17.84*   HEMOGLOBIN g/dL 9.5* 12.1   PLATELETS 10*3/mm3 214 236               Imaging Results (last 24 hours)     Procedure Component Value Units Date/Time    XR Chest 1 View [[id number]] Collected:  12/08/17 0441     Updated:  12/08/17 0502    Narrative:         Chest single view on  12/8/2017     CLINICAL INDICATION: Weight loss, hyponatremia    COMPARISON: None    FINDINGS: There is moderate elevation of the right hemidiaphragm.  There is minimal adjacent right basilar atelectasis or scarring.  Lungs are otherwise clear. Cardiac, hilar and mediastinal  contours are within normal limits. Pulmonary vascularity is  within normal limits.      Impression:       No acute disease.    Electronically signed by:  Remberto Sierra  12/8/2017 5:00 AM  [facility] Workstation: [id number]           MEDICATIONS      mesalamine 800 mg Oral TID   methylPREDNISolone sodium succinate 40 mg Intravenous Q6H   metroNIDAZOLE 500 mg Oral Q8H       sodium chloride 100 mL/hr Last Rate: 100 mL/hr (12/08/17 0102)       Assessment/Plan   ASSESSMENT / PLAN    Principal Problem:    Lower abdominal pain  Active Problems:    Weakness    Ulcerative colitis    1- Hyponatremia.  Patient's urine sodium is on the low side.  She does have high urine osmolality.  I think so this is likely related to hypovolemia in the setting of severe diarrhea.  keep ivf. Agree with IV fluid resuscitation.  I will check the serum sodium later today. Uric acid not low tsh is normal. cxr negative for mass     2.ulcerative colitis with acute flare currently on IV steroid Asacol.  Patient has recently been started on Humira.    3. Inc troponins awaiting echo stress test                   This document has been electronically signed by Sylvia Islas MD on December 8, 2017 11:16 AM

## 2017-12-08 NOTE — PROGRESS NOTES
Nish Morales DO,[facility]  Gastroenterology  Hepatology  Endoscopy  Board Certified in Internal Medicine and gastroenterology  [address], [address]  [place]. [address]  T- [phone]   F - [phone]     GASTROENTEROLOGY PROGRESS NOTE   NISH MORALES DO.         SUBJECTIVE:   12/7/2017  Chief Complaint:     Subjective  : Doing well with no problems with rectal bleeding.  Diarrhea seems to be that are controlled.  No abdominal pain.         CURRENT MEDICATIONS/OBJECTIVE/VS/PE:     Current Medications:     Current Facility-Administered Medications   Medication Dose Route Frequency Provider Last Rate Last Dose   • diphenoxylate-atropine (LOMOTIL) 2.5-0.025 MG per tablet 1 tablet  1 tablet Oral Q2H PRN Nish Morales DO   1 tablet at 12/07/17 1531   • HYDROcodone-acetaminophen (NORCO)  MG per tablet 1 tablet  1 tablet Oral Q6H PRN Neelam Saravia MD   1 tablet at 12/07/17 1531   • mesalamine (DELZICOL) delayed release capsule 800 mg  800 mg Oral TID Nish Morales DO   800 mg at 12/07/17 1531   • methylPREDNISolone sodium succinate (SOLU-Medrol) injection 40 mg  40 mg Intravenous Q6H Neelam Saravia MD   40 mg at 12/07/17 1736   • [START ON 12/8/2017] metroNIDAZOLE (FLAGYL) tablet 500 mg  500 mg Oral Q8H Neelam Saravia MD       • morphine injection 2 mg  2 mg Intravenous Q2H PRN Sony Burris MD   2 mg at 12/05/17 2036   • sodium chloride 0.9 % flush 1-10 mL  1-10 mL Intravenous PRN Sony Burris MD       • sodium chloride 0.9 % flush 10 mL  10 mL Intravenous PRN Carmine Ponce PA-C       • sodium chloride 0.9 % infusion  100 mL/hr Intravenous Continuous Sony Burris  mL/hr at 12/07/17 1021 100 mL/hr at 12/07/17 1021       Objective     Physical Exam:   Temp:  [96.8 °F (36 °C)-97.6 °F (36.4 °C)] 97.6 °F (36.4 °C)  Heart Rate:  [] 98  Resp:  [18-20] 18  BP: ()/(53-68) 115/56     Physical Exam:  General Appearance:    Alert, cooperative, in  no acute distress   Head:    Normocephalic, without obvious abnormality, atraumatic   Eyes:            Lids and lashes normal, conjunctivae and sclerae normal, no   icterus, no pallor, corneas clear, PERRLA   Ears:    Ears appear intact with no abnormalities noted   Throat:   No oral lesions, no thrush, oral mucosa moist   Neck:   No adenopathy, supple, trachea midline, no thyromegaly, no     carotid bruit, no JVD   Back:     No kyphosis present, no scoliosis present, no skin lesions,       erythema or scars, no tenderness to percussion or                   palpation,   range of motion normal   Lungs:     Clear to auscultation,respirations regular, even and                   unlabored    Heart:    Regular rhythm and normal rate, normal S1 and S2, no            murmur, no gallop, no rub, no click   Breast Exam:    Deferred   Abdomen:     Normal bowel sounds, no masses, no organomegaly, soft        non-tender, non-distended, no guarding, no rebound                 tenderness   Genitalia:    Deferred   Extremities:   Moves all extremities well, no edema, no cyanosis, no              redness   Pulses:   Pulses palpable and equal bilaterally   Skin:   No bleeding, bruising or rash   Lymph nodes:   No palpable adenopathy   Neurologic:   Cranial nerves 2 - 12 grossly intact, sensation intact, DTR        present and equal bilaterally      Results Review:     Lab Results (last 24 hours)     Procedure Component Value Units Date/Time    Blood Culture - Blood, [[id number]]  (Normal) Collected:  12/04/17 1118    Specimen:  Blood from Arm, Left Updated:  12/07/17 1131     Blood Culture No growth at 3 days    Blood Culture - Blood, [[id number]]  (Normal) Collected:  12/04/17 1110    Specimen:  Blood from Arm, Right Updated:  12/07/17 1131     Blood Culture No growth at 3 days    Sodium, Urine, Random - Urine, Clean Catch [[id number]]  (Abnormal) Collected:  12/07/17 1520    Specimen:  Urine from Urine, Clean Catch Updated:  12/07/17  1531     Sodium, Urine 28 (L) mmol/L     Osmolality, Urine - Urine, Clean Catch [[id number]]  (Normal) Collected:  12/07/17 1520    Specimen:  Urine from Urine, Clean Catch Updated:  12/07/17 1537     Osmolality, Urine 504 mOsm/kg     Uric Acid [[id number]]  (Normal) Collected:  12/07/17 1739    Specimen:  Blood Updated:  12/07/17 1802     Uric Acid 4.9 mg/dL     TSH [[id number]]  (Normal) Collected:  12/06/17 1924    Specimen:  Blood Updated:  12/07/17 1809     TSH 0.520 mIU/mL     Sodium [[id number]]  (Abnormal) Collected:  12/07/17 1923    Specimen:  Blood Updated:  12/07/17 1940     Sodium 136 (L) mmol/L            I reviewed the patient's new clinical results.  I reviewed the patient's new imaging results and agree with the interpretation.     ASSESSMENT/PLAN:   ASSESSMENT:   1.  Acute flare of ulcerative colitis    PLAN:   1.  Continue the intravenous 40 mg every 6 hours for another 48 hours and then start to taper down      Nish Morales DO  12/07/17  8:45 PM

## 2017-12-08 NOTE — CONSULTS
[facility]  [address] [place] [address]. [address]   - [phone]  F  [phone]     Consultation         PATIENT  DEMOGRAPHICS   PATIENT NAME: Damaris Sánchez                      PHYSICIAN: Sylvia Islas MD  : 1945  MRN: [id number]    Subjective   SUBJECTIVE   Referring Provider: Dr Saravia  Reason for Consultation: Hyponatremia  History of present illness:        Ms. Sánchez is a 72-year-old female who has history of inflammatory bowel disease.  She is recently been started on Humira.  She's been diagnosed with the ulcerative colitis for almost 3 years now.  Patient came here with the lower abdominal pain along with cramping and loose bowel movement.  Sometimes she has seen the blood in the stool.    Patient has been treated for flare of the ulcerative colitis.  She is currently on IV steroid.  She is also on Asacol as well.  She also has a history of C. difficile colitis.     We've been asked to evaluate for low sodium.  Sodium is dropped to 129 this time.  She is currently on normal saline.  She denies any problem with low sodium before. she Is not on any diuretics or SSRI.  She has some weight loss of 5-6 pounds in the last few weeks.  She denies any history of smoking before.    Past Medical History:   Diagnosis Date   • Ulcerative colitis      Past Surgical History:   Procedure Laterality Date   • COLONOSCOPY     • SIGMOIDOSCOPY N/A 2017     Family History   Problem Relation Age of Onset   • Hypertension Father      Social History   Substance Use Topics   • Smoking status: Former Smoker   • Smokeless tobacco: Never Used   • Alcohol use No     Allergies:  Review of patient's allergies indicates no known allergies.     REVIEW OF SYSTEMS    Review of Systems   Constitutional: Negative for chills and fever.   Respiratory: Negative for chest tightness and shortness of breath.    Cardiovascular: Negative for chest pain and leg swelling.   Gastrointestinal: Positive for  "abdominal pain and diarrhea. Negative for nausea.   Genitourinary: Negative for dysuria, flank pain and hematuria.   Neurological: Negative for dizziness, syncope and weakness.       Objective   OBJECTIVE   Vital Signs  Temp:  [96.8 °F (36 °C)-97.6 °F (36.4 °C)] 97.1 °F (36.2 °C)  Heart Rate:  [80-97] 80  Resp:  [18-20] 18  BP: ()/(53-68) 108/56    Flowsheet Rows         First Filed Value    Admission Height  160 cm (63\") Documented at 12/04/2017 0939    Admission Weight  59.4 kg (131 lb) Documented at 12/04/2017 0939           I/O last 3 completed shifts:  In: 3665 [P.O.:360; I.V.:3305]  Out: 0     PHYSICAL EXAM    Physical Exam   Constitutional: She is oriented to person, place, and time. She appears well-developed.   HENT:   Head: Normocephalic.   Eyes: Pupils are equal, round, and reactive to light.   Cardiovascular: Normal rate, regular rhythm and normal heart sounds.    Pulmonary/Chest: Effort normal and breath sounds normal.   Abdominal: Soft. Bowel sounds are normal. There is tenderness.   Neurological: She is alert and oriented to person, place, and time.       RESULTS   Results Review:      Results from last 7 days  Lab Units 12/05/17  0621 12/04/17  0959   SODIUM mmol/L 129* 128*   POTASSIUM mmol/L 3.5 4.2   CHLORIDE mmol/L 101 91*   CO2 mmol/L 20.0* 25.0   BUN mg/dL 11 20   CREATININE mg/dL 0.66 1.08*   CALCIUM mg/dL 7.5* 8.9   BILIRUBIN mg/dL 0.3 0.7   ALK PHOS U/L 82 124   ALT (SGPT) U/L 28 26   AST (SGOT) U/L 29 33   GLUCOSE mg/dL 125* 110*       Estimated Creatinine Clearance: 57.9 mL/min (by C-G formula based on Cr of 0.66).            Results from last 7 days  Lab Units 12/07/17  1739   URIC ACID mg/dL 4.9         Results from last 7 days  Lab Units 12/05/17  0621 12/04/17  0959   WBC 10*3/mm3 9.27 17.84*   HEMOGLOBIN g/dL 9.5* 12.1   PLATELETS 10*3/mm3 214 236              MEDICATIONS      mesalamine 800 mg Oral TID   methylPREDNISolone sodium succinate 40 mg Intravenous Q6H   [START ON " 12/8/2017] metroNIDAZOLE 500 mg Oral Q8H       sodium chloride 100 mL/hr Last Rate: 100 mL/hr (12/07/17 1021)     Prescriptions Prior to Admission   Medication Sig Dispense Refill Last Dose   • acidophilus (FLORANEX) tablet tablet Take 1 tablet by mouth 3 (Three) Times a Day. 90 tablet 1    • Adalimumab (HUMIRA) 10 MG/0.2ML Prefilled Syringe Kit Inject  under the skin.      • Calcium Carbonate-Vitamin D (CALCIUM 600+D) 600-200 MG-UNIT tablet Take 1 tablet by mouth 2 (Two) Times a Day.      • famotidine (PEPCID) 40 MG tablet Take 1 tablet by mouth Daily. 30 tablet 1    • ferrous sulfate 324 (65 Fe) MG tablet delayed-release EC tablet Take 1 tablet by mouth 2 (Two) Times a Day With Meals. 60 tablet 1    • mesalamine (DELZICOL) 400 MG capsule delayed-release delayed release capsule Take 1 capsule by mouth 3 (Three) Times a Day. 90 capsule 1    • predniSONE (DELTASONE) 20 MG tablet Take 1 tablet by mouth 2 (Two) Times a Day. 60 tablet 1    • traMADol (ULTRAM) 50 MG tablet Take 50 mg by mouth Every 6 (Six) Hours As Needed for Moderate Pain .        Assessment/Plan   ASSESSMENT / PLAN    Principal Problem:    Lower abdominal pain  Active Problems:    Weakness    Ulcerative colitis    1- Hyponatremia.  Patient's urine sodium is on the low side.  She does have high urine osmolality.  I think so this is likely related to hypovolemia in the setting of severe diarrhea.  Agree with IV fluid resuscitation.  I will check the serum sodium later today.  We'll check uric acid and TSH as well to finish the workup of hyponatremia.  I will also get a chest x-ray to rule out any mass.    2.ulcerative colitis with acute flare currently on IV steroid Asacol.  Patient has recently been started on Humira.    Thank you for the referral will continue follow the patient during the hospital stay.         I discussed the patients findings and my recommendations with patient and family         This document has been electronically signed by Sylvia  MD Janel on December 7, 2017 6:07 PM

## 2017-12-08 NOTE — THERAPY TREATMENT NOTE
Acute Care - Occupational Therapy Treatment Note  [facility]     Patient Name: Damaris Sánchez  : 1945  MRN: [id number]  Today's Date: 2017  Onset of Illness/Injury or Date of Surgery Date: 17  Date of Referral to OT: 17  Referring Physician: Dr. Neelam Saravia      Admit Date: 2017    Visit Dx:     ICD-10-CM ICD-9-CM   1. Weakness R53.1 780.79   2. Ulcerative colitis with complication, unspecified location K51.919 556.9   3. NSTEMI (non-ST elevated myocardial infarction) I21.4 410.70   4. Lower abdominal pain R10.30 789.09   5. Decreased activities of daily living (ADL) Z78.9 V49.89   6. Impaired functional mobility, balance, gait, and endurance Z74.09 V49.89   7. Impaired mobility and ADLs Z74.09 799.89     Patient Active Problem List   Diagnosis   • Lower abdominal pain   • C. difficile colitis   • Weakness   • Ulcerative colitis             Adult Rehabilitation Note       17 0842 17 1420 17 1005    Rehab Assessment/Intervention    Discipline occupational therapy assistant  -KD occupational therapy assistant  -CS,PG,CS2 physical therapy assistant  -SEJAL    Document Type therapy note (daily note)  -KD therapy note (daily note)  -CS,PG,CS2 therapy note (daily note)  -SEJAL    Subjective Information agree to therapy  -KD agree to therapy  -CS,PG,CS2 agree to therapy  -SEJAL    Patient Effort, Rehab Treatment adequate  -KD adequate  -CS,PG,CS2 adequate  -SEJAL    Patient Effort, Rehab Treatment Comment   pt requires encouragement to participate.   -SEJAL    Symptoms Noted During/After Treatment   fatigue  -SEJAL    Precautions/Limitations  fall precautions  -CS,PG,CS2 fall precautions  -SEJAL    Specific Treatment Considerations Tx started @ 8:44  -KD      Recorded by [KD] MADDI Kelley/JARAD [CS,PG,CS2] MADDI Hare/JARAD (r) Heather Patino OT Student (t) JAM Hare (c) [SEJAL] Shalom Ross, PTA    Vital Signs    Pre Systolic BP Rehab 110  -  -CS,PG,CS2  108  -SEJAL    Pre Treatment Diastolic BP 60  -KD 64  -CS,PG,CS2 56  -SEJAL    Post Systolic BP Rehab  130  -CS,PG,CS2 119  -SEJAL    Post Treatment Diastolic BP  65  -CS,PG,CS2 72  -SJEAL    Pretreatment Heart Rate (beats/min) 97  -KD 94  -CS,PG,CS2 89  -SEJAL    Intratreatment Heart Rate (beats/min)   109  -SEJAL    Posttreatment Heart Rate (beats/min) 96  -KD 90  -CS,PG,CS2 94  -SEJAL    Pre SpO2 (%) 95  -KD 90  -CS,PG,CS2 98  -SEJAL    O2 Delivery Pre Treatment room air  -KD room air  -CS,PG,CS2 room air  -SEJAL    Intra SpO2 (%)   96  -SEJAL    O2 Delivery Intra Treatment   room air  -SEJAL    Post SpO2 (%) 96  -KD  98  -SEJAL    O2 Delivery Post Treatment room air  -KD  room air  -SEJAL    Pre Patient Position Supine  -KD Supine  -CS,PG,CS2 Supine  -SEJAL    Intra Patient Position Supine  -KD Supine  -CS,PG,CS2     Post Patient Position Supine  -KD Supine  -CS,PG,CS2 Sitting  -SEJAL    Recorded by [KD] HILARY KelleyA/L [CS,PG,CS2] MADDI Hare/JARAD (r) Heather Patino, OT Student (t) MADDI Hare/JARAD (c) [SEJAL] Shalom Ross, BRUNO    Pain Assessment    Pain Assessment No/denies pain  -KD 0-10  -CS,PG,CS2 0-10  -SEJAL    Pain Score 0  -KD 4  -CS,PG,CS2 4  -SEJAL    Post Pain Score  4  -CS3 4  -SEJAL    Pain Type  Chronic pain  -CS,PG,CS2     Pain Location  Back  -CS,PG,CS2 Abdomen   and L flank  -SEJAL    Pain Intervention(s)   Medication (See MAR)  -SEJAL    Recorded by [KD] MADDI Kelley/JARAD [CS,PG,CS2] MADDI Hare/JARAD (r) Heather Patino, OT Student (t) MADDI Hare/JARAD (c)  [CS3] MADDI Hare/JARAD [SEJAL] Shalom Ross, PTA    Vision Assessment/Intervention    Visual Impairment  WFL with corrective lenses  -CS,PG,CS2 WFL with corrective lenses  -SEJAL    Recorded by  [CS,PG,CS2] MADDI Hare/JARAD (r) Heather Patino, OT Student (t) MADDI Hare/JARAD (c) [SEJAL] Shalom Ross PTA    Cognitive Assessment/Intervention    Current Cognitive/Communication Assessment functional  -KD functional  -CS,PG,CS2  functional  -SEJAL    Orientation Status oriented x 4  -KD oriented x 4  -CS,PG,CS2 oriented x 4  -SEJAL    Follows Commands/Answers Questions 100% of the time  -% of the time  -CS,PG,CS2 100% of the time  -SEJAL    Personal Safety WNL/WFL  -KD WNL/WFL  -CS,PG,CS2 WNL/WFL  -SEJAL    Personal Safety Interventions gait belt;nonskid shoes/slippers when out of bed  -KD gait belt;nonskid shoes/slippers when out of bed  -CS3 gait belt;muscle strengthening facilitated;nonskid shoes/slippers when out of bed;supervised activity  -SEJAL    Recorded by [KD] MADDI Kelley/L [CS,PG,CS2] MADDI Hare/JARAD (r) Heather Patino, OT Student (t) MADDI Hare/JARAD (c)  [CS3] MADDI Hare/JARAD [SEJAL] Shalom Ross PTA    Bed Mobility, Assessment/Treatment    Bed Mobility, Assistive Device   bed rails;head of bed elevated  -SEJAL    Bed Mobility, Scoot/Bridge, [place]   supervision required   multiple scoots  -SEJAL    Bed Mob, Supine to Sit, [place]   minimum assist (75% patient effort)  -SEJAL    Recorded by   [SEJAL] Shalom Ross PTA    Transfer Assessment/Treatment    Transfers, Sit-Stand [place]   contact guard assist;minimum assist (75% patient effort)  -SEJAL    Transfers, Stand-Sit [place]   contact guard assist  -SEJAL    Transfers, Sit-Stand-Sit, Assist Device   rolling walker  -SEAJL    Transfer, Comment   pt requires cueing for hand placement w/ sit-stand-sit. pt completed sit-stands 6x2  -SEJAL    Recorded by   [SEJAL] Shalom Ross PTA    Gait Assessment/Treatment    Gait, [place] Level   contact guard assist  -SEJAL    Gait, Assistive Device   rolling walker  -SEJAL    Gait, Distance (Feet)   172  -SEJAL    Gait, Gait Deviations   irving decreased;step length decreased  -SEJAL    Gait, Impairments   strength decreased  -SEJAL    Gait, Comment   pt required encouragement to participate in gait training. pt stated multiple times that her legs wouldn't support her. pt felt accomplished after   -SEJAL    Recorded by    [SEJAL] Shalom Ross PTA    Stairs Assessment/Treatment    Stairs, [place] Level   not tested  -SEJAL    Recorded by   [SEJAL] Shalom Ross PTA    Upper Body Bathing Assessment/Training    UB Bathing Assess/Train, Comment  deferred  -CS,PG,CS2     Recorded by  [CS,PG,CS2] HILARY HareA/JARAD (r) Heather Patino, OT Student (t) MADDI Hare/L (c)     Lower Body Bathing Assessment/Training    LB Bathing Assess/Train, Comment  deferred  -CS,PG,CS2     Recorded by  [CS,PG,CS2] HILARY HareA/JARAD (r) Heather Patino, OT Student (t) MADDI Hare/L (c)     Upper Body Dressing Assessment/Training    UB Dressing Assess/Train, Comment  deferred  -CS,PG,CS2     Recorded by  [CS,PG,CS2] HILARY HareA/JARAD (r) Heather Patino OT Student (t) MADDI Hare/L (c)     Lower Body Dressing Assessment/Training    LB Dressing Assess/Train, Comment  deferred  -CS,PG,CS2     Recorded by  [CS,PG,CS2] MADDI Hare/JARAD (r) Heather Patino OT Student (t) MADDI Hare/L (c)     Toileting Assessment/Training    Toileting Assess/Train, Comment  deferred  -CS,PG,CS2     Recorded by  [CS,PG,CS2] MADDI Hare/JARAD (r) Heather Patino OT Student (t) MADDI Hare/L (c)     Grooming Assessment/Training    Grooming Assess/Train, Comment  deferred  -CS,PG,CS2     Recorded by  [CS,PG,CS2] MADDI Hare/JARAD (r) Heather Patino OT Student (t) MADDI Hare/L (kerv[place])     Therapy Exercises    Bilateral Lower Extremities   20 reps;supine;ankle pumps/circles;hip abduction/adduction;heel slides;sitting;LAQ;knee flexion;glut sets;AROM:;AAROM:   sit-stands 6x2.   -SEJAL    Bilateral Upper Extremity AROM:;10 reps;sitting;elbow flexion/extension;hand pumps;pronation/supination;shoulder abduction/adduction;shoulder extension/flexion;shoulder ER/IR;shoulder horizontal abd/add  -KD AROM:;20 reps;supine;elbow flexion/extension;pronation/supination;shoulder  extension/flexion;shoulder ER/IR  -CS,PG,CS2     BUE Resistance manual resistance- minimal   2 sets with RB's PRN  -KD manual resistance- minimal  -CS,PG,CS2     Recorded by [KD] JAM Kelley [CS,PG,CS2] MADDI Hare/JRAAD (r) Heather Patino, OT Student (t) MADDI Hare/JARAD (c) [SEJAL] Shalom Ross PTA    Positioning and Restraints    Pre-Treatment Position in bed  -KD in bed  -CS,PG,CS2 in bed  -SEJAL    Post Treatment Position bed  -KD bed  -CS,PG,CS2 bed   pt states she will sit in recliner after lunch.   -SEJAL    In Bed sitting;call light within reach;exit alarm on;encouraged to call for assist;with family/caregiver  -KD supine;call light within reach  -CS,PG,CS2 sitting EOB;call light within reach;encouraged to call for assist;exit alarm on;patient within staff view   all needs met. recliner prepared w/ alarm for after lunch.   -SEJAL    Recorded by [KD] MADDI Kelley/JARAD [CS,PG,CS2] MADDI Hare/JARAD (r) Heather Patino, OT Student (t) MADDI Hare/JARAD (c) [SEJAL] Shalom Ross PTA      User Key  (r) = Recorded By, (t) = Taken By, (c) = Cosigned By    Initials Name Effective Dates    SEJAL Shalom Ross PTA 10/17/16 -     KD JAM Kelley 10/17/16 -     CS MADDI Hare/L 10/17/16 -     PG Heather Patino, OT Student 01/31/17 -                 OT Goals       12/08/17 0937 12/07/17 1545 12/06/17 1100    Transfer Training OT LTG    Transfer Training OT LTG, Date Established   12/06/17  -NN    Transfer Training OT LTG, Time to Achieve   by discharge  -NN    Transfer Training OT LTG, Activity Type   all transfers  -NN    Transfer Training OT LTG, [place] Level   conditional independence  -NN    Transfer Training OT LTG, Assist Device   walker, rolling  -NN    Transfer Training OT LTG, Date Goal Reviewed 12/08/17  -KD 12/07/17  -CS (r) PG (t) CS (c)     Transfer Training OT LTG, Outcome goal not met  -KD      Strength OT LTG    Strength Goal OT LTG,  Date Established   12/06/17  -NN    Strength Goal OT LTG, Time to Achieve   by discharge  -NN    Strength Goal OT LTG, Measure to Achieve   Pt. will complete BUE strengthening exercises all planes and joints to increase BUE strength to 5/5 for ADLs.   -NN    Strength Goal OT LTG, Date Goal Reviewed 12/08/17  -KD 12/07/17  -CS (r) PG (t) CS (c)     Strength Goal OT LTG, Outcome goal not met  -KD      ADL OT LTG    ADL OT LTG, Date Established   12/06/17  -NN    ADL OT LTG, Time to Achieve   by discharge  -NN    ADL OT LTG, Activity Type   ADL skills  -NN    ADL OT LTG, [place] Level   independent  -NN    ADL OT LTG, Date Goal Reviewed 12/08/17  -KD 12/07/17  -CS (r) PG (t) CS (c)     ADL OT LTG, Outcome goal not met  -KD        User Key  (r) = Recorded By, (t) = Taken By, (c) = Cosigned By    Initials Name Provider Type     Swetha Kitchen, LINDA/L Occupational Therapy Assistant    LILIANA Watters LINDA/L Occupational Therapy Assistant    NN Ashley Brannon, OTR/L Occupational Therapist    PG Heather Patino, OT Student OT Student          Occupational Therapy Education     Title: PT OT SLP Therapies (Active)     Topic: Occupational Therapy (Active)     Point: ADL training (Done)    Description: Instruct learner(s) on proper safety adaptation and remediation techniques during self care or transfers.   Instruct in proper use of assistive devices.    Learning Progress Summary    Learner Readiness Method Response Comment Documented by Status   Patient Acceptance E VU,NR Pt. educated on role of OT, safe t/f, benefit of activity, r/w safety, use of brief, progression with poc NN 12/06/17 1051 Done               Point: Home exercise program (Active)    Description: Instruct learner(s) on appropriate technique for monitoring, assisting and/or progressing therapeutic exercises/activities.    Learning Progress Summary    Learner Readiness Method Response Comment Documented by Status   Patient Acceptance D NR  KD  12/08/17 0937 Active    Acceptance E VU,NR Pt. educated on role of OT, safe t/f, benefit of activity, r/w safety, use of brief, progression with poc NN 12/06/17 1059 Done               Point: Body mechanics (Done)    Description: Instruct learner(s) on proper positioning and spine alignment during self-care, functional mobility activities and/or exercises.    Learning Progress Summary    Learner Readiness Method Response Comment Documented by Status   Patient Acceptance E VU,NR Pt. educated on role of OT, safe t/f, benefit of activity, r/w safety, use of brief, progression with poc NN 12/06/17 1059 Done                      User Key     Initials Effective Dates Name Provider Type Discipline     10/17/16 -  MADDI Kelley/L Occupational Therapy Assistant OT     11/08/17 -  LIANE Lockett/L Occupational Therapist OT                  OT Recommendation and Plan  Anticipated Discharge Disposition: home with home health  Planned Therapy Interventions: activity intolerance, ADL retraining, balance training, bed mobility training, energy conservation, home exercise program, strengthening, transfer training  Therapy Frequency:  (3-14x/week)  Plan of Care Review  Outcome Summary/Follow up Plan: Pt harsh tx well this AM. Pt completed 2 sets x 10 reps ther ex long sitting in bed with 1lb HW in all planes with RB's PRN.        Outcome Measures       12/07/17 1500 12/07/17 1005 12/06/17 1648    How much help from another person do you currently need...    Turning from your back to your side while in flat bed without using bedrails?  3  -SEJAL 3  -JCA    Moving from lying on back to sitting on the side of a flat bed without bedrails?  3  -SEJAL 3  -JCA    Moving to and from a bed to a chair (including a wheelchair)?  3  -SEJAL 3  -JCA    Standing up from a chair using your arms (e.g., wheelchair, bedside chair)?  3  -SEJAL 3  -JCA    Climbing 3-5 steps with a railing?  3  -SEJAL 3  -JCA    To walk in hospital room?  3  -SEJAL 3  -JCA     AM-PAC 6 Clicks Score  18  -SEJAL 18  -JCA    How much help from another is currently needed...    Putting on and taking off regular lower body clothing? 3  -CS (r) PG (t) CS (c)      Bathing (including washing, rinsing, and drying) 3  -CS (r) PG (t) CS (c)      Toileting (which includes using toilet bed pan or urinal) 2  -CS (r) PG (t) CS (c)      Putting on and taking off regular upper body clothing 3  -CS (r) PG (t) CS (c)      Taking care of personal grooming (such as brushing teeth) 4  -CS (r) PG (t) CS (c)      Eating meals 4  -CS (r) PG (t) CS (c)      Score 19  -CS (r) PG (t)      Functional Assessment    Outcome Measure Options  AM-PAC 6 Clicks Basic Mobility (PT)  -SEJAL AM-[facility] 6 Clicks Basic Mobility (PT)  -JCA      12/06/17 1100          How much help from another is currently needed...    Putting on and taking off regular lower body clothing? 3  -NN      Bathing (including washing, rinsing, and drying) 3  -NN      Toileting (which includes using toilet bed pan or urinal) 2  -NN      Putting on and taking off regular upper body clothing 3  -NN      Taking care of personal grooming (such as brushing teeth) 4  -NN      Eating meals 4  -NN      Score 19  -NN      Functional Assessment    Outcome Measure Options [facility] 6 Clicks Daily Activity (OT)  -NN        User Key  (r) = Recorded By, (t) = Taken By, (c) = Cosigned By    Initials Name Provider Type    JCCAMILLA Guerrero, PT Physical Therapist    SEJAL Ross, PTA Physical Therapy Assistant    CS Madhavi Watters, LINDA/L Occupational Therapy Assistant    NN Ashley Brannon, OTR/L Occupational Therapist    PG Heather Patino, OT Student OT Student           Time Calculation:         Time Calculation- OT       12/08/17 0940          Time Calculation- OT    OT Start Time 0844  -KD      OT Stop Time 0912  -KD      OT Time Calculation (min) 28 min  -KD      Total Timed Code Minutes- OT 28 minute(s)  -KD      OT Received On 12/08/17  -KD        User Key  (r)  = Recorded By, (t) = Taken By, (c) = Cosigned By    Initials Name Provider Type     MADDI Kelley/L Occupational Therapy Assistant           Therapy Charges for Today     Code Description Service Date Service Provider Modifiers Qty    09843807066 HC OT THER PROC EA 15 MIN 12/8/2017 JAM Kelley, KX 2          OT G-codes  OT Functional Scales Options: AM-PAC 6 Clicks Daily Activity (OT)  Functional Limitation: Self care  Self Care Current Status (): At least 40 percent but less than 60 percent impaired, limited or restricted  Self Care Goal Status (): At least 1 percent but less than 20 percent impaired, limited or restricted    JAM Kelley  12/8/2017

## 2017-12-08 NOTE — CONSULTS
Cardiology Consultation Note.        Patient Name: Damaris Sánchez  Age/Sex: 72 y.o. female  : 1945  MRN: [id number]    Date of consultation: 2017  Consulting Physician: Gabe Blount MD  Primary care Physician: DESTINY Arreguin  Requesting Physician:   Neelam Saravia MD   Reason for consultation: Elevated troponin suggestive for non-Q myocardial infarctions atypical chest pain  Subjective:       Chief Complaint: Abdominal discomfort with some atypical chest pain     History of Present Illness:  Damaris Sánchez is a 72 y.o. female     Body mass index is 22.55 kg/(m^2).  With a past medical history significant for history of ulcerative colitis diagnosed 3 years ago recent evaluation and subsequent initiation of Humira.  Patient has no previous history of documented arterial hypertension hyperlipidemia or any previous cardiac issue.    Patient had presented to the hospital with lower abdominal discomfort.  Patient underwent troponin check which was mildly elevated.  Patient had complain of having substernal chest pain.  Patient was unclear if the patient's symptoms of epigastric and lower chest pain was secondary to ulcerative colitis.  Patient currently is not having symptoms of severe  chest pain.  Patient resting echocardiogram did not show any ST-T wave changes.  Patient troponin was mildly elevated.  Patient on specific questioning has had previous episodes off some atypical chest pain.    Patient during the hospitalization also had episodes of hyponatremia.  Patient has previous history of tobacco abuse.  Patient denies previous cardiac evaluation    Patient 10 point review of system except for what is stated in the history of present illness is negative.    Past Medical History:  1.  Crohn's disease diagnosed in .  2.  C. difficile colitis  3.  Anemia  4.  Atypical chest pain.      Past Surgical History:  Past Surgical History:   Procedure Laterality Date   • COLONOSCOPY     •  SIGMOIDOSCOPY N/A 9/13/2017       Family History:  Family History   Problem Relation Age of Onset   • Hypertension Father        Social History:  Social History     Social History   • Marital status:      Spouse name: N/A   • Number of children: N/A   • Years of education: N/A     Occupational History   • Not on file.     Social History Main Topics   • Smoking status: Former Smoker   • Smokeless tobacco: Never Used   • Alcohol use No   • Drug use: No   • Sexual activity: Not Currently     Other Topics Concern   • Not on file     Social History Narrative        Cardiac Risk Factors: Post menopausal. and Tobacco abuse      Allergies:  No Known Allergies    Medication::  Prescriptions Prior to Admission   Medication Sig Dispense Refill Last Dose   • acidophilus (FLORANEX) tablet tablet Take 1 tablet by mouth 3 (Three) Times a Day. 90 tablet 1    • Adalimumab (HUMIRA) 10 MG/0.2ML Prefilled Syringe Kit Inject  under the skin.      • Calcium Carbonate-Vitamin D (CALCIUM 600+D) 600-200 MG-UNIT tablet Take 1 tablet by mouth 2 (Two) Times a Day.      • famotidine (PEPCID) 40 MG tablet Take 1 tablet by mouth Daily. 30 tablet 1    • ferrous sulfate 324 (65 Fe) MG tablet delayed-release EC tablet Take 1 tablet by mouth 2 (Two) Times a Day With Meals. 60 tablet 1    • mesalamine (DELZICOL) 400 MG capsule delayed-release delayed release capsule Take 1 capsule by mouth 3 (Three) Times a Day. 90 capsule 1    • predniSONE (DELTASONE) 20 MG tablet Take 1 tablet by mouth 2 (Two) Times a Day. 60 tablet 1    • traMADol (ULTRAM) 50 MG tablet Take 50 mg by mouth Every 6 (Six) Hours As Needed for Moderate Pain .              Review of Systems:       Constitutional:  Denies recent weight loss, weight gain, fever or chills, no change in exercise tolerance     HENT:  Denies any hearing loss, epistaxis, hoarseness, or difficulty speaking.     Eyes: Wears eyeglasses or contact lenses     Respiratory:  Denies dyspnea with exertion,no  cough, wheezing, or hemoptysis.     Cardiovascular: Negative for palpitations, chest pain, orthopnea, PND, peripheral edema, syncope, or claudication.     Gastrointestinal:  Denies change in bowel habits, dyspepsia, ulcer disease, hematochezia, or melena.  No nausea, no vomiting, no hematemesis, no diarrhea or constipation, no melena      Endocrine: Negative for cold intolerance, heat intolerance, polydipsia, polyphagia and polyuria. Denies any history of weight change, heat/cold intolerance, polydipsia, polyuria     Genitourinary: Negative for hematuria.      Musculoskeletal: Denies any history of arthritic symptoms or back problems .  No joint pain, joint stiffness, joint swelling, muscle pain, muscle weakness and neck pain    Skin:  Denies any change in hair or nails, rashes, or skin lesions.     Allergic/Immunologic: Negative.  Negative for environmental allergies, food allergies and immunocompromised state.     Neurological:  Denies any history of recurrent headaches, strokes, TIA, or seizure disorder.     Hematological: Denies excessive bleeding, easy bruising, fatigue, lymphadenopathy and petechiae or any bleeding disorders, or lymphadenopathy.     Psychiatric/Behavioral: Denies any history of depression, substance abuse, or change in cognitive function. Denies any psychomotor reaction or tangential thought.  No depression, homicidal ideations and suicidal ideations    Endocrine: No frequent urination and nocturia, temperature intolerance, weight gain, unintended and weight loss, unintended            Objective:     Objective:  Vitals:    12/07/17 1500   BP: 108/56   Pulse: 80   Resp: 18   Temp: 97.1 °F (36.2 °C)   SpO2: 93%     .    Body mass index is 22.55 kg/(m^2).           Physical Exam:   General Appearance:    Alert, oriented, cooperative, in no acute distress   Head:    Normocephalic, atraumatic, without obvious abnormality   Eyes:           DEANGELO  Lids and lashes normal, conjunctivae and sclerae  normal, no icterus, no pallor   Ears:    Ears appear intact with no abnormalities noted   Throat:   Mucous membranes pink and moist   Neck:   Supple, trachea midline, no carotid bruit, no organomegaly or JVD   Lungs:     Clear to auscultation and percussion, respirations regular, even and Unlabored. No wheezes, rales, rhonchi    Heart:    Regular rhythm and normal rate, normal S1 and S2, no            murmur, no gallop, no rub, no click   Abdomen:     Soft, non-tender, non-distended, no guarding, no rebound tenderness, Normal bowel sounds in all four quadrants, no masses, liver and spleen nonpalpable,    Genitalia:    Deferred   Extremities:   Moves all extremities well, no edema, no cyanosis, no              Redness, no clubbing   Pulses:   Pulses palpable and equal bilaterally   Skin:   Moist and warm. No bleeding, bruising or rash   Neurologic/Psychiatric:   Alert and oriented to person, place, and time.  Motor, power and tone in upper and lower extremities are grossly intact. No focal neurological deficits. Normal cognitive function. No psychomotor reaction or tangential thought. No depression, homicidal ideations and suicidal ideations           Lab Review:       Results from last 7 days  Lab Units 12/05/17  0621   SODIUM mmol/L 129*   POTASSIUM mmol/L 3.5   CHLORIDE mmol/L 101   CO2 mmol/L 20.0*   BUN mg/dL 11   CREATININE mg/dL 0.66   CALCIUM mg/dL 7.5*   BILIRUBIN mg/dL 0.3   ALK PHOS U/L 82   ALT (SGPT) U/L 28   AST (SGOT) U/L 29   GLUCOSE mg/dL 125*       Results from last 7 days  Lab Units 12/06/17  1924 12/06/17  1538 12/06/17  1322   TROPONIN I ng/mL 0.058* 0.059* 0.065*           Results from last 7 days  Lab Units 12/05/17  0621   WBC 10*3/mm3 9.27   HEMOGLOBIN g/dL 9.5*   HEMATOCRIT % 28.0*   PLATELETS 10*3/mm3 214                   Results from last 7 days  Lab Units 12/06/17  1924   TSH mIU/mL 0.520       EKG:   ECG/EMG Results (last 24 hours)     Procedure Component Value Units Date/Time     SCANNED EKG [[id number]] Resulted:  12/04/17      Updated:  12/07/17 1054          Imaging:  Imaging Results (last 24 hours)     ** No results found for the last 24 hours. **          I personally viewed and interpreted the patient's EKG/Telemetry data.    Assessment:   1.  Indeterminate troponin suggestive for non-Q myocardial infarction.  2.  History of Crohn's disease with recent initiation of Humira.  3.  Anemia.  4.  Hyponatremia.          Plan:   1.  Indeterminate troponin with atypical symptoms of chest pain.  Patient has atypical symptoms of chest discomfort.  Patient resting electro-cardiogram did not show any ST-T wave changes suggestive of ischemia.  Patient has been recommended to undergo a transthoracic echocardiogram to evaluate the left ventricular systolic function and to rule out wall motion abnormality and a Lexiscan Cardiolite stress tests to rule out stress-induced ischemia.  2.  History of Crohn's disease with universal ulcerative colitis currently started on Humira.  Patient is anemic and has been followed by gastroenterology.  Patient does complain of having abdominal discomfort and is currently on Flagyl also for C. difficile colitis  3.  Hyponatremia is getting better.    The above plan of management were discussed with the patient.  Thank you for the consultation        Time: time spent in face-to-face evaluation of greater than 55  minutes and interacting and formulating examining and discussing the plan with the patient with 50% of greater time spent in face-to-face interaction.    Gabe Blount MD  12/07/17  6:17 PM     EMR Dragon/Transcription disclaimer:   Some of this note may be an electronic transcription/translation of spoken language to printed text. The electronic translation of spoken language may permit erroneous, or at times, nonsensical words or phrases to be inadvertently transcribed; Although I have reviewed the note for such errors, some may still exist.

## 2017-12-09 NOTE — PROGRESS NOTES
Herbert Sanchez DO,[facility]  Gastroenterology  Hepatology  Endoscopy  Board Certified in Internal Medicine and gastroenterology  [address], [address]  [place]. [address]  - [phone]   F - [phone]     GASTROENTEROLOGY PROGRESS NOTE   HERBERT SANCHEZ DO.         SUBJECTIVE:   12/8/2017  Chief Complaint:     Subjective  : Has had no further bleeding.  Abdominal pain is better.  Has a lot of questions about use of Norco and once that prescribed as an outpatient.  She wants me to prescribe that for her.  Son has a lot of questions and wonders why she is not cured from her problem.  I told him very plainly that this problem that she has is going to take approximately 1 year to see improvement and resolution.  This will take quite a bit of time.  I told him that I would not do a colonoscopy now with her acute inflammation.  That may take several days or weeks to get better and then we may try to do it at that point in time.  I reiterated to him that her situation is very bad.  I told him that it may require surgery.  We are trying everything that we possibly can do to try to get this better.  She is scheduled for another Humira injection.  That will be done on Tuesday.  We will bring her medications to her to get that done.  The patient is eating.  No nausea or vomiting.  Diarrhea is better controlled with Lomotil.         CURRENT MEDICATIONS/OBJECTIVE/VS/PE:     Current Medications:     Current Facility-Administered Medications   Medication Dose Route Frequency Provider Last Rate Last Dose   • diphenoxylate-atropine (LOMOTIL) 2.5-0.025 MG per tablet 1 tablet  1 tablet Oral Q2H PRN Herbert Sanchez DO   1 tablet at 12/08/17 1713   • famotidine (PEPCID) tablet 40 mg  40 mg Oral Daily Neelam Saravia MD       • HYDROcodone-acetaminophen (NORCO)  MG per tablet 1 tablet  1 tablet Oral Q6H PRN Neelam Saravia MD   1 tablet at 12/08/17 1712   • mesalamine (DELZICOL) delayed release  capsule 800 mg  800 mg Oral TID Nish Morales DO   800 mg at 12/08/17 1712   • [START ON 12/9/2017] methylPREDNISolone sodium succinate (SOLU-Medrol) injection 40 mg  40 mg Intravenous Q12H Neelam Saravia MD       • metroNIDAZOLE (FLAGYL) tablet 500 mg  500 mg Oral Q8H Neelam Saravia MD   500 mg at 12/08/17 1716   • morphine injection 2 mg  2 mg Intravenous Q2H PRN Sony Burris MD   2 mg at 12/05/17 2036   • sodium chloride 0.9 % flush 1-10 mL  1-10 mL Intravenous PRN Sony Burris MD       • sodium chloride 0.9 % flush 10 mL  10 mL Intravenous PRN Carmine Ponce PA-C       • sodium chloride 0.9 % infusion  50 mL/hr Intravenous Continuous Sylvia Islas MD 50 mL/hr at 12/08/17 1154 50 mL/hr at 12/08/17 1154       Objective     Physical Exam:   Temp:  [96.8 °F (36 °C)-97.8 °F (36.6 °C)] 97.8 °F (36.6 °C)  Heart Rate:  [] 100  Resp:  [18] 18  BP: (110-147)/(60-76) 126/65     Physical Exam:  General Appearance:    Alert, cooperative, in no acute distress   Head:    Normocephalic, without obvious abnormality, atraumatic   Eyes:            Lids and lashes normal, conjunctivae and sclerae normal, no   icterus, no pallor, corneas clear, PERRLA   Ears:    Ears appear intact with no abnormalities noted   Throat:   No oral lesions, no thrush, oral mucosa moist   Neck:   No adenopathy, supple, trachea midline, no thyromegaly, no     carotid bruit, no JVD   Back:     No kyphosis present, no scoliosis present, no skin lesions,       erythema or scars, no tenderness to percussion or                   palpation,   range of motion normal   Lungs:     Clear to auscultation,respirations regular, even and                   unlabored    Heart:    Regular rhythm and normal rate, normal S1 and S2, no            murmur, no gallop, no rub, no click   Breast Exam:    Deferred   Abdomen:     Normal bowel sounds, no masses, no organomegaly, soft        non-tender, non-distended, no guarding, no rebound                  tenderness   Genitalia:    Deferred   Extremities:   Moves all extremities well, no edema, no cyanosis, no              redness   Pulses:   Pulses palpable and equal bilaterally   Skin:   No bleeding, bruising or rash   Lymph nodes:   No palpable adenopathy   Neurologic:   Cranial nerves 2 - 12 grossly intact, sensation intact, DTR        present and equal bilaterally      Results Review:     Lab Results (last 24 hours)     Procedure Component Value Units Date/Time    Blood Culture - Blood, [[id number]]  (Normal) Collected:  12/04/17 1118    Specimen:  Blood from Arm, Left Updated:  12/08/17 1131     Blood Culture No growth at 4 days    Blood Culture - Blood, [[id number]]  (Normal) Collected:  12/04/17 1110    Specimen:  Blood from Arm, Right Updated:  12/08/17 1131     Blood Culture No growth at 4 days           I reviewed the patient's new clinical results.  I reviewed the patient's new imaging results and agree with the interpretation.     ASSESSMENT/PLAN:   ASSESSMENT:   1.  Chronic ulcerative colitis, universal, acute flare    PLAN:   1.  No changes in medication  2.  Spent approximately 20 minutes with the family today  3.  Reviewed with Dr. Blount.  It is all right to use anticoagulation      Nish Morales DO  12/08/17  8:31 PM

## 2017-12-09 NOTE — PLAN OF CARE
Problem: Patient Care Overview (Adult)  Goal: Plan of Care Review  Outcome: Ongoing (interventions implemented as appropriate)    12/09/17 0325 12/09/17 1416   Coping/Psychosocial Response Interventions   Plan Of Care Reviewed With patient --    Patient Care Overview   Progress no change --    Outcome Evaluation   Outcome Summary/Follow up Plan --  Pt completed bed bath with setup and min A for UB and LB. Pt required max A for lisa cleaning x2. Continue w/POC.          Problem: Inpatient Occupational Therapy  Goal: Transfer Training Goal 1 LTG- OT  Outcome: Ongoing (interventions implemented as appropriate)    12/06/17 1100 12/08/17 0937 12/09/17 1416   Transfer Training OT LTG   Transfer Training OT LTG, Date Established 12/06/17 --  --    Transfer Training OT LTG, Time to Achieve by discharge --  --    Transfer Training OT LTG, Activity Type all transfers --  --    Transfer Training OT LTG, [place] Level conditional independence --  --    Transfer Training OT LTG, Assist Device walker, rolling --  --    Transfer Training OT LTG, Date Goal Reviewed --  --  12/09/17   Transfer Training OT LTG, Outcome --  goal not met --        Goal: Strength Goal LTG- OT  Outcome: Ongoing (interventions implemented as appropriate)    12/06/17 1100 12/08/17 0937 12/09/17 1416   Strength OT LTG   Strength Goal OT LTG, Date Established 12/06/17 --  --    Strength Goal OT LTG, Time to Achieve by discharge --  --    Strength Goal OT LTG, Measure to Achieve Pt. will complete BUE strengthening exercises all planes and joints to increase BUE strength to 5/5 for ADLs.  --  --    Strength Goal OT LTG, Date Goal Reviewed --  --  12/09/17   Strength Goal OT LTG, Outcome --  goal not met --        Goal: ADL Goal LTG- OT  Outcome: Ongoing (interventions implemented as appropriate)    12/06/17 1100 12/08/17 0937 12/09/17 1416   ADL OT LTG   ADL OT LTG, Date Established 12/06/17 --  --    ADL OT LTG, Time to Achieve by discharge --  --    ADL  OT LTG, Activity Type ADL skills --  --    ADL OT LTG, [place] Level independent --  --    ADL OT LTG, Date Goal Reviewed --  --  12/09/17   ADL OT LTG, Outcome --  goal not met --

## 2017-12-09 NOTE — PROGRESS NOTES
[facility] Medicine Services  INPATIENT PROGRESS NOTE    Length of Stay: 3  Date of Admission: 12/4/2017  Primary Care Physician: DESTINY Arreguin    Subjective   Chief Complaint: Acute ulcerative colitis    12/5/2017: Patient still has persistent abdominal pain     December 6, 2017: Patient is still having persistent left-sided abdominal pain.  Patient's diarrhea has improved after taking Lomotil.    December 7,2017: Abdominal pain is still persistent.     December 8,2017: Abdominal pain is still persistent; patient has thrombus in left atrium.     Review of Systems   Constitutional: Negative for activity change, appetite change, fatigue and fever.   HENT: Negative for ear pain and sore throat.    Eyes: Negative for pain and visual disturbance.   Respiratory: Negative for cough and shortness of breath.    Cardiovascular: Negative for chest pain and palpitations.   Gastrointestinal: Positive for abdominal pain. Negative for nausea.   Endocrine: Negative for cold intolerance and heat intolerance.   Genitourinary: Negative for difficulty urinating and dysuria.   Musculoskeletal: Negative for arthralgias and gait problem.   Skin: Negative for color change and rash.   Neurological: Negative for dizziness, weakness and headaches.   Hematological: Negative for adenopathy. Does not bruise/bleed easily.   Psychiatric/Behavioral: Negative for agitation, confusion and sleep disturbance.        All pertinent negatives and positives are as above. All other systems have been reviewed and are negative unless otherwise stated.     Objective    Temp:  [96.8 °F (36 °C)-97.8 °F (36.6 °C)] 97.8 °F (36.6 °C)  Heart Rate:  [] 100  Resp:  [18] 18  BP: (110-147)/(56-76) 126/65    Physical Exam   Constitutional: She is oriented to person, place, and time. She appears well-developed and well-nourished. No distress.   HENT:   Head: Normocephalic and atraumatic.   Right Ear: External ear  normal.   Left Ear: External ear normal.   Mouth/Throat: Oropharynx is clear and moist.   Eyes: Conjunctivae and EOM are normal. Pupils are equal, round, and reactive to light. Left eye exhibits no discharge. No scleral icterus.   Neck: No tracheal deviation present. No thyromegaly present.   Cardiovascular: Normal rate, regular rhythm and normal heart sounds.  Exam reveals no gallop and no friction rub.    No murmur heard.  Pulmonary/Chest: Effort normal and breath sounds normal. No stridor. No respiratory distress. She has no wheezes. She has no rales.   Abdominal: Soft. Bowel sounds are normal. She exhibits no distension. There is no tenderness. There is no rebound.   Left-sided abdominal tenderness   Musculoskeletal: She exhibits no edema, tenderness or deformity.   Neurological: She is alert and oriented to person, place, and time. She displays normal reflexes. Coordination normal.   Skin: Skin is warm and dry. No rash noted. No erythema. No pallor.   Psychiatric: She has a normal mood and affect. Her behavior is normal. Thought content normal.           famotidine 40 mg Oral Daily   mesalamine 800 mg Oral TID   methylPREDNISolone sodium succinate 40 mg Intravenous Q6H   metroNIDAZOLE 500 mg Oral Q8H         Results Review:  I have reviewed the labs, radiology results, and diagnostic studies.    Laboratory Data:     Results from last 7 days  Lab Units 12/07/17  1923 12/05/17  0621 12/04/17  0959   SODIUM mmol/L 136* 129* 128*   POTASSIUM mmol/L  --  3.5 4.2   CHLORIDE mmol/L  --  101 91*   CO2 mmol/L  --  20.0* 25.0   BUN mg/dL  --  11 20   CREATININE mg/dL  --  0.66 1.08*   GLUCOSE mg/dL  --  125* 110*   CALCIUM mg/dL  --  7.5* 8.9   BILIRUBIN mg/dL  --  0.3 0.7   ALK PHOS U/L  --  82 124   ALT (SGPT) U/L  --  28 26   AST (SGOT) U/L  --  29 33   ANION GAP mmol/L  --  8.0 12.0     Estimated Creatinine Clearance: 57.9 mL/min (by C-G formula based on Cr of 0.66).        Results from last 7 days  Lab Units  12/07/17  1739   URIC ACID mg/dL 4.9       Results from last 7 days  Lab Units 12/05/17  0621 12/04/17  0959   WBC 10*3/mm3 9.27 17.84*   HEMOGLOBIN g/dL 9.5* 12.1   HEMATOCRIT % 28.0* 35.5   PLATELETS 10*3/mm3 214 236           Culture Data:   No results found for: BLOODCX  No results found for: URINECX  No results found for: RESPCX  No results found for: WOUNDCX  No results found for: STOOLCX  No components found for: BODYFLD    Radiology Data:   Imaging Results (last 24 hours)     Procedure Component Value Units Date/Time    XR Chest 1 View [[id number]] Collected:  12/08/17 0441     Updated:  12/08/17 0502    Narrative:         Chest single view on  12/8/2017     CLINICAL INDICATION: Weight loss, hyponatremia    COMPARISON: None    FINDINGS: There is moderate elevation of the right hemidiaphragm.  There is minimal adjacent right basilar atelectasis or scarring.  Lungs are otherwise clear. Cardiac, hilar and mediastinal  contours are within normal limits. Pulmonary vascularity is  within normal limits.      Impression:       No acute disease.    Electronically signed by:  Remberto Sierra  12/8/2017 5:00 AM  CST Workstation: [id number]          I have reviewed the patient current medications.     Assessment/Plan     Hospital Problem List     * (Principal)Lower abdominal pain    Overview Deleted 12/4/2017 12:39 PM by Sony Burris MD            Weakness    Ulcerative colitis (Chronic)          1.  Acute ulcerative colitis  Dr. Morales from GI is following.  Patient is on steroids, mesalamine, Flagyl.Taper steroids.   Patient is at high risk of having  surgery done  Patient was to have maximum medical management.      2. Chronic diarrhea   Patient said that she did not feel dizzy or had loss of consciousness and she was at home.  Patient tripped over a step when she fell down.  Continue with lomotil as needed     3. Elevated Trops:  Troponin trending downwards  -EKG shows normal sinus rhythm, nonspecific T-wave  changes, prolonged QT.    4.  Hyponatremia  Could be because of chronic diarrhea  I ordered patient sodium and urine osmolarity  Dr. mazariegos on board       DVT Prophylaxis: SCD/NAIN  I had extensive conversation with patient and her family about patient's condition; they want to have conversation with  and with .     Discharge Planning: I expect patient to be discharged to home in 3-4 days.          This document has been electronically signed by Neelam Saravia MD on December 8, 2017 6:58 PM

## 2017-12-09 NOTE — PROGRESS NOTES
Cardiology Progress Note:     LOS: 3 days   Patient Care Team:  DESTINY Arreguin as PCP - General (Nurse Practitioner)      Subjective:      Chart reviewed , patient seen and examined.  Patient unable to undergo the stress testing today as the patient had breakfast earlier this morning.  Patient transthoracic echocardiogram had revealed concentric left ventricular hypertrophy with preserved left ventricular systolic function.  There was evidence of echo density noted in the left ventricular apex which was suggestive of possible thrombus versus Variant septal hypertrophy.  Have reviewed the echocardiographic finding with the patient and the family.  Family wanted to visualise the images of the transthoracic echocardiogram.  His reviewed with the son and the family and the patient finding of the left atrial echodensity.  Patient had not had any symptoms of chest pain.  Have discussed with the patient and the family the need for further evaluation with a transesophageal echocardiogram.  Risks benefits and treatment option for the transesophageal echocardiogram were discussed with the patient and an informed consent was obtained from the patient for the transesophageal echocardiogram.  Patient denies any abdominal discomfort.  Patient is currently on steroids and has been followed by gastroenterology Dr. Morales.        Objective:     Objective:  Vitals:    12/08/17 1520   BP: 126/65   Pulse: 100   Resp: 18   Temp: 97.8 °F (36.6 °C)   SpO2: 96%       Intake/Output Summary (Last 24 hours) at 12/08/17 1817  Last data filed at 12/08/17 1300   Gross per 24 hour   Intake             3522 ml   Output              200 ml   Net             3322 ml             Physical Exam:   General Appearance:    Alert, oriented, cooperative, in no acute distress   Head:    Normocephalic, atraumatic, without obvious abnormality   Eyes:           DEANGELO  Lids and lashes normal, conjunctivae and sclerae normal, no icterus, no pallor   Ears:     Ears appear intact with no abnormalities noted   Throat:   Mucous membranes pink and moist   Neck:   Supple, trachea midline, no carotid bruit, no organomegaly or JVD   Lungs:     Clear to auscultation and percussion, respirations regular, even and Unlabored. No wheezes, rales, rhonchi    Heart:    Regular rhythm and normal rate, normal S1 and S2, no            murmur, no gallop, no rub, no click   Abdomen:     Soft, non-tender, non-distended, no guarding, no rebound tenderness, Normal bowel sounds in all four quadrant, no masses, liver and spleen nonpalpable,    Genitalia:    Deferred   Extremities:   Moves all extremities well, no edema, no cyanosis, no              Redness, no clubbing   Pulses:   Pulses palpable and equal bilaterally   Skin:   Moist and warm. No bleeding, bruising or rash   Neurologic/Psychiatric:   Alert and oriented to person, place, and time.  Motor, power and tone in upper and lower extremity is grossly intact.  No focal neurological deficits. Normal cognitive function. No psychomotor reaction or tangential thought. No depression, homicidal ideations and suicidal ideations            Results Review:    Lab Results (last 24 hours)     Procedure Component Value Units Date/Time    Sodium [[id number]]  (Abnormal) Collected:  12/07/17 1923    Specimen:  Blood Updated:  12/07/17 1940     Sodium 136 (L) mmol/L     Blood Culture - Blood, [[id number]]  (Normal) Collected:  12/04/17 1118    Specimen:  Blood from Arm, Left Updated:  12/08/17 1131     Blood Culture No growth at 4 days    Blood Culture - Blood, [[id number]]  (Normal) Collected:  12/04/17 1110    Specimen:  Blood from Arm, Right Updated:  12/08/17 1131     Blood Culture No growth at 4 days           Medication Review:   Current Facility-Administered Medications   Medication Dose Route Frequency Provider Last Rate Last Dose   • diphenoxylate-atropine (LOMOTIL) 2.5-0.025 MG per tablet 1 tablet  1 tablet Oral Q2H PRN Nish Morales, DO   1  tablet at 12/08/17 1713   • HYDROcodone-acetaminophen (NORCO)  MG per tablet 1 tablet  1 tablet Oral Q6H PRN Neelam Saravia MD   1 tablet at 12/08/17 1712   • mesalamine (DELZICOL) delayed release capsule 800 mg  800 mg Oral TID Nish Morales DO   800 mg at 12/08/17 1712   • methylPREDNISolone sodium succinate (SOLU-Medrol) injection 40 mg  40 mg Intravenous Q6H Neelam Saravia MD   40 mg at 12/08/17 1713   • metroNIDAZOLE (FLAGYL) tablet 500 mg  500 mg Oral Q8H Neelam Saravia MD   500 mg at 12/08/17 1716   • morphine injection 2 mg  2 mg Intravenous Q2H PRN Sony Burris MD   2 mg at 12/05/17 2036   • sodium chloride 0.9 % flush 1-10 mL  1-10 mL Intravenous PRN Sony Burris MD       • sodium chloride 0.9 % flush 10 mL  10 mL Intravenous PRN Carmine Ponce PA-C       • sodium chloride 0.9 % infusion  50 mL/hr Intravenous Continuous Sylvia Islas MD 50 mL/hr at 12/08/17 1154 50 mL/hr at 12/08/17 1154       Assessment and Plan:    Principal Problem:    Lower abdominal pain  Active Problems:    Weakness    Ulcerative colitis  1.  Indeterminate troponin.  Patient has not had any further recurrent symptoms of chest discomfort.  Patient resting EKG done reveals sinus rhythm without any acute ST-T wave changes.  Patient would undergo a Lexiscan Cardiolite stress tests on Monday.  Risk benefit for the Lexiscan Cardiolite stress test was discussed with the patient and an informed consent was obtained for the Lexiscan Cardiolite stress test.  2.  Left ventricular echodensity noted on the trans-thoracic echocardiogram.  Have discussed the finding with the patient and the family at length reviewing the images off the echodensity.  Have recommended the patient to undergo a transesophageal echocardiogram to further evaluate echodensity which possibly could be a thrombus versus ventricular myxoma versus septal hypertrophy.  Risk benefit for the transesophageal echocardiogram were discussed with  the patient and an informed consent was obtained for the transesophageal echocardiogram.  Plan would be to proceed with a transesophageal echocardiogram on Monday.  3.  Arterial hypertension.  Patient blood pressure is currently well-controlled.  4.  Ulcerative colitis.  Patient is on steroids and has been followed by Dr. Morales.    Above plan of management were discussed with the patient and the family      Gbae Blount MD  12/08/17  6:17 PM      Time: Time spent on face-to-face interaction 55 minutes      EMR Dragon/Transcription disclaimer:   Some of this note may be an electronic transcription/translation of spoken language to printed text. The electronic translation of spoken language may permit erroneous, or at times, nonsensical words or phrases to be inadvertently transcribed; Although I have reviewed the note for such errors, some may still exist.

## 2017-12-09 NOTE — PROGRESS NOTES
"[facility]  [address] [place] [address]. [address]  T - [phone]  F - [phone]     Progress Note          PATIENT  DEMOGRAPHICS   PATIENT NAME: Damaris Sánchez                      PHYSICIAN: Sylvia Islas MD  : 1945  MRN: [id number]   LOS: 4 days    Patient Care Team:  DESTINY Arreguin as PCP - General (Nurse Practitioner)  Subjective   SUBJECTIVE   Still has diarrhea, abdominal pain Is better       Objective   OBJECTIVE   Vital Signs  Temp:  [96.7 °F (35.9 °C)-97.8 °F (36.6 °C)] 96.7 °F (35.9 °C)  Heart Rate:  [] 108  Resp:  [18] 18  BP: (116-147)/(61-76) 122/74    Flowsheet Rows         First Filed Value    Admission Height  160 cm (63\") Documented at 2017 0939    Admission Weight  59.4 kg (131 lb) Documented at 2017 0939           I/O last 3 completed shifts:  In: 3522 [P.O.:1320; I.V.:2202]  Out: 900 [Urine:900]    PHYSICAL EXAM    Physical Exam   Constitutional: She is oriented to person, place, and time. She appears well-developed.   HENT:   Head: Normocephalic.   Eyes: Pupils are equal, round, and reactive to light.   Cardiovascular: Normal rate, regular rhythm and normal heart sounds.    Pulmonary/Chest: Effort normal and breath sounds normal.   Abdominal: Soft. Bowel sounds are normal. There is tenderness.   Neurological: She is alert and oriented to person, place, and time.       RESULTS   Results Review:      Results from last 7 days  Lab Units 17  0435 17  1923 17  0621 17  0959   SODIUM mmol/L 136* 136* 129* 128*   POTASSIUM mmol/L 3.2*  --  3.5 4.2   CHLORIDE mmol/L 106  --  101 91*   CO2 mmol/L 24.0  --  20.0* 25.0   BUN mg/dL 16  --  11 20   CREATININE mg/dL 0.67  --  0.66 1.08*   CALCIUM mg/dL 8.0*  --  7.5* 8.9   BILIRUBIN mg/dL 0.3  --  0.3 0.7   ALK PHOS U/L 83  --  82 124   ALT (SGPT) U/L 32  --  28 26   AST (SGOT) U/L 15  --  29 33   GLUCOSE mg/dL 164*  --  125* 110*       Estimated Creatinine Clearance: " 57.9 mL/min (by C-G formula based on Cr of 0.67).            Results from last 7 days  Lab Units 12/07/17  1739   URIC ACID mg/dL 4.9         Results from last 7 days  Lab Units 12/09/17  0411 12/05/17  0621 12/04/17  0959   WBC 10*3/mm3 8.24 9.27 17.84*   HEMOGLOBIN g/dL 9.0* 9.5* 12.1   PLATELETS 10*3/mm3 172 214 236               Imaging Results (last 24 hours)     ** No results found for the last 24 hours. **           MEDICATIONS      famotidine 40 mg Oral Daily   mesalamine 800 mg Oral TID   [START ON 12/10/2017] methylPREDNISolone sodium succinate 40 mg Intravenous Daily       sodium chloride 25 mL/hr Last Rate: 25 mL/hr (12/08/17 2204)       Assessment/Plan   ASSESSMENT / PLAN    Principal Problem:    Lower abdominal pain  Active Problems:    Weakness    Ulcerative colitis    1- Hyponatremia.  Patient's urine sodium is on the low side.  She does have high urine osmolality.  I think so this is likely related to hypovolemia in the setting of severe diarrhea. Na is upto 136 stop ivf. Observe.  Uric acid not low tsh is normal. cxr negative for mass     2.ulcerative colitis with acute flare currently on IV steroid Asacol.  Patient has recently been started on Humira. Still has diarrhea and discuss with dr Morales and will try imodium    3. Inc troponins awaiting stress test on monday                   This document has been electronically signed by Sylvia Islas MD on December 9, 2017 10:52 AM

## 2017-12-09 NOTE — PROGRESS NOTES
Herbert Morales DO,[facility]  Gastroenterology  Hepatology  Endoscopy  Board Certified in Internal Medicine and gastroenterology  [address], [address]  [place]. [address]  T- [phone]   F - [phone]     GASTROENTEROLOGY PROGRESS NOTE   HERBERT MORALES DO.         SUBJECTIVE:   12/9/2017  Chief Complaint:     Subjective  : Feels that the diarrhea is getting worse.  Had 3 bowel movements last night and 5 bowel movements today.  No blood.  Abdominal pain better.  Spoke with Dr. dishonesty and I guess it's okay to switch to Imodium.  It certainly is less effective but it is reasonable.  She also says that she would like to try the higher doses of steroids for at least 7 days.  I told her my concern is that she has an acutely inflamed colon that is not responding to treatment and this may be a situation where she needs to have surgical intervention.  I am willing to try to treat her for another 7 days of high doses of steroids as well as another shot of the Humira on Tuesday.  However, if she continues to not responding well, she needs to be transferred to colorectal surgeons and gastroenterology consultants.  I suggested [facility] because they have both of these specialties.      Worried about her leg swelling.  She does not feel like she can move her legs as well as she should.     CURRENT MEDICATIONS/OBJECTIVE/VS/PE:     Current Medications:     Current Facility-Administered Medications   Medication Dose Route Frequency Provider Last Rate Last Dose   • famotidine (PEPCID) tablet 40 mg  40 mg Oral Daily Neelam Saravia MD   40 mg at 12/09/17 0937   • HYDROcodone-acetaminophen (NORCO)  MG per tablet 1 tablet  1 tablet Oral Q6H PRN Neelam Saravia MD   1 tablet at 12/09/17 0936   • loperamide (IMODIUM) capsule 2 mg  2 mg Oral 4x Daily PRN Sylvia Islas MD       • Magnesium Sulfate 2 gram Bolus, followed by 8 gram infusion (total Mg dose 10 grams)- Mg less than or equal to 1mg/dL   2 g Intravenous PRN Neelam Saravia MD        Or   • Magnesium Sulfate 6 gram Infusion (2 gm x 3) -Mg 1.1 -1.5 mg/dL  2 g Intravenous PRN Neelam Saravia MD        Or   • magnesium sulfate 4 gram infusion- Mg 1.6-1.9 mg/dL  4 g Intravenous PRN Neelam Saravia MD       • mesalamine (DELZICOL) delayed release capsule 800 mg  800 mg Oral TID Nish Morales DO   800 mg at 12/09/17 0900   • [START ON 12/10/2017] methylPREDNISolone sodium succinate (SOLU-Medrol) injection 40 mg  40 mg Intravenous Daily Neelam Saravia MD       • morphine injection 2 mg  2 mg Intravenous Q2H PRN Sony Burris MD   2 mg at 12/05/17 2036   • potassium & sodium phosphates (PHOS-NAK) 280-160-250 MG packet - for Phosphorus less than 1.25 mg/dL  1 packet Oral Q6H PRN Neelam Saravia MD        Or   • potassium & sodium phosphates (PHOS-NAK) 280-160-250 MG packet - for Phosphorus 1.25 - 2.1 mg/dL  1 packet Oral Once PRN Neelam Saravia MD       • potassium chloride (KLOR-CON) packet 40 mEq  40 mEq Oral PRN Neelam Saravia MD       • potassium chloride (MICRO-K) CR capsule 40 mEq  40 mEq Oral PRN Neelam Saravia MD       • potassium chloride (MICRO-K) CR capsule 40 mEq  40 mEq Oral Once Sylvia Islas MD       • sodium chloride 0.9 % flush 1-10 mL  1-10 mL Intravenous PRN Sony Burris MD       • sodium chloride 0.9 % flush 10 mL  10 mL Intravenous PRN Carmine Ponce PA-C           Objective     Physical Exam:   Temp:  [96.7 °F (35.9 °C)-97.8 °F (36.6 °C)] 97 °F (36.1 °C)  Heart Rate:  [] 110  Resp:  [18] 18  BP: (116-129)/(61-74) 129/67     Physical Exam:  General Appearance:    Alert, cooperative, in no acute distress   Head:    Normocephalic, without obvious abnormality, atraumatic   Eyes:            Lids and lashes normal, conjunctivae and sclerae normal, no   icterus, no pallor, corneas clear, PERRLA   Ears:    Ears appear intact with no abnormalities noted   Throat:   No oral lesions, no  thrush, oral mucosa moist   Neck:   No adenopathy, supple, trachea midline, no thyromegaly, no     carotid bruit, no JVD   Back:     No kyphosis present, no scoliosis present, no skin lesions,       erythema or scars, no tenderness to percussion or                   palpation,   range of motion normal   Lungs:     Clear to auscultation,respirations regular, even and                   unlabored    Heart:    Regular rhythm and normal rate, normal S1 and S2, no            murmur, no gallop, no rub, no click   Breast Exam:    Deferred   Abdomen:     Normal bowel sounds, no masses, no organomegaly, soft        non-tender, non-distended, no guarding, no rebound                 tenderness   Genitalia:    Deferred   Extremities:   Moves all extremities well, 2+ edema, no cyanosis, no              redness   Pulses:   Pulses palpable and equal bilaterally   Skin:   No bleeding, bruising or rash   Lymph nodes:   No palpable adenopathy   Neurologic:   Cranial nerves 2 - 12 grossly intact, sensation intact, DTR        present and equal bilaterally      Results Review:     Lab Results (last 24 hours)     Procedure Component Value Units Date/Time    CBC (No Diff) [[id number]]  (Abnormal) Collected:  12/09/17 0411    Specimen:  Blood Updated:  12/09/17 0446     WBC 8.24 10*3/mm3      RBC 2.97 (L) 10*6/mm3      Hemoglobin 9.0 (L) g/dL      Hematocrit 26.8 (L) %      MCV 90.2 fL      MCH 30.3 pg      MCHC 33.6 g/dL      RDW 15.0 (H) %      RDW-SD 49.3 (H) fl      MPV 9.1 fL      Platelets 172 10*3/mm3     Comprehensive Metabolic Panel [[id number]]  (Abnormal) Collected:  12/09/17 0435    Specimen:  Blood Updated:  12/09/17 0504     Glucose 164 (H) mg/dL      BUN 16 mg/dL      Creatinine 0.67 mg/dL      Sodium 136 (L) mmol/L      Potassium 3.2 (L) mmol/L      Chloride 106 mmol/L      CO2 24.0 mmol/L      Calcium 8.0 (L) mg/dL      Total Protein 5.1 (L) g/dL      Albumin 2.40 (L) g/dL      ALT (SGPT) 32 U/L      AST (SGOT) 15 U/L       Alkaline Phosphatase 83 U/L      Total Bilirubin 0.3 mg/dL      eGFR Non African Amer 87 mL/min/1.73      Globulin 2.7 gm/dL      A/G Ratio 0.9 (L) g/dL      BUN/Creatinine Ratio 23.9     Anion Gap 6.0 mmol/L     Narrative:       The MDRD GFR formula is only valid for adults with stable renal function between ages 18 and 70.    Blood Culture - Blood, [[id number]]  (Normal) Collected:  12/04/17 1118    Specimen:  Blood from Arm, Left Updated:  12/09/17 1131     Blood Culture No growth at 5 days    Blood Culture - Blood, [[id number]]  (Normal) Collected:  12/04/17 1110    Specimen:  Blood from Arm, Right Updated:  12/09/17 1131     Blood Culture No growth at 5 days    Magnesium [[id number]]  (Normal) Collected:  12/09/17 1156    Specimen:  Blood Updated:  12/09/17 1224     Magnesium 1.9 mg/dL     Potassium [[id number]]  (Abnormal) Collected:  12/09/17 1156    Specimen:  Blood Updated:  12/09/17 1224     Potassium 3.3 (L) mmol/L            I reviewed the patient's new clinical results.  I reviewed the patient's new imaging results and agree with the interpretation.     ASSESSMENT/PLAN:   ASSESSMENT:   1.  Chronic ulcerative colitis, acute flare, poor response to medical treatments  2.  Positive CMV IgG  PLAN:   1.  Increase the Solu-Medrol to 60 mg IV every 6 hours  2.  Humira on Tuesday  3.  Review with Dr. Blount  Certainly if there is a clot in the LV this may need to have further consideration.    4.  With the CMV IgG that was done that is positive I'm going to do a CMV PCR      Nish Morales,   12/09/17  12:59 PM

## 2017-12-09 NOTE — PROGRESS NOTES
[facility] Medicine Services  INPATIENT PROGRESS NOTE    Length of Stay: 4  Date of Admission: 12/4/2017  Primary Care Physician: DESTINY Arreguin    Subjective   Chief Complaint: Acute ulcerative colitis    12/5/2017: Patient still has persistent abdominal pain     December 6, 2017: Patient is still having persistent left-sided abdominal pain.  Patient's diarrhea has improved after taking Lomotil.    December 7,2017: Abdominal pain is still persistent.     December 8,2017: Abdominal pain is still persistent; patient has thrombus in left atrium.     December 9, 2017: Patient said that her abdominal pain is improved.  Patient had a comfortable night.  I had extensive conversation with patient and her family regarding patient's condition.  Patient told me that all her questions have been answered.    Review of Systems   Constitutional: Negative for activity change, appetite change, fatigue and fever.   HENT: Negative for ear pain and sore throat.    Eyes: Negative for pain and visual disturbance.   Respiratory: Negative for cough and shortness of breath.    Cardiovascular: Negative for chest pain and palpitations.   Gastrointestinal: Negative for abdominal pain and nausea.   Endocrine: Negative for cold intolerance and heat intolerance.   Genitourinary: Negative for difficulty urinating and dysuria.   Musculoskeletal: Negative for arthralgias and gait problem.   Skin: Negative for color change and rash.   Neurological: Negative for dizziness, weakness and headaches.   Hematological: Negative for adenopathy. Does not bruise/bleed easily.   Psychiatric/Behavioral: Negative for agitation, confusion and sleep disturbance.        All pertinent negatives and positives are as above. All other systems have been reviewed and are negative unless otherwise stated.     Objective    Temp:  [96.7 °F (35.9 °C)-97.8 °F (36.6 °C)] 96.7 °F (35.9 °C)  Heart Rate:  [] 108  Resp:  [18]  18  BP: (116-147)/(61-76) 122/74    Physical Exam   Constitutional: She is oriented to person, place, and time. She appears well-developed and well-nourished. No distress.   HENT:   Head: Normocephalic and atraumatic.   Right Ear: External ear normal.   Left Ear: External ear normal.   Mouth/Throat: Oropharynx is clear and moist.   Eyes: Conjunctivae and EOM are normal. Pupils are equal, round, and reactive to light. Left eye exhibits no discharge. No scleral icterus.   Neck: No tracheal deviation present. No thyromegaly present.   Cardiovascular: Normal rate, regular rhythm and normal heart sounds.  Exam reveals no gallop and no friction rub.    No murmur heard.  Pulmonary/Chest: Effort normal and breath sounds normal. No stridor. No respiratory distress. She has no wheezes. She has no rales.   Abdominal: Soft. Bowel sounds are normal. She exhibits no distension. There is no tenderness. There is no rebound.   Left-sided abdominal tenderness   Musculoskeletal: She exhibits no edema, tenderness or deformity.   Neurological: She is alert and oriented to person, place, and time. She displays normal reflexes. Coordination normal.   Skin: Skin is warm and dry. No rash noted. No erythema. No pallor.   Psychiatric: She has a normal mood and affect. Her behavior is normal. Thought content normal.           famotidine 40 mg Oral Daily   mesalamine 800 mg Oral TID   methylPREDNISolone sodium succinate 40 mg Intravenous Q12H   metroNIDAZOLE 500 mg Oral Q8H         Results Review:  I have reviewed the labs, radiology results, and diagnostic studies.    Laboratory Data:     Results from last 7 days  Lab Units 12/09/17  0435 12/07/17  1923 12/05/17  0621 12/04/17  0959   SODIUM mmol/L 136* 136* 129* 128*   POTASSIUM mmol/L 3.2*  --  3.5 4.2   CHLORIDE mmol/L 106  --  101 91*   CO2 mmol/L 24.0  --  20.0* 25.0   BUN mg/dL 16  --  11 20   CREATININE mg/dL 0.67  --  0.66 1.08*   GLUCOSE mg/dL 164*  --  125* 110*   CALCIUM mg/dL 8.0*   --  7.5* 8.9   BILIRUBIN mg/dL 0.3  --  0.3 0.7   ALK PHOS U/L 83  --  82 124   ALT (SGPT) U/L 32  --  28 26   AST (SGOT) U/L 15  --  29 33   ANION GAP mmol/L 6.0  --  8.0 12.0     Estimated Creatinine Clearance: 57.9 mL/min (by C-G formula based on Cr of 0.67).        Results from last 7 days  Lab Units 12/07/17  1739   URIC ACID mg/dL 4.9       Results from last 7 days  Lab Units 12/09/17  0411 12/05/17  0621 12/04/17  0959   WBC 10*3/mm3 8.24 9.27 17.84*   HEMOGLOBIN g/dL 9.0* 9.5* 12.1   HEMATOCRIT % 26.8* 28.0* 35.5   PLATELETS 10*3/mm3 172 214 236           Culture Data:   No results found for: BLOODCX  No results found for: URINECX  No results found for: RESPCX  No results found for: WOUNDCX  No results found for: STOOLCX  No components found for: BODYFLD    Radiology Data:   Imaging Results (last 24 hours)     ** No results found for the last 24 hours. **          I have reviewed the patient current medications.     Assessment/Plan     Hospital Problem List     * (Principal)Lower abdominal pain    Overview Deleted 12/4/2017 12:39 PM by Sony Burris MD            Weakness    Ulcerative colitis (Chronic)          1.  Acute ulcerative colitis  Dr. Morales from GI is following.  Patient is on steroids, mesalamine, Flagyl.Taper steroids.   Patient is at high risk of having  surgery done  Patient was to have maximum medical management.      2. Chronic diarrhea   Patient said that she did not feel dizzy or had loss of consciousness and she was at home.  Patient tripped over a step when she fell down.  Continue with lomotil as needed     3. Elevated Trops:  Troponin trending downwards  -EKG shows normal sinus rhythm, nonspecific T-wave changes, prolonged QT.  Patient is found to have left ventricular thrombus on echo.  Patient will need transesophageal echocardiogram.  Patient will need stress test.    4.  Hyponatremia  Could be because of chronic diarrhea  I ordered patient sodium and urine osmolarity  Dr. mazariegos  on board       5.  Hypokalemia  Replaced      DVT Prophylaxis: SCD/NAIN  I had extensive conversation with patient and her family about patient's condition; they want to have conversation with  and with .     Discharge Planning: I expect patient to be discharged to home in 3-4 days.          This document has been electronically signed by Neelam Saravia MD on December 9, 2017 7:59 AM

## 2017-12-09 NOTE — THERAPY TREATMENT NOTE
Acute Care - Occupational Therapy Treatment Note  [facility]     Patient Name: Damaris Sánchez  : 1945  MRN: [id number]  Today's Date: 2017  Onset of Illness/Injury or Date of Surgery Date: 17  Date of Referral to OT: 17  Referring Physician: Dr. Neelam Saravia      Admit Date: 2017    Visit Dx:     ICD-10-CM ICD-9-CM   1. Weakness R53.1 780.79   2. Ulcerative colitis with complication, unspecified location K51.919 556.9   3. NSTEMI (non-ST elevated myocardial infarction) I21.4 410.70   4. Lower abdominal pain R10.30 789.09   5. Decreased activities of daily living (ADL) Z78.9 V49.89   6. Impaired functional mobility, balance, gait, and endurance Z74.09 V49.89   7. Impaired mobility and ADLs Z74.09 799.89   8. Troponin level elevated R74.8 790.6   9. Chest pain, unspecified type R07.9 786.50     Patient Active Problem List   Diagnosis   • Lower abdominal pain   • C. difficile colitis   • Weakness   • Ulcerative colitis             Adult Rehabilitation Note       17 1320 17 0842 17 1420    Rehab Assessment/Intervention    Discipline occupational therapy assistant  -CS,PG,CS2 occupational therapy assistant  -KD occupational therapy assistant  -LILIANA,PG,CS2    Document Type therapy note (daily note)  -CS,PG,CS2 therapy note (daily note)  -KD therapy note (daily note)  -CS,PG,CS2    Subjective Information agree to therapy;complains of;pain  -CS,PG,CS2 agree to therapy  -KD agree to therapy  -CS,PG,CS2    Patient Effort, Rehab Treatment adequate  -CS,PG,CS2 adequate  -KD adequate  -CS,PG,CS2    Precautions/Limitations   fall precautions  -CS,PG,CS2    Specific Treatment Considerations  Tx started @ 8:44  -KD     Recorded by [CS,PG,CS2] MADDI Hare/JARAD (r) Heather Patino OT Student (t) JAM Hare (c) [KD] MADDI Kelley/JARAD [CS,PG,CS2] MADDI Hare/JARAD (r) Heather Patino, OT Student (t) JAM Hare (c)    Vital Signs     Pre Systolic BP Rehab 126  -CS,PG,CS2 110  -  -CS,PG,CS2    Pre Treatment Diastolic BP 65  -CS,PG,CS2 60  -KD 64  -CS,PG,CS2    Post Systolic BP Rehab 142  -CS,PG,CS2  130  -CS,PG,CS2    Post Treatment Diastolic BP 73  -CS,PG,CS2  65  -CS,PG,CS2    Pretreatment Heart Rate (beats/min) 99  -CS,PG,CS2 97  -KD 94  -CS,PG,CS2    Posttreatment Heart Rate (beats/min) 103  -CS,PG,CS2 96  -KD 90  -CS,PG,CS2    Pre SpO2 (%) 98  -CS,PG,CS2 95  -KD 90  -CS,PG,CS2    O2 Delivery Pre Treatment room air  -CS,PG,CS2 room air  -KD room air  -CS,PG,CS2    Post SpO2 (%) 99  -CS,PG,CS2 96  -KD     O2 Delivery Post Treatment room air  -CS,PG,CS2 room air  -KD     Pre Patient Position Supine  -CS,PG,CS2 Supine  -KD Supine  -CS,PG,CS2    Intra Patient Position  Supine  -KD Supine  -CS,PG,CS2    Post Patient Position  Supine  -KD Supine  -CS,PG,CS2    Recorded by [CS,PG,CS2] MADDI Hare/JARAD (r) Heather Patino, OT Student (t) MADDI Hare/JARAD (c) [KD] MADDI Kelley/JARAD [CS,PG,CS2] MADDI Hare/JARAD (r) Heather aPtino, OT Student (t) MADDI Hare/L (c)    Pain Assessment    Pain Assessment 0-10  -CS,PG,CS2 No/denies pain  -KD 0-10  -CS,PG,CS2    Pain Score 5  -CS,PG,CS2 0  -KD 4  -CS,PG,CS2    Post Pain Score 4  -CS,PG,CS2  4  -CS3    Pain Type   Chronic pain  -CS,PG,CS2    Pain Location Abdomen  -CS,PG,CS2  Back  -CS,PG,CS2    Recorded by [CS,PG,CS2] MADDI Hare/JARAD (r) Heather Patino, OT Student (t) HILARY HareA/L (c) [KD] HILARY KelleyA/L [CS,PG,CS2] MADDI Hare/JARAD (r) Heather Patino, OT Student (t) HILARY HareA/JARAD (c)  [CS3] HILARY HareA/L    Vision Assessment/Intervention    Visual Impairment WFL with corrective lenses  -CS,PG,CS2  WFL with corrective lenses  -CS,PG,CS2    Recorded by [CS,PG,CS2] MADDI Hare/JARAD (r) Heather Patino, OT Student (t) MADDI Hare/L (c)  [CS,PG,CS2] MADDI Hare/L (r)  Heather Patino, OT Student (t) MADDI Hare/L (c)    Cognitive Assessment/Intervention    Current Cognitive/Communication Assessment functional  -CS,PG,CS2 functional  -KD functional  -CS,PG,CS2    Orientation Status oriented x 4  -CS,PG,CS2 oriented x 4  -KD oriented x 4  -CS,PG,CS2    Follows Commands/Answers Questions 100% of the time  -CS,PG,CS2 100% of the time  -% of the time  -CS,PG,CS2    Personal Safety WNL/WFL  -CS,PG,CS2 WNL/WFL  -KD WNL/WFL  -CS,PG,CS2    Personal Safety Interventions  gait belt;nonskid shoes/slippers when out of bed  -KD gait belt;nonskid shoes/slippers when out of bed  -CS3    Recorded by [CS,PG,CS2] MADDI Hare/JARAD (r) Heather Patino, OT Student (t) JAM Hare (c) [KD] MADDI Kelley/JARAD [CS,PG,CS2] MADDI Hare/JARAD (r) Heather Patino, OT Student (t) JAM Hare (c)  [CS3] MADDI Hare/L    Bed Mobility, Assessment/Treatment    Bed Mobility, Assistive Device bed rails;draw sheet  -CS,PG,CS2      Bed Mobility, Roll Left, [place] minimum assist (75% patient effort)  -CS,PG,CS2      Bed Mobility, Roll Right, [place] minimum assist (75% patient effort)  -CS,PG,CS2      Recorded by [CS,PG,CS2] MADDI Hare/JARAD (r) Heather Patino, OT Student (t) JAM Hare (c)      Upper Body Bathing Assessment/Training    UB Bathing Assess/Train Assistive Device bath mitt  -CS,PG,CS2      UB Bathing Assess/Train, Position long sitting;supine  -CS,PG,CS2      UB Bathing Assess/Train, [place] Level minimum assist (75% patient effort);set up required  -CS,PG,CS2      UB Bathing Assess/Train, Impairments ROM decreased;strength decreased  -CS,PG,CS2      UB Bathing Assess/Train, Comment   deferred  -CS,PG,CS2    Recorded by [CS,PG,CS2] JAM Hare (r) Heather Patino, OT Student (t) JAM Hare (c)  [CS,PG,CS2] JAM Hare (reggie) Heather Patino, OT Student  (t) MADDI Hare/L (c)    Lower Body Bathing Assessment/Training    LB Bathing Assess/Train Assistive Device bath mitt  -CS,PG,CS2      LB Bathing Assess/Train, Position long sitting;supine  -CS,PG,CS2      LB Bathing Assess/Train, [place] Level set up required;moderate assist (50% patient effort)  -CS,PG,CS2      LB Bathing Assess/Train, Impairments ROM decreased;strength decreased  -CS,PG,CS2      LB Bathing Assess/Train, Comment Pt required max A with lisa  bathing,   -CS,PG,CS2  deferred  -CS,PG,CS2    Recorded by [CS,PG,CS2] MADDI Hare/JARAD (r) Heather Patino, OT Student (t) JAM Hare (c)  [CS,PG,CS2] MADDI Hare/JARAD (r) Heather Patino OT Student (t) MADDI Hare/L (c)    Upper Body Dressing Assessment/Training    UB Dressing Assess/Train, Clothing Type doffing:;donning:;hospital gown  -CS,PG,CS2      UB Dressing Assess/Train, Position supine;long sitting  -CS,PG,CS2      UB Dressing Assess/Train, [place] minimum assist (75% patient effort);set up required  -CS,PG,CS2      UB Dressing Assess/Train, Impairments ROM decreased;strength decreased  -CS,PG,CS2      UB Dressing Assess/Train, Comment   deferred  -CS,PG,CS2    Recorded by [CS,PG,CS2] MADDI Hare/JARAD (r) Heather Patino, OT Student (t) MADDI Hare/L (c)  [CS,PG,CS2] MADDI Hare/JARAD (r) Heather Pation OT Student (t) MADDI Hare/L (c)    Lower Body Dressing Assessment/Training    LB Dressing Assess/Train, Clothing Type doffing:;donning:;slipper socks  -CS,PG,CS2      LB Dressing Assess/Train, Position supine  -CS,PG,CS2      LB Dressing Assess/Train, [place] supervision required;set up required  -CS,PG,CS2      LB Dressing Assess/Train, Impairments ROM decreased;strength decreased  -CS,PG,CS2      LB Dressing Assess/Train, Comment   deferred  -CS,PG,CS2    Recorded by [CS,PG,CS2] MADDI Hare/JARAD (reggie) Heather Patino OT Student (t)  Madhavi L Duong, LINDA/L (c)  [CS,PG,CS2] HILARY HareA/L (r) Heather Patino, OT Student (t) MADDI Hare/L (c)    Toileting Assessment/Training    Toileting Assess/Train, Position long sitting;supine  -CS,PG,CS2      Toileting Assess/Train, Indep[place] Level maximum assist (25% patient effort)  -CS,PG,CS2      Toileting Assess/Train, Impairments ROM decreased;strength decreased  -CS,PG,CS2      Toileting Assess/Train, Comment Pt had BM during session and required max A to clean lisa area after.  Pt could roll side to side.   -CS,PG,CS2  deferred  -CS,PG,CS2    Recorded by [CS,PG,CS2] HILARY HareA/L (r) Heather Patino, OT Student (t) MADDI Hare/L (c)  [CS,PG,CS2] MADDI Hare/JARAD (r) Heather Patino, OT Student (t) MADDI Hare/L (c)    Grooming Assessment/Training    Grooming Assess/Train, Position sitting  -CS,PG,CS2      Grooming Assess/Train, Indepen Level moderate assist (50% patient effort);set up required  -CS3      Grooming Assess/Train, Impairments ROM decreased;strength decreased  -CS,PG,CS2      Grooming Assess/Train, Comment Pt setup for applying deoderant and washing face.  Pt required mod A for hair combing.   -CS,PG,CS2  deferred  -CS,PG,CS2    Recorded by [CS,PG,CS2] HILARY HareA/L (r) Heather Patino, OT Student (t) MADDI Hare/JARAD (c)  [CS3] MADDI Hare/JARAD  [CS,PG,CS2] MADDI Hare/JARAD (r) Heather Patino, OT Student (t) MADDI Hare/L (c)    Therapy Exercises    Bilateral Upper Extremity  AROM:;10 reps;sitting;elbow flexion/extension;hand pumps;pronation/supination;shoulder abduction/adduction;shoulder extension/flexion;shoulder ER/IR;shoulder horizontal abd/add  -KD AROM:;20 reps;supine;elbow flexion/extension;pronation/supination;shoulder extension/flexion;shoulder ER/IR  -CS,PG,CS2    BUE Resistance  manual resistance- minimal   2 sets with RB's PRN  -KD manual resistance- minimal   -CS,PG,CS2    Recorded by  [KD] MADDI Kelley/JARAD [CS,PG,CS2] MADDI Hare/JARAD (r) Heather Patino, OT Student (t) JAM Hare (c)    Positioning and Restraints    Pre-Treatment Position in bed  -CS,PG,CS2 in bed  -KD in bed  -CS,PG,CS2    Post Treatment Position bed  -CS,PG,CS2 bed  -KD bed  -CS,PG,CS2    In Bed supine;call light within reach  -CS,PG,CS2 sitting;call light within reach;exit alarm on;encouraged to call for assist;with family/caregiver  -KD supine;call light within reach  -CS,PG,CS2    Recorded by [CS,PG,CS2] MADDI Hare/JARAD (r) Heather Patino, HUMBERTO Student (t) JAM Hare (c) [KD] MADDI Kelley/JARAD [CS,PG,CS2] MADDI Hare/JARAD (r) Heather Patino, HUMBERTO Student (t) JAM Hare (c)      12/07/17 1005          Rehab Assessment/Intervention    Discipline physical therapy assistant  -SEJAL      Document Type therapy note (daily note)  -SEJAL      Subjective Information agree to therapy  -SEJAL      Patient Effort, Rehab Treatment adequate  -SEJAL      Patient Effort, Rehab Treatment Comment pt requires encouragement to participate.   -SEJAL      Symptoms Noted During/After Treatment fatigue  -SEJAL      Precautions/Limitations fall precautions  -SEJAL      Recorded by [SEJAL] Shalom Ross PTA      Vital Signs    Pre Systolic BP Rehab 108  -SEJAL      Pre Treatment Diastolic BP 56  -SEJAL      Post Systolic BP Rehab 119  -SEJAL      Post Treatment Diastolic BP 72  -SEJAL      Pretreatment Heart Rate (beats/min) 89  -SEJAL      Intratreatment Heart Rate (beats/min) 109  -SEJAL      Posttreatment Heart Rate (beats/min) 94  -SEJAL      Pre SpO2 (%) 98  -SEJAL      O2 Delivery Pre Treatment room air  -SEJAL      Intra SpO2 (%) 96  -SEJAL      O2 Delivery Intra Treatment room air  -SEJAL      Post SpO2 (%) 98  -SEJAL      O2 Delivery Post Treatment room air  -SEJAL      Pre Patient Position Supine  -SEJAL      Post Patient Position Sitting  -SEJAL      Recorded by [SEJAL] Shalom Ross PTA      Pain  Assessment    Pain Assessment 0-10  -SEJAL      Pain Score 4  -SEJAL      Post Pain Score 4  -SEJAL      Pain Location Abdomen   and L flank  -      Pain Intervention(s) Medication (See MAR)  -SEJAL      Recorded by [SEJAL] Shalom Ross PTA      Vision Assessment/Intervention    Visual Impairment WFL with corrective lenses  -SEJAL      Recorded by [SEJAL] Shalom Ross PTA      Cognitive Assessment/Intervention    Current Cognitive/Communication Assessment functional  -SEJAL      Orientation Status oriented x 4  -SEJAL      Follows Commands/Answers Questions 100% of the time  -SEJAL      Personal Safety WNL/WFL  -SEJAL      Personal Safety Interventions gait belt;muscle strengthening facilitated;nonskid shoes/slippers when out of bed;supervised activity  -SEJAL      Recorded by [SEJAL] Shalom Ross PTA      Bed Mobility, Assessment/Treatment    Bed Mobility, Assistive Device bed rails;head of bed elevated  -SEJAL      Bed Mobility, Scoot/Bridge, [place] supervision required   multiple scoots  -SEJAL      Bed Mob, Supine to Sit, [place] minimum assist (75% patient effort)  -SEJAL      Recorded by [SEJAL] Shalom Ross PTA      Transfer Assessment/Treatment    Transfers, Sit-Stand [place] contact guard assist;minimum assist (75% patient effort)  -      Transfers, Stand-Sit [place] contact guard assist  -SEJAL      Transfers, Sit-Stand-Sit, Assist Device rolling walker  -SEJAL      Transfer, Comment pt requires cueing for hand placement w/ sit-stand-sit. pt completed sit-stands 6x2  -SEJAL      Recorded by [SEJAL] Shalom Ross PTA      Gait Assessment/Treatment    Gait, [place] Level contact guard assist  -SEJAL      Gait, Assistive Device rolling walker  -      Gait, Distance (Feet) 172  -SEJAL      Gait, Gait Deviations irving decreased;step length decreased  -SEJAL      Gait, Impairments strength decreased  -SEJAL      Gait, Comment pt required encouragement to participate in gait training. pt stated multiple times that her legs wouldn't  support her. pt felt accomplished after   -SEJAL      Recorded by [SEJAL] Shalom Ross PTA      Stairs Assessment/Treatment    Stairs, [place] Level not tested  -SEJAL      Recorded by [SEJAL] Shalom Ross PTA      Therapy Exercises    Bilateral Lower Extremities 20 reps;supine;ankle pumps/circles;hip abduction/adduction;heel slides;sitting;LAQ;knee flexion;glut sets;AROM:;AAROM:   sit-stands 6x2.   -SEJAL      Recorded by [SEJAL] Shalom Ross PTA      Positioning and Restraints    Pre-Treatment Position in bed  -SEJAL      Post Treatment Position bed   pt states she will sit in recliner after lunch.   -SEJAL      In Bed sitting EOB;call light within reach;encouraged to call for assist;exit alarm on;patient within staff view   all needs met. recliner prepared w/ alarm for after lunch.   -SEJAL      Recorded by [SEJAL] Shalom Ross PTA        User Key  (r) = Recorded By, (t) = Taken By, (c) = Cosigned By    Initials Name Effective Dates    SEJAL Ross PTA 10/17/16 -     KD Swetha Kitchen, LINDA/L 10/17/16 -     CS Madhavi Watters LINDA/L 10/17/16 -     PG Heather Patino, OT Student 01/31/17 -                 OT Goals       12/09/17 1416 12/08/17 0937 12/07/17 1545    Transfer Training OT LTG    Transfer Training OT LTG, Date Goal Reviewed 12/09/17  -CS (r) PG (t) CS (c) 12/08/17  -KD 12/07/17  -CS (r) PG (t) CS (c)    Transfer Training OT LTG, Outcome  goal not met  -KD     Strength OT LTG    Strength Goal OT LTG, Date Goal Reviewed 12/09/17  -CS (r) PG (t) CS (c) 12/08/17  -KD 12/07/17  -CS (r) PG (t) CS (c)    Strength Goal OT LTG, Outcome  goal not met  -KD     ADL OT LTG    ADL OT LTG, Date Goal Reviewed 12/09/17  -CS (r) PG (t) CS (c) 12/08/17  -KD 12/07/17  -CS (r) PG (t) CS (c)    ADL OT LTG, Outcome  goal not met  -KD       12/06/17 1100          Transfer Training OT LTG    Transfer Training OT LTG, Date Established 12/06/17  -NN      Transfer Training OT LTG, Time to Achieve by discharge  -NN      Transfer  Training OT LTG, Activity Type all transfers  -NN      Transfer Training OT LTG, [place] Level conditional independence  -NN      Transfer Training OT LTG, Assist Device walker, rolling  -NN      Strength OT LTG    Strength Goal OT LTG, Date Established 12/06/17  -NN      Strength Goal OT LTG, Time to Achieve by discharge  -NN      Strength Goal OT LTG, Measure to Achieve Pt. will complete BUE strengthening exercises all planes and joints to increase BUE strength to 5/5 for ADLs.   -NN      ADL OT LTG    ADL OT LTG, Date Established 12/06/17  -NN      ADL OT LTG, Time to Achieve by discharge  -NN      ADL OT LTG, Activity Type ADL skills  -NN      ADL OT LTG, [place] Level independent  -NN        User Key  (r) = Recorded By, (t) = Taken By, (c) = Cosigned By    Initials Name Provider Type    SANDRA Kitchen, LINDA/L Occupational Therapy Assistant    LILIANA Watters, LINDA/L Occupational Therapy Assistant    NN Ashley Brannon, OTR/L Occupational Therapist    PG Heather Patino, OT Student OT Student          Occupational Therapy Education     Title: PT OT SLP Therapies (Active)     Topic: Occupational Therapy (Done)     Point: ADL training (Done)    Description: Instruct learner(s) on proper safety adaptation and remediation techniques during self care or transfers.   Instruct in proper use of assistive devices.    Learning Progress Summary    Learner Readiness Method Response Comment Documented by Status   Patient Acceptance E VU  PG 12/09/17 1416 Done    Acceptance E JUDE,NR Pt. educated on role of OT, safe t/f, benefit of activity, r/w safety, use of brief, progression with poc NN 12/06/17 1059 Done               Point: Home exercise program (Done)    Description: Instruct learner(s) on appropriate technique for monitoring, assisting and/or progressing therapeutic exercises/activities.    Learning Progress Summary    Learner Readiness Method Response Comment Documented by Status   Patient Acceptance  E VU  PG 12/09/17 1416 Done    Acceptance D NR  KD 12/08/17 0937 Active    Acceptance E VU,NR Pt. educated on role of OT, safe t/f, benefit of activity, r/w safety, use of brief, progression with poc NN 12/06/17 1059 Done               Point: Body mechanics (Done)    Description: Instruct learner(s) on proper positioning and spine alignment during self-care, functional mobility activities and/or exercises.    Learning Progress Summary    Learner Readiness Method Response Comment Documented by Status   Patient Acceptance E VU  PG 12/09/17 1416 Done    Acceptance E VU,NR Pt. educated on role of OT, safe t/f, benefit of activity, r/w safety, use of brief, progression with poc NN 12/06/17 1059 Done                      User Key     Initials Effective Dates Name Provider Type Discipline    KD 10/17/16 -  Swetha Kitchen LINDA/L Occupational Therapy Assistant OT    NN 11/08/17 -  LIANE Lockett/L Occupational Therapist OT    PG 01/31/17 -  Heather Patino OT Student OT Student OT                  OT Recommendation and Plan  Anticipated Discharge Disposition: home with home health  Planned Therapy Interventions: activity intolerance, ADL retraining, balance training, bed mobility training, energy conservation, home exercise program, strengthening, transfer training  Therapy Frequency:  (3-14x/week)           Outcome Measures       12/09/17 1400 12/07/17 1500 12/07/17 1005    How much help from another person do you currently need...    Turning from your back to your side while in flat bed without using bedrails?   3  -SEJAL    Moving from lying on back to sitting on the side of a flat bed without bedrails?   3  -SEJAL    Moving to and from a bed to a chair (including a wheelchair)?   3  -SEJAL    Standing up from a chair using your arms (e.g., wheelchair, bedside chair)?   3  -SEJAL    Climbing 3-5 steps with a railing?   3  -SEJAL    To walk in hospital room?   3  -SEJAL    AM-PAC 6 Clicks Score   18  -SEJAL    How much help from  another is currently needed...    Putting on and taking off regular lower body clothing? 3  -CS (r) PG (t) CS (c) 3  -CS (r) PG (t) CS (c)     Bathing (including washing, rinsing, and drying) 3  -CS (r) PG (t) CS (c) 3  -CS (r) PG (t) CS (c)     Toileting (which includes using toilet bed pan or urinal) 2  -CS (r) PG (t) CS (c) 2  -CS (r) PG (t) CS (c)     Putting on and taking off regular upper body clothing 3  -CS (r) PG (t) CS (c) 3  -CS (r) PG (t) CS (c)     Taking care of personal grooming (such as brushing teeth) 4  -CS (r) PG (t) CS (c) 4  -CS (r) PG (t) CS (c)     Eating meals 4  -CS (r) PG (t) CS (c) 4  -CS (r) PG (t) CS (c)     Score 19  -CS (r) PG (t) 19  -CS (r) PG (t)     Functional Assessment    Outcome Measure Options   AM-[facility] 6 Clicks Basic Mobility (PT)  -      12/06/17 1648          How much help from another person do you currently need...    Turning from your back to your side while in flat bed without using bedrails? 3  -JCA      Moving from lying on back to sitting on the side of a flat bed without bedrails? 3  -JCA      Moving to and from a bed to a chair (including a wheelchair)? 3  -JCA      Standing up from a chair using your arms (e.g., wheelchair, bedside chair)? 3  -JCA      Climbing 3-5 steps with a railing? 3  -JCA      To walk in hospital room? 3  -JCA      AM-PAC 6 Clicks Score 18  -JCA      Functional Assessment    Outcome Measure Options AM-[facility] 6 Clicks Basic Mobility (PT)  -A        User Key  (r) = Recorded By, (t) = Taken By, (c) = Cosigned By    Initials Name Provider Type    JCA Jenni Guerrero, PT Physical Therapist    SEJAL Shalom Ross, PTA Physical Therapy Assistant    MADDI Zamora/JARAD Occupational Therapy Assistant    PG Heather Patino, OT Student OT Student           Time Calculation:         Time Calculation- OT       12/09/17 1423          Time Calculation- OT    OT Start Time 1320  -CS      OT Stop Time 1413  -CS      OT Time Calculation (min) 53 min  -CS       Total Timed Code Minutes- OT 53 minute(s)  -CS      OT Received On 12/09/17  -CS        User Key  (r) = Recorded By, (t) = Taken By, (c) = Cosigned By    Initials Name Provider Type     JAM Hare Occupational Therapy Assistant           Therapy Charges for Today     Code Description Service Date Service Provider Modifiers Qty    [id number] HC OT SELF CARE/MGMT/TRAIN EA 15 MIN 12/9/2017 JAM Hare, KX 4          OT G-codes  OT Functional Scales Options: AM-PAC 6 Clicks Daily Activity (OT)  Functional Limitation: Self care  Self Care Current Status (): At least 40 percent but less than 60 percent impaired, limited or restricted  Self Care Goal Status (): At least 1 percent but less than 20 percent impaired, limited or restricted    JAM Hare  12/9/2017

## 2017-12-10 NOTE — PROGRESS NOTES
"[facility]  [address] [place] [address]. [address]  T - [phone]  F - [phone]     Progress Note          PATIENT  DEMOGRAPHICS   PATIENT NAME: Damaris Sánchez                      PHYSICIAN: Sylvia Islas MD  : 1945  MRN: [id number]   LOS: 5 days    Patient Care Team:  DESTINY Arreguin as PCP - General (Nurse Practitioner)  Subjective   SUBJECTIVE   Still has diarrhea, now on octreotide       Objective   OBJECTIVE   Vital Signs  Temp:  [97.3 °F (36.3 °C)-99.7 °F (37.6 °C)] 97.3 °F (36.3 °C)  Heart Rate:  [] 119  Resp:  [18-19] 18  BP: (117-131)/(63-74) 126/66    Flowsheet Rows         First Filed Value    Admission Height  160 cm (63\") Documented at 2017 0939    Admission Weight  59.4 kg (131 lb) Documented at 2017 0939           I/O last 3 completed shifts:  In: 840 [P.O.:840]  Out: 700 [Urine:700]    PHYSICAL EXAM    Physical Exam   Constitutional: She is oriented to person, place, and time. She appears well-developed.   HENT:   Head: Normocephalic.   Eyes: Pupils are equal, round, and reactive to light.   Cardiovascular: Normal rate, regular rhythm and normal heart sounds.    Pulmonary/Chest: Effort normal and breath sounds normal.   Abdominal: Soft. Bowel sounds are normal. There is tenderness.   Neurological: She is alert and oriented to person, place, and time.       RESULTS   Results Review:      Results from last 7 days  Lab Units 12/10/17  0629 17  1156 17  0435 17  1923 17  0621   SODIUM mmol/L 135*  --  136* 136* 129*   POTASSIUM mmol/L 4.4 3.3* 3.2*  --  3.5   CHLORIDE mmol/L 104  --  106  --  101   CO2 mmol/L 27.0  --  24.0  --  20.0*   BUN mg/dL 11  --  16  --  11   CREATININE mg/dL 0.59  --  0.67  --  0.66   CALCIUM mg/dL 7.7*  --  8.0*  --  7.5*   BILIRUBIN mg/dL 0.3  --  0.3  --  0.3   ALK PHOS U/L 70  --  83  --  82   ALT (SGPT) U/L 33  --  32  --  28   AST (SGOT) U/L 21  --  15  --  29   GLUCOSE mg/dL 89  -- "  164*  --  125*       Estimated Creatinine Clearance: 57.9 mL/min (by C-G formula based on Cr of 0.59).      Results from last 7 days  Lab Units 12/09/17  1156   MAGNESIUM mg/dL 1.9         Results from last 7 days  Lab Units 12/07/17  1739   URIC ACID mg/dL 4.9         Results from last 7 days  Lab Units 12/10/17  0629 12/09/17  0411 12/05/17  0621 12/04/17  0959   WBC 10*3/mm3 8.90 8.24 9.27 17.84*   HEMOGLOBIN g/dL 9.4* 9.0* 9.5* 12.1   PLATELETS 10*3/mm3 116* 172 214 236               Imaging Results (last 24 hours)     ** No results found for the last 24 hours. **           MEDICATIONS      famotidine 40 mg Oral Daily   mesalamine 800 mg Oral TID   methylPREDNISolone sodium succinate 60 mg Intravenous Daily   octreotide 50 mcg Subcutaneous TID          Assessment/Plan   ASSESSMENT / PLAN    Principal Problem:    Lower abdominal pain  Active Problems:    Weakness    Ulcerative colitis    1- Hyponatremia.  Patient's urine sodium is on the low side.  She does have high urine osmolality.  I think so this is likely related to hypovolemia in the setting of severe diarrhea. Na is upto 135 and off ivf. Observe for now. Will sign off here and call for any questions.  Uric acid not low tsh is normal. cxr negative for mass     2.ulcerative colitis with acute flare currently on IV steroid Asacol.  Patient has recently been started on Humira. Still has diarrhea and now on octreotide    3. Inc troponins awaiting stress test on monday                   This document has been electronically signed by Sylvia Islas MD on December 10, 2017 11:43 AM

## 2017-12-10 NOTE — THERAPY TREATMENT NOTE
Acute Care - Physical Therapy Treatment Note  [facility]     Patient Name: Damaris Sánchez  : 1945  MRN: [id number]  Today's Date: 12/10/2017  Onset of Illness/Injury or Date of Surgery Date: 17  Date of Referral to PT: 17  Referring Physician: Dr. Neelam Saravia    Admit Date: 2017    Visit Dx:    ICD-10-CM ICD-9-CM   1. Weakness R53.1 780.79   2. Ulcerative colitis with complication, unspecified location K51.919 556.9   3. NSTEMI (non-ST elevated myocardial infarction) I21.4 410.70   4. Lower abdominal pain R10.30 789.09   5. Decreased activities of daily living (ADL) Z78.9 V49.89   6. Impaired functional mobility, balance, gait, and endurance Z74.09 V49.89   7. Impaired mobility and ADLs Z74.09 799.89   8. Troponin level elevated R74.8 790.6   9. Chest pain, unspecified type R07.9 786.50     Patient Active Problem List   Diagnosis   • Lower abdominal pain   • C. difficile colitis   • Weakness   • Ulcerative colitis               Adult Rehabilitation Note       12/10/17 1408 17 1320 17 0842    Rehab Assessment/Intervention    Discipline physical therapy assistant  -EM occupational therapy assistant  -CS,PG,CS2 occupational therapy assistant  -KD    Document Type therapy note (daily note)  -EM therapy note (daily note)  -CS,PG,CS2 therapy note (daily note)  -KD    Subjective Information agree to therapy  -EM agree to therapy;complains of;pain  -CS,PG,CS2 agree to therapy  -KD    Patient Effort, Rehab Treatment adequate  -EM adequate  -CS,PG,CS2 adequate  -KD    Specific Treatment Considerations   Tx started @ 8:44  -KD    Recorded by [EM] Marcelo Olsen, PTA [CS,PG,CS2] MADDI Hare/JARAD (r) Heather Patino, OT Student (t) MADDI Hare/JARAD (c) [KD] HILARY KelleyA/L    Vital Signs    Pre Systolic BP Rehab 122  -  -CS,PG,CS2 110  -KD    Pre Treatment Diastolic BP 64  -EM 65  -CS,PG,CS2 60  -KD    Post Systolic BP Rehab  142  -CS,PG,CS2     Post  Treatment Diastolic BP  73  -CS,PG,CS2     Pretreatment Heart Rate (beats/min) 75  -EM 99  -CS,PG,CS2 97  -KD    Posttreatment Heart Rate (beats/min)  103  -CS,PG,CS2 96  -KD    Pre SpO2 (%) 94  -EM 98  -CS,PG,CS2 95  -KD    O2 Delivery Pre Treatment room air  -EM room air  -CS,PG,CS2 room air  -KD    Post SpO2 (%)  99  -CS,PG,CS2 96  -KD    O2 Delivery Post Treatment  room air  -CS,PG,CS2 room air  -KD    Pre Patient Position Sitting  -EM Supine  -CS,PG,CS2 Supine  -KD    Intra Patient Position   Supine  -KD    Post Patient Position   Supine  -KD    Recorded by [EM] Marcelo Olsen PTA [CS,PG,CS2] MADDI Hare/JARAD (r) Heather Patino, OT Student (t) MADDI Hare/JARAD (c) [KD] MADDI Kelley/L    Pain Assessment    Pain Assessment 0-10  -EM 0-10  -CS,PG,CS2 No/denies pain  -KD    Pain Score  5  -CS,PG,CS2 0  -KD    Post Pain Score  4  -CS,PG,CS2     Pain Location  Abdomen  -CS,PG,CS2     Recorded by [EM] Marcelo Olsen PTA [CS,PG,CS2] MADDI Hare/JARAD (r) Heather Patino, HUMBERTO Student (t) MADDI Hare/JARAD (c) [KD] HILARY KelleyA/L    Vision Assessment/Intervention    Visual Impairment  WFL with corrective lenses  -CS,PG,CS2     Recorded by  [CS,PG,CS2] MADDI Hare/JARAD (r) Heather Patino, OT Student (t) MADDI Hare/L (c)     Cognitive Assessment/Intervention    Current Cognitive/Communication Assessment functional  -EM functional  -CS,PG,CS2 functional  -KD    Orientation Status oriented x 4  -EM oriented x 4  -CS,PG,CS2 oriented x 4  -KD    Follows Commands/Answers Questions 100% of the time  -% of the time  -CS,PG,CS2 100% of the time  -KD    Personal Safety WNL/WFL  -EM WNL/WFL  -CS,PG,CS2 WNL/WFL  -KD    Personal Safety Interventions   gait belt;nonskid shoes/slippers when out of bed  -KD    Recorded by [EM] Marcelo Olsen PTA [CS,PG,CS2] MADDI Hare/JARAD (r) Heather Patino OT Student (t) MADDI Hare/JARAD (c) [KD] Swetha VILLALPANDO  Fidelina, LINDA/L    Bed Mobility, Assessment/Treatment    Bed Mobility, Assistive Device  bed rails;draw sheet  -CS,PG,CS2     Bed Mobility, Roll Left, [place]  minimum assist (75% patient effort)  -CS,PG,CS2     Bed Mobility, Roll Right, [place]  minimum assist (75% patient effort)  -CS,PG,CS2     Bed Mob, Supine to Sit, [place] minimum assist (75% patient effort);moderate assist (50% patient effort)  -EM      Recorded by [EM] Marcelo Olsen PTA [CS,PG,CS2] HILARY HareA/L (r) Heather Patino, OT Student (t) JAM Hare (c)     Transfer Assessment/Treatment    Transfers, Sit-Stand [place] minimum assist (75% patient effort)  -EM      Transfers, Stand-Sit [place] minimum assist (75% patient effort)  -EM      Transfers, Sit-Stand-Sit, Assist Device rolling walker  -EM      Toilet Transfer, [place] minimum assist (75% patient effort)  -EM      Toilet Transfer, Assistive Device rolling walker  -EM      Transfer, Comment mod Ax1 to stand from toilet-elevated toilet seat issued post tx.  -EM      Recorded by [EM] Marcelo Olsen PTA      Gait Assessment/Treatment    Gait, [place] Level contact guard assist  -EM      Gait, Assistive Device rolling walker  -EM      Gait, Distance (Feet) 232   8'+8'  -EM      Gait, Gait Deviations bilateral:;antalgic;step length decreased;forward flexed posture  -EM      Gait, Comment Followed with chair  -EM      Recorded by [EM] Marcelo Olsen PTA      Upper Body Bathing Assessment/Training    UB Bathing Assess/Train Assistive Device  bath mitt  -CS,PG,CS2     UB Bathing Assess/Train, Position  long sitting;supine  -CS,PG,CS2     UB Bathing Assess/Train, [place] Level  minimum assist (75% patient effort);set up required  -CS,PG,CS2     UB Bathing Assess/Train, Impairments  ROM decreased;strength decreased  -CS,PG,CS2     Recorded by  [CS,PG,CS2] HILARY HareA/JARAD (r) Heather Patino, OT Student (t) Madhavi Watters,  LINDA/L (c)     Lower Body Bathing Assessment/Training    LB Bathing Assess/Train Assistive Device  bath mitt  -CS,PG,CS2     LB Bathing Assess/Train, Position  long sitting;supine  -CS,PG,CS2     LB Bathing Assess/Train, [place] Level  set up required;moderate assist (50% patient effort)  -CS,PG,CS2     LB Bathing Assess/Train, Impairments  ROM decreased;strength decreased  -CS,PG,CS2     LB Bathing Assess/Train, Comment  Pt required max A with lisa  bathing,   -CS,PG,CS2     Recorded by  [CS,PG,CS2] JAM Hare (r) Heather Patino, OT Student (t) JAM Hare (c)     Upper Body Dressing Assessment/Training    UB Dressing Assess/Train, Clothing Type  doffing:;donning:;hospital gown  -CS,PG,CS2     UB Dressing Assess/Train, Position  supine;long sitting  -CS,PG,CS2     UB Dressing Assess/Train, [place]  minimum assist (75% patient effort);set up required  -CS,PG,CS2     UB Dressing Assess/Train, Impairments  ROM decreased;strength decreased  -CS,PG,CS2     Recorded by  [CS,PG,CS2] JAM Hare (r) Heather Patino, OT Student (t) JAM Hare (c)     Lower Body Dressing Assessment/Training    LB Dressing Assess/Train, Clothing Type  doffing:;donning:;slipper socks  -CS,PG,CS2     LB Dressing Assess/Train, Position  supine  -CS,PG,CS2     LB Dressing Assess/Train, [place]  supervision required;set up required  -CS,PG,CS2     LB Dressing Assess/Train, Impairments  ROM decreased;strength decreased  -CS,PG,CS2     Recorded by  [CS,PG,CS2] JAM Hare (r) Heather Patino, OT Student (t) JAM Hare (c)     Toileting Assessment/Training    Toileting Assess/Train, Position  long sitting;supine  -CS,PG,CS2     Toileting Assess/Train, Indepen Level  maximum assist (25% patient effort)  -CS,PG,CS2     Toileting Assess/Train, Impairments  ROM decreased;strength decreased  -CS,PG,CS2     Toileting Assess/Train, Comment  Pt had BM during  session and required max A to clean lisa area after.  Pt could roll side to side.   -CS,PG,CS2     Recorded by  [CS,PG,CS2] JAM Hare (r) Heather Patino, OT Student (t) JAM Hare (c)     Grooming Assessment/Training    Grooming Assess/Train, Position  sitting  -CS,PG,CS2     Grooming Assess/Train, Indepen Level  moderate assist (50% patient effort);set up required  -CS3     Grooming Assess/Train, Impairments  ROM decreased;strength decreased  -CS,PG,CS2     Grooming Assess/Train, Comment  Pt setup for applying deoderant and washing face.  Pt required mod A for hair combing.   -CS,PG,CS2     Recorded by  [CS,PG,CS2] MADDI Hare/JARAD (r) Heather Patino, OT Student (t) JAM Hare (c)  [CS3] JAM Hare     Therapy Exercises    Bilateral Lower Extremities AROM:;20 reps;sitting;ankle pumps/circles;LAQ;AAROM:;hip flexion  -EM      Bilateral Upper Extremity   AROM:;10 reps;sitting;elbow flexion/extension;hand pumps;pronation/supination;shoulder abduction/adduction;shoulder extension/flexion;shoulder ER/IR;shoulder horizontal abd/add  -KD    BUE Resistance   manual resistance- minimal   2 sets with RB's PRN  -KD    Recorded by [EM] Marcelo Olsen PTA  [KD] MADDI Kelley/L    Positioning and Restraints    Pre-Treatment Position in bed  -EM in bed  -CS,PG,CS2 in bed  -KD    Post Treatment Position chair  -EM bed  -CS,PG,CS2 bed  -KD    In Bed sitting;call light within reach;encouraged to call for assist;with family/caregiver  -EM supine;call light within reach  -CS,PG,CS2 sitting;call light within reach;exit alarm on;encouraged to call for assist;with family/caregiver  -KD    Recorded by [EM] Marcelo Olsen PTA [CS,PG,CS2] MADDI Hare/JARAD (r) Heather Patino, OT Student (t) JAM Hare (c) [KD] MADDI Kelley/L      User Key  (r) = Recorded By, (t) = Taken By, (c) = Cosigned By    Initials Name Effective Dates    KARISHMA SHINE  Raúl, PTA 08/11/15 -     KD Swetha Kitchen, LINDA/L 10/17/16 -     CS Madhavi Watters, LINDA/L 10/17/16 -     PG Heather Patino, OT Student 01/31/17 -                 IP PT Goals       12/07/17 1119 12/07/17 1005 12/06/17 1735    Transfer Training PT LTG    Transfer Training PT LTG, Date Established   12/06/17  -    Transfer Training PT LTG, Time to Achieve   by discharge  -SEJAL    Transfer Training PT LTG, Activity Type   bed to chair /chair to bed;sit to stand/stand to sit  -    Transfer Training PT LTG, [place] Level   conditional independence  -SEJAL    Transfer Training PT  LTG, Date Goal Reviewed  12/07/17  -A     Transfer Training PT LTG, Outcome  goal ongoing  -A goal ongoing  -    Gait Training PT LTG    Gait Training Goal PT LTG, Date Established   12/06/17  -    Gait Training Goal PT LTG, Time to Achieve   by discharge  -    Gait Training Goal PT LTG, [place] Level   conditional independence  -SEJAL    Gait Training Goal PT LTG, Assist Device   --   aad  -    Gait Training Goal PT LTG, Distance to Achieve   150ft  -    Gait Training Goal PT LTG, Additional Goal   300ft  -SEJAL    Gait Training Goal PT LTG, Date Goal Reviewed  12/07/17  -A     Gait Training Goal PT LTG, Outcome  goal ongoing  -A goal ongoing  -    Stair Training PT LTG    Stair Training Goal PT LTG, Date Established   12/06/17  -SEJAL    Stair Training Goal PT LTG, Time to Achieve   by discharge  -    Stair Training Goal PT LTG, Number of Steps   2  -    Stair Training Goal PT LTG, [place] Level   conditional independence  -SEJAL    Stair Training Goal PT LTG, Date Goal Reviewed  12/07/17  -A     Stair Training Goal PT LTG, Outcome  goal ongoing  -A goal ongoing  -    Strength Goal PT LTG    Strength Goal PT LTG, Date Established   12/06/17  -    Strength Goal PT LTG, Time to Achieve   by discharge  -    Strength Goal PT LTG, Measure to Achieve   Pt will tolerate 15 reps of AROM exercises  -     Strength Goal PT LTG, Functional Goal   Pt will progress to standing exercises  -    Strength Goal PT LTG, Date Goal Reviewed 12/07/17  -[facility]      Strength Goal PT LTG, Outcome goal partially met  -[facility]  goal ongoing  -    Patient Education PT LTG    Patient Education PT LTG, Date Established   12/06/17  -    Patient Education PT LTG, Time to Achieve   by discharge  -    Patient Education PT LTG, Education Type   HEP;gait;transfers;home safety  -    Patient Education PT LTG, Date Goal Reviewed  12/07/17  -A     Patient Education PT LTG Outcome  goal ongoing  -A goal ongoing  -      12/06/17 1734          Strength Goal PT LTG    Strength Goal PT LTG, Date Established (P)  12/06/17  -      Strength Goal PT LTG, Time to Achieve (P)  by discharge  -      Strength Goal PT LTG, Measure to Achieve (P)  Pt will tolerate 15 reps of AROM exercises  -      Patient Education PT LTG    Patient Education PT LTG, Date Established (P)  12/06/17  -      Patient Education PT LTG, Time to Achieve (P)  by discharge  -      Patient Education PT LTG, Education Type (P)  HEP;gait;transfers;home safety  -      Patient Education PT LTG Outcome (P)  goal ongoing  -        User Key  (r) = Recorded By, (t) = Taken By, (c) = Cosigned By    Initials Name Provider Type    SEJAL Jenni Guerrero, PT Physical Therapist    CHRISTOPHER Ross, BRUNO Physical Therapy Assistant          Physical Therapy Education     Title: PT OT SLP Therapies (Active)     Topic: Physical Therapy (Active)     Point: Mobility training (Active)    Learning Progress Summary    Learner Readiness Method Response Comment Documented by Status   Patient Acceptance E NR   12/07/17 1119 Active                      User Key     Initials Effective Dates Name Provider Type Discipline     10/17/16 -  Shalom Ross PTA Physical Therapy Assistant PT                    PT Recommendation and Plan  Anticipated Discharge Disposition: inpatient rehabilitation  facility (vs 24/7 care with  PT)  Planned Therapy Interventions: balance training, bed mobility training, gait training, home exercise program, patient/family education, stair training, strengthening, transfer training  PT Frequency: other (see comments) (5-14 times per week)  Plan of Care Review  Outcome Summary/Follow up Plan: Pt harsh tx well, no goals met this tx. Pt requires motivation for participation. Pt t/f sup-sit min/mod Ax1, sit-stand-sit w/ min Ax1. Pt amb 232',8',8' CGAx1. Pt performed therex in chair-demo significant quad and hip weakness with therex. Pt will benefit from additional PT to improve gt and t/f mobility and improve B LE strength.          Outcome Measures       12/10/17 1408 12/09/17 1400       How much help from another person do you currently need...    Turning from your back to your side while in flat bed without using bedrails? 3  -EM      Moving from lying on back to sitting on the side of a flat bed without bedrails? 3  -EM      Moving to and from a bed to a chair (including a wheelchair)? 3  -EM      Standing up from a chair using your arms (e.g., wheelchair, bedside chair)? 3  -EM      Climbing 3-5 steps with a railing? 3  -EM      To walk in hospital room? 3  -EM      AM-PAC 6 Clicks Score 18  -EM      How much help from another is currently needed...    Putting on and taking off regular lower body clothing?  3  -CS (r) PG (t) CS (c)     Bathing (including washing, rinsing, and drying)  3  -CS (r) PG (t) CS (c)     Toileting (which includes using toilet bed pan or urinal)  2  -CS (r) PG (t) CS (c)     Putting on and taking off regular upper body clothing  3  -CS (r) PG (t) CS (c)     Taking care of personal grooming (such as brushing teeth)  4  -CS (r) PG (t) CS (c)     Eating meals  4  -CS (r) PG (t) CS (c)     Score  19  -CS (r) PG (t)     Functional Assessment    Outcome Measure Options AM-PAC 6 Clicks Basic Mobility (PT)  -EM        User Key  (r) = Recorded By, (t) = Taken  By, (c) = Cosigned By    Initials Name Provider Type    KARISHMA Olsen PTA Physical Therapy Assistant    MADDI Zamora/JARAD Occupational Therapy Assistant    RENATA Patino, OT Student OT Student           Time Calculation:         PT Charges       12/10/17 1545          Time Calculation    Start Time 1408  -EM      Stop Time 1454  -EM      Time Calculation (min) 46 min  -EM      Time Calculation- PT    Total Timed Code Minutes- PT 46 minute(s)  -EM        User Key  (r) = Recorded By, (t) = Taken By, (c) = Cosigned By    Initials Name Provider Type    KARISHMA Olsen PTA Physical Therapy Assistant          Therapy Charges for Today     Code Description Service Date Service Provider Modifiers Qty    98959189852 HC PT THER PROC EA 15 MIN 12/10/2017 Marcelo Olsen PTA GP, KX 1    29063576703 HC PT THERAPEUTIC ACT EA 15 MIN 12/10/2017 Marcelo Olsen PTA GP, KX 1    69964696493 HC GAIT TRAINING EA 15 MIN 12/10/2017 Marcelo Olsen PTA GP, KX 1          PT G-Codes  PT Professional Judgement Used?: Yes  Outcome Measure Options: AM-PAC 6 Clicks Basic Mobility (PT)  Score: 18  Functional Limitation: Mobility: Walking and moving around  Mobility: Walking and Moving Around Current Status (): At least 40 percent but less than 60 percent impaired, limited or restricted  Mobility: Walking and Moving Around Goal Status (): At least 1 percent but less than 20 percent impaired, limited or restricted    Marcelo Olsen PTA  12/10/2017

## 2017-12-10 NOTE — PROGRESS NOTES
Herbert Sanchez DO,[facility]  Gastroenterology  Hepatology  Endoscopy  Board Certified in Internal Medicine and gastroenterology  [address], [address]  [place]. [address]  - [phone]   F - [phone]     GASTROENTEROLOGY PROGRESS NOTE   HERBERT SANCHEZ DO.         SUBJECTIVE:   12/10/2017  Chief Complaint:     Subjective  : Continues to have problems with diarrhea.  She says that she's had 3 diarrhea episodes today.  No abdominal pain.  Has had no vomiting of blood.  Has had up Lopid her throat.    To have stress test and BONIFACIO tomorrow.    We switched her from Imodium to octreotide.  She still is having problems with loose stools     CURRENT MEDICATIONS/OBJECTIVE/VS/PE:     Current Medications:     Current Facility-Administered Medications   Medication Dose Route Frequency Provider Last Rate Last Dose   • diphenoxylate-atropine (LOMOTIL) 2.5-0.025 MG per tablet 1 tablet  1 tablet Oral Q2H PRN Herbert Sanchez DO   1 tablet at 12/10/17 1002   • famotidine (PEPCID) tablet 40 mg  40 mg Oral Daily Neelam Saravia MD   40 mg at 12/10/17 1000   • HYDROcodone-acetaminophen (NORCO)  MG per tablet 1 tablet  1 tablet Oral Q6H PRN Neelam Saravia MD   1 tablet at 12/10/17 1132   • Magnesium Sulfate 2 gram Bolus, followed by 8 gram infusion (total Mg dose 10 grams)- Mg less than or equal to 1mg/dL  2 g Intravenous PRN Neelam Saravia MD        Or   • Magnesium Sulfate 6 gram Infusion (2 gm x 3) -Mg 1.1 -1.5 mg/dL  2 g Intravenous PRN Neelam Saravia MD        Or   • magnesium sulfate 4 gram infusion- Mg 1.6-1.9 mg/dL  4 g Intravenous PRN Neelam Saravia MD       • mesalamine (DELZICOL) delayed release capsule 800 mg  800 mg Oral TID Herbert Sanchez DO   800 mg at 12/10/17 1001   • methylPREDNISolone sodium succinate (SOLU-Medrol) injection 60 mg  60 mg Intravenous Daily Herbert Sanchez DO   60 mg at 12/10/17 1001   • morphine injection 2 mg  2 mg Intravenous Q2H PRN  Sony Burris MD   2 mg at 12/05/17 2036   • octreotide (sandoSTATIN) injection 50 mcg  50 mcg Subcutaneous TID Nish Morales DO   50 mcg at 12/10/17 1000   • potassium & sodium phosphates (PHOS-NAK) 280-160-250 MG packet - for Phosphorus less than 1.25 mg/dL  1 packet Oral Q6H PRN Neelam Saravia MD        Or   • potassium & sodium phosphates (PHOS-NAK) 280-160-250 MG packet - for Phosphorus 1.25 - 2.1 mg/dL  1 packet Oral Once PRN Neelam Saravia MD       • potassium chloride (KLOR-CON) packet 40 mEq  40 mEq Oral PRN Neelam Saravia MD       • potassium chloride (MICRO-K) CR capsule 40 mEq  40 mEq Oral PRN Neelam Saravia MD       • sodium chloride 0.9 % flush 1-10 mL  1-10 mL Intravenous PRN Sony Burris MD       • sodium chloride 0.9 % flush 10 mL  10 mL Intravenous PRN Carmine Ponce PA-C           Objective     Physical Exam:   Temp:  [97.3 °F (36.3 °C)-99.7 °F (37.6 °C)] 97.3 °F (36.3 °C)  Heart Rate:  [] 119  Resp:  [18-19] 18  BP: (117-131)/(63-74) 126/66     Physical Exam:  General Appearance:    Alert, cooperative, in no acute distress   Head:    Normocephalic, without obvious abnormality, atraumatic   Eyes:            Lids and lashes normal, conjunctivae and sclerae normal, no   icterus, no pallor, corneas clear, PERRLA   Ears:    Ears appear intact with no abnormalities noted   Throat:   No oral lesions, no thrush, oral mucosa moist   Neck:   No adenopathy, supple, trachea midline, no thyromegaly, no     carotid bruit, no JVD   Back:     No kyphosis present, no scoliosis present, no skin lesions,       erythema or scars, no tenderness to percussion or                   palpation,   range of motion normal   Lungs:     Clear to auscultation,respirations regular, even and                   unlabored    Heart:    Regular rhythm and normal rate, normal S1 and S2, no            murmur, no gallop, no rub, no click   Breast Exam:    Deferred   Abdomen:     Normal bowel  sounds, no masses, no organomegaly, soft        non-tender, non-distended, no guarding, no rebound                 Tenderness--well healed surgical wound   Genitalia:    Deferred   Extremities:   Moves all extremities well, no edema, no cyanosis, no              redness   Pulses:   Pulses palpable and equal bilaterally   Skin:   No bleeding, bruising or rash   Lymph nodes:   No palpable adenopathy   Neurologic:   Cranial nerves 2 - 12 grossly intact, sensation intact, DTR        present and equal bilaterally      Results Review:     Lab Results (last 24 hours)     Procedure Component Value Units Date/Time    Magnesium [[id number]]  (Normal) Collected:  12/09/17 1156    Specimen:  Blood Updated:  12/09/17 1224     Magnesium 1.9 mg/dL     Potassium [[id number]]  (Abnormal) Collected:  12/09/17 1156    Specimen:  Blood Updated:  12/09/17 1224     Potassium 3.3 (L) mmol/L     CBC (No Diff) [[id number]]  (Abnormal) Collected:  12/10/17 0629    Specimen:  Blood Updated:  12/10/17 0720     WBC 8.90 10*3/mm3      RBC 3.19 (L) 10*6/mm3      Hemoglobin 9.4 (L) g/dL      Hematocrit 29.5 (L) %      MCV 92.5 fL      MCH 29.5 pg      MCHC 31.9 g/dL      RDW 15.3 (H) %      RDW-SD 51.5 (H) fl      MPV 9.7 fL      Platelets 116 (L) 10*3/mm3     Comprehensive Metabolic Panel [[id number]]  (Abnormal) Collected:  12/10/17 0629    Specimen:  Blood Updated:  12/10/17 0753     Glucose 89 mg/dL      BUN 11 mg/dL      Creatinine 0.59 mg/dL      Sodium 135 (L) mmol/L      Potassium 4.4 mmol/L      Chloride 104 mmol/L      CO2 27.0 mmol/L      Calcium 7.7 (L) mg/dL      Total Protein 5.2 (L) g/dL      Albumin 2.40 (L) g/dL      ALT (SGPT) 33 U/L      AST (SGOT) 21 U/L      Alkaline Phosphatase 70 U/L      Total Bilirubin 0.3 mg/dL      eGFR Non African Amer 100 (H) mL/min/1.73      Globulin 2.8 gm/dL      A/G Ratio 0.9 (L) g/dL      BUN/Creatinine Ratio 18.6     Anion Gap 4.0 (L) mmol/L     Narrative:       The MDRD GFR formula is only valid for  adults with stable renal function between ages 18 and 70.           I reviewed the patient's new clinical results.  I reviewed the patient's new imaging results and agree with the interpretation.     ASSESSMENT/PLAN:   ASSESSMENT:   1.  Acute flare of ulcerative colitis, not improving  2.  Positive CMV IgG    PLAN:   1.  Increase the octreotide 100 µg 3 times a day  2.  We'll probably do a colonoscopy, unprepped this next week  3.  The patient understands that if there is no improvement with these high doses of medications that this patient should have the consideration for a total colectomy and proctectomy.  If there is necessary to have that done she will need to be transferred to [place]  4.  Awaiting the CMV PCR      Nish Morales DO  12/10/17  12:18 PM

## 2017-12-10 NOTE — PLAN OF CARE
Problem: Patient Care Overview (Adult)  Goal: Plan of Care Review  Outcome: Ongoing (interventions implemented as appropriate)    12/10/17 0246 12/10/17 1541   Coping/Psychosocial Response Interventions   Plan Of Care Reviewed With patient --    Patient Care Overview   Progress no change --    Outcome Evaluation   Outcome Summary/Follow up Plan --  Pt harsh tx well, no goals met this tx. Pt requires motivation for participation. Pt t/f sup-sit min/mod Ax1, sit-stand-sit w/ min Ax1. Pt amb 232',8',8' CGAx1. Pt performed therex in chair-demo significant quad and hip weakness with therex. Pt will benefit from additional PT to improve gt and t/f mobility and improve B LE strength.         Problem: Inpatient Physical Therapy  Goal: Transfer Training Goal 1 LTG- PT  Outcome: Ongoing (interventions implemented as appropriate)    12/06/17 1735 12/07/17 1005   Transfer Training PT LTG   Transfer Training PT LTG, Date Established 12/06/17 --    Transfer Training PT LTG, Time to Achieve by discharge --    Transfer Training PT LTG, Activity Type bed to chair /chair to bed;sit to stand/stand to sit --    Transfer Training PT LTG, [place] Level conditional independence --    Transfer Training PT LTG, Date Goal Reviewed --  12/07/17   Transfer Training PT LTG, Outcome --  goal ongoing       Goal: Gait Training Goal LTG- PT  Outcome: Ongoing (interventions implemented as appropriate)    12/06/17 1735 12/07/17 1005   Gait Training PT LTG   Gait Training Goal PT LTG, Date Established 12/06/17 --    Gait Training Goal PT LTG, Time to Achieve by discharge --    Gait Training Goal PT LTG, [place] Level conditional independence --    Gait Training Goal PT LTG, Assist Device (aad) --    Gait Training Goal PT LTG, Distance to Achieve 150ft --    Gait Training Goal PT LTG, Additional Goal 300ft --    Gait Training Goal PT LTG, Date Goal Reviewed --  12/07/17   Gait Training Goal PT LTG, Outcome --  goal ongoing       Goal: Stair  Training Goal LTG- PT  Outcome: Ongoing (interventions implemented as appropriate)    12/06/17 1735 12/07/17 1005   Stair Training PT LTG   Stair Training Goal PT LTG, Date Established 12/06/17 --    Stair Training Goal PT LTG, Time to Achieve by discharge --    Stair Training Goal PT LTG, Number of Steps 2 --    Stair Training Goal PT LTG, [place] Level conditional independence --    Stair Training Goal PT LTG, Date Goal Reviewed --  12/07/17   Stair Training Goal PT LTG, Outcome --  goal ongoing       Goal: Strength Goal LTG- PT  Outcome: Ongoing (interventions implemented as appropriate)    12/06/17 1735 12/07/17 1119   Strength Goal PT LTG   Strength Goal PT LTG, Date Established 12/06/17 --    Strength Goal PT LTG, Time to Achieve by discharge --    Strength Goal PT LTG, Measure to Achieve Pt will tolerate 15 reps of AROM exercises --    Strength Goal PT LTG, Functional Goal Pt will progress to standing exercises --    Strength Goal PT LTG, Date Goal Reviewed --  12/07/17   Strength Goal PT LTG, Outcome --  goal partially met       Goal: Patient Education Goal LTG- PT  Outcome: Ongoing (interventions implemented as appropriate)    12/06/17 1735 12/07/17 1005   Patient Education PT LTG   Patient Education PT LTG, Date Established 12/06/17 --    Patient Education PT LTG, Time to Achieve by discharge --    Patient Education PT LTG, Education Type HEP;gait;transfers;home safety --    Patient Education PT LTG, Date Goal Reviewed --  12/07/17   Patient Education PT LTG Outcome --  goal ongoing

## 2017-12-10 NOTE — PROGRESS NOTES
[facility] Medicine Services  INPATIENT PROGRESS NOTE    Length of Stay: 5  Date of Admission: 12/4/2017  Primary Care Physician: DESTINY Arreguin    Subjective   Chief Complaint: Acute ulcerative colitis    12/5/2017: Patient still has persistent abdominal pain     December 6, 2017: Patient is still having persistent left-sided abdominal pain.  Patient's diarrhea has improved after taking Lomotil.    December 7,2017: Abdominal pain is still persistent.     December 8,2017: Abdominal pain is still persistent; patient has thrombus in left atrium.     December 9, 2017: Patient said that her abdominal pain is improved.  Patient had a comfortable night.  I had extensive conversation with patient and her family regarding patient's condition.  Patient told me that all her questions have been answered.    December 10, 2017: Patient is complaining of worsening diarrhea.  Patient still has persistent abdominal pain.  Patient is supposed to go for a stress test and for BONIFACIO tomorrow.  She will also need colonoscopy tomorrow on December 11, 2017.    Review of Systems   Constitutional: Negative for activity change, appetite change, fatigue and fever.   HENT: Negative for ear pain and sore throat.    Eyes: Negative for pain and visual disturbance.   Respiratory: Negative for cough and shortness of breath.    Cardiovascular: Negative for chest pain and palpitations.   Gastrointestinal: Negative for abdominal pain and nausea.   Endocrine: Negative for cold intolerance and heat intolerance.   Genitourinary: Negative for difficulty urinating and dysuria.   Musculoskeletal: Negative for arthralgias and gait problem.   Skin: Negative for color change and rash.   Neurological: Negative for dizziness, weakness and headaches.   Hematological: Negative for adenopathy. Does not bruise/bleed easily.   Psychiatric/Behavioral: Negative for agitation, confusion and sleep disturbance.        All  pertinent negatives and positives are as above. All other systems have been reviewed and are negative unless otherwise stated.     Objective    Temp:  [97.3 °F (36.3 °C)-99.7 °F (37.6 °C)] 97.3 °F (36.3 °C)  Heart Rate:  [] 119  Resp:  [18-19] 18  BP: (117-131)/(63-74) 126/66    Physical Exam   Constitutional: She is oriented to person, place, and time. She appears well-developed and well-nourished. No distress.   HENT:   Head: Normocephalic and atraumatic.   Right Ear: External ear normal.   Left Ear: External ear normal.   Mouth/Throat: Oropharynx is clear and moist.   Eyes: Conjunctivae and EOM are normal. Pupils are equal, round, and reactive to light. Left eye exhibits no discharge. No scleral icterus.   Neck: No tracheal deviation present. No thyromegaly present.   Cardiovascular: Normal rate, regular rhythm and normal heart sounds.  Exam reveals no gallop and no friction rub.    No murmur heard.  Pulmonary/Chest: Effort normal and breath sounds normal. No stridor. No respiratory distress. She has no wheezes. She has no rales.   Abdominal: Soft. Bowel sounds are normal. She exhibits no distension. There is no tenderness. There is no rebound.   Left-sided abdominal tenderness   Musculoskeletal: She exhibits no edema, tenderness or deformity.   Neurological: She is alert and oriented to person, place, and time. She displays normal reflexes. Coordination normal.   Skin: Skin is warm and dry. No rash noted. No erythema. No pallor.   Psychiatric: She has a normal mood and affect. Her behavior is normal. Thought content normal.           famotidine 40 mg Oral Daily   mesalamine 800 mg Oral TID   methylPREDNISolone sodium succinate 60 mg Intravenous Daily   octreotide 100 mcg Subcutaneous TID         Results Review:  I have reviewed the labs, radiology results, and diagnostic studies.    Laboratory Data:     Results from last 7 days  Lab Units 12/10/17  0629 12/09/17  1156 12/09/17  0435 12/07/17  1923  12/05/17  0621   SODIUM mmol/L 135*  --  136* 136* 129*   POTASSIUM mmol/L 4.4 3.3* 3.2*  --  3.5   CHLORIDE mmol/L 104  --  106  --  101   CO2 mmol/L 27.0  --  24.0  --  20.0*   BUN mg/dL 11  --  16  --  11   CREATININE mg/dL 0.59  --  0.67  --  0.66   GLUCOSE mg/dL 89  --  164*  --  125*   CALCIUM mg/dL 7.7*  --  8.0*  --  7.5*   BILIRUBIN mg/dL 0.3  --  0.3  --  0.3   ALK PHOS U/L 70  --  83  --  82   ALT (SGPT) U/L 33  --  32  --  28   AST (SGOT) U/L 21  --  15  --  29   ANION GAP mmol/L 4.0*  --  6.0  --  8.0     Estimated Creatinine Clearance: 57.9 mL/min (by C-G formula based on Cr of 0.59).    Results from last 7 days  Lab Units 12/09/17  1156   MAGNESIUM mg/dL 1.9       Results from last 7 days  Lab Units 12/07/17  1739   URIC ACID mg/dL 4.9       Results from last 7 days  Lab Units 12/10/17  0629 12/09/17  0411 12/05/17  0621 12/04/17  0959   WBC 10*3/mm3 8.90 8.24 9.27 17.84*   HEMOGLOBIN g/dL 9.4* 9.0* 9.5* 12.1   HEMATOCRIT % 29.5* 26.8* 28.0* 35.5   PLATELETS 10*3/mm3 116* 172 214 236           Culture Data:   No results found for: BLOODCX  No results found for: URINECX  No results found for: RESPCX  No results found for: WOUNDCX  No results found for: STOOLCX  No components found for: BODYFLD    Radiology Data:   Imaging Results (last 24 hours)     ** No results found for the last 24 hours. **          I have reviewed the patient current medications.     Assessment/Plan     Hospital Problem List     * (Principal)Lower abdominal pain    Overview Deleted 12/4/2017 12:39 PM by Sony Burris MD            Weakness    Ulcerative colitis (Chronic)          1.  Acute ulcerative colitis  Dr. Morales from GI is following.  Patient is on steroids, mesalamine, Taper steroids.   Patient is at high risk of having  surgery done  Patient was to have maximum medical management.      2. Chronic diarrhea   Patient said that she did not feel dizzy or had loss of consciousness and she was at home.  Patient tripped over a  step when she fell down.  Continue with lomotil as needed   Octreotide for diarrhea  Patient is positive for CMV and therefore PCR for CMV is pending.    3. Elevated Trops:  Troponin trending downwards  -EKG shows normal sinus rhythm, nonspecific T-wave changes, prolonged QT.  Patient is found to have left ventricular thrombus on echo.  Patient will need transesophageal echocardiogram.  Patient will need stress test Tomorrow on Monday.     4.  Hyponatremia  Could be because of chronic diarrhea  Dr. mazariegos on board   Sodium improved.      5.  Hypokalemia  Replaced      DVT Prophylaxis: SCD/NAIN  I had extensive conversation with patient and her family about patient's condition; they want to have conversation with  and with .     Discharge Planning: I expect patient to be discharged to home in 3-4 days.          This document has been electronically signed by Neelam Saravia MD on December 10, 2017 3:14 PM

## 2017-12-10 NOTE — PLAN OF CARE
Problem: Patient Care Overview (Adult)  Goal: Plan of Care Review  Outcome: Ongoing (interventions implemented as appropriate)    12/10/17 0246   Coping/Psychosocial Response Interventions   Plan Of Care Reviewed With patient   Patient Care Overview   Progress no change   Outcome Evaluation   Outcome Summary/Follow up Plan multiple incontinent episodes, pt encourage to be out of bed for toileting and daily activities.

## 2017-12-11 NOTE — THERAPY TREATMENT NOTE
Acute Care - Physical Therapy Treatment Note  [facility]     Patient Name: Damaris Sánchez  : 1945  MRN: [id number]  Today's Date: 2017  Onset of Illness/Injury or Date of Surgery Date: 17  Date of Referral to PT: 17  Referring Physician: Dr. Neelam Saravia    Admit Date: 2017    Visit Dx:    ICD-10-CM ICD-9-CM   1. Weakness R53.1 780.79   2. Ulcerative colitis with complication, unspecified location K51.919 556.9   3. NSTEMI (non-ST elevated myocardial infarction) I21.4 410.70   4. Lower abdominal pain R10.30 789.09   5. Decreased activities of daily living (ADL) Z78.9 V49.89   6. Impaired functional mobility, balance, gait, and endurance Z74.09 V49.89   7. Impaired mobility and ADLs Z74.09 799.89   8. Troponin level elevated R74.8 790.6   9. Chest pain, unspecified type R07.9 786.50     Patient Active Problem List   Diagnosis   • Lower abdominal pain   • C. difficile colitis   • Weakness   • Ulcerative colitis               Adult Rehabilitation Note       17 1544 12/10/17 1408 17 1320    Rehab Assessment/Intervention    Discipline physical therapy assistant  -SEAJL physical therapy assistant  -EM occupational therapy assistant  -CS,PG,CS2    Document Type therapy note (daily note)  -SEJAL therapy note (daily note)  -EM therapy note (daily note)  -CS,PG,CS2    Subjective Information agree to therapy  -SEJAL agree to therapy  -EM agree to therapy;complains of;pain  -CS,PG,CS2    Patient Effort, Rehab Treatment adequate  -SEJAL adequate  -EM adequate  -CS,PG,CS2    Patient Effort, Rehab Treatment Comment spoke w/ eval PT and nsg who state to proceed with therapy  -SEJAL      Precautions/Limitations fall precautions  -SEJAL      Recorded by [SEJAL] Shalom Ross, PTA [EM] Marcelo Olsen PTA [CS,PG,CS2] MADDI Hare/JARAD (r) Heather Patino, OT Student (t) MADDI Hare/L (c)    Vital Signs    Pre Systolic BP Rehab 105  -SEJAL 122  -  -CS,PG,CS2    Pre Treatment Diastolic  BP 61  -SEJAL 64  -EM 65  -CS,PG,CS2    Post Systolic BP Rehab 128  -SEJAL  142  -CS,PG,CS2    Post Treatment Diastolic BP 60  -SEJAL  73  -CS,PG,CS2    Pretreatment Heart Rate (beats/min) 98  -SEJAL 75  -EM 99  -CS,PG,CS2    Posttreatment Heart Rate (beats/min) 111  -SEJAL  103  -CS,PG,CS2    Pre SpO2 (%) 93  -SEJAL 94  -EM 98  -CS,PG,CS2    O2 Delivery Pre Treatment room air  -SEJAL room air  -EM room air  -CS,PG,CS2    Post SpO2 (%) 97  -SEJAL  99  -CS,PG,CS2    O2 Delivery Post Treatment room air  -SEJAL  room air  -CS,PG,CS2    Pre Patient Position Supine  -SEJAL Sitting  -EM Supine  -CS,PG,CS2    Post Patient Position Supine  -SEJAL      Recorded by [SEJAL] Shalom Ross, PTA [EM] Marcelo Olsen, PTA [CS,PG,CS2] MADDI Hare/JARAD (r) Heather Patino, OT Student (t) MADDI Hare/L (c)    Pain Assessment    Pain Assessment 0-10  -SEJAL 0-10  -EM 0-10  -CS,PG,CS2    Pain Score 10  -SEJAL  5  -CS,PG,CS2    Post Pain Score 5  -SEJAL  4  -CS,PG,CS2    Pain Location Generalized  -SEJAL  Abdomen  -CS,PG,CS2    Pain Intervention(s) Medication (See MAR)  -SEJAL      Recorded by [SEJAL] Shalom Ross PTA [EM] Marcelo Olsen, PTA [CS,PG,CS2] MADDI Hare/JARAD (r) Heather Patino, OT Student (t) MADDI Hare/L (c)    Vision Assessment/Intervention    Visual Impairment WFL with corrective lenses  -SEJAL  WFL with corrective lenses  -CS,PG,CS2    Recorded by [SEJAL] Shalom Ross PTA  [CS,PG,CS2] MADDI Hare/JARAD (r) Heather Patino, OT Student (t) MADDI Hare/L (c)    Cognitive Assessment/Intervention    Current Cognitive/Communication Assessment functional  -SEJAL functional  -EM functional  -CS,PG,CS2    Orientation Status oriented x 4  -SEJAL oriented x 4  -EM oriented x 4  -CS,PG,CS2    Follows Commands/Answers Questions 100% of the time  -SEJAL 100% of the time  -% of the time  -CS,PG,CS2    Personal Safety  WNL/WFL  -EM WNL/WFL  -CS,PG,CS2    Personal Safety Interventions gait belt;muscle strengthening  facilitated;nonskid shoes/slippers when out of bed;supervised activity  -SEJAL      Recorded by [SEJAL] Shalom Ross PTA [EM] Marcelo Olsen PTA [CS,PG,CS2] JAM Hare (r) Heather Patino, OT Student (t) JAM Hare (c)    Bed Mobility, Assessment/Treatment    Bed Mobility, Assistive Device bed rails;head of bed elevated  -SEJAL  bed rails;draw sheet  -CS,PG,CS2    Bed Mobility, Roll Left, [place]   minimum assist (75% patient effort)  -CS,PG,CS2    Bed Mobility, Roll Right, [place]   minimum assist (75% patient effort)  -CS,PG,CS2    Bed Mobility, Scoot/Bridge, [place] minimum assist (75% patient effort)  -SEJAL      Bed Mob, Supine to Sit, [place] minimum assist (75% patient effort)  -SEJAL minimum assist (75% patient effort);moderate assist (50% patient effort)  -EM     Bed Mob, Sit to Supine, [place] minimum assist (75% patient effort)  -SEJAL      Recorded by [SEJAL] Shalom Ross PTA [EM] Marcelo Olsen PTA [CS,PG,CS2] MADDI Hare/JARAD (r) Heather Patino, OT Student (t) JAM Hare (c)    Transfer Assessment/Treatment    Transfers, Sit-Stand [place] minimum assist (75% patient effort)  -SEJAL minimum assist (75% patient effort)  -EM     Transfers, Stand-Sit [place] minimum assist (75% patient effort)  -SEJAL minimum assist (75% patient effort)  -EM     Transfers, Sit-Stand-Sit, Assist Device rolling walker  -SEJAL rolling walker  -EM     Toilet Transfer, [place] minimum assist (75% patient effort);moderate assist (50% patient effort)  - minimum assist (75% patient effort)  -EM     Toilet Transfer, Assistive Device rolling walker  -SEJAL rolling walker  -EM     Transfer, Comment pt required more assist to stand from [facility]. pt able to complete her own lisa care  -SEJAL mod Ax1 to stand from toilet-elevated toilet seat issued post tx.  -EM     Recorded by [SEJAL] Shalom Ross PTA [EM] Marcelo Olsen PTA     Gait Assessment/Treatment    Gait,  [place] Level contact guard assist  -SEJAL contact guard assist  -EM     Gait, Assistive Device rolling walker  -SEJAL rolling walker  -EM     Gait, Distance (Feet) 208  -SEJAL 232   8'+8'  -EM     Gait, Gait Deviations irving decreased;forward flexed posture   cueing for posture and to look ahead  - bilateral:;antalgic;step length decreased;forward flexed posture  -EM     Gait, Impairments strength decreased  -      Gait, Comment  Followed with chair  -EM     Recorded by [SEJAL] Shalom Ross PTA [EM] Marcelo Olsen PTA     Stairs Assessment/Treatment    Stairs, [place] Level not tested  -SEJAL      Recorded by [SEJAL] Shalom Ross, PTA      Upper Body Bathing Assessment/Training    UB Bathing Assess/Train Assistive Device   bath mitt  -CS,PG,CS2    UB Bathing Assess/Train, Position   long sitting;supine  -CS,PG,CS2    UB Bathing Assess/Train, [place] Level   minimum assist (75% patient effort);set up required  -CS,PG,CS2    UB Bathing Assess/Train, Impairments   ROM decreased;strength decreased  -CS,PG,CS2    Recorded by   [CS,PG,CS2] MDADI Hare/JARAD (r) Heather Patino, OT Student (t) JAM Hare (c)    Lower Body Bathing Assessment/Training    LB Bathing Assess/Train Assistive Device   bath mitt  -CS,PG,CS2    LB Bathing Assess/Train, Position   long sitting;supine  -CS,PG,CS2    LB Bathing Assess/Train, [place] Level   set up required;moderate assist (50% patient effort)  -CS,PG,CS2    LB Bathing Assess/Train, Impairments   ROM decreased;strength decreased  -CS,PG,CS2    LB Bathing Assess/Train, Comment   Pt required max A with lisa  bathing,   -CS,PG,CS2    Recorded by   [CS,PG,CS2] JAM Hare (r) Heather Patino, OT Student (t) JAM Hare (c)    Upper Body Dressing Assessment/Training    UB Dressing Assess/Train, Clothing Type   doffing:;donning:;hospital gown  -CS,PG,CS2    UB Dressing Assess/Train, Position   supine;long sitting  -CS,PG,CS2     UB Dressing Assess/Train, [place]   minimum assist (75% patient effort);set up required  -CS,PG,CS2    UB Dressing Assess/Train, Impairments   ROM decreased;strength decreased  -CS,PG,CS2    Recorded by   [CS,PG,CS2] MADDI Hare/JARAD (r) Heather Patino, OT Student (t) JAM Hare (c)    Lower Body Dressing Assessment/Training    LB Dressing Assess/Train, Clothing Type   doffing:;donning:;slipper socks  -CS,PG,CS2    LB Dressing Assess/Train, Position   supine  -CS,PG,CS2    LB Dressing Assess/Train, [place]   supervision required;set up required  -CS,PG,CS2    LB Dressing Assess/Train, Impairments   ROM decreased;strength decreased  -CS,PG,CS2    Recorded by   [CS,PG,CS2] MADDI Hare/JARAD (r) Heather Patino, OT Student (t) JAM Hare (c)    Toileting Assessment/Training    Toileting Assess/Train, Position   long sitting;supine  -CS,PG,CS2    Toileting Assess/Train, [place]ep[place] Level   maximum assist (25% patient effort)  -CS,PG,CS2    Toileting Assess/Train, Impairments   ROM decreased;strength decreased  -CS,PG,CS2    Toileting Assess/Train, Comment   Pt had BM during session and required max A to clean lisa area after.  Pt could roll side to side.   -CS,PG,CS2    Recorded by   [CS,PG,CS2] MADDI Hare/JARAD (r) Heather Patino, OT Student (t) JAM Hare (c)    Grooming Assessment/Training    Grooming Assess/Train, Position   sitting  -CS,PG,CS2    Grooming Assess/Train, Indepen Level   moderate assist (50% patient effort);set up required  -CS3    Grooming Assess/Train, Impairments   ROM decreased;strength decreased  -CS,PG,CS2    Grooming Assess/Train, Comment   Pt setup for applying deoderant and washing face.  Pt required mod A for hair combing.   -CS,PG,CS2    Recorded by   [CS,PG,CS2] MADDI Hare/JARAD (r) Heather Patino, OT Student (t) JAM Hare (c)  [CS3] JAM Hare    Therapy Exercises     Bilateral Lower Extremities AROM:;AAROM:;15 reps;20 reps;supine;ankle pumps/circles;quad sets;glut sets;hip abduction/adduction;heel slides;sitting;LAQ;hip flexion  -SEJAL AROM:;20 reps;sitting;ankle pumps/circles;LAQ;AAROM:;hip flexion  -EM     Recorded by [SEJAL] Shalom Ross PTA [EM] Marcelo Olsen PTA     Positioning and Restraints    Pre-Treatment Position in bed  -SEJAL in bed  -EM in bed  -CS,PG,CS2    Post Treatment Position bed  -SEJAL chair  -EM bed  -CS,PG,CS2    In Bed supine;call light within reach;encouraged to call for assist;with family/caregiver   all needs met. pt declined recliner  -SEJAL sitting;call light within reach;encouraged to call for assist;with family/caregiver  -EM supine;call light within reach  -CS,PG,CS2    Recorded by [SEJAL] Shalom Ross PTA [EM] Marcelo Olsen PTA [CS,PG,CS2] MADDI Hare/JARAD (r) Heather Patino, OT Student (t) JAM Hare (c)      User Key  (r) = Recorded By, (t) = Taken By, (c) = Cosigned By    Initials Name Effective Dates    EM Marcelo Olsen, BRUNO 08/11/15 -     SEJAL Shalom Ross PTA 10/17/16 -     CS JAM Hare 10/17/16 -     PG Heather Patino, OT Student 01/31/17 -                 IP PT Goals       12/11/17 1544 12/07/17 1119 12/07/17 1005    Transfer Training PT LTG    Transfer Training PT  LTG, Date Goal Reviewed 12/11/17  -SEJAL  12/07/17  -    Transfer Training PT LTG, Outcome goal ongoing  -  goal ongoing  -    Gait Training PT LTG    Gait Training Goal PT LTG, Date Goal Reviewed 12/11/17  -SEJAL  12/07/17  -    Gait Training Goal PT LTG, Outcome goal ongoing  -  goal ongoing  -    Stair Training PT LTG    Stair Training Goal PT LTG, Date Goal Reviewed 12/11/17  -SEJAL  12/07/17  -    Stair Training Goal PT LTG, Outcome goal ongoing  -SEJAL  goal ongoing  -    Strength Goal PT LTG    Strength Goal PT LTG, Date Goal Reviewed 12/11/17  -SEJAL 12/07/17  -SEJAL     Strength Goal PT LTG, Outcome goal ongoing  -SEJAL goal partially met   -SEJAL     Patient Education PT LTG    Patient Education PT LTG, Date Goal Reviewed 12/11/17  -SEJAL  12/07/17  -SEJAL    Patient Education PT LTG Outcome goal ongoing  -SEJAL  goal ongoing  -SEJAL      12/06/17 1735 12/06/17 1734       Transfer Training PT LTG    Transfer Training PT LTG, Date Established 12/06/17  -JCA      Transfer Training PT LTG, Time to Achieve by discharge  -JCA      Transfer Training PT LTG, Activity Type bed to chair /chair to bed;sit to stand/stand to sit  -JCA      Transfer Training PT LTG, [place] Level conditional independence  -JCA      Transfer Training PT LTG, Outcome goal ongoing  -JCA      Gait Training PT LTG    Gait Training Goal PT LTG, Date Established 12/06/17  -JCA      Gait Training Goal PT LTG, Time to Achieve by discharge  -JCA      Gait Training Goal PT LTG, [place] Level conditional independence  -JCA      Gait Training Goal PT LTG, Assist Device --   aad  -JCA      Gait Training Goal PT LTG, Distance to Achieve 150ft  -JCA      Gait Training Goal PT LTG, Additional Goal 300ft  -JCA      Gait Training Goal PT LTG, Outcome goal ongoing  -A      Stair Training PT LTG    Stair Training Goal PT LTG, Date Established 12/06/17  -JCA      Stair Training Goal PT LTG, Time to Achieve by discharge  -JCA      Stair Training Goal PT LTG, Number of Steps 2  -JCA      Stair Training Goal PT LTG, [place] Level conditional independence  -JCA      Stair Training Goal PT LTG, Outcome goal ongoing  -JCA      Strength Goal PT LTG    Strength Goal PT LTG, Date Established 12/06/17  -JCA (P)  12/06/17  -JCA     Strength Goal PT LTG, Time to Achieve by discharge  -JCA (P)  by discharge  -JCA     Strength Goal PT LTG, Measure to Achieve Pt will tolerate 15 reps of AROM exercises  -JCA (P)  Pt will tolerate 15 reps of AROM exercises  -JCA     Strength Goal PT LTG, Functional Goal Pt will progress to standing exercises  -JCA      Strength Goal PT LTG, Outcome goal ongoing  -JCA      Patient  Education PT LTG    Patient Education PT LTG, Date Established 12/06/17  -JCA (P)  12/06/17  -JCA     Patient Education PT LTG, Time to Achieve by discharge  -JCA (P)  by discharge  -JCA     Patient Education PT LTG, Education Type HEP;gait;transfers;home safety  -JCA (P)  HEP;gait;transfers;home safety  -JCA     Patient Education PT LTG Outcome goal ongoing  -JCA (P)  goal ongoing  -JCA       User Key  (r) = Recorded By, (t) = Taken By, (c) = Cosigned By    Initials Name Provider Type    [facility] Jenni Guerrero, PT Physical Therapist    SEJAL Ross, BRUNO Physical Therapy Assistant          Physical Therapy Education     Title: PT OT SLP Therapies (Active)     Topic: Physical Therapy (Active)     Point: Mobility training (Active)    Learning Progress Summary    Learner Readiness Method Response Comment Documented by Status   Patient Acceptance E NR   12/11/17 1650 Active    Acceptance E NR   12/07/17 1119 Active                      User Key     Initials Effective Dates Name Provider Type [facility] 10/17/16 -  Shalom Ross PTA Physical Therapy Assistant PT                    PT Recommendation and Plan  Anticipated Discharge Disposition: inpatient rehabilitation facility (vs 24/7 care with HH PT)  Planned Therapy Interventions: balance training, bed mobility training, gait training, home exercise program, patient/family education, stair training, strengthening, transfer training  PT Frequency: other (see comments) (5-14 times per week)  Plan of Care Review  Plan Of Care Reviewed With: patient  Progress: progress toward functional goals as expected  Outcome Summary/Follow up Plan: pt responded well to therapy. pt required min A for bed mob and sit-stand-sit. pt required min- mod A w/ BR t/f. gait training at 208 ft CGA w/ RW. pt is lacking motivation and requires encoureagement to participate w/ therapy. pt would continue to benefit from PT services. if pt wwere to DC today, recommend SNF or 24/7  care          Outcome Measures       12/11/17 1544 12/10/17 1408 12/09/17 1400    How much help from another person do you currently need...    Turning from your back to your side while in flat bed without using bedrails? 3  -SEJAL 3  -EM     Moving from lying on back to sitting on the side of a flat bed without bedrails? 3  -SEJAL 3  -EM     Moving to and from a bed to a chair (including a wheelchair)? 3  -SEJAL 3  -EM     Standing up from a chair using your arms (e.g., wheelchair, bedside chair)? 3  -SEJAL 3  -EM     Climbing 3-5 steps with a railing? 3  -SEJAL 3  -EM     To walk in hospital room? 3  -SEJAL 3  -EM     AM-PAC 6 Clicks Score 18  -SEJAL 18  -EM     How much help from another is currently needed...    Putting on and taking off regular lower body clothing?   3  -CS (r) PG (t) CS (c)    Bathing (including washing, rinsing, and drying)   3  -CS (r) PG (t) CS (c)    Toileting (which includes using toilet bed pan or urinal)   2  -CS (r) PG (t) CS (c)    Putting on and taking off regular upper body clothing   3  -CS (r) PG (t) CS (c)    Taking care of personal grooming (such as brushing teeth)   4  -CS (r) PG (t) CS (c)    Eating meals   4  -CS (r) PG (t) CS (c)    Score   19  -CS (r) PG (t)    Functional Assessment    Outcome Measure Options AM-PAC 6 Clicks Basic Mobility (PT)  -[facility] 6 Clicks Basic Mobility (PT)  -EM       User Key  (r) = Recorded By, (t) = Taken By, (c) = Cosigned By    Initials Name Provider Type    EM Marcelo Olsen PTA Physical Therapy Assistant    SEJAL Ross PTA Physical Therapy Assistant    JAM Zamora Occupational Therapy Assistant    RENATA Patino, OT Student OT Student           Time Calculation:         PT Charges       12/11/17 1654          Time Calculation    Start Time 1544  -SEJAL      Stop Time 1635  -SEJAL      Time Calculation (min) 51 min  -SEJAL      Time Calculation- PT    Total Timed Code Minutes- PT 51 minute(s)  -SEJAL        User Key  (r) = Recorded By, (t) =  Taken By, (c) = Cosigned By    Initials Name Provider Type    SEJAL Shalom Ross PTA Physical Therapy Assistant          Therapy Charges for Today     Code Description Service Date Service Provider Modifiers Qty    77361376648 HC GAIT TRAINING EA 15 MIN 12/11/2017 Shalom Ross PTA GP, KX 1    88203499277 HC PT THER PROC EA 15 MIN 12/11/2017 Shalom Ross PTA GP, KX 1    36772195918 HC PT THERAPEUTIC ACT EA 15 MIN 12/11/2017 Shalom Ross PTA GP, KX 1          PT G-Codes  PT Professional Judgement Used?: Yes  Outcome Measure Options: AM-PAC 6 Clicks Basic Mobility (PT)  Score: 18  Functional Limitation: Mobility: Walking and moving around  Mobility: Walking and Moving Around Current Status (): At least 40 percent but less than 60 percent impaired, limited or restricted  Mobility: Walking and Moving Around Goal Status (): At least 1 percent but less than 20 percent impaired, limited or restricted    Shalom Ross PTA  12/11/2017

## 2017-12-11 NOTE — PLAN OF CARE
Problem: Pain, Acute (Adult)  Goal: Acceptable Pain Control/Comfort Level  Outcome: Ongoing (interventions implemented as appropriate)    Problem: Fall Risk (Adult)  Goal: Absence of Falls  Outcome: Ongoing (interventions implemented as appropriate)    Problem: Pressure Ulcer Risk (Ulis Scale) (Adult,Obstetrics,Pediatric)  Goal: Identify Related Risk Factors and Signs and Symptoms  Outcome: Ongoing (interventions implemented as appropriate)  Goal: Skin Integrity  Outcome: Ongoing (interventions implemented as appropriate)

## 2017-12-11 NOTE — PLAN OF CARE
Problem: Patient Care Overview (Adult)  Goal: Plan of Care Review  Outcome: Ongoing (interventions implemented as appropriate)    12/11/17 1334   Coping/Psychosocial Response Interventions   Plan Of Care Reviewed With patient   Patient Care Overview   Progress no change   Outcome Evaluation   Outcome Summary/Follow up Plan Stress test done today. BONIFACIO sceduled in AM. Pain controlled with interventions.        Goal: Adult Individualization and Mutuality  Outcome: Ongoing (interventions implemented as appropriate)  Goal: Discharge Needs Assessment  Outcome: Ongoing (interventions implemented as appropriate)    Problem: Pain, Acute (Adult)  Goal: Acceptable Pain Control/Comfort Level  Outcome: Ongoing (interventions implemented as appropriate)    Problem: Fall Risk (Adult)  Goal: Absence of Falls  Outcome: Ongoing (interventions implemented as appropriate)    Problem: Pressure Ulcer Risk (Luis Scale) (Adult,Obstetrics,Pediatric)  Goal: Identify Related Risk Factors and Signs and Symptoms  Outcome: Ongoing (interventions implemented as appropriate)  Goal: Skin Integrity  Outcome: Ongoing (interventions implemented as appropriate)

## 2017-12-11 NOTE — PROGRESS NOTES
Risk-benefit treatment option for the transesophageal echocardiogram were discussed with the patient and an informed consent was obtained patient Mallampati score #2 patient ASA classification was #2.

## 2017-12-11 NOTE — PLAN OF CARE
Problem: Patient Care Overview (Adult)  Goal: Plan of Care Review  Outcome: Ongoing (interventions implemented as appropriate)    12/11/17 1544   Coping/Psychosocial Response Interventions   Plan Of Care Reviewed With patient   Patient Care Overview   Progress progress toward functional goals as expected   Outcome Evaluation   Outcome Summary/Follow up Plan pt responded well to therapy. pt required min A for bed mob and sit-stand-sit. pt required min- mod A w/ BR t/f. gait training at 208 ft CGA w/ RW. pt is lacking motivation and requires encouragement to participate w/ therapy. pt would continue to benefit from PT services. if pt were to DC today, recommend SNF or 24/7 care       Goal: Discharge Needs Assessment  Outcome: Ongoing (interventions implemented as appropriate)    12/06/17 0151 12/06/17 1545 12/06/17 1648   Discharge Needs Assessment   Concerns To Be Addressed --  --  --    Concerns Comments Very weak. Pt unable to rise from toilet without assistance. --  --    Discharge Facility/Level Of Care Needs --  --  --    Current Discharge Risk --  chronically ill --    Discharge Disposition --  still a patient --    Current Health   Anticipated Changes Related to Illness --  none --    Living Environment   Transportation Available --  --  family or friend will provide   Self-Care   Equipment Currently Used at Home --  --  walker, standard;grab bar;raised toilet     12/07/17 1659 12/11/17 1651   Discharge Needs Assessment   Concerns To Be Addressed no discharge needs identified --    Concerns Comments --  --    Discharge Facility/Level Of Care Needs --  other (see comments)  (24/7 care)   Current Discharge Risk --  --    Discharge Disposition --  --    Current Health   Anticipated Changes Related to Illness --  --    Living Environment   Transportation Available --  --    Self-Care   Equipment Currently Used at Home --  --          Problem: Inpatient Physical Therapy  Goal: Transfer Training Goal 1 LTG-  PT  Outcome: Ongoing (interventions implemented as appropriate)    12/06/17 1735 12/11/17 1544   Transfer Training PT LTG   Transfer Training PT LTG, Date Established 12/06/17 --    Transfer Training PT LTG, Time to Achieve by discharge --    Transfer Training PT LTG, Activity Type bed to chair /chair to bed;sit to stand/stand to sit --    Transfer Training PT LTG, [place] Level conditional independence --    Transfer Training PT LTG, Date Goal Reviewed --  12/11/17   Transfer Training PT LTG, Outcome --  goal ongoing       Goal: Gait Training Goal LTG- PT  Outcome: Ongoing (interventions implemented as appropriate)    12/06/17 1735 12/11/17 1544   Gait Training PT LTG   Gait Training Goal PT LTG, Date Established 12/06/17 --    Gait Training Goal PT LTG, Time to Achieve by discharge --    Gait Training Goal PT LTG, [place] Level conditional independence --    Gait Training Goal PT LTG, Assist Device (aad) --    Gait Training Goal PT LTG, Distance to Achieve 150ft --    Gait Training Goal PT LTG, Additional Goal 300ft --    Gait Training Goal PT LTG, Date Goal Reviewed --  12/11/17   Gait Training Goal PT LTG, Outcome --  goal ongoing       Goal: Stair Training Goal LTG- PT  Outcome: Ongoing (interventions implemented as appropriate)    12/06/17 1735 12/11/17 1544   Stair Training PT LTG   Stair Training Goal PT LTG, Date Established 12/06/17 --    Stair Training Goal PT LTG, Time to Achieve by discharge --    Stair Training Goal PT LTG, Number of Steps 2 --    Stair Training Goal PT LTG, [place] Level conditional independence --    Stair Training Goal PT LTG, Date Goal Reviewed --  12/11/17   Stair Training Goal PT LTG, Outcome --  goal ongoing       Goal: Strength Goal LTG- PT  Outcome: Ongoing (interventions implemented as appropriate)    12/06/17 1735 12/11/17 1544   Strength Goal PT LTG   Strength Goal PT LTG, Date Established 12/06/17 --    Strength Goal PT LTG, Time to Achieve by discharge --     Strength Goal PT LTG, Measure to Achieve Pt will tolerate 15 reps of AROM exercises --    Strength Goal PT LTG, Functional Goal Pt will progress to standing exercises --    Strength Goal PT LTG, Date Goal Reviewed --  12/11/17   Strength Goal PT LTG, Outcome --  goal ongoing       Goal: Patient Education Goal LTG- PT  Outcome: Ongoing (interventions implemented as appropriate)    12/06/17 1735 12/11/17 1544   Patient Education PT LTG   Patient Education PT LTG, Date Established 12/06/17 --    Patient Education PT LTG, Time to Achieve by discharge --    Patient Education PT LTG, Education Type HEP;gait;transfers;home safety --    Patient Education PT LTG, Date Goal Reviewed --  12/11/17   Patient Education PT LTG Outcome --  goal ongoing

## 2017-12-11 NOTE — PROGRESS NOTES
Patient underwent a Lexiscan Cardiolite stress test earlier this morning.  Patient had   Sprite after the nuclear stress tests.  Patient was supposed to be nothing by mouth for the transesophageal echocardiogram.  As the patient had PO intake would cancel the transesophageal echocardiogram and would be scheduled in the morning.

## 2017-12-12 NOTE — PLAN OF CARE
Problem: Patient Care Overview (Adult)  Goal: Plan of Care Review  Outcome: Ongoing (interventions implemented as appropriate)    12/12/17 1020 12/12/17 1530   Coping/Psychosocial Response Interventions   Plan Of Care Reviewed With patient --    Patient Care Overview   Progress progress toward functional goals as expected --    Outcome Evaluation   Outcome Summary/Follow up Plan --  Pt engaged in BUE strengthening exercises x20 reps in all available planes of motion. Pt deferred ADLs this date. Continue with POC.          Problem: Inpatient Occupational Therapy  Goal: Transfer Training Goal 1 LTG- OT  Outcome: Ongoing (interventions implemented as appropriate)    12/06/17 1100 12/08/17 0937 12/12/17 1530   Transfer Training OT LTG   Transfer Training OT LTG, Date Established 12/06/17 --  --    Transfer Training OT LTG, Time to Achieve by discharge --  --    Transfer Training OT LTG, Activity Type all transfers --  --    Transfer Training OT LTG, [place] Level conditional independence --  --    Transfer Training OT LTG, Assist Device walker, rolling --  --    Transfer Training OT LTG, Date Goal Reviewed --  --  12/12/17   Transfer Training OT LTG, Outcome --  goal not met --        Goal: Strength Goal LTG- OT  Outcome: Ongoing (interventions implemented as appropriate)    12/06/17 1100 12/08/17 0937 12/12/17 1530   Strength OT LTG   Strength Goal OT LTG, Date Established 12/06/17 --  --    Strength Goal OT LTG, Time to Achieve by discharge --  --    Strength Goal OT LTG, Measure to Achieve Pt. will complete BUE strengthening exercises all planes and joints to increase BUE strength to 5/5 for ADLs.  --  --    Strength Goal OT LTG, Date Goal Reviewed --  --  12/12/17   Strength Goal OT LTG, Outcome --  goal not met --        Goal: ADL Goal LTG- OT  Outcome: Ongoing (interventions implemented as appropriate)    12/06/17 1100 12/08/17 0937 12/12/17 1530   ADL OT LTG   ADL OT LTG, Date Established 12/06/17 --  --     ADL OT LTG, Time to Achieve by discharge --  --    ADL OT LTG, Activity Type ADL skills --  --    ADL OT LTG, [place] Level independent --  --    ADL OT LTG, Date Goal Reviewed --  --  12/12/17   ADL OT LTG, Outcome --  goal not met --

## 2017-12-12 NOTE — PLAN OF CARE
Problem: Patient Care Overview (Adult)  Goal: Plan of Care Review  Outcome: Ongoing (interventions implemented as appropriate)    12/12/17 1020   Coping/Psychosocial Response Interventions   Plan Of Care Reviewed With patient   Patient Care Overview   Progress progress toward functional goals as expected   Outcome Evaluation   Outcome Summary/Follow up Plan pt responded well to therapy. pt w/ increased gait to 200 ft CGA. pt required CGA-min A for bed mob and sit-stand-sit. pt participated in LE ther ex. vitals stable throughout tx. no new goals met at this time. pt would continue to benefit from PT services. Recommend 24/7 care upon DC.        Goal: Discharge Needs Assessment  Outcome: Ongoing (interventions implemented as appropriate)    12/06/17 0151 12/06/17 1545 12/06/17 1648   Discharge Needs Assessment   Concerns To Be Addressed --  --  --    Concerns Comments Very weak. Pt unable to rise from toilet without assistance. --  --    Discharge Facility/Level Of Care Needs --  --  --    Current Discharge Risk --  chronically ill --    Discharge Disposition --  still a patient --    Current Health   Anticipated Changes Related to Illness --  none --    Living Environment   Transportation Available --  --  family or friend will provide   Self-Care   Equipment Currently Used at Home --  --  walker, standard;grab bar;raised toilet     12/07/17 1659 12/11/17 1651   Discharge Needs Assessment   Concerns To Be Addressed no discharge needs identified --    Concerns Comments --  --    Discharge Facility/Level Of Care Needs --  other (see comments)  (24/7 care)   Current Discharge Risk --  --    Discharge Disposition --  --    Current Health   Anticipated Changes Related to Illness --  --    Living Environment   Transportation Available --  --    Self-Care   Equipment Currently Used at Home --  --          Problem: Inpatient Physical Therapy  Goal: Transfer Training Goal 1 LTG- PT  Outcome: Ongoing (interventions  implemented as appropriate)    12/06/17 1735 12/12/17 1020   Transfer Training PT LTG   Transfer Training PT LTG, Date Established 12/06/17 --    Transfer Training PT LTG, Time to Achieve by discharge --    Transfer Training PT LTG, Activity Type bed to chair /chair to bed;sit to stand/stand to sit --    Transfer Training PT LTG, [place] Level conditional independence --    Transfer Training PT LTG, Date Goal Reviewed --  12/12/17   Transfer Training PT LTG, Outcome --  goal ongoing       Goal: Gait Training Goal LTG- PT  Outcome: Ongoing (interventions implemented as appropriate)    12/06/17 1735 12/12/17 1020   Gait Training PT LTG   Gait Training Goal PT LTG, Date Established 12/06/17 --    Gait Training Goal PT LTG, Time to Achieve by discharge --    Gait Training Goal PT LTG, [place] Level conditional independence --    Gait Training Goal PT LTG, Assist Device (aad) --    Gait Training Goal PT LTG, Distance to Achieve 150ft --    Gait Training Goal PT LTG, Additional Goal 300ft --    Gait Training Goal PT LTG, Date Goal Reviewed --  12/12/17   Gait Training Goal PT LTG, Outcome --  goal ongoing       Goal: Stair Training Goal LTG- PT  Outcome: Outcome(s) achieved Date Met:  12/12/17 12/06/17 1735 12/12/17 1020   Stair Training PT LTG   Stair Training Goal PT LTG, Date Established 12/06/17 --    Stair Training Goal PT LTG, Time to Achieve by discharge --    Stair Training Goal PT LTG, Number of Steps 2 --    Stair Training Goal PT LTG, [place] Level conditional independence --    Stair Training Goal PT LTG, Date Goal Reviewed --  12/12/17   Stair Training Goal PT LTG, Outcome --  goal ongoing       Goal: Strength Goal LTG- PT  Outcome: Ongoing (interventions implemented as appropriate)    12/06/17 1735 12/12/17 1020   Strength Goal PT LTG   Strength Goal PT LTG, Date Established 12/06/17 --    Strength Goal PT LTG, Time to Achieve by discharge --    Strength Goal PT LTG, Measure to Achieve Pt  will tolerate 15 reps of AROM exercises --    Strength Goal PT LTG, Functional Goal Pt will progress to standing exercises --    Strength Goal PT LTG, Date Goal Reviewed --  12/12/17   Strength Goal PT LTG, Outcome --  goal ongoing       Goal: Patient Education Goal LTG- PT  Outcome: Ongoing (interventions implemented as appropriate)    12/06/17 1735 12/12/17 1020   Patient Education PT LTG   Patient Education PT LTG, Date Established 12/06/17 --    Patient Education PT LTG, Time to Achieve by discharge --    Patient Education PT LTG, Education Type HEP;gait;transfers;home safety --    Patient Education PT LTG, Date Goal Reviewed --  12/12/17   Patient Education PT LTG Outcome --  goal ongoing

## 2017-12-12 NOTE — CONSULTS
Adult Nutrition  Assessment    Patient Name:  Damaris Sánchez  YOB: 1945  MRN: [id number]  Admit Date:  12/4/2017    Assessment Date:  12/12/2017    Comments:  Pt has been NPO today for BONIFACIO procedure. Pt and her son request low fiber/residue information--RD reviewed info and answered ?s. Enc pt to call w/future ?s.           Reason for Assessment       12/12/17 1435    Reason for Assessment    Reason For Assessment/Visit follow up protocol;education                Nutrition/Diet History       12/12/17 1435    Nutrition/Diet History    Typical Food/Fluid Intake Pt and son request low fiber diet education--very interested and receptive to info.               Labs/Tests/Procedures/Meds       12/12/17 1436    Labs/Tests/Procedures/Meds    Labs/Tests Review Reviewed    Medication Review Reviewed, pertinent                Nutrition Prescription Ordered       12/12/17 1437    Nutrition Prescription PO    Current PO Diet Regular    Other Modifiers Low Fiber              Electronically signed by:  Ritu Bella RD  12/12/17 2:39 PM

## 2017-12-12 NOTE — THERAPY TREATMENT NOTE
Acute Care - Physical Therapy Treatment Note  [facility]     Patient Name: Damaris Sánchez  : 1945  MRN: [id number]  Today's Date: 2017  Onset of Illness/Injury or Date of Surgery Date: 17  Date of Referral to PT: 17  Referring Physician: Dr. Neelam Saravia    Admit Date: 2017    Visit Dx:    ICD-10-CM ICD-9-CM   1. Weakness R53.1 780.79   2. Ulcerative colitis with complication, unspecified location K51.919 556.9   3. NSTEMI (non-ST elevated myocardial infarction) I21.4 410.70   4. Lower abdominal pain R10.30 789.09   5. Decreased activities of daily living (ADL) Z78.9 V49.89   6. Impaired functional mobility, balance, gait, and endurance Z74.09 V49.89   7. Impaired mobility and ADLs Z74.09 799.89   8. Troponin level elevated R74.8 790.6   9. Chest pain, unspecified type R07.9 786.50   10. Elevated troponin R74.8 790.6   11. Chest pain at rest R07.9 786.50     Patient Active Problem List   Diagnosis   • Lower abdominal pain   • C. difficile colitis   • Weakness   • Ulcerative colitis               Adult Rehabilitation Note       17 1020 17 1544 12/10/17 1408    Rehab Assessment/Intervention    Discipline physical therapy assistant  -SEJAL physical therapy assistant  -SEJAL physical therapy assistant  -EM    Document Type therapy note (daily note)  -SEJAL therapy note (daily note)  -SEJAL therapy note (daily note)  -EM    Subjective Information agree to therapy  -SEJAL agree to therapy  -SEJAL agree to therapy  -EM    Patient Effort, Rehab Treatment adequate  -SEJAL adequate  -SEJAL adequate  -EM    Patient Effort, Rehab Treatment Comment nsg agrees to therapy.   -SEJAL spoke w/ eval PT and nsg who state to proceed with therapy  -SEJAL     Precautions/Limitations fall precautions  -SEJAL fall precautions  -SEJAL     Recorded by [SEJAL] Shalom Ross PTA [SEJAL] Shalom Ross PTA [EM] Marcelo Olsen PTA    Vital Signs    Pre Systolic BP Rehab 129  -SEJAL 105  -SEJAL 122  -EM    Pre Treatment Diastolic BP 77  -SEJAL  61  -SEJAL 64  -EM    Post Systolic BP Rehab 124  -SEJAL 128  -SEJAL     Post Treatment Diastolic BP 66  -SEJAL 60  -SEJAL     Pretreatment Heart Rate (beats/min) 93  -SEJAL 98  -SEJAL 75  -EM    Posttreatment Heart Rate (beats/min) 97  -SEJAL 111  -SEJAL     Pre SpO2 (%) 91  -SEJAL 93  -SEJAL 94  -EM    O2 Delivery Pre Treatment room air  -SEJAL room air  -SEJAL room air  -EM    Post SpO2 (%) 94  -SEJAL 97  -SEJAL     O2 Delivery Post Treatment room air  -SEJAL room air  -SEJAL     Pre Patient Position Supine  -SEJAL Supine  -SEJAL Sitting  -EM    Post Patient Position Supine  -SEJAL Supine  -SEJAL     Recorded by [SEJAL] Shalom Ross PTA [SEJAL] Shalom Ross PTA [EM] Marcelo Olsen PTA    Pain Assessment    Pain Assessment 0-10  -SEJAL 0-10  -SJEAL 0-10  -EM    Pain Score 7  -SEJAL 10  -SEJAL     Post Pain Score 5  -SEJAL 5  -SEJAL     Pain Type Chronic pain  -SEJAL      Pain Location Generalized  -SEJAL Generalized  -SEJAL     Pain Orientation Left  -SEJAL      Pain Intervention(s) Medication (See MAR)  -SEJAL Medication (See MAR)  -SEJAL     Recorded by [SEJAL] Shalom Ross PTA [SEJAL] Shalom Ross PTA [EM] Marcelo Olsen PTA    Vision Assessment/Intervention    Visual Impairment WFL with corrective lenses  -SEJAL WFL with corrective lenses  -SEJAL     Recorded by [SEJAL] Shalom Ross PTA [SEJAL] Shalom Ross PTA     Cognitive Assessment/Intervention    Current Cognitive/Communication Assessment functional  -SEJAL functional  -SEJAL functional  -EM    Orientation Status oriented x 4  -SEJAL oriented x 4  -SEJAL oriented x 4  -EM    Follows Commands/Answers Questions 100% of the time  -SEJAL 100% of the time  -SEJAL 100% of the time  -EM    Personal Safety   WNL/WFL  -EM    Personal Safety Interventions gait belt;muscle strengthening facilitated;nonskid shoes/slippers when out of bed;supervised activity  -SEJAL gait belt;muscle strengthening facilitated;nonskid shoes/slippers when out of bed;supervised activity  -SEJAL     Recorded by [SEJAL] Shalom Ross PTA [SEJAL] Shalom Ross PTA [EM] Marcelo Olsen PTA    Bed Mobility,  Assessment/Treatment    Bed Mobility, Assistive Device bed rails;head of bed elevated  -SEJAL bed rails;head of bed elevated  -     Bed Mobility, Scoot/Bridge, [place] contact guard assist;minimum assist (75% patient effort)  - minimum assist (75% patient effort)  -SEJAL     Bed Mob, Supine to Sit, [place] contact guard assist  - minimum assist (75% patient effort)  -SEJAL minimum assist (75% patient effort);moderate assist (50% patient effort)  -EM    Bed Mob, Sit to Supine, [place] minimum assist (75% patient effort)  - minimum assist (75% patient effort)  -SEJAL     Recorded by [SEJAL] Shalom Ross PTA [SEJAL] Shalom Ross PTA [EM] Marcelo Olsen PTA    Transfer Assessment/Treatment    Transfers, Bed-Chair [place] --   pt declined OOB to chair at this time.   -SEJAL      Transfers, Sit-Stand [place] contact guard assist;minimum assist (75% patient effort)  - minimum assist (75% patient effort)  - minimum assist (75% patient effort)  -EM    Transfers, Stand-Sit [place] contact guard assist  - minimum assist (75% patient effort)  - minimum assist (75% patient effort)  -EM    Transfers, Sit-Stand-Sit, Assist Device rolling walker  - rolling walker  - rolling walker  -EM    Toilet Transfer, [place]  minimum assist (75% patient effort);moderate assist (50% patient effort)  - minimum assist (75% patient effort)  -EM    Toilet Transfer, Assistive Device  rolling walker  - rolling walker  -EM    Transfer, Comment  pt required more assist to stand from [facility]. pt able to complete her own lisa care  - mod Ax1 to stand from toilet-elevated toilet seat issued post tx.  -EM    Recorded by [SEJAL] Shalom Ross PTA [SEJAL] Shalom Ross PTA [EM] Marcelo Olsen PTA    Gait Assessment/Treatment    Gait, [place] Level contact guard assist  - contact guard assist  - contact guard assist  -EM    Gait, Assistive Device rolling walker  - rolling walker  - rolling walker  -EM     Gait, Distance (Feet) 215  -SEJAL 208  -SEJAL 232   8'+8'  -EM    Gait, Gait Deviations step length decreased;forward flexed posture  -SEJAL irving decreased;forward flexed posture   cueing for posture and to look ahead  - bilateral:;antalgic;step length decreased;forward flexed posture  -EM    Gait, Impairments strength decreased  - strength decreased  -     Gait, Comment   Followed with chair  -EM    Recorded by [SEJAL] Shalom Ross PTA [SEJAL] Shalom Ross PTA [EM] Marcelo Olsen PTA    Stairs Assessment/Treatment    Stairs, [place] Level not tested  -SEJAL not tested  -SEJAL     Recorded by [SEJAL] Shalom Ross PTA [SEJAL] Shalom Ross PTA     Therapy Exercises    Bilateral Lower Extremities AAROM:;AROM:;20 reps;supine;ankle pumps/circles;hip abduction/adduction;heel slides;SAQ  -SEJAL AROM:;AAROM:;15 reps;20 reps;supine;ankle pumps/circles;quad sets;glut sets;hip abduction/adduction;heel slides;sitting;LAQ;hip flexion  -SEJAL AROM:;20 reps;sitting;ankle pumps/circles;LAQ;AAROM:;hip flexion  -EM    Recorded by [SEJAL] Shalom Ross PTA [SEJAL] Shalom Ross PTA [EM] Marcelo Olsen PTA    Positioning and Restraints    Pre-Treatment Position in bed  - in bed  -SEJAL in bed  -EM    Post Treatment Position bed  - bed  - chair  -EM    In Bed supine;call light within reach;encouraged to call for assist;exit alarm on;with family/caregiver   all needs met. bed gatched. heels in the breeze  - supine;call light within reach;encouraged to call for assist;with family/caregiver   all needs met. pt declined recliner  - sitting;call light within reach;encouraged to call for assist;with family/caregiver  -EM    Recorded by [SEJAL] Shalom Ross PTA [SEJAL] Shalom Ross PTA [EM] Marcelo Olsen PTA      12/09/17 1320          Rehab Assessment/Intervention    Discipline occupational therapy assistant  -LILIANA,PG,CS2      Document Type therapy note (daily note)  -LILIANA,PG,CS2      Subjective Information agree to therapy;complains  of;pain  -CS,PG,CS2      Patient Effort, Rehab Treatment adequate  -CS,PG,CS2      Recorded by [CS,PG,CS2] MADDI Hare/JARAD (r) Heather Patino, HUMBERTO Student (t) JAM Hare (c)      Vital Signs    Pre Systolic BP Rehab 126  -CS,PG,CS2      Pre Treatment Diastolic BP 65  -CS,PG,CS2      Post Systolic BP Rehab 142  -CS,PG,CS2      Post Treatment Diastolic BP 73  -CS,PG,CS2      Pretreatment Heart Rate (beats/min) 99  -CS,PG,CS2      Posttreatment Heart Rate (beats/min) 103  -CS,PG,CS2      Pre SpO2 (%) 98  -CS,PG,CS2      O2 Delivery Pre Treatment room air  -CS,PG,CS2      Post SpO2 (%) 99  -CS,PG,CS2      O2 Delivery Post Treatment room air  -CS,PG,CS2      Pre Patient Position Supine  -CS,PG,CS2      Recorded by [CS,PG,CS2] MADDI Hare/JARAD (r) Heather Patino, HUMBERTO Student (t) JAM Hare (c)      Pain Assessment    Pain Assessment 0-10  -CS,PG,CS2      Pain Score 5  -CS,PG,CS2      Post Pain Score 4  -CS,PG,CS2      Pain Location Abdomen  -CS,PG,CS2      Recorded by [CS,PG,CS2] MADDI Hare/JARAD (r) Heather Patino OT Student (t) JAM Hare (c)      Vision Assessment/Intervention    Visual Impairment WFL with corrective lenses  -CS,PG,CS2      Recorded by [CS,PG,CS2] MADDI Hare/JARAD (r) Heather Patino OT Student (t) JAM Hare (c)      Cognitive Assessment/Intervention    Current Cognitive/Communication Assessment functional  -CS,PG,CS2      Orientation Status oriented x 4  -CS,PG,CS2      Follows Commands/Answers Questions 100% of the time  -CS,PG,CS2      Personal Safety WNL/WFL  -CS,PG,CS2      Recorded by [CS,PG,CS2] MADDI Hare/JARAD (r) Heather Patino OT Student (t) JAM Hare (c)      Bed Mobility, Assessment/Treatment    Bed Mobility, Assistive Device bed rails;draw sheet  -CS,PG,CS2      Bed Mobility, Roll Left, [place] minimum assist (75% patient effort)  -CS,PG,CS2      Bed Mobility,  Roll Right, [place] minimum assist (75% patient effort)  -CS,PG,CS2      Recorded by [CS,PG,CS2] JAM Hare (r) Heather Patino, OT Student (t) JAM Hare (c)      Upper Body Bathing Assessment/Training    UB Bathing Assess/Train Assistive Device bath mitt  -CS,PG,CS2      UB Bathing Assess/Train, Position long sitting;supine  -CS,PG,CS2      UB Bathing Assess/Train, [place] Level minimum assist (75% patient effort);set up required  -CS,PG,CS2      UB Bathing Assess/Train, Impairments ROM decreased;strength decreased  -CS,PG,CS2      Recorded by [CS,PG,CS2] JAM Hare (r) Heather Patino, OT Student (t) JAM Hare (c)      Lower Body Bathing Assessment/Training    LB Bathing Assess/Train Assistive Device bath mitt  -CS,PG,CS2      LB Bathing Assess/Train, Position long sitting;supine  -CS,PG,CS2      LB Bathing Assess/Train, [place] Level set up required;moderate assist (50% patient effort)  -CS,PG,CS2      LB Bathing Assess/Train, Impairments ROM decreased;strength decreased  -CS,PG,CS2      LB Bathing Assess/Train, Comment Pt required max A with lisa  bathing,   -CS,PG,CS2      Recorded by [CS,PG,CS2] MADDI Hare/JARAD (r) Heather Patino, OT Student (t) JAM Hare (c)      Upper Body Dressing Assessment/Training    UB Dressing Assess/Train, Clothing Type doffing:;donning:;hospital gown  -CS,PG,CS2      UB Dressing Assess/Train, Position supine;long sitting  -CS,PG,CS2      UB Dressing Assess/Train, [place] minimum assist (75% patient effort);set up required  -CS,PG,CS2      UB Dressing Assess/Train, Impairments ROM decreased;strength decreased  -CS,PG,CS2      Recorded by [CS,PG,CS2] JAM Hare (r) Heather Patino, OT Student (t) Madhavi L Duong, LINDA/L (c)      Lower Body Dressing Assessment/Training    LB Dressing Assess/Train, Clothing Type doffing:;donning:;slipper socks  -CS,PG,CS2      LB  Dressing Assess/Train, Position supine  -CS,PG,CS2      LB Dressing Assess/Train, [place] supervision required;set up required  -CS,PG,CS2      LB Dressing Assess/Train, Impairments ROM decreased;strength decreased  -CS,PG,CS2      Recorded by [CS,PG,CS2] MADDI Hare/JARAD (r) Heather Patino, OT Student (t) JAM Hare (c)      Toileting Assessment/Training    Toileting Assess/Train, Position long sitting;supine  -CS,PG,CS2      Toileting Assess/Train, Indepen Level maximum assist (25% patient effort)  -CS,PG,CS2      Toileting Assess/Train, Impairments ROM decreased;strength decreased  -CS,PG,CS2      Toileting Assess/Train, Comment Pt had BM during session and required max A to clean lisa area after.  Pt could roll side to side.   -CS,PG,CS2      Recorded by [CS,PG,CS2] MADDI Hare/JARAD (r) Heather Patino, OT Student (t) MADDI Hare/L (c)      Grooming Assessment/Training    Grooming Assess/Train, Position sitting  -CS,PG,CS2      Grooming Assess/Train, Indepen Level moderate assist (50% patient effort);set up required  -CS3      Grooming Assess/Train, Impairments ROM decreased;strength decreased  -CS,PG,CS2      Grooming Assess/Train, Comment Pt setup for applying deoderant and washing face.  Pt required mod A for hair combing.   -CS,PG,CS2      Recorded by [CS,PG,CS2] MADDI Hare/JARAD (r) Heather Patino, OT Student (t) JAM Hare (c)  [CS3] MADDI Hare/JARAD      Positioning and Restraints    Pre-Treatment Position in bed  -CS,PG,CS2      Post Treatment Position bed  -CS,PG,CS2      In Bed supine;call light within reach  -CS,PG,CS2      Recorded by [CS,PG,CS2] MADDI Hare/JARAD (r) Heather Patino, OT Student (t) JAM Hare (c)        User Key  (r) = Recorded By, (t) = Taken By, (c) = Cosigned By    Initials Name Effective Dates    EM Marcelo Olsen, PTA 08/11/15 -     SEJAL Shalom Ross, PTA 10/17/16 -     CS  Madhavi Watters, LINDA/L 10/17/16 -     PG Heather Patino, OT Student 01/31/17 -                 IP PT Goals       12/12/17 1020 12/11/17 1544 12/07/17 1119    Transfer Training PT LTG    Transfer Training PT  LTG, Date Goal Reviewed 12/12/17  -SEJAL 12/11/17  -SEJAL     Transfer Training PT LTG, Outcome goal ongoing  -SEJAL goal ongoing  -SEJAL     Gait Training PT LTG    Gait Training Goal PT LTG, Date Goal Reviewed 12/12/17  -SEJAL 12/11/17  -SEJAL     Gait Training Goal PT LTG, Outcome goal ongoing  -SEJAL goal ongoing  -SEJAL     Stair Training PT LTG    Stair Training Goal PT LTG, Date Goal Reviewed 12/12/17  -SEJAL 12/11/17  -SEJAL     Stair Training Goal PT LTG, Outcome goal ongoing  -SEJAL goal ongoing  -SEJAL     Strength Goal PT LTG    Strength Goal PT LTG, Date Goal Reviewed 12/12/17  -SEJAL 12/11/17  -SEJAL 12/07/17  -SEJAL    Strength Goal PT LTG, Outcome goal ongoing  -SEJAL goal ongoing  -SEJAL goal partially met  -SEJAL    Patient Education PT LTG    Patient Education PT LTG, Date Goal Reviewed 12/12/17  -SEJAL 12/11/17  -SEJAL     Patient Education PT LTG Outcome goal ongoing  -SEJAL goal ongoing  -SEJAL       12/07/17 1005 12/06/17 1735 12/06/17 1734    Transfer Training PT LTG    Transfer Training PT LTG, Date Established  12/06/17  -JCA     Transfer Training PT LTG, Time to Achieve  by discharge  -JCA     Transfer Training PT LTG, Activity Type  bed to chair /chair to bed;sit to stand/stand to sit  -JCA     Transfer Training PT LTG, [place] Level  conditional independence  -JCA     Transfer Training PT  LTG, Date Goal Reviewed 12/07/17  -SEJAL      Transfer Training PT LTG, Outcome goal ongoing  -SEJAL goal ongoing  -JCA     Gait Training PT LTG    Gait Training Goal PT LTG, Date Established  12/06/17  -JCA     Gait Training Goal PT LTG, Time to Achieve  by discharge  -JCA     Gait Training Goal PT LTG, [place] Level  conditional independence  -JCA     Gait Training Goal PT LTG, Assist Device  --   aad  -JCA     Gait Training Goal PT LTG, Distance to  Achieve  150ft  -JCA     Gait Training Goal PT LTG, Additional Goal  300ft  -JCA     Gait Training Goal PT LTG, Date Goal Reviewed 12/07/17  -SEJAL      Gait Training Goal PT LTG, Outcome goal ongoing  -SEJAL goal ongoing  -JCA     Stair Training PT LTG    Stair Training Goal PT LTG, Date Established  12/06/17  -JCA     Stair Training Goal PT LTG, Time to Achieve  by discharge  -JCA     Stair Training Goal PT LTG, Number of Steps  2  -JCA     Stair Training Goal PT LTG, [place] Level  conditional independence  -JCA     Stair Training Goal PT LTG, Date Goal Reviewed 12/07/17  -SEJLA      Stair Training Goal PT LTG, Outcome goal ongoing  -SEJAL goal ongoing  -JCA     Strength Goal PT LTG    Strength Goal PT LTG, Date Established  12/06/17  -JCA (P)  12/06/17  -JCA    Strength Goal PT LTG, Time to Achieve  by discharge  -JCA (P)  by discharge  -JCA    Strength Goal PT LTG, Measure to Achieve  Pt will tolerate 15 reps of AROM exercises  -JCA (P)  Pt will tolerate 15 reps of AROM exercises  -JCA    Strength Goal PT LTG, Functional Goal  Pt will progress to standing exercises  -JCA     Strength Goal PT LTG, Outcome  goal ongoing  -JCA     Patient Education PT LTG    Patient Education PT LTG, Date Established  12/06/17  -JCA (P)  12/06/17  -JCA    Patient Education PT LTG, Time to Achieve  by discharge  -JCA (P)  by discharge  -JCA    Patient Education PT LTG, Education Type  HEP;gait;transfers;home safety  -JCA (P)  HEP;gait;transfers;home safety  -JCA    Patient Education PT LTG, Date Goal Reviewed 12/07/17  -      Patient Education PT LTG Outcome goal ongoing  -SEJAL goal ongoing  -JCA (P)  goal ongoing  -JCA      User Key  (r) = Recorded By, (t) = Taken By, (c) = Cosigned By    Initials Name Provider Type    CHRISTOPHER Guerrero, PT Physical Therapist    SEJAL Ross, PTA Physical Therapy Assistant          Physical Therapy Education     Title: PT OT SLP Therapies (Active)     Topic: Physical Therapy (Active)     Point:  Mobility training (Active)    Learning Progress Summary    Learner Readiness Method Response Comment Documented by Status   Patient Acceptance E NR  SEJAL 12/12/17 1121 Active    Acceptance E NR  SEJAL 12/11/17 1650 Active    Acceptance E NR  SEJAL 12/07/17 1119 Active                      User Key     Initials Effective Dates Name Provider Type Discipline     10/17/16 -  Shalom Ross PTA Physical Therapy Assistant PT                    PT Recommendation and Plan  Anticipated Discharge Disposition: inpatient rehabilitation facility (vs 24/7 care with HH PT)  Planned Therapy Interventions: balance training, bed mobility training, gait training, home exercise program, patient/family education, stair training, strengthening, transfer training  PT Frequency: other (see comments) (5-14 times per week)  Plan of Care Review  Plan Of Care Reviewed With: patient  Progress: progress toward functional goals as expected  Outcome Summary/Follow up Plan: pt responded well to therapy. pt w/ increased agit to 200 ft CGA. pt required CGA-min A for bed mob and sit-stand-sit. pt participated in LE ther ex. vitals stable throughout tx. no new goals met at this time. pt would continue to benefit from PT services. Recommend 24/7 care upon DC.           Outcome Measures       12/12/17 1020 12/11/17 1544 12/10/17 1408    How much help from another person do you currently need...    Turning from your back to your side while in flat bed without using bedrails? 3  -SEJAL 3  -SEJAL 3  -EM    Moving from lying on back to sitting on the side of a flat bed without bedrails? 3  -SEJAL 3  -SEJAL 3  -EM    Moving to and from a bed to a chair (including a wheelchair)? 3  -SEJAL 3  -SEJAL 3  -EM    Standing up from a chair using your arms (e.g., wheelchair, bedside chair)? 3  -SEJAL 3  -SEJAL 3  -EM    Climbing 3-5 steps with a railing? 3  -SEJAL 3  -SEJAL 3  -EM    To walk in hospital room? 3  -SEJAL 3  -SEJAL 3  -EM    AM-PAC 6 Clicks Score 18  -SEJAL 18  -SEJAL 18  -EM    Functional  Assessment    Outcome Measure Options AM-PAC 6 Clicks Basic Mobility (PT)  -SEJAL AM-PAC 6 Clicks Basic Mobility (PT)  -SEJAL AM-PAC 6 Clicks Basic Mobility (PT)  -EM      12/09/17 1400          How much help from another is currently needed...    Putting on and taking off regular lower body clothing? 3  -CS (r) PG (t) CS (c)      Bathing (including washing, rinsing, and drying) 3  -CS (r) PG (t) CS (c)      Toileting (which includes using toilet bed pan or urinal) 2  -CS (r) PG (t) CS (c)      Putting on and taking off regular upper body clothing 3  -CS (r) PG (t) CS (c)      Taking care of personal grooming (such as brushing teeth) 4  -CS (r) PG (t) CS (c)      Eating meals 4  -CS (r) PG (t) CS (c)      Score 19  -CS (r) PG (t)        User Key  (r) = Recorded By, (t) = Taken By, (c) = Cosigned By    Initials Name Provider Type     Marcelo Olsen PTA Physical Therapy Assistant    SEJAL Ross PTA Physical Therapy Assistant    MADDI Zamora/JARAD Occupational Therapy Assistant    PG Heather Patino, OT Student OT Student           Time Calculation:         PT Charges       12/12/17 1125          Time Calculation    Start Time 1020  -SEJAL      Stop Time 1100  -SEJAL      Time Calculation (min) 40 min  -SEJAL      Time Calculation- PT    Total Timed Code Minutes- PT 40 minute(s)  -SEJAL        User Key  (r) = Recorded By, (t) = Taken By, (c) = Cosigned By    Initials Name Provider Type    SEJAL Shalom Ross PTA Physical Therapy Assistant          Therapy Charges for Today     Code Description Service Date Service Provider Modifiers Qty    71729979424 HC GAIT TRAINING EA 15 MIN 12/11/2017 Shalom Ross PTA GP, KX 1    30125583391 HC PT THER PROC EA 15 MIN 12/11/2017 BRUNO Smith, KX 1    67649167173 HC PT THERAPEUTIC ACT EA 15 MIN 12/11/2017 BRUNO Smith, KX 1    2194547 HC GAIT TRAINING EA 15 MIN 12/12/2017 BRUNO Smith, KX 1    44025271349 HC PT THER PROC EA 15 MIN 12/12/2017  Shalom Ross PTA GP, KX 1    78212794135 HC PT THERAPEUTIC ACT EA 15 MIN 12/12/2017 Shalom Ross PTA GP, KX 1          PT G-Codes  PT Professional Judgement Used?: Yes  Outcome Measure Options: AM-PAC 6 Clicks Basic Mobility (PT)  Score: 18  Functional Limitation: Mobility: Walking and moving around  Mobility: Walking and Moving Around Current Status (): At least 40 percent but less than 60 percent impaired, limited or restricted  Mobility: Walking and Moving Around Goal Status (): At least 1 percent but less than 20 percent impaired, limited or restricted    Shalom Ross PTA  12/12/2017

## 2017-12-12 NOTE — PLAN OF CARE
Problem: Patient Care Overview (Adult)  Goal: Plan of Care Review  Outcome: Ongoing (interventions implemented as appropriate)    12/12/17 0326   Coping/Psychosocial Response Interventions   Plan Of Care Reviewed With patient   Patient Care Overview   Progress no change   Outcome Evaluation   Outcome Summary/Follow up Plan VSS, pain is controlled, ambulating assist x1 to restroom       Goal: Adult Individualization and Mutuality  Outcome: Ongoing (interventions implemented as appropriate)  Goal: Discharge Needs Assessment  Outcome: Ongoing (interventions implemented as appropriate)    Problem: Pain, Acute (Adult)  Goal: Acceptable Pain Control/Comfort Level  Outcome: Ongoing (interventions implemented as appropriate)    Problem: Fall Risk (Adult)  Goal: Absence of Falls  Outcome: Ongoing (interventions implemented as appropriate)    Problem: Pressure Ulcer Risk (Luis Scale) (Adult,Obstetrics,Pediatric)  Goal: Identify Related Risk Factors and Signs and Symptoms  Outcome: Ongoing (interventions implemented as appropriate)  Goal: Skin Integrity  Outcome: Ongoing (interventions implemented as appropriate)

## 2017-12-12 NOTE — PROGRESS NOTES
[facility] Medicine Services  INPATIENT PROGRESS NOTE    Length of Stay: 7  Date of Admission: 12/4/2017  Primary Care Physician: DESTINY Arreguin    Subjective   Chief Complaint:  Abdominal pain and diarrhea  HPI:  Patient continues to have intractable diarrhea.  She continues to have recurrent episodes of left sided abdominal pain.    Review of Systems   Constitutional: Negative for appetite change, chills, fatigue, fever and unexpected weight change.   Respiratory: Negative for cough, choking, chest tightness, shortness of breath and wheezing.    Cardiovascular: Negative for chest pain, palpitations and leg swelling.   Gastrointestinal: Positive for abdominal pain and diarrhea. Negative for blood in stool, constipation, nausea and vomiting.   Genitourinary: Negative for dysuria, flank pain and hematuria.   Neurological: Positive for weakness. Negative for dizziness, seizures, syncope, speech difficulty, light-headedness, numbness and headaches.   Hematological: Does not bruise/bleed easily.        All pertinent negatives and positives are as above. All other systems have been reviewed and are negative unless otherwise stated.     Objective    Temp:  [97.4 °F (36.3 °C)-99.4 °F (37.4 °C)] 97.4 °F (36.3 °C)  Heart Rate:  [] 104  Resp:  [16-20] 18  BP: (105-173)/() 121/60    Physical Exam   Constitutional: She appears well-developed and well-nourished.   HENT:   Head: Normocephalic and atraumatic.   Eyes: EOM are normal. Pupils are equal, round, and reactive to light.   Neck: Normal range of motion. Neck supple.   Cardiovascular: Normal rate, regular rhythm and normal heart sounds.  Exam reveals no gallop and no friction rub.    No murmur heard.  Pulmonary/Chest: Effort normal and breath sounds normal. No respiratory distress. She has no wheezes. She has no rales. She exhibits no tenderness.   Abdominal: Soft. Bowel sounds are normal. She exhibits distension.  There is no tenderness. There is no guarding.   Musculoskeletal: She exhibits no edema.   Skin: Skin is warm and dry.   Psychiatric: She has a normal mood and affect. Her behavior is normal. Thought content normal.   Vitals reviewed.          Results Review:  I have reviewed the labs, radiology results, and diagnostic studies.    Laboratory Data:     Results from last 7 days  Lab Units 12/12/17  0546 12/11/17  0545 12/10/17  0629   SODIUM mmol/L 133* 133* 135*   POTASSIUM mmol/L 4.4 4.4 4.4   CHLORIDE mmol/L 100 100 104   CO2 mmol/L 28.0 29.0 27.0   BUN mg/dL 12 10 11   CREATININE mg/dL 0.54 0.54 0.59   GLUCOSE mg/dL 116* 138* 89   CALCIUM mg/dL 7.3* 7.3* 7.7*   BILIRUBIN mg/dL 0.3 0.2 0.3   ALK PHOS U/L 72 70 70   ALT (SGPT) U/L 37 33 33   AST (SGOT) U/L 17 18 21   ANION GAP mmol/L 5.0 4.0* 4.0*     Estimated Creatinine Clearance: 57.9 mL/min (by C-G formula based on Cr of 0.54).    Results from last 7 days  Lab Units 12/09/17  1156   MAGNESIUM mg/dL 1.9       Results from last 7 days  Lab Units 12/07/17  1739   URIC ACID mg/dL 4.9       Results from last 7 days  Lab Units 12/12/17  0546 12/11/17  0545 12/10/17  0629 12/09/17  0411   WBC 10*3/mm3 10.97* 10.64* 8.90 8.24   HEMOGLOBIN g/dL 8.3* 8.4* 9.4* 9.0*   HEMATOCRIT % 25.0* 25.7* 29.5* 26.8*   PLATELETS 10*3/mm3 140* 140* 116* 172       Radiology Data:   Imaging Results (last 24 hours)     ** No results found for the last 24 hours. **          I have reviewed the patient current medications.     Assessment/Plan     Hospital Problem List     * (Principal)Lower abdominal pain    Overview Deleted 12/4/2017 12:39 PM by Sony Burris MD            Weakness    Ulcerative colitis (Chronic)          Plan:    1.  Acute flare of Ulcerative colitis:  No significant improvement.  2.  Chronic diarrhea:  On octreotide and lomotil.  No significant improvement.  3.  Elevated troponin:  Stress test shows a medium-sized, moderate-to-severe area of ischemia located in the  anterior wall and apex. High risk study.  4.  Left ventricular thrombus:  BONIFACIO done this morning.  Results pending.  5.  DVT Prophylaxis:  SCDs and Teds.            This document has been electronically signed by Leeroy Navarro MD on December 12, 2017 1:40 PM

## 2017-12-12 NOTE — PROGRESS NOTES
Cardiology Progress Note:     LOS: 6 days   Patient Care Team:  DESTINY Arreguin as PCP - General (Nurse Practitioner)      Subjective:      Chart reviewed , patient seen and examined.  Patient underwent a Lexiscan Cardiolite stress tests.  Patient denies any symptoms of chest pain.  Patient and the family was informed of the finding of the Lexiscan Cardiolite stress tests which was suggestive of   a medium-sized, moderate-to-severe area of ischemia located in the anterior wall and apex. High risk study.  Patient was unable to undergo the transesophageal echocardiogram today as the patient had by mouth intake.  Patient is currently not having any symptoms of chest pain.            Objective:     Objective:  Vitals:    12/11/17 1611   BP:    Pulse: 92   Resp:    Temp:    SpO2:        Intake/Output Summary (Last 24 hours) at 12/11/17 1854  Last data filed at 12/11/17 1224   Gross per 24 hour   Intake                0 ml   Output              200 ml   Net             -200 ml             Physical Exam:   General Appearance:    Alert, oriented, cooperative, in no acute distress   Head:    Normocephalic, atraumatic, without obvious abnormality   Eyes:           DEANGELO  Lids and lashes normal, conjunctivae and sclerae normal, no icterus, no pallor   Ears:    Ears appear intact with no abnormalities noted   Throat:   Mucous membranes pink and moist   Neck:   Supple, trachea midline, no carotid bruit, no organomegaly or JVD   Lungs:     Clear to auscultation and percussion, respirations regular, even and Unlabored. No wheezes, rales, rhonchi    Heart:    Regular rhythm and normal rate, normal S1 and S2, no            murmur, no gallop, no rub, no click   Abdomen:     Soft, non-tender, non-distended, no guarding, no rebound tenderness, Normal bowel sounds in all four quadrant, no masses, liver and spleen nonpalpable,    Genitalia:    Deferred   Extremities:   Moves all extremities well, no edema, no cyanosis, no               Redness, no clubbing   Pulses:   Pulses palpable and equal bilaterally   Skin:   Moist and warm. No bleeding, bruising or rash   Neurologic/Psychiatric:   Alert and oriented to person, place, and time.  Motor, power and tone in upper and lower extremity is grossly intact.  No focal neurological deficits. Normal cognitive function. No psychomotor reaction or tangential thought. No depression, homicidal ideations and suicidal ideations            Results Review:    Lab Results (last 24 hours)     Procedure Component Value Units Date/Time    CBC (No Diff) [[id number]]  (Abnormal) Collected:  12/11/17 0545    Specimen:  Blood Updated:  12/11/17 0615     WBC 10.64 (H) 10*3/mm3      RBC 2.80 (L) 10*6/mm3      Hemoglobin 8.4 (L) g/dL      Hematocrit 25.7 (L) %      MCV 91.8 fL      MCH 30.0 pg      MCHC 32.7 g/dL      RDW 15.7 (H) %      RDW-SD 53.2 (H) fl      MPV 8.9 fL      Platelets 140 (L) 10*3/mm3     Comprehensive Metabolic Panel [[id number]]  (Abnormal) Collected:  12/11/17 0545    Specimen:  Blood Updated:  12/11/17 0640     Glucose 138 (H) mg/dL      BUN 10 mg/dL      Creatinine 0.54 mg/dL      Sodium 133 (L) mmol/L      Potassium 4.4 mmol/L      Chloride 100 mmol/L      CO2 29.0 mmol/L      Calcium 7.3 (L) mg/dL      Total Protein 4.9 (L) g/dL      Albumin 2.20 (L) g/dL      ALT (SGPT) 33 U/L      AST (SGOT) 18 U/L      Alkaline Phosphatase 70 U/L      Total Bilirubin 0.2 mg/dL      eGFR Non African Amer 111 (H) mL/min/1.73      Globulin 2.7 gm/dL      A/G Ratio 0.8 (L) g/dL      BUN/Creatinine Ratio 18.5     Anion Gap 4.0 (L) mmol/L     Narrative:       The MDRD GFR formula is only valid for adults with stable renal function between ages 18 and 70.           Medication Review:   Current Facility-Administered Medications   Medication Dose Route Frequency Provider Last Rate Last Dose   • diphenoxylate-atropine (LOMOTIL) 2.5-0.025 MG per tablet 1 tablet  1 tablet Oral Q2H PRN Nish Morales DO   1 tablet at  12/11/17 1716   • famotidine (PEPCID) tablet 40 mg  40 mg Oral Daily Neelam Saravia MD   40 mg at 12/11/17 1407   • HYDROcodone-acetaminophen (NORCO)  MG per tablet 1 tablet  1 tablet Oral Q6H PRN Neelam Saravia MD   1 tablet at 12/11/17 1812   • Magnesium Sulfate 2 gram Bolus, followed by 8 gram infusion (total Mg dose 10 grams)- Mg less than or equal to 1mg/dL  2 g Intravenous PRN Neelam Saravia MD        Or   • Magnesium Sulfate 6 gram Infusion (2 gm x 3) -Mg 1.1 -1.5 mg/dL  2 g Intravenous PRN Neelam Saravia MD        Or   • magnesium sulfate 4 gram infusion- Mg 1.6-1.9 mg/dL  4 g Intravenous PRN Neelam Saravia MD       • mesalamine (DELZICOL) delayed release capsule 800 mg  800 mg Oral TID Nish Morales, DO   800 mg at 12/11/17 1717   • methylPREDNISolone sodium succinate (SOLU-Medrol) injection 60 mg  60 mg Intravenous Daily Nish Morales, DO   60 mg at 12/11/17 0956   • morphine injection 2 mg  2 mg Intravenous Q2H PRN Sony Burris MD   2 mg at 12/11/17 1608   • octreotide (sandoSTATIN) injection 100 mcg  100 mcg Subcutaneous TID Nish Morales, DO   100 mcg at 12/11/17 1716   • potassium & sodium phosphates (PHOS-NAK) 280-160-250 MG packet - for Phosphorus less than 1.25 mg/dL  1 packet Oral Q6H PRN Neelam Saravia MD        Or   • potassium & sodium phosphates (PHOS-NAK) 280-160-250 MG packet - for Phosphorus 1.25 - 2.1 mg/dL  1 packet Oral Once PRN Neelam Saravia MD       • potassium chloride (KLOR-CON) packet 40 mEq  40 mEq Oral PRN Neelam Saravia MD       • potassium chloride (MICRO-K) CR capsule 40 mEq  40 mEq Oral PRN Neelam Saravia MD       • sodium chloride 0.9 % flush 1-10 mL  1-10 mL Intravenous PRN Sony Burris MD       • sodium chloride 0.9 % flush 10 mL  10 mL Intravenous PRN Carmine Ponce PA-C       • sodium chloride 0.9 % flush 10 mL  10 mL Intravenous PRN Gabe Blount MD   10 mL at 12/11/17 0955       Assessment  and Plan:    Principal Problem:    Lower abdominal pain  Active Problems:    Weakness    Ulcerative colitis      1. Indeterminate troponin with abnormal resting electrocardiogram.  Patient currently not having any symptoms of chest pain.  Patient nuclear SPECT myocardial perfusion imaging indicates a medium-sized, moderate-to-severe area of ischemia located in the anterior wall and apex. High risk study.  Discussed with the patient the possible need for a coronary angiogram to rule out underlying coronary artery disease.  Patient is currently not having any symptoms of chest pain and at the present time would be treated medically.  2.  Echodensity noted in the left ventricle.  Patient is to undergo a transesophageal echocardiogram in the morning.  3.  Anemia with a hemoglobin of 8.4.  Patient had not had any hemoptysis hematuria bright blood per rectum.  Patient does have history of ulcerative colitis and has been followed by Dr. Morales.  4.  Arterial hypertension.  Patient blood pressure is currently stable and is not on any antihypertensive medication.  5.  Abdominal discomfort is much better.        Gabe Blount MD  12/11/17  6:54 PM      Time: Time spent on face-to-face 30 minutes      Dictated utilizing Dragon dictation.

## 2017-12-12 NOTE — THERAPY TREATMENT NOTE
Acute Care - Occupational Therapy Treatment Note  [facility]     Patient Name: Damaris Sánchez  : 1945  MRN: [id number]  Today's Date: 2017  Onset of Illness/Injury or Date of Surgery Date: 17  Date of Referral to OT: 17  Referring Physician: Dr. Neelam Saravia      Admit Date: 2017    Visit Dx:     ICD-10-CM ICD-9-CM   1. Weakness R53.1 780.79   2. Ulcerative colitis with complication, unspecified location K51.919 556.9   3. NSTEMI (non-ST elevated myocardial infarction) I21.4 410.70   4. Lower abdominal pain R10.30 789.09   5. Decreased activities of daily living (ADL) Z78.9 V49.89   6. Impaired functional mobility, balance, gait, and endurance Z74.09 V49.89   7. Impaired mobility and ADLs Z74.09 799.89   8. Troponin level elevated R74.8 790.6   9. Chest pain, unspecified type R07.9 786.50   10. Elevated troponin R74.8 790.6   11. Chest pain at rest R07.9 786.50     Patient Active Problem List   Diagnosis   • Lower abdominal pain   • C. difficile colitis   • Weakness   • Ulcerative colitis             Adult Rehabilitation Note       17 1319 17 1020 17 1544    Rehab Assessment/Intervention    Discipline occupational therapy assistant  -CS,PG,CS2 physical therapy assistant  -SEJAL physical therapy assistant  -SEJAL    Document Type therapy note (daily note)  -LILIANA,PG,CS2 therapy note (daily note)  -SEJAL therapy note (daily note)  -SEJAL    Subjective Information agree to therapy  -CS,PG,CS2 agree to therapy  -SEJAL agree to therapy  -SEJAL    Patient Effort, Rehab Treatment good  -CS,PG,CS2 adequate  -SEJAL adequate  -SEJAL    Patient Effort, Rehab Treatment Comment  nsg agrees to therapy.   -SEJAL spoke w/ eval PT and nsg who state to proceed with therapy  -SEJAL    Precautions/Limitations fall precautions  -CS,PG,CS2 fall precautions  -SEJAL fall precautions  -SEJAL    Recorded by [LILIANA,PG,CS2] MADDI Hare/JRAAD (r) Heather Patino OT Student (t) MADDI Hare/L (c) [SEJAL] Shalom SHINE  BRUNO Ross [SEJAL] Shalom Ross PTA    Vital Signs    Pre Systolic BP Rehab 111  -CS,PG,CS2 129  -SEJAL 105  -SEJAL    Pre Treatment Diastolic BP 67  -CS,PG,CS2 77  -SEJAL 61  -SEJAL    Post Systolic BP Rehab 121  -CS,PG,CS2 124  -SEJAL 128  -SEJAL    Post Treatment Diastolic BP 59  -CS,PG,CS2 66  -SEJAL 60  -SEJAL    Pretreatment Heart Rate (beats/min) 96  -CS,PG,CS2 93  -SEJAL 98  -SEJAL    Posttreatment Heart Rate (beats/min) 91  -CS,PG,CS2 97  -SEJAL 111  -SEJAL    Pre SpO2 (%) 95  -CS,PG,CS2 91  -SEJAL 93  -SEJAL    O2 Delivery Pre Treatment room air  -CS,PG,CS2 room air  -SEJAL room air  -SEJAL    Post SpO2 (%) 95  -CS,PG,CS2 94  -SEJAL 97  -SEJAL    O2 Delivery Post Treatment room air  -CS,PG,CS2 room air  -SEJAL room air  -SEJAL    Pre Patient Position Supine  -CS,PG,CS2 Supine  -SEJAL Supine  -SEJAL    Intra Patient Position Supine  -CS,PG,CS2      Post Patient Position Supine  -CS,PG,CS2 Supine  -SEJAL Supine  -SEJAL    Recorded by [CS,PG,CS2] MADDI Hare/JARAD (r) Heather Patino, OT Student (t) MADDI Hare/JARAD (c) [SEJAL] Shalom Ross PTA [SEJAL] Shalom Ross PTA    Pain Assessment    Pain Assessment 0-10  -CS,PG,CS2 0-10  -SEJAL 0-10  -SEJAL    Pain Score 0  -CS,PG,CS2 7  -SEJAL 10  -SEJAL    Post Pain Score 0  -CS,PG,CS2 5  -SEJAL 5  -SEJAL    Pain Type  Chronic pain  -SEJAL     Pain Location  Generalized  -SEJAL Generalized  -SEJAL    Pain Orientation  Left  -SEJAL     Pain Intervention(s)  Medication (See MAR)  -SEJAL Medication (See MAR)  -SEJAL    Recorded by [CS,PG,CS2] JAM Hare (r) Heather Patino, OT Student (t) MADDI Hare/JARAD (c) [SEJAL] Shalom Ross PTA [SEJAL] Shalom Ross PTA    Vision Assessment/Intervention    Visual Impairment WFL with corrective lenses  -CS,PG,CS2 WFL with corrective lenses  -SEJAL WFL with corrective lenses  -SEJAL    Recorded by [CS,PG,CS2] MADDI Hare/JARAD (r) Heather Patino OT Student (t) MADDI Hare/JARAD (c) [SEJAL] Shalom Ross PTA [SEJAL] Shalom Ross PTA    Cognitive Assessment/Intervention    Current  Cognitive/Communication Assessment functional  -CS,PG,CS2 functional  -SEJAL functional  -SEJAL    Orientation Status oriented x 4  -CS,PG,CS2 oriented x 4  -SEJAL oriented x 4  -SEJAL    Follows Commands/Answers Questions 100% of the time  -CS,PG,CS2 100% of the time  -SEJAL 100% of the time  -SEJAL    Personal Safety WNL/WFL  -CS,PG,CS2      Personal Safety Interventions  gait belt;muscle strengthening facilitated;nonskid shoes/slippers when out of bed;supervised activity  -SEJAL gait belt;muscle strengthening facilitated;nonskid shoes/slippers when out of bed;supervised activity  -SEJAL    Recorded by [CS,PG,CS2] MADDI Hare/JARAD (r) Heather Patino, OT Student (t) MADDI Hare/JARAD (c) [SEJAL] Shalom Ross PTA [SEJAL] Shalom Ross PTA    Bed Mobility, Assessment/Treatment    Bed Mobility, Assistive Device  bed rails;head of bed elevated  -SEJAL bed rails;head of bed elevated  -SEJAL    Bed Mobility, Scoot/Bridge, [place]  contact guard assist;minimum assist (75% patient effort)  - minimum assist (75% patient effort)  -    Bed Mob, Supine to Sit, [place]  contact guard assist  - minimum assist (75% patient effort)  -    Bed Mob, Sit to Supine, [place]  minimum assist (75% patient effort)  - minimum assist (75% patient effort)  -    Recorded by  [SEJAL] Shalom Ross PTA [SEJAL] Shalom Ross PTA    Transfer Assessment/Treatment    Transfers, Bed-Chair [place]  --   pt declined OOB to chair at this time.   -     Transfers, Sit-Stand [place]  contact guard assist;minimum assist (75% patient effort)  - minimum assist (75% patient effort)  -    Transfers, Stand-Sit [place]  contact guard assist  - minimum assist (75% patient effort)  -    Transfers, Sit-Stand-Sit, Assist Device  rolling walker  - rolling walker  -    Toilet Transfer, [place]   minimum assist (75% patient effort);moderate assist (50% patient effort)  -    Toilet Transfer, Assistive Device   rolling  walker  -SEJAL    Transfer, Comment   pt required more assist to stand from [facility]. pt able to complete her own lisa care  -SEJLA    Recorded by  [SEJAL] Shalom Ross PTA [SEJAL] Shalom Ross PTA    Gait Assessment/Treatment    Gait, [place] Level  contact guard assist  -SEJAL contact guard assist  -SEJAL    Gait, Assistive Device  rolling walker  -SEJAL rolling walker  -SEJAL    Gait, Distance (Feet)  215  -SEJAL 208  -SEJAL    Gait, Gait Deviations  step length decreased;forward flexed posture  -SEJAL irving decreased;forward flexed posture   cueing for posture and to look ahead  -SEJAL    Gait, Impairments  strength decreased  -SEJAL strength decreased  -SEJAL    Recorded by  [SEJAL] Shalom Ross PTA [SEJAL] Shalom Ross PTA    Stairs Assessment/Treatment    Stairs, [place] Level  not tested  -SEJAL not tested  -SEJAL    Recorded by  [SEJAL] Shalom Ross PTA [SEJAL] Shalom Ross PTA    Upper Body Bathing Assessment/Training    UB Bathing Assess/Train, Comment deferred  -CS,PG,CS2      Recorded by [CS,PG,CS2] MADDI Hare/JARAD (r) Heather Patino OT Student (t) MADDI Hare/L (c)      Lower Body Bathing Assessment/Training    LB Bathing Assess/Train, Comment deferred  -CS,PG,CS2      Recorded by [CS,PG,CS2] MADDI Hare/JARAD (r) Heather Patino OT Student (t) MADDI Hare/L (c)      Upper Body Dressing Assessment/Training    UB Dressing Assess/Train, Comment deferred  -CS,PG,CS2      Recorded by [CS,PG,CS2] MADDI Hare/JARAD (r) Heather Patino OT Student (t) MADDI Hare/L (c)      Lower Body Dressing Assessment/Training    LB Dressing Assess/Train, Comment deferred  -CS,PG,CS2      Recorded by [CS,PG,CS2] MADDI Hare/JARAD (r) Heather Patino OT Student (t) MADDI Hare/L (c)      Toileting Assessment/Training    Toileting Assess/Train, Comment deferred  -CS,PG,CS2      Recorded by [CS,PG,CS2] MADDI Hare/JARAD (r) Heather Patino, OT Student (t) Madhavi ABRAHAM  JAM Watters (kervin)      Grooming Assessment/Training    Grooming Assess/Train, Comment deferred  -CS,PG,CS2      Recorded by [CS,PG,CS2] JAM Hare (r) Heather Patino OT Student (t) JAM Hare (kervin)      Therapy Exercises    Bilateral Lower Extremities  AAROM:;AROM:;20 reps;supine;ankle pumps/circles;hip abduction/adduction;heel slides;SAQ  -SEJAL AROM:;AAROM:;15 reps;20 reps;supine;ankle pumps/circles;quad sets;glut sets;hip abduction/adduction;heel slides;sitting;LAQ;hip flexion  -SEJAL    Bilateral Upper Extremity AROM:;20 reps;sitting;elbow flexion/extension;pronation/supination;shoulder extension/flexion;shoulder ER/IR  -CS,PG,CS2      BUE Resistance manual resistance- minimal  -CS,PG,CS2      Recorded by [CS,PG,CS2] JAM Hare (r) Heather Patino OT Student (t) JAM Hare (c) [SEJAL] Shalom Ross PTA [SEJAL] Shalom Ross PTA    Positioning and Restraints    Pre-Treatment Position in bed  -CS,PG,CS2 in bed  -SEJAL in bed  -SEJAL    Post Treatment Position bed  -CS,PG,CS2 bed  -SEJAL bed  -SEJAL    In Bed sitting;call light within reach;encouraged to call for assist  -CS,PG,CS2 supine;call light within reach;encouraged to call for assist;exit alarm on;with family/caregiver   all needs met. bed gatched. heels in the breeze  -SEJAL supine;call light within reach;encouraged to call for assist;with family/caregiver   all needs met. pt declined recliner  -SEJAL    Recorded by [CS,PG,CS2] JAM Hare (r) Heather Patino OT Student (t) JAM Hare (c) [SEJAL] Shalom Ross PTA [SEJAL] Shalom Ross PTA      12/10/17 2458          Rehab Assessment/Intervention    Discipline physical therapy assistant  -EM      Document Type therapy note (daily note)  -EM      Subjective Information agree to therapy  -EM      Patient Effort, Rehab Treatment adequate  -EM      Recorded by [EM] Marcelo Olsen, PTA      Vital Signs    Pre Systolic BP Rehab 122  -EM      Pre  Treatment Diastolic BP 64  -EM      Pretreatment Heart Rate (beats/min) 75  -EM      Pre SpO2 (%) 94  -EM      O2 Delivery Pre Treatment room air  -EM      Pre Patient Position Sitting  -EM      Recorded by [EM] Marcelo Olsen PTA      Pain Assessment    Pain Assessment 0-10  -EM      Recorded by [EM] Marcelo Olsen PTA      Cognitive Assessment/Intervention    Current Cognitive/Communication Assessment functional  -EM      Orientation Status oriented x 4  -EM      Follows Commands/Answers Questions 100% of the time  -EM      Personal Safety WNL/WFL  -EM      Recorded by [EM] Marcelo Olsen PTA      Bed Mobility, Assessment/Treatment    Bed Mob, Supine to Sit, [place] minimum assist (75% patient effort);moderate assist (50% patient effort)  -EM      Recorded by [EM] Marcelo Olsen PTA      Transfer Assessment/Treatment    Transfers, Sit-Stand [place] minimum assist (75% patient effort)  -EM      Transfers, Stand-Sit [place] minimum assist (75% patient effort)  -EM      Transfers, Sit-Stand-Sit, Assist Device rolling walker  -EM      Toilet Transfer, [place] minimum assist (75% patient effort)  -EM      Toilet Transfer, Assistive Device rolling walker  -EM      Transfer, Comment mod Ax1 to stand from toilet-elevated toilet seat issued post tx.  -EM      Recorded by [EM] Marcelo Olsen PTA      Gait Assessment/Treatment    Gait, [place] Level contact guard assist  -EM      Gait, Assistive Device rolling walker  -EM      Gait, Distance (Feet) 232   8'+8'  -EM      Gait, Gait Deviations bilateral:;antalgic;step length decreased;forward flexed posture  -EM      Gait, Comment Followed with chair  -EM      Recorded by [EM] Marcelo Olsen PTA      Therapy Exercises    Bilateral Lower Extremities AROM:;20 reps;sitting;ankle pumps/circles;LAQ;AAROM:;hip flexion  -EM      Recorded by [EM] Marcelo Olsen PTA      Positioning and Restraints    Pre-Treatment Position in bed  -EM      Post Treatment  Position chair  -EM      In Bed sitting;call light within reach;encouraged to call for assist;with family/caregiver  -EM      Recorded by [EM] Marcelo Olsen, PTA        User Key  (r) = Recorded By, (t) = Taken By, (c) = Cosigned By    Initials Name Effective Dates    EM Marcelo Olsen, PTA 08/11/15 -     SEJAL Shalom Ross, PTA 10/17/16 -     CS Madhavi Watters, LINDA/L 10/17/16 -     PG Heather Patino, OT Student 01/31/17 -                 OT Goals       12/12/17 1530 12/09/17 1416 12/08/17 0937    Transfer Training OT LTG    Transfer Training OT LTG, Date Goal Reviewed 12/12/17  -CS (r) PG (t) CS (c) 12/09/17  -CS (r) PG (t) CS (c) 12/08/17  -KD    Transfer Training OT LTG, Outcome   goal not met  -KD    Strength OT LTG    Strength Goal OT LTG, Date Goal Reviewed 12/12/17  -CS (r) PG (t) CS (c) 12/09/17  -CS (r) PG (t) CS (c) 12/08/17  -KD    Strength Goal OT LTG, Outcome   goal not met  -KD    ADL OT LTG    ADL OT LTG, Date Goal Reviewed 12/12/17  -CS (r) PG (t) CS (c) 12/09/17  -CS (r) PG (t) CS (c) 12/08/17  -KD    ADL OT LTG, Outcome   goal not met  -KD      12/07/17 1545 12/06/17 1100       Transfer Training OT LTG    Transfer Training OT LTG, Date Established  12/06/17  -NN     Transfer Training OT LTG, Time to Achieve  by discharge  -NN     Transfer Training OT LTG, Activity Type  all transfers  -NN     Transfer Training OT LTG, [place] Level  conditional independence  -NN     Transfer Training OT LTG, Assist Device  walker, rolling  -NN     Transfer Training OT LTG, Date Goal Reviewed 12/07/17  -CS (r) PG (t) CS (c)      Strength OT LTG    Strength Goal OT LTG, Date Established  12/06/17  -NN     Strength Goal OT LTG, Time to Achieve  by discharge  -NN     Strength Goal OT LTG, Measure to Achieve  Pt. will complete BUE strengthening exercises all planes and joints to increase BUE strength to 5/5 for ADLs.   -NN     Strength Goal OT LTG, Date Goal Reviewed 12/07/17  -CS (r) PG (t) CS (c)      ADL OT  LTG    ADL OT LTG, Date Established  12/06/17  -NN     ADL OT LTG, Time to Achieve  by discharge  -NN     ADL OT LTG, Activity Type  ADL skills  -NN     ADL OT LTG, [place] Level  independent  -NN     ADL OT LTG, Date Goal Reviewed 12/07/17  -CS (r) PG (t) CS (c)        User Key  (r) = Recorded By, (t) = Taken By, (c) = Cosigned By    Initials Name Provider Type    KD Swetha Kitchen, LINDA/L Occupational Therapy Assistant    CS Madhavi Watters, LINDA/L Occupational Therapy Assistant    NN Ashley Brannon, OTR/L Occupational Therapist    PG Heather Patino, OT Student OT Student          Occupational Therapy Education     Title: PT OT SLP Therapies (Active)     Topic: Occupational Therapy (Done)     Point: ADL training (Done)    Description: Instruct learner(s) on proper safety adaptation and remediation techniques during self care or transfers.   Instruct in proper use of assistive devices.    Learning Progress Summary    Learner Readiness Method Response Comment Documented by Status   Patient Acceptance E VU  PG 12/12/17 1529 Done    Acceptance E VU  PG 12/09/17 1416 Done    Acceptance E VU,NR Pt. educated on role of OT, safe t/f, benefit of activity, r/w safety, use of brief, progression with poc NN 12/06/17 1059 Done   Family Acceptance E VU  PG 12/12/17 1529 Done               Point: Home exercise program (Done)    Description: Instruct learner(s) on appropriate technique for monitoring, assisting and/or progressing therapeutic exercises/activities.    Learning Progress Summary    Learner Readiness Method Response Comment Documented by Status   Patient Acceptance E VU  PG 12/12/17 1529 Done    Acceptance E VU  PG 12/09/17 1416 Done    Acceptance D NR  KD 12/08/17 0937 Active    Acceptance E VU,NR Pt. educated on role of OT, safe t/f, benefit of activity, r/w safety, use of brief, progression with poc NN 12/06/17 1059 Done   Family Acceptance E VU  PG 12/12/17 1529 Done               Point: Body mechanics  (Done)    Description: Instruct learner(s) on proper positioning and spine alignment during self-care, functional mobility activities and/or exercises.    Learning Progress Summary    Learner Readiness Method Response Comment Documented by Status   Patient Acceptance E VU  PG 12/12/17 1529 Done    Acceptance E VU  PG 12/09/17 1416 Done    Acceptance E VU,NR Pt. educated on role of OT, safe t/f, benefit of activity, r/w safety, use of brief, progression with poc NN 12/06/17 1059 Done   Family Acceptance E VU  PG 12/12/17 1529 Done                      User Key     Initials Effective Dates Name Provider Type Discipline    KD 10/17/16 -  Swetha Kitchen LINDA/L Occupational Therapy Assistant OT    NN 11/08/17 -  Ashley Brannon OTR/L Occupational Therapist OT    PG 01/31/17 -  Heather Patino, OT Student OT Student OT                  OT Recommendation and Plan  Anticipated Discharge Disposition: home with home health  Planned Therapy Interventions: activity intolerance, ADL retraining, balance training, bed mobility training, energy conservation, home exercise program, strengthening, transfer training  Therapy Frequency:  (3-14x/week)           Outcome Measures       12/12/17 1020 12/11/17 1544 12/10/17 1408    How much help from another person do you currently need...    Turning from your back to your side while in flat bed without using bedrails? 3  -SEJAL 3  -SEJAL 3  -EM    Moving from lying on back to sitting on the side of a flat bed without bedrails? 3  -SEJAL 3  -SEJAL 3  -EM    Moving to and from a bed to a chair (including a wheelchair)? 3  -SEJAL 3  -SEJAL 3  -EM    Standing up from a chair using your arms (e.g., wheelchair, bedside chair)? 3  -SEJAL 3  -SEJAL 3  -EM    Climbing 3-5 steps with a railing? 3  -SEJAL 3  -SEJAL 3  -EM    To walk in hospital room? 3  -SEJAL 3  -SEJAL 3  -EM    AM-PAC 6 Clicks Score 18  -SEJAL 18  -SEJAL 18  -EM    Functional Assessment    Outcome Measure Options AM-PAC 6 Clicks Basic Mobility (PT)  -SEJAL AM-PAC 6 Clicks  Basic Mobility (PT)  -SEJAL AM-PAC 6 Clicks Basic Mobility (PT)  -EM      User Key  (r) = Recorded By, (t) = Taken By, (c) = Cosigned By    Initials Name Provider Type    EM Marcelo Olsen, PTA Physical Therapy Assistant    SEJAL Ross, BRUNO Physical Therapy Assistant           Time Calculation:         Time Calculation- OT       12/12/17 1606          Time Calculation- OT    OT Start Time 1519  -CS      OT Stop Time 1542  -CS      OT Time Calculation (min) 23 min  -CS      Total Timed Code Minutes- OT 23 minute(s)  -CS      OT Received On 12/12/17  -CS        User Key  (r) = Recorded By, (t) = Taken By, (c) = Cosigned By    Initials Name Provider Type     JAM Hare Occupational Therapy Assistant           Therapy Charges for Today     Code Description Service Date Service Provider Modifiers Qty    [id number] HC OT THER PROC EA 15 MIN 12/12/2017 JAM Hare GO, KX 2          OT G-codes  OT Functional Scales Options: AM-PAC 6 Clicks Daily Activity (OT)  Functional Limitation: Self care  Self Care Current Status (): At least 40 percent but less than 60 percent impaired, limited or restricted  Self Care Goal Status (): At least 1 percent but less than 20 percent impaired, limited or restricted    JAM Hare  12/12/2017

## 2017-12-12 NOTE — PROGRESS NOTES
[facility] Medicine Services  INPATIENT PROGRESS NOTE    Length of Stay: 6  Date of Admission: 12/4/2017  Primary Care Physician: DESTINY Arreguin    Subjective   Chief Complaint:  Abdominal pain and diarrhea  HPI:  Patient still complains of intractable diarrhea.  She has had several recurrent episodes of left sided abdominal pain.    Review of Systems   Constitutional: Negative for appetite change, chills, fatigue, fever and unexpected weight change.   Respiratory: Negative for cough, choking, chest tightness, shortness of breath and wheezing.    Cardiovascular: Negative for chest pain, palpitations and leg swelling.   Gastrointestinal: Positive for abdominal pain and diarrhea. Negative for blood in stool, constipation, nausea and vomiting.   Genitourinary: Negative for dysuria, flank pain and hematuria.   Neurological: Positive for weakness. Negative for dizziness, seizures, syncope, speech difficulty, light-headedness, numbness and headaches.   Hematological: Does not bruise/bleed easily.        All pertinent negatives and positives are as above. All other systems have been reviewed and are negative unless otherwise stated.     Objective    Temp:  [97.2 °F (36.2 °C)-98.6 °F (37 °C)] 97.2 °F (36.2 °C)  Heart Rate:  [] 92  Resp:  [18] 18  BP: (105-128)/(55-71) 105/61    Physical Exam   Constitutional: She appears well-developed and well-nourished.   HENT:   Head: Normocephalic and atraumatic.   Eyes: EOM are normal. Pupils are equal, round, and reactive to light.   Neck: Normal range of motion. Neck supple.   Cardiovascular: Normal rate, regular rhythm and normal heart sounds.  Exam reveals no gallop and no friction rub.    No murmur heard.  Pulmonary/Chest: Effort normal and breath sounds normal. No respiratory distress. She has no wheezes. She has no rales. She exhibits no tenderness.   Abdominal: Soft. Bowel sounds are normal. She exhibits distension. There  is no tenderness. There is no guarding.   Musculoskeletal: She exhibits no edema.   Skin: Skin is warm and dry.   Psychiatric: She has a normal mood and affect. Her behavior is normal. Thought content normal.   Vitals reviewed.          Results Review:  I have reviewed the labs, radiology results, and diagnostic studies.    Laboratory Data:     Results from last 7 days  Lab Units 12/11/17  0545 12/10/17  0629 12/09/17  1156 12/09/17  0435   SODIUM mmol/L 133* 135*  --  136*   POTASSIUM mmol/L 4.4 4.4 3.3* 3.2*   CHLORIDE mmol/L 100 104  --  106   CO2 mmol/L 29.0 27.0  --  24.0   BUN mg/dL 10 11  --  16   CREATININE mg/dL 0.54 0.59  --  0.67   GLUCOSE mg/dL 138* 89  --  164*   CALCIUM mg/dL 7.3* 7.7*  --  8.0*   BILIRUBIN mg/dL 0.2 0.3  --  0.3   ALK PHOS U/L 70 70  --  83   ALT (SGPT) U/L 33 33  --  32   AST (SGOT) U/L 18 21  --  15   ANION GAP mmol/L 4.0* 4.0*  --  6.0     Estimated Creatinine Clearance: 57.9 mL/min (by C-G formula based on Cr of 0.54).    Results from last 7 days  Lab Units 12/09/17  1156   MAGNESIUM mg/dL 1.9       Results from last 7 days  Lab Units 12/07/17  1739   URIC ACID mg/dL 4.9       Results from last 7 days  Lab Units 12/11/17  0545 12/10/17  0629 12/09/17  0411 12/05/17  0621   WBC 10*3/mm3 10.64* 8.90 8.24 9.27   HEMOGLOBIN g/dL 8.4* 9.4* 9.0* 9.5*   HEMATOCRIT % 25.7* 29.5* 26.8* 28.0*   PLATELETS 10*3/mm3 140* 116* 172 214       Radiology Data:   Imaging Results (last 24 hours)     ** No results found for the last 24 hours. **          I have reviewed the patient current medications.     Assessment/Plan     Hospital Problem List     * (Principal)Lower abdominal pain    Overview Deleted 12/4/2017 12:39 PM by Sony Burris MD            Weakness    Ulcerative colitis (Chronic)          Plan:    1.  Acute flare of Ulcerative colitis:  No significant improvement.  2.  Chronic diarrhea:  On octreotide and lomotil.  No significant improvement.  3.  Elevated troponin:  Stress test today.   Results pending.  4.  Left ventricular thrombus:  BONIFACIO in the am.  5.  DVT Prophylaxis:  SCDs and Teds.            This document has been electronically signed by Leeroy Navarro MD on December 11, 2017 7:14 PM

## 2017-12-13 NOTE — SIGNIFICANT NOTE
12/13/17 1624   Rehab Treatment   Discipline occupational therapist   Treatment Not Performed other (see comments)  (OTR spoke with Dr. Navarro - he says hold at this time;  pt may transfer to another facility.)

## 2017-12-13 NOTE — PLAN OF CARE
Problem: Patient Care Overview (Adult)  Goal: Plan of Care Review  Outcome: Ongoing (interventions implemented as appropriate)    12/13/17 1535   Coping/Psychosocial Response Interventions   Plan Of Care Reviewed With patient   Patient Care Overview   Progress no change   Outcome Evaluation   Outcome Summary/Follow up Plan VS stable; pain medicine and lomotil given to help with pain and frequent diarrhea       Goal: Adult Individualization and Mutuality  Outcome: Ongoing (interventions implemented as appropriate)  Goal: Discharge Needs Assessment  Outcome: Ongoing (interventions implemented as appropriate)    Problem: Pain, Acute (Adult)  Goal: Acceptable Pain Control/Comfort Level  Outcome: Ongoing (interventions implemented as appropriate)    Problem: Fall Risk (Adult)  Goal: Absence of Falls  Outcome: Ongoing (interventions implemented as appropriate)    Problem: Pressure Ulcer Risk (Luis Scale) (Adult,Obstetrics,Pediatric)  Goal: Identify Related Risk Factors and Signs and Symptoms  Outcome: Ongoing (interventions implemented as appropriate)  Goal: Skin Integrity  Outcome: Ongoing (interventions implemented as appropriate)

## 2017-12-13 NOTE — PROGRESS NOTES
[facility] Medicine Services  INPATIENT PROGRESS NOTE    Length of Stay: 8  Date of Admission: 12/4/2017  Primary Care Physician: DESTINY Arreguin    Subjective   Chief Complaint:  Abdominal pain and diarrhea  HPI:  Patient continues to have intractable diarrhea and abdominal pain.  She states that the diarrhea may have slowed down a little bit.    Review of Systems   Constitutional: Negative for appetite change, chills, fatigue, fever and unexpected weight change.   Respiratory: Negative for cough, choking, chest tightness, shortness of breath and wheezing.    Cardiovascular: Negative for chest pain, palpitations and leg swelling.   Gastrointestinal: Positive for abdominal pain and diarrhea. Negative for blood in stool, constipation, nausea and vomiting.   Genitourinary: Negative for dysuria, flank pain and hematuria.   Neurological: Positive for weakness. Negative for dizziness, seizures, syncope, speech difficulty, light-headedness, numbness and headaches.   Hematological: Does not bruise/bleed easily.        All pertinent negatives and positives are as above. All other systems have been reviewed and are negative unless otherwise stated.     Objective    Temp:  [96 °F (35.6 °C)-98.2 °F (36.8 °C)] 97.2 °F (36.2 °C)  Heart Rate:  [] 100  Resp:  [16-18] 18  BP: (103-143)/(59-67) 103/60    Physical Exam   Constitutional: She appears well-developed and well-nourished.   HENT:   Head: Normocephalic and atraumatic.   Eyes: EOM are normal. Pupils are equal, round, and reactive to light.   Neck: Normal range of motion. Neck supple.   Cardiovascular: Normal rate, regular rhythm and normal heart sounds.  Exam reveals no gallop and no friction rub.    No murmur heard.  Pulmonary/Chest: Effort normal and breath sounds normal. No respiratory distress. She has no wheezes. She has no rales. She exhibits no tenderness.   Abdominal: Soft. Bowel sounds are normal. She exhibits  distension. There is no tenderness. There is no guarding.   Musculoskeletal: She exhibits no edema.   Skin: Skin is warm and dry.   Psychiatric: She has a normal mood and affect. Her behavior is normal. Thought content normal.   Vitals reviewed.          Results Review:  I have reviewed the labs, radiology results, and diagnostic studies.    Laboratory Data:     Results from last 7 days  Lab Units 12/13/17  0605 12/12/17  0546 12/11/17  0545   SODIUM mmol/L 137 133* 133*   POTASSIUM mmol/L 3.4* 4.4 4.4   CHLORIDE mmol/L 102 100 100   CO2 mmol/L 28.0 28.0 29.0   BUN mg/dL 10 12 10   CREATININE mg/dL 0.64 0.54 0.54   GLUCOSE mg/dL 130* 116* 138*   CALCIUM mg/dL 7.6* 7.3* 7.3*   BILIRUBIN mg/dL 0.3 0.3 0.2   ALK PHOS U/L 78 72 70   ALT (SGPT) U/L 41 37 33   AST (SGOT) U/L 20 17 18   ANION GAP mmol/L 7.0 5.0 4.0*     Estimated Creatinine Clearance: 57.9 mL/min (by C-G formula based on Cr of 0.64).    Results from last 7 days  Lab Units 12/09/17  1156   MAGNESIUM mg/dL 1.9       Results from last 7 days  Lab Units 12/07/17  1739   URIC ACID mg/dL 4.9       Results from last 7 days  Lab Units 12/13/17  0605 12/12/17  0546 12/11/17  0545 12/10/17  0629 12/09/17  0411   WBC 10*3/mm3 7.97 10.97* 10.64* 8.90 8.24   HEMOGLOBIN g/dL 8.2* 8.3* 8.4* 9.4* 9.0*   HEMATOCRIT % 25.0* 25.0* 25.7* 29.5* 26.8*   PLATELETS 10*3/mm3 149* 140* 140* 116* 172       Radiology Data:   Imaging Results (last 24 hours)     ** No results found for the last 24 hours. **          I have reviewed the patient current medications.     Assessment/Plan     Hospital Problem List     * (Principal)Lower abdominal pain    Overview Deleted 12/4/2017 12:39 PM by Sony Burris MD            Weakness    Ulcerative colitis (Chronic)          Plan:    1.  Acute flare of Ulcerative colitis:  No significant improvement.  2.  Chronic diarrhea:  On octreotide and lomotil.  No significant improvement.  3.  Elevated troponin:  Stress test shows a medium-sized,  "moderate-to-severe area of ischemia located in the anterior wall and apex. High risk study.  4.  Left ventricular thrombus:  BONIFACIO done this morning.  Results pending.  5.  DVT Prophylaxis:  SCDs and Teds.      I had a lengthy discussion with the patient and her two sons regarding the next steps in care.  The patient seems reluctant to proceed with the surgical option.  I explained the complexity of the case to them.  They all verbalized understanding.  Her sons are strongly in favor of her being transferred to [place] for tertiary level evaluation and treatment.  She isn't sure she wants to go.  She mentioned not pursuing further care at all, and \"just going home and getting her affairs in order.\"  I am going to give the patient and her family time to discuss it, and then I will go back and answer any questions they might have.          This document has been electronically signed by Leeroy Navarro MD on December 13, 2017 2:03 PM      "

## 2017-12-13 NOTE — PROGRESS NOTES
Herbert Sanchez DO,[facility]  Gastroenterology  Hepatology  Endoscopy  Board Certified in Internal Medicine and gastroenterology  [address], [address]  [place]. [address]  - [phone]   F - [phone]     GASTROENTEROLOGY PROGRESS NOTE   HERBERT SANCHEZ DO.         SUBJECTIVE:   12/12/2017  Chief Complaint:     Subjective  : Has now started having some rectal bleeding.  The abdominal pain is improved.  No nausea or vomiting.  I brought over the Humira for her to receive night.  Orders given to administer it.    Reviewed with Dr. Navarro.  The patient has a abnormal stress test.  There is still a question about a left ventricular thrombus.    I have been very plain with the patient as well as Dr. Navarro.  The patient is not responding to aggressive medical treatments for the inflammatory bowel disease.  Surgery should be strongly considered.  I would recommend a colon and rectal surgeon for this.  However, with this abnormal stress test and this abnormal echocardiogram, the patient is not a candidate for surgery until this issue is cleared up.  I told the patient and the family that the patient should really go to [place] for  multiple disciplinary approach to this.  The patient will need to have a clearance from cardiology and cardiothoracic surgery probably.  If a cardiac catheterization was done and the patient received post procedure antiplatelets or anticoagulants, the patient could have significant bleeding.  The patient could not have surgery done without clearance if there is a significant fixed cardiac ischemic changes as suggested on the stress test.  The patient also needs to have evaluation for this left ventricular thrombus.  If it is present, anticoagulation is needed.  If anticoagulation is given and the patient starts having hemorrhaging, that is a significant problem also.    The patient and I had a candace conversation.  It is very evident that the patient is at a rock and  a hard place.  That is exactly her words for this and I exactly agree with her.         CURRENT MEDICATIONS/OBJECTIVE/VS/PE:     Current Medications:     Current Facility-Administered Medications   Medication Dose Route Frequency Provider Last Rate Last Dose   • diphenoxylate-atropine (LOMOTIL) 2.5-0.025 MG per tablet 1 tablet  1 tablet Oral Q2H PRN Nish Morales, DO   1 tablet at 12/12/17 1224   • famotidine (PEPCID) tablet 40 mg  40 mg Oral Daily Neelam Saravia MD   40 mg at 12/12/17 0900   • HYDROcodone-acetaminophen (NORCO)  MG per tablet 1 tablet  1 tablet Oral Q6H PRN Neelam Saravia MD   1 tablet at 12/12/17 1340   • Magnesium Sulfate 2 gram Bolus, followed by 8 gram infusion (total Mg dose 10 grams)- Mg less than or equal to 1mg/dL  2 g Intravenous PRN Neelam Saravia MD        Or   • Magnesium Sulfate 6 gram Infusion (2 gm x 3) -Mg 1.1 -1.5 mg/dL  2 g Intravenous PRN Neelam Saravia MD        Or   • magnesium sulfate 4 gram infusion- Mg 1.6-1.9 mg/dL  4 g Intravenous PRN Neelam Saravia MD       • mesalamine (DELZICOL) delayed release capsule 800 mg  800 mg Oral TID Nish Morales DO   800 mg at 12/12/17 1726   • methylPREDNISolone sodium succinate (SOLU-Medrol) injection 60 mg  60 mg Intravenous Daily Nish Morales DO   60 mg at 12/12/17 1034   • morphine injection 2 mg  2 mg Intravenous Q2H PRN Sony Burris MD   2 mg at 12/12/17 1040   • octreotide (sandoSTATIN) injection 100 mcg  100 mcg Subcutaneous TID Nish Morales, DO   100 mcg at 12/12/17 1727   • potassium & sodium phosphates (PHOS-NAK) 280-160-250 MG packet - for Phosphorus less than 1.25 mg/dL  1 packet Oral Q6H PRN Neelam Saravia MD        Or   • potassium & sodium phosphates (PHOS-NAK) 280-160-250 MG packet - for Phosphorus 1.25 - 2.1 mg/dL  1 packet Oral Once PRN Neelam Saravia MD       • potassium chloride (KLOR-CON) packet 40 mEq  40 mEq Oral PRN Neelam Saravia MD       •  potassium chloride (MICRO-K) CR capsule 40 mEq  40 mEq Oral PRN Neelam Saravia MD       • sodium chloride 0.9 % flush 1-10 mL  1-10 mL Intravenous PRN Sony Burris MD       • sodium chloride 0.9 % flush 10 mL  10 mL Intravenous PRN Carmine Ponce PA-C       • sodium chloride 0.9 % flush 10 mL  10 mL Intravenous PRN Gabe Blount MD   10 mL at 12/11/17 0955       Objective     Physical Exam:   Temp:  [97.2 °F (36.2 °C)-99.4 °F (37.4 °C)] 98.2 °F (36.8 °C)  Heart Rate:  [] 99  Resp:  [16-20] 18  BP: (121-173)/() 143/62     Physical Exam:  General Appearance:    Alert, cooperative, in no acute distress   Head:    Normocephalic, without obvious abnormality, atraumatic   Eyes:            Lids and lashes normal, conjunctivae and sclerae normal, no   icterus, no pallor, corneas clear, PERRLA   Ears:    Ears appear intact with no abnormalities noted   Throat:   No oral lesions, no thrush, oral mucosa moist   Neck:   No adenopathy, supple, trachea midline, no thyromegaly, no     carotid bruit, no JVD   Back:     No kyphosis present, no scoliosis present, no skin lesions,       erythema or scars, no tenderness to percussion or                   palpation,   range of motion normal   Lungs:     Clear to auscultation,respirations regular, even and                   unlabored    Heart:    Regular rhythm and normal rate, normal S1 and S2, no            murmur, no gallop, no rub, no click   Breast Exam:    Deferred   Abdomen:     Normal bowel sounds, no masses, no organomegaly, soft        non-tender, non-distended, no guarding, no rebound                 tenderness   Genitalia:    Deferred   Extremities:   Moves all extremities well, no edema, no cyanosis, no              redness   Pulses:   Pulses palpable and equal bilaterally   Skin:   No bleeding, bruising or rash   Lymph nodes:   No palpable adenopathy   Neurologic:   Cranial nerves 2 - 12 grossly intact, sensation intact, DTR        present and  equal bilaterally      Results Review:     Lab Results (last 24 hours)     Procedure Component Value Units Date/Time    CBC (No Diff) [[id number]]  (Abnormal) Collected:  12/12/17 0546    Specimen:  Blood Updated:  12/12/17 0618     WBC 10.97 (H) 10*3/mm3      RBC 2.75 (L) 10*6/mm3      Hemoglobin 8.3 (L) g/dL      Hematocrit 25.0 (L) %      MCV 90.9 fL      MCH 30.2 pg      MCHC 33.2 g/dL      RDW 15.3 (H) %      RDW-SD 51.0 (H) fl      MPV 10.0 fL      Platelets 140 (L) 10*3/mm3     Comprehensive Metabolic Panel [[id number]]  (Abnormal) Collected:  12/12/17 0546    Specimen:  Blood Updated:  12/12/17 0630     Glucose 116 (H) mg/dL      BUN 12 mg/dL      Creatinine 0.54 mg/dL      Sodium 133 (L) mmol/L      Potassium 4.4 mmol/L      Chloride 100 mmol/L      CO2 28.0 mmol/L      Calcium 7.3 (L) mg/dL      Total Protein 5.2 (L) g/dL      Albumin 2.40 (L) g/dL      ALT (SGPT) 37 U/L      AST (SGOT) 17 U/L      Alkaline Phosphatase 72 U/L      Total Bilirubin 0.3 mg/dL      eGFR Non African Amer 111 mL/min/1.73      Globulin 2.8 gm/dL      A/G Ratio 0.9 (L) g/dL      BUN/Creatinine Ratio 22.2     Anion Gap 5.0 mmol/L     Narrative:       The MDRD GFR formula is only valid for adults with stable renal function between ages 18 and 70.           I reviewed the patient's new clinical results.  I reviewed the patient's new imaging results and agree with the interpretation.     ASSESSMENT/PLAN:   ASSESSMENT:   1.  Universal ulcerative colitis.  Unable to do a colonoscopy to completely evaluate this, due to the persistent flare   2.  Anemia of chronic blood loss    PLAN:   1.  I discussed this with the patient as well as the family as well as Dr. Navarro.  I would recommend transferring to [place] for a multiple disciplinary approach to the patient's problem.  2.  Options that are available for the patient home with them the [employer], 5-ASA compounds as well as aggressive high doses of steroids include intravenous  cyclosporine.  I do not have any experience with giving this to patients with inflammatory bowel disease.  However, I do not think that this is the proper approach to the patient from a long-term perspective.  3.  I told Dr. Navarro that if he wanted me to speak with the gastroenterologist at [place] I would be happy to do so.       Nish Morales,   12/12/17  8:47 PM

## 2017-12-13 NOTE — PLAN OF CARE
Problem: Patient Care Overview (Adult)  Goal: Plan of Care Review  Outcome: Ongoing (interventions implemented as appropriate)  Goal: Adult Individualization and Mutuality  Outcome: Ongoing (interventions implemented as appropriate)  Goal: Discharge Needs Assessment  Outcome: Ongoing (interventions implemented as appropriate)    Problem: Pain, Acute (Adult)  Goal: Acceptable Pain Control/Comfort Level  Outcome: Ongoing (interventions implemented as appropriate)    Problem: Fall Risk (Adult)  Goal: Absence of Falls  Outcome: Ongoing (interventions implemented as appropriate)    Problem: Pressure Ulcer Risk (Luis Scale) (Adult,Obstetrics,Pediatric)  Goal: Identify Related Risk Factors and Signs and Symptoms  Outcome: Ongoing (interventions implemented as appropriate)  Goal: Skin Integrity  Outcome: Ongoing (interventions implemented as appropriate)

## 2017-12-13 NOTE — SIGNIFICANT NOTE
12/13/17 1030   Rehab Treatment   Discipline physical therapy assistant   Treatment Not Performed unable to treat, medical status change  (PTA spoke w/MD at 9:00 rounds - Hold PT at this time. ? t/f to another facility.)   Recommendation   PT - Next Appointment 12/14/17

## 2017-12-13 NOTE — PROGRESS NOTES
Patient underwent a transesophageal echocardiogram with some difficulty.  Poor image quality.  Patient had evidence of echo density noted in the left ventricle which is suggestive of possible thrombus.  There was no evidence of any wall motion abnormality.    Have discussed with the patient at the present time would not start the patient on anticoagulation.  A shunt does have history of ulcerative colitis with a low hemoglobin.  Patient also had a positive stress tests.  Patient had not complained of having any symptoms of chest pain.  At the present time patient has been recommended to undergo cardiac MRI.  Family does not want to pursue any further workup for the positive stress tests at the present time as the patient is not having symptoms of chest pain.    Findings were discussed with the patient and the family at length

## 2017-12-14 NOTE — PROGRESS NOTES
[facility] Medicine Services  INPATIENT PROGRESS NOTE    Length of Stay: 9  Date of Admission: 12/4/2017  Primary Care Physician: DESTINY Arreguin    Subjective   Chief Complaint:  Abdominal pain and diarrhea  HPI:  Patient continues to have intractable diarrhea and abdominal pain.  She states that the diarrhea may have slowed down a little bit.  She has reluctantly agreed to consider transfer to [place].    Review of Systems   Constitutional: Negative for appetite change, chills, fatigue, fever and unexpected weight change.   Respiratory: Negative for cough, choking, chest tightness, shortness of breath and wheezing.    Cardiovascular: Negative for chest pain, palpitations and leg swelling.   Gastrointestinal: Positive for abdominal pain and diarrhea. Negative for blood in stool, constipation, nausea and vomiting.   Genitourinary: Negative for dysuria, flank pain and hematuria.   Neurological: Positive for weakness. Negative for dizziness, seizures, syncope, speech difficulty, light-headedness, numbness and headaches.   Hematological: Does not bruise/bleed easily.        All pertinent negatives and positives are as above. All other systems have been reviewed and are negative unless otherwise stated.     Objective    Temp:  [97.3 °F (36.3 °C)-98.7 °F (37.1 °C)] 98 °F (36.7 °C)  Heart Rate:  [] 104  Resp:  [18] 18  BP: (105-146)/(60-86) 142/67    Physical Exam   Constitutional: She appears well-developed and well-nourished.   HENT:   Head: Normocephalic and atraumatic.   Eyes: EOM are normal. Pupils are equal, round, and reactive to light.   Neck: Normal range of motion. Neck supple.   Cardiovascular: Normal rate, regular rhythm and normal heart sounds.  Exam reveals no gallop and no friction rub.    No murmur heard.  Pulmonary/Chest: Effort normal and breath sounds normal. No respiratory distress. She has no wheezes. She has no rales. She exhibits no  tenderness.   Abdominal: Soft. Bowel sounds are normal. She exhibits distension. There is no tenderness. There is no guarding.   Musculoskeletal: She exhibits no edema.   Skin: Skin is warm and dry.   Psychiatric: She has a normal mood and affect. Her behavior is normal. Thought content normal.   Vitals reviewed.          Results Review:  I have reviewed the labs, radiology results, and diagnostic studies.    Laboratory Data:     Results from last 7 days  Lab Units 12/14/17  0616 12/13/17  2202 12/13/17  0605 12/12/17  0546   SODIUM mmol/L 137  --  137 133*   POTASSIUM mmol/L 4.3 4.9 3.4* 4.4   CHLORIDE mmol/L 102  --  102 100   CO2 mmol/L 28.0  --  28.0 28.0   BUN mg/dL 11  --  10 12   CREATININE mg/dL 0.62  --  0.64 0.54   GLUCOSE mg/dL 130*  --  130* 116*   CALCIUM mg/dL 7.5*  --  7.6* 7.3*   BILIRUBIN mg/dL 0.2  --  0.3 0.3   ALK PHOS U/L 67  --  78 72   ALT (SGPT) U/L 34  --  41 37   AST (SGOT) U/L 22  --  20 17   ANION GAP mmol/L 7.0  --  7.0 5.0     Estimated Creatinine Clearance: 57.9 mL/min (by C-G formula based on Cr of 0.62).    Results from last 7 days  Lab Units 12/09/17  1156   MAGNESIUM mg/dL 1.9       Results from last 7 days  Lab Units 12/07/17  1739   URIC ACID mg/dL 4.9       Results from last 7 days  Lab Units 12/14/17  0616 12/13/17  0605 12/12/17  0546 12/11/17  0545 12/10/17  0629   WBC 10*3/mm3 8.71 7.97 10.97* 10.64* 8.90   HEMOGLOBIN g/dL 7.6* 8.2* 8.3* 8.4* 9.4*   HEMATOCRIT % 23.2* 25.0* 25.0* 25.7* 29.5*   PLATELETS 10*3/mm3 163 149* 140* 140* 116*       Radiology Data:   Imaging Results (last 24 hours)     ** No results found for the last 24 hours. **          I have reviewed the patient current medications.     Assessment/Plan     Hospital Problem List     * (Principal)Lower abdominal pain    Overview Deleted 12/4/2017 12:39 PM by Sony Burris MD            Weakness    Ulcerative colitis (Chronic)          Plan:    1.  Acute flare of Ulcerative colitis:  No significant  improvement.  2.  Chronic diarrhea:  On octreotide and lomotil.  No significant improvement.  3.  Elevated troponin:  Stress test shows a medium-sized, moderate-to-severe area of ischemia located in the anterior wall and apex. High risk study.  4.  Left ventricular thrombus:  BONIFACIO done this morning.  Results pending.  5.  DVT Prophylaxis:  SCDs and Teds.      Patient has reluctantly agreed to go to [place] for at least a second opinion.            This document has been electronically signed by Leeroy Navarro MD on December 14, 2017 3:00 PM

## 2017-12-14 NOTE — PROGRESS NOTES
Cardiology Progress Note:     LOS: 8 days   Patient Care Team:  DESTINY Arreguin as PCP - General (Nurse Practitioner)      Subjective:      Chart reviewed , patient seen and examined. Patient denies any chest pain, shortness of breath palpitation.  Patient continues to have persistent diarrhea and abdominal discomfort.  Patient currently is not having any symptoms of chest pain.  Have discussed the finding of the positive nuclear stress tests evaluation.  At the present time patient is agreeable to have further evaluation at [facility] for the ulcerative colitis and possible surgical intervention if deemed necessary.  At the present time patient has been informed further workup from the cardiac standpoint could also be done at [facility].  Patient would not be started empirically on anticoagulation.  Patient would need a cardiac MRI to further evaluate the left ventricular echo density which is suggestive of thrombus and the positive nuclear stress tests which would need a coronary angiogram.            Objective:     Objective:  Vitals:    12/13/17 1530   BP:    Pulse: 96   Resp:    Temp:    SpO2:        Intake/Output Summary (Last 24 hours) at 12/13/17 2129  Last data filed at 12/13/17 0545   Gross per 24 hour   Intake              240 ml   Output                0 ml   Net              240 ml             Physical Exam:   General Appearance:    Alert, oriented, cooperative, in no acute distress   Head:    Normocephalic, atraumatic, without obvious abnormality   Eyes:           DEANGELO  Lids and lashes normal, conjunctivae and sclerae normal, no icterus, no pallor   Ears:    Ears appear intact with no abnormalities noted   Throat:   Mucous membranes pink and moist   Neck:   Supple, trachea midline, no carotid bruit, no organomegaly or JVD   Lungs:     Clear to auscultation and percussion, respirations regular, even and Unlabored. No wheezes, rales, rhonchi    Heart:     Regular rhythm and normal rate, normal S1 and S2, no            murmur, no gallop, no rub, no click   Abdomen:     Soft, non-tender, non-distended, no guarding, no rebound tenderness, Normal bowel sounds in all four quadrant, no masses, liver and spleen nonpalpable,    Genitalia:    Deferred   Extremities:   Moves all extremities well, no edema, no cyanosis, no              Redness, no clubbing   Pulses:   Pulses palpable and equal bilaterally   Skin:   Moist and warm. No bleeding, bruising or rash   Neurologic/Psychiatric:   Alert and oriented to person, place, and time.  Motor, power and tone in upper and lower extremity is grossly intact.  No focal neurological deficits. Normal cognitive function. No psychomotor reaction or tangential thought. No depression, homicidal ideations and suicidal ideations            Results Review:    Lab Results (last 24 hours)     Procedure Component Value Units Date/Time    CBC (No Diff) [[id number]]  (Abnormal) Collected:  12/13/17 0605    Specimen:  Blood Updated:  12/13/17 0636     WBC 7.97 10*3/mm3      RBC 2.74 (L) 10*6/mm3      Hemoglobin 8.2 (L) g/dL      Hematocrit 25.0 (L) %      MCV 91.2 fL      MCH 29.9 pg      MCHC 32.8 g/dL      RDW 15.2 (H) %      RDW-SD 50.4 (H) fl      MPV 9.6 fL      Platelets 149 (L) 10*3/mm3     Comprehensive Metabolic Panel [[id number]]  (Abnormal) Collected:  12/13/17 0605    Specimen:  Blood Updated:  12/13/17 0721     Glucose 130 (H) mg/dL      BUN 10 mg/dL      Creatinine 0.64 mg/dL      Sodium 137 mmol/L      Potassium 3.4 (L) mmol/L      Chloride 102 mmol/L      CO2 28.0 mmol/L      Calcium 7.6 (L) mg/dL      Total Protein 5.3 (L) g/dL      Albumin 2.50 (L) g/dL      ALT (SGPT) 41 U/L      AST (SGOT) 20 U/L      Alkaline Phosphatase 78 U/L      Total Bilirubin 0.3 mg/dL      eGFR Non African Amer 91 (H) mL/min/1.73      Globulin 2.8 gm/dL      A/G Ratio 0.9 (L) g/dL      BUN/Creatinine Ratio 15.6     Anion Gap 7.0 mmol/L     Narrative:        The MDRD GFR formula is only valid for adults with stable renal function between ages 18 and 70.           Medication Review:   Current Facility-Administered Medications   Medication Dose Route Frequency Provider Last Rate Last Dose   • diphenoxylate-atropine (LOMOTIL) 2.5-0.025 MG per tablet 1 tablet  1 tablet Oral Q2H PRN Nish Morales, DO   1 tablet at 12/13/17 1734   • famotidine (PEPCID) tablet 40 mg  40 mg Oral Daily Neelam Saravia MD   40 mg at 12/13/17 0840   • HYDROcodone-acetaminophen (NORCO)  MG per tablet 1 tablet  1 tablet Oral Q6H PRN Neelam Saravia MD   1 tablet at 12/13/17 1408   • Magnesium Sulfate 2 gram Bolus, followed by 8 gram infusion (total Mg dose 10 grams)- Mg less than or equal to 1mg/dL  2 g Intravenous PRN Neelam Saravia MD        Or   • Magnesium Sulfate 6 gram Infusion (2 gm x 3) -Mg 1.1 -1.5 mg/dL  2 g Intravenous PRN Neelam Saravia MD        Or   • magnesium sulfate 4 gram infusion- Mg 1.6-1.9 mg/dL  4 g Intravenous PRN Neelam Saravia MD       • mesalamine (DELZICOL) delayed release capsule 800 mg  800 mg Oral TID Nish Morales DO   800 mg at 12/13/17 1711   • methylPREDNISolone sodium succinate (SOLU-Medrol) injection 60 mg  60 mg Intravenous Daily Nish Morales, DO   60 mg at 12/13/17 0840   • morphine injection 2 mg  2 mg Intravenous Q2H PRN Sony Burris MD   2 mg at 12/12/17 1040   • octreotide (sandoSTATIN) injection 100 mcg  100 mcg Subcutaneous TID Nish Morales, DO   100 mcg at 12/13/17 1711   • potassium & sodium phosphates (PHOS-NAK) 280-160-250 MG packet - for Phosphorus less than 1.25 mg/dL  1 packet Oral Q6H PRN Neelam Saravia MD        Or   • potassium & sodium phosphates (PHOS-NAK) 280-160-250 MG packet - for Phosphorus 1.25 - 2.1 mg/dL  1 packet Oral Once PRN Neelam Saravia MD       • potassium chloride (KLOR-CON) packet 40 mEq  40 mEq Oral PRN Neelam Saravia MD   40 mEq at 12/13/17 1807   • potassium  chloride (MICRO-K) CR capsule 40 mEq  40 mEq Oral PRN Neelam Saravia MD   40 mEq at 12/13/17 0840   • sodium chloride 0.9 % flush 1-10 mL  1-10 mL Intravenous PRN Sony Burris MD       • sodium chloride 0.9 % flush 10 mL  10 mL Intravenous PRN Carmine Ponce PA-C       • sodium chloride 0.9 % flush 10 mL  10 mL Intravenous PRN Gabe Blount MD   10 mL at 12/11/17 0955       Assessment and Plan:    Principal Problem:    Lower abdominal pain  Active Problems:    Weakness    Ulcerative colitis  1.  Indeterminate troponin.  Patient had a nuclear Cardiolite stress test which was suggestive of ischemia.  Patient is currently not having any symptoms of chest pain suggestive of angina.  Patient at the present time would be treated medically.  Should the patient get transferred to [facility] with for what the records of the positive nuclear stress tests.  2.  Echodensity noted in the left ventricular apex suggestive of thrombus.  Patient has good LV function systolic function.  Empirically would not start the patient on anticoagulation secondary to the patient history of gastrointestinal bleeding and anemia secondary to ulcerative colitis.  3.  Arterial hypertension.  Patient blood pressure is currently controlled.  4.  Anemia.  Patient hemoglobin has remained stable.        The above plan of management were discussed with the patient            Gabe Blount MD  12/13/17  9:29 PM      Time: Time spent in face-to-face interaction 20 minutes    Dictated utilizing Dragon dictation.

## 2017-12-15 NOTE — PLAN OF CARE
Problem: Patient Care Overview (Adult)  Goal: Plan of Care Review  Outcome: Ongoing (interventions implemented as appropriate)    12/15/17 0355   Coping/Psychosocial Response Interventions   Plan Of Care Reviewed With patient   Patient Care Overview   Progress no change   Outcome Evaluation   Outcome Summary/Follow up Plan vss, some pain controlled with oral pain meds, lomotil given for diarrhea, rested well       Goal: Adult Individualization and Mutuality  Outcome: Ongoing (interventions implemented as appropriate)  Goal: Discharge Needs Assessment  Outcome: Ongoing (interventions implemented as appropriate)    Problem: Pain, Acute (Adult)  Goal: Acceptable Pain Control/Comfort Level  Outcome: Ongoing (interventions implemented as appropriate)    Problem: Fall Risk (Adult)  Goal: Absence of Falls  Outcome: Ongoing (interventions implemented as appropriate)    Problem: Pressure Ulcer Risk (Luis Scale) (Adult,Obstetrics,Pediatric)  Goal: Identify Related Risk Factors and Signs and Symptoms  Outcome: Outcome(s) achieved Date Met:  12/15/17  Goal: Skin Integrity  Outcome: Ongoing (interventions implemented as appropriate)

## 2017-12-15 NOTE — NURSING NOTE
Son voiced concern that no bed available at [facility], voiced interest in wanting to explore other options for transfer if possible. Dr Navarro notified of this and son's wish for him to contact him and states he will contact him shortly.

## 2017-12-15 NOTE — PLAN OF CARE
Problem: Pain, Acute (Adult)  Goal: Acceptable Pain Control/Comfort Level  Outcome: Ongoing (interventions implemented as appropriate)    Problem: Fall Risk (Adult)  Goal: Absence of Falls  Outcome: Ongoing (interventions implemented as appropriate)    Problem: Pressure Ulcer Risk (Luis Scale) (Adult,Obstetrics,Pediatric)  Goal: Skin Integrity  Outcome: Ongoing (interventions implemented as appropriate)

## 2017-12-15 NOTE — PLAN OF CARE
Problem: Patient Care Overview (Adult)  Goal: Plan of Care Review  Outcome: Ongoing (interventions implemented as appropriate)    12/15/17 1633   Coping/Psychosocial Response Interventions   Plan Of Care Reviewed With patient;son;caregiver   Patient Care Overview   Progress no change   Outcome Evaluation   Outcome Summary/Follow up Plan Pt continues to have uncontrolled diarrhea. Probable transfer to [place]. Will continue currentlynutritional regimen

## 2017-12-15 NOTE — PROGRESS NOTES
[facility] Services  INPATIENT PROGRESS NOTE    Length of Stay: 10  Date of Admission: 12/4/2017  Primary Care Physician: DESTINY Arreguin    Subjective   Chief Complaint:  Abdominal pain and diarrhea  HPI:  Patient continues to have intractable diarrhea and abdominal pain.  She states that the diarrhea may have slowed down a little bit.  She has reluctantly agreed to transfer.    Review of Systems   Constitutional: Negative for appetite change, chills, fatigue, fever and unexpected weight change.   Respiratory: Negative for cough, choking, chest tightness, shortness of breath and wheezing.    Cardiovascular: Negative for chest pain, palpitations and leg swelling.   Gastrointestinal: Positive for abdominal pain and diarrhea. Negative for blood in stool, constipation, nausea and vomiting.   Genitourinary: Negative for dysuria, flank pain and hematuria.   Neurological: Positive for weakness. Negative for dizziness, seizures, syncope, speech difficulty, light-headedness, numbness and headaches.   Hematological: Does not bruise/bleed easily.        All pertinent negatives and positives are as above. All other systems have been reviewed and are negative unless otherwise stated.     Objective    Temp:  [96.5 °F (35.8 °C)-99.9 °F (37.7 °C)] 99.9 °F (37.7 °C)  Heart Rate:  [] 100  Resp:  [16-18] 18  BP: (128-145)/(68-78) 145/78    Physical Exam   Constitutional: She appears well-developed and well-nourished.   HENT:   Head: Normocephalic and atraumatic.   Eyes: EOM are normal. Pupils are equal, round, and reactive to light.   Neck: Normal range of motion. Neck supple.   Cardiovascular: Normal rate, regular rhythm and normal heart sounds.  Exam reveals no gallop and no friction rub.    No murmur heard.  Pulmonary/Chest: Effort normal and breath sounds normal. No respiratory distress. She has no wheezes. She has no rales. She exhibits no tenderness.   Abdominal:  Soft. Bowel sounds are normal. She exhibits distension. There is no tenderness. There is no guarding.   Musculoskeletal: She exhibits no edema.   Skin: Skin is warm and dry.   Psychiatric: She has a normal mood and affect. Her behavior is normal. Thought content normal.   Vitals reviewed.          Results Review:  I have reviewed the labs, radiology results, and diagnostic studies.    Laboratory Data:     Results from last 7 days  Lab Units 12/15/17  0608 12/14/17  0616 12/13/17  2202 12/13/17  0605   SODIUM mmol/L 137 137  --  137   POTASSIUM mmol/L 4.7 4.3 4.9 3.4*   CHLORIDE mmol/L 98 102  --  102   CO2 mmol/L 32.0* 28.0  --  28.0   BUN mg/dL 9 11  --  10   CREATININE mg/dL 0.66 0.62  --  0.64   GLUCOSE mg/dL 112* 130*  --  130*   CALCIUM mg/dL 7.7* 7.5*  --  7.6*   BILIRUBIN mg/dL 0.3 0.2  --  0.3   ALK PHOS U/L 73 67  --  78   ALT (SGPT) U/L 34 34  --  41   AST (SGOT) U/L 21 22  --  20   ANION GAP mmol/L 7.0 7.0  --  7.0     Estimated Creatinine Clearance: 57.9 mL/min (by C-G formula based on Cr of 0.66).    Results from last 7 days  Lab Units 12/09/17  1156   MAGNESIUM mg/dL 1.9           Results from last 7 days  Lab Units 12/15/17  0608 12/14/17  0616 12/13/17  0605 12/12/17  0546 12/11/17  0545   WBC 10*3/mm3 9.42 8.71 7.97 10.97* 10.64*   HEMOGLOBIN g/dL 7.8* 7.6* 8.2* 8.3* 8.4*   HEMATOCRIT % 24.4* 23.2* 25.0* 25.0* 25.7*   PLATELETS 10*3/mm3 180 163 149* 140* 140*       Radiology Data:   Imaging Results (last 24 hours)     ** No results found for the last 24 hours. **          I have reviewed the patient current medications.     Assessment/Plan     Hospital Problem List     * (Principal)Lower abdominal pain    Overview Deleted 12/4/2017 12:39 PM by Sony Burris MD            Weakness    Ulcerative colitis (Chronic)          Plan:    1.  Acute flare of Ulcerative colitis:  No significant improvement.  2.  Chronic diarrhea:  On octreotide and lomotil.  No significant improvement.  3.  Elevated troponin:   Stress test shows a medium-sized, moderate-to-severe area of ischemia located in the anterior wall and apex. High risk study.  4.  Left ventricular thrombus:  BONIFACIO done this morning.  Results pending.  5.  DVT Prophylaxis:  SCDs and Teds.      [facility] has no beds and is on critical diversion.  They are not accepting any transfers that aren't already established with their facility.  I will attempt to contact [facility].            This document has been electronically signed by Leeroy Navarro MD on December 15, 2017 5:34 PM

## 2017-12-15 NOTE — CONSULTS
"Adult Nutrition  Assessment    Patient Name:  Damaris Sánchez  YOB: 1945  MRN: [id number]  Admit Date:  12/4/2017    Assessment Date:  12/15/2017    Comments:  Pt continues to  Have uncontrolled diarrhea. She is drinking the Ensure and the Ensure clear--and denies that it makes the diarrhea any worse.  She reports that \"everything goes through her\"  Possible transfer to [place] due to Acute flare of Ulcerative colitis with chronic diarrhea.  RD will monitor tx plans.            Reason for Assessment       12/15/17 1449    Reason for Assessment    Reason For Assessment/Visit follow up protocol                Nutrition/Diet History       12/15/17 1449    Nutrition/Diet History    Typical Food/Fluid Intake Pt reports that it doesn't matter what she eats--\"It all goes rigth through me\"  She likes the apple ensure clear and the Ensure, regular.                Labs/Tests/Procedures/Meds       12/15/17 1450    Labs/Tests/Procedures/Meds    Labs/Tests Review Reviewed            Physical Findings       12/15/17 1450    Physical Appearance    Gastrointestinal diarrhea              Nutrition Prescription Ordered       12/15/17 1450    Nutrition Prescription PO    Current PO Diet Regular    Fluid Consistency Thin    Supplement Ensure Enlive;Ensure Clear    Supplement Frequency 3 times a day    Other Modifiers Low Fiber            Evaluation of Received Nutrient/Fluid Intake       12/15/17 1451    PO Evaluation    Number of Days PO Intake Evaluated 3 days    Number of Meals 6    % PO Intake  0%x3, 100%x2, 75%x1            Electronically signed by:  Candelaria Avila RD  12/15/17 2:54 PM  "

## 2017-12-16 NOTE — DISCHARGE PLACEMENT REQUEST
"Kira Borrero (72 y.o. Female)     Date of Birth Social Security Number Address Home Phone MRN    1945  [id number] [facility] [address] [phone] [id number]    [place] Marital Status          None        Admission Date Admission Type Admitting Provider Attending Provider Department, Room/Bed    12/4/17 Emergency Neelam Saravia MD Bhutta, Shujera Asad, MD [address], 366/1    Discharge Date Discharge Disposition Discharge Destination                      Attending Provider: Neelam Saravia MD     Allergies:  No Known Allergies    Isolation:  None   Infection:  None   Code Status:  FULL    Ht:  160 cm (63\")   Wt:  57.7 kg (127 lb 4.8 oz)    Admission Cmt:  None   Principal Problem:  Lower abdominal pain [R10.30]                 Active Insurance as of 12/4/2017     Primary Coverage     Payor Plan Insurance Group Employer/Plan Group    MEDICARE MEDICARE A & B      Payor Plan Address Payor Plan Phone Number Effective From Effective To    [address] [phone] 4/1/2010     [place] [address]       Subscriber Name Subscriber Birth Date Member ID       KIRA BORRERO 1945 [id number]           Secondary Coverage     Payor Plan Insurance Group Employer/Plan Group    [employer] [place]ICA[place]      Payor Plan Address Payor Plan Phone Number Effective From Effective To    [address] [address] [phone] 9/6/2017     [place] [address]       Subscriber Name Subscriber Birth Date Member ID       KIRA BORRERO 1945 [id number]                 Emergency Contacts      (Rel.) Home Phone Work Phone Mobile Phone    Abdi Borrero (Son) [phone] -- --               History & Physical      Sony Burris MD at 12/4/2017 12:41 PM          History and Physical  Sony Burris MD  Hospitalist      Patient Care Team:  DESTINY Arreguin as PCP - General (Nurse Practitioner)    Chief complaint   Chief Complaint   Patient presents with   • " Fall   • Weakness   • Diarrhea       Subjective     Patient is a 72 y.o. female admitted for worsening lower abdominal pain, cramping, accompanied by looser stools, sometimes bloody. She has a longstanding history of ulcerative colitis, at times controlled, sometimes not.    He appetite has been poorer, she has experienced minimal nausea. Food intake makes her symptoms worse.    History  Past Medical History:   Diagnosis Date   • Ulcerative colitis      Past Surgical History:   Procedure Laterality Date   • COLONOSCOPY     • SIGMOIDOSCOPY N/A 9/13/2017     Family History   Problem Relation Age of Onset   • Hypertension Father      Social History   Substance Use Topics   • Smoking status: Former Smoker   • Smokeless tobacco: Never Used   • Alcohol use No     Prescriptions Prior to Admission   Medication Sig Dispense Refill Last Dose   • acidophilus (FLORANEX) tablet tablet Take 1 tablet by mouth 3 (Three) Times a Day. 90 tablet 1    • Adalimumab (HUMIRA) 10 MG/0.2ML Prefilled Syringe Kit Inject  under the skin.      • famotidine (PEPCID) 40 MG tablet Take 1 tablet by mouth Daily. 30 tablet 1    • ferrous sulfate 324 (65 Fe) MG tablet delayed-release EC tablet Take 1 tablet by mouth 2 (Two) Times a Day With Meals. 60 tablet 1    • mesalamine (DELZICOL) 400 MG capsule delayed-release delayed release capsule Take 1 capsule by mouth 3 (Three) Times a Day. 90 capsule 1    • predniSONE (DELTASONE) 20 MG tablet Take 1 tablet by mouth 2 (Two) Times a Day. 60 tablet 1    • traMADol (ULTRAM) 50 MG tablet Take 50 mg by mouth Every 6 (Six) Hours As Needed for Moderate Pain .        Allergies:  Review of patient's allergies indicates no known allergies.    Review of Systems  Review of Systems   Constitutional: Positive for fatigue. Negative for fever.   Respiratory: Positive for cough.    Gastrointestinal: Positive for abdominal distention, abdominal pain, blood in stool, diarrhea and nausea. Negative for constipation and  vomiting.   Genitourinary: Positive for frequency. Negative for dysuria and urgency.   Musculoskeletal: Positive for back pain. Negative for neck pain.   Skin: Positive for pallor. Negative for rash.   Neurological: Positive for dizziness, weakness and light-headedness. Negative for syncope.   Psychiatric/Behavioral: Negative for agitation, behavioral problems and confusion.   All other systems reviewed and are negative.      Objective     Vital Signs  Temp:  [97.4 °F (36.3 °C)] 97.4 °F (36.3 °C)  Heart Rate:  [100-112] 106  Resp:  [18] 18  BP: (101-112)/(57-64) 101/57    Physical Exam:  Physical Exam   Constitutional: She is oriented to person, place, and time. She appears well-developed and well-nourished. No distress.   HENT:   Head: Normocephalic and atraumatic.   Eyes: EOM are normal. Pupils are equal, round, and reactive to light. No scleral icterus.   Neck: Normal range of motion. Neck supple.   Cardiovascular: Normal rate and regular rhythm.    Pulmonary/Chest: Effort normal and breath sounds normal. No respiratory distress. She has no wheezes.   Abdominal: Soft. Bowel sounds are normal. She exhibits distension. There is tenderness. There is no rebound and no guarding.   Musculoskeletal: Normal range of motion. She exhibits no edema or tenderness.   Neurological: She is alert and oriented to person, place, and time. No cranial nerve deficit. Coordination normal.   Skin: Skin is warm and dry. No erythema.   Psychiatric: She has a normal mood and affect. Her behavior is normal.   Vitals reviewed.      Results Review:   Lab Results (last 24 hours)     Procedure Component Value Units Date/Time    CBC & Differential [[id number]] Collected:  12/04/17 0959    Specimen:  Blood Updated:  12/04/17 1016    Narrative:       The following orders were created for panel order CBC & Differential.  Procedure                               Abnormality         Status                     ---------                                -----------         ------                     CBC Auto Differential[[id number]]        Abnormal            Final result                 Please view results for these tests on the individual orders.    CBC Auto Differential [[id number]]  (Abnormal) Collected:  12/04/17 0959    Specimen:  Blood Updated:  12/04/17 1016     WBC 17.84 (H) 10*3/mm3      RBC 3.97 10*6/mm3      Hemoglobin 12.1 g/dL      Hematocrit 35.5 %      MCV 89.4 fL      MCH 30.5 pg      MCHC 34.1 g/dL      RDW 15.2 (H) %      RDW-SD 49.8 (H) fl      MPV 9.1 fL      Platelets 236 10*3/mm3      Neutrophil % 82.2 (H) %      Lymphocyte % 9.1 (L) %      Monocyte % 4.0 %      Eosinophil % 0.2 %      Basophil % 0.2 %      Immature Grans % 4.3 (H) %      Neutrophils, Absolute 14.68 (H) 10*3/mm3      Lymphocytes, Absolute 1.62 10*3/mm3      Monocytes, Absolute 0.71 10*3/mm3      Eosinophils, Absolute 0.04 10*3/mm3      Basophils, Absolute 0.03 10*3/mm3      Immature Grans, Absolute 0.76 (H) 10*3/mm3     Comprehensive Metabolic Panel [[id number]]  (Abnormal) Collected:  12/04/17 0959    Specimen:  Blood Updated:  12/04/17 1032     Glucose 110 (H) mg/dL      BUN 20 mg/dL      Creatinine 1.08 (H) mg/dL      Sodium 128 (L) mmol/L      Potassium 4.2 mmol/L      Chloride 91 (L) mmol/L      CO2 25.0 mmol/L      Calcium 8.9 mg/dL      Total Protein 7.1 g/dL      Albumin 3.40 g/dL      ALT (SGPT) 26 U/L      AST (SGOT) 33 U/L      Alkaline Phosphatase 124 U/L      Total Bilirubin 0.7 mg/dL      eGFR Non African Amer 50 mL/min/1.73      Globulin 3.7 (H) gm/dL      A/G Ratio 0.9 (L) g/dL      BUN/Creatinine Ratio 18.5     Anion Gap 12.0 mmol/L     Narrative:       The MDRD GFR formula is only valid for adults with stable renal function between ages 18 and 70.    Lipase [[id number]]  (Normal) Collected:  12/04/17 0959    Specimen:  Blood Updated:  12/04/17 1032     Lipase 65 U/L     Lactic Acid, Plasma [[id number]]  (Abnormal) Collected:  12/04/17 0959    Specimen:  Blood  Updated:  12/04/17 1035     Lactate 2.3 (C) mmol/L     Troponin [[id number]]  (Abnormal) Collected:  12/04/17 0959    Specimen:  Blood Updated:  12/04/17 1050     Troponin I 0.195 (C) ng/mL     Influenza Antigen, Rapid - Swab, Nasopharynx [[id number]]  (Normal) Collected:  12/04/17 1043    Specimen:  Swab from Nasopharynx Updated:  12/04/17 1100     Influenza A Ag, EIA Negative     Influenza B Ag, EIA Negative    [place] Draw [[id number]] Collected:  12/04/17 0959    Specimen:  Blood Updated:  12/04/17 1101    Narrative:       The following orders were created for panel order [place] Draw.  Procedure                               Abnormality         Status                     ---------                               -----------         ------                     Light Blue Top[[id number]]                                   Final result               Green Top (Gel)[[id number]]                                  Final result               Lavender Top[[id number]]                                     Final result               Gold Top - SST[[id number]]                                   Final result                 Please view results for these tests on the individual orders.    Light Blue Top [[id number]] Collected:  12/04/17 0959    Specimen:  Blood Updated:  12/04/17 1101     Extra Tube hold for add-on      Auto resulted       Green Top (Gel) [[id number]] Collected:  12/04/17 0959    Specimen:  Blood Updated:  12/04/17 1101     Extra Tube Hold for add-ons.      Auto resulted.       Lavender Top [[id number]] Collected:  12/04/17 0959    Specimen:  Blood Updated:  12/04/17 1101     Extra Tube hold for add-on      Auto resulted       Gold Top - SST [[id number]] Collected:  12/04/17 0959    Specimen:  Blood Updated:  12/04/17 1101     Extra Tube Hold for add-ons.      Auto resulted.       Blood Culture - Blood, [[id number]] Collected:  12/04/17 1118    Specimen:  Blood from Arm, Left Updated:  12/04/17 1119    Blood Culture - Blood,  [[id number]] Collected:  12/04/17 1110    Specimen:  Blood from Arm, Right Updated:  12/04/17 1120    Lactic Acid, Reflex Timer [[id number]] Collected:  12/04/17 0959    Specimen:  Blood Updated:  12/04/17 1346     Extra Tube Hold for add-ons.      Auto resulted.       Gastrointestinal Panel, PCR - Stool, Per Rectum [[id number]]  (Normal) Collected:  12/04/17 1303    Specimen:  Stool from Per Rectum Updated:  12/04/17 1446     Campylobacter Not Detected     Clostridium difficile (toxin A/B) Not Detected     Plesiomonas shigelloides Not Detected     Salmonella Not Detected     Vibrio Not Detected     Vibrio cholerae Not Detected     Yersinia enterocolitica Not Detected     Enteroaggregative E. coli (EAEC) Not Detected     Enteropathogenic E. coli (EPEC) Not Detected     Enterotoxigenic E. coli (ETEC) lt/st Not Detected     Shiga-like toxin-producing E. coli (STEC) stx1/stx2 Not Detected     E. coli O157 Not Detected     Shigella/Enteroinvasive E. coli (EIEC) Not Detected     Cryptosporidium Not Detected     Cyclospora cayetanensis Not Detected     Entamoeba histolytica Not Detected     Giardia lamblia Not Detected     Adenovirus F40/41 Not Detected     Astrovirus Not Detected     Norovirus GI/GII Not Detected     Rotavirus A Not Detected     Sapovirus (I, II, IV or V) Not Detected    Narrative:         Testing performed by multiplex PCR system.          Imaging Results (last 24 hours)     Procedure Component Value Units Date/Time    CT Abdomen Pelvis With Contrast [[id number]] Collected:  12/04/17 1145     Updated:  12/04/17 1219    Narrative:       CT abdomen, pelvis with contrast.       CLINICAL INDICATION: Lower abdominal pain. History of ulcerative  colitis.       COMPARISON: CT September 6, 2017.       TECHNIQUE: Oral and nonionic IV contrast. 80 mL Isovue-300.  Helical scanning with axial and coronal reformations. Soft  tissue, lung, and bone windows reviewed.    This exam was performed according to our  departmental  dose-optimization program, which includes automated exposure  control, adjustment of the mA and/or kV according to patient size  and/or use of iterative reconstruction technique.    ABDOMEN CT FINDINGS: The visualized lung bases show minor  dependent changes. Subtle area of relatively decreased CT  attenuation adjacent falciform ligament. This has been described  as a pseudolesion rather than true pathology and is of incidental  significance. (Series 2 images 24-26.) Liver is otherwise  unremarkable. Benign calcified granulomata in the spleen. Small  low CT attenuation lesion within the spleen either cyst or  hemangioma. The spleen is otherwise unremarkable. The  gallbladder,  pancreas, adrenal glands  are normal in appearance.  Nine cyst upper pole left kidney. Kidneys otherwise unremarkable.    There is diffuse bowel l wall thickening involving the entire  colon compatible with patient's known diagnosis of ulcerative  colitis. Normal small bowel. No evidence of backwash ileitis.  Normal appendix. No evidence of pathologically enlarged nodes,  free air, or free fluid. Degenerative changes lumbar spine,  L4-L5. Left-sided lumbar scoliotic curvature.  The bones are otherwise unremarkable.    PELVIS CT FINDINGS: There are no pelvic masses.    No evidence of  pathologically enlarged nodes, free air, or free fluid.     The bones are grossly unremarkable.      Impression:       CONCLUSION: Diffuse bowel wall thickening involving the entire  colon compatible with pancolitis. No evidence of any colonic  obstruction. Normal small bowel. No evidence of backwash ileitis.  Normal appendix.    Small area of decreased CT attenuation adjacent to falciform  ligament in the liver (this is a pseudolesion, of incidental  significance). Small low-density lesion within the spleen either  cyst or hemangioma. Benign cyst upper pole left kidney.  Degenerative changes lumbar spine left-sided lumbar scoliotic  curvature. CT  abdomen, pelvis with contrast is otherwise  unremarkable.    Electronically signed by:  Jarad Peoples MD  12/4/2017 12:18 PM CST  Workstation: [id number]          Assessment/Plan     Principal Problem:    Lower abdominal pain  Active Problems:    Ulcerative colitis    Start IV fluid, IV antibiotics, symptomatic treatment. Consult GI.    Sony Burris MD  12/04/17  3:44 PM       Electronically signed by Sony Burris MD at 12/4/2017  3:44 PM           Physician Progress Notes (last 72 hours) (Notes from 12/13/2017 12:54 PM through 12/16/2017 12:54 PM)      Leeroy Navarro MD at 12/13/2017  2:03 PM  Version 1 of 1             [facility] Medicine Services  INPATIENT PROGRESS NOTE    Length of Stay: 8  Date of Admission: 12/4/2017  Primary Care Physician: DESTINY Arreguin    Subjective   Chief Complaint:  Abdominal pain and diarrhea  HPI:  Patient continues to have intractable diarrhea and abdominal pain.  She states that the diarrhea may have slowed down a little bit.    Review of Systems   Constitutional: Negative for appetite change, chills, fatigue, fever and unexpected weight change.   Respiratory: Negative for cough, choking, chest tightness, shortness of breath and wheezing.    Cardiovascular: Negative for chest pain, palpitations and leg swelling.   Gastrointestinal: Positive for abdominal pain and diarrhea. Negative for blood in stool, constipation, nausea and vomiting.   Genitourinary: Negative for dysuria, flank pain and hematuria.   Neurological: Positive for weakness. Negative for dizziness, seizures, syncope, speech difficulty, light-headedness, numbness and headaches.   Hematological: Does not bruise/bleed easily.        All pertinent negatives and positives are as above. All other systems have been reviewed and are negative unless otherwise stated.     Objective    Temp:  [96 °F (35.6 °C)-98.2 °F (36.8 °C)] 97.2 °F (36.2 °C)  Heart Rate:  [] 100  Resp:   [16-18] 18  BP: (103-143)/(59-67) 103/60    Physical Exam   Constitutional: She appears well-developed and well-nourished.   HENT:   Head: Normocephalic and atraumatic.   Eyes: EOM are normal. Pupils are equal, round, and reactive to light.   Neck: Normal range of motion. Neck supple.   Cardiovascular: Normal rate, regular rhythm and normal heart sounds.  Exam reveals no gallop and no friction rub.    No murmur heard.  Pulmonary/Chest: Effort normal and breath sounds normal. No respiratory distress. She has no wheezes. She has no rales. She exhibits no tenderness.   Abdominal: Soft. Bowel sounds are normal. She exhibits distension. There is no tenderness. There is no guarding.   Musculoskeletal: She exhibits no edema.   Skin: Skin is warm and dry.   Psychiatric: She has a normal mood and affect. Her behavior is normal. Thought content normal.   Vitals reviewed.          Results Review:  I have reviewed the labs, radiology results, and diagnostic studies.    Laboratory Data:     Results from last 7 days  Lab Units 12/13/17  0605 12/12/17  0546 12/11/17  0545   SODIUM mmol/L 137 133* 133*   POTASSIUM mmol/L 3.4* 4.4 4.4   CHLORIDE mmol/L 102 100 100   CO2 mmol/L 28.0 28.0 29.0   BUN mg/dL 10 12 10   CREATININE mg/dL 0.64 0.54 0.54   GLUCOSE mg/dL 130* 116* 138*   CALCIUM mg/dL 7.6* 7.3* 7.3*   BILIRUBIN mg/dL 0.3 0.3 0.2   ALK PHOS U/L 78 72 70   ALT (SGPT) U/L 41 37 33   AST (SGOT) U/L 20 17 18   ANION GAP mmol/L 7.0 5.0 4.0*     Estimated Creatinine Clearance: 57.9 mL/min (by C-G formula based on Cr of 0.64).    Results from last 7 days  Lab Units 12/09/17  1156   MAGNESIUM mg/dL 1.9       Results from last 7 days  Lab Units 12/07/17  1739   URIC ACID mg/dL 4.9       Results from last 7 days  Lab Units 12/13/17  0605 12/12/17  0546 12/11/17  0545 12/10/17  0629 12/09/17  0411   WBC 10*3/mm3 7.97 10.97* 10.64* 8.90 8.24   HEMOGLOBIN g/dL 8.2* 8.3* 8.4* 9.4* 9.0*   HEMATOCRIT % 25.0* 25.0* 25.7* 29.5* 26.8*  "  PLATELETS 10*3/mm3 149* 140* 140* 116* 172       Radiology Data:   Imaging Results (last 24 hours)     ** No results found for the last 24 hours. **          I have reviewed the patient current medications.     Assessment/Plan     Hospital Problem List     * (Principal)Lower abdominal pain    Overview Deleted 12/4/2017 12:39 PM by Sony Burris MD            Weakness    Ulcerative colitis (Chronic)          Plan:    1.  Acute flare of Ulcerative colitis:  No significant improvement.  2.  Chronic diarrhea:  On octreotide and lomotil.  No significant improvement.  3.  Elevated troponin:  Stress test shows a medium-sized, moderate-to-severe area of ischemia located in the anterior wall and apex. High risk study.  4.  Left ventricular thrombus:  BONIFACIO done this morning.  Results pending.  5.  DVT Prophylaxis:  SCDs and Teds.      I had a lengthy discussion with the patient and her two sons regarding the next steps in care.  The patient seems reluctant to proceed with the surgical option.  I explained the complexity of the case to them.  They all verbalized understanding.  Her sons are strongly in favor of her being transferred to [place] for tertiary level evaluation and treatment.  She isn't sure she wants to go.  She mentioned not pursuing further care at all, and \"just going home and getting her affairs in order.\"  I am going to give the patient and her family time to discuss it, and then I will go back and answer any questions they might have.          This document has been electronically signed by Leeroy Navarro MD on December 13, 2017 2:03 PM         Electronically signed by Leeroy Navarro MD at 12/13/2017  2:08 PM      Gabe Blount MD at 12/13/2017  9:29 PM  Version 1 of 1         Cardiology Progress Note:     LOS: 8 days   Patient Care Team:  DESTINY Arreguin as PCP - General (Nurse Practitioner)      Subjective:      Chart reviewed , patient seen and examined. Patient denies any chest pain, " shortness of breath palpitation.  Patient continues to have persistent diarrhea and abdominal discomfort.  Patient currently is not having any symptoms of chest pain.  Have discussed the finding of the positive nuclear stress tests evaluation.  At the present time patient is agreeable to have further evaluation at [facility] for the ulcerative colitis and possible surgical intervention if deemed necessary.  At the present time patient has been informed further workup from the cardiac standpoint could also be done at [facility].  Patient would not be started empirically on anticoagulation.  Patient would need a cardiac MRI to further evaluate the left ventricular echo density which is suggestive of thrombus and the positive nuclear stress tests which would need a coronary angiogram.            Objective:     Objective:  Vitals:    12/13/17 1530   BP:    Pulse: 96   Resp:    Temp:    SpO2:        Intake/Output Summary (Last 24 hours) at 12/13/17 2129  Last data filed at 12/13/17 0545   Gross per 24 hour   Intake              240 ml   Output                0 ml   Net              240 ml             Physical Exam:   General Appearance:    Alert, oriented, cooperative, in no acute distress   Head:    Normocephalic, atraumatic, without obvious abnormality   Eyes:           DEANGELO  Lids and lashes normal, conjunctivae and sclerae normal, no icterus, no pallor   Ears:    Ears appear intact with no abnormalities noted   Throat:   Mucous membranes pink and moist   Neck:   Supple, trachea midline, no carotid bruit, no organomegaly or JVD   Lungs:     Clear to auscultation and percussion, respirations regular, even and Unlabored. No wheezes, rales, rhonchi    Heart:    Regular rhythm and normal rate, normal S1 and S2, no            murmur, no gallop, no rub, no click   Abdomen:     Soft, non-tender, non-distended, no guarding, no rebound tenderness, Normal bowel sounds in all four  quadrant, no masses, liver and spleen nonpalpable,    Genitalia:    Deferred   Extremities:   Moves all extremities well, no edema, no cyanosis, no              Redness, no clubbing   Pulses:   Pulses palpable and equal bilaterally   Skin:   Moist and warm. No bleeding, bruising or rash   Neurologic/Psychiatric:   Alert and oriented to person, place, and time.  Motor, power and tone in upper and lower extremity is grossly intact.  No focal neurological deficits. Normal cognitive function. No psychomotor reaction or tangential thought. No depression, homicidal ideations and suicidal ideations            Results Review:    Lab Results (last 24 hours)     Procedure Component Value Units Date/Time    CBC (No Diff) [[id number]]  (Abnormal) Collected:  12/13/17 0605    Specimen:  Blood Updated:  12/13/17 0636     WBC 7.97 10*3/mm3      RBC 2.74 (L) 10*6/mm3      Hemoglobin 8.2 (L) g/dL      Hematocrit 25.0 (L) %      MCV 91.2 fL      MCH 29.9 pg      MCHC 32.8 g/dL      RDW 15.2 (H) %      RDW-SD 50.4 (H) fl      MPV 9.6 fL      Platelets 149 (L) 10*3/mm3     Comprehensive Metabolic Panel [[id number]]  (Abnormal) Collected:  12/13/17 0605    Specimen:  Blood Updated:  12/13/17 0721     Glucose 130 (H) mg/dL      BUN 10 mg/dL      Creatinine 0.64 mg/dL      Sodium 137 mmol/L      Potassium 3.4 (L) mmol/L      Chloride 102 mmol/L      CO2 28.0 mmol/L      Calcium 7.6 (L) mg/dL      Total Protein 5.3 (L) g/dL      Albumin 2.50 (L) g/dL      ALT (SGPT) 41 U/L      AST (SGOT) 20 U/L      Alkaline Phosphatase 78 U/L      Total Bilirubin 0.3 mg/dL      eGFR Non African Amer 91 (H) mL/min/1.73      Globulin 2.8 gm/dL      A/G Ratio 0.9 (L) g/dL      BUN/Creatinine Ratio 15.6     Anion Gap 7.0 mmol/L     Narrative:       The MDRD GFR formula is only valid for adults with stable renal function between ages 18 and 70.           Medication Review:   Current Facility-Administered Medications   Medication Dose Route Frequency Provider  Last Rate Last Dose   • diphenoxylate-atropine (LOMOTIL) 2.5-0.025 MG per tablet 1 tablet  1 tablet Oral Q2H PRN Nish Morales DO   1 tablet at 12/13/17 1734   • famotidine (PEPCID) tablet 40 mg  40 mg Oral Daily Neelam Saravia MD   40 mg at 12/13/17 0840   • HYDROcodone-acetaminophen (NORCO)  MG per tablet 1 tablet  1 tablet Oral Q6H PRN Neelam Saravia MD   1 tablet at 12/13/17 1408   • Magnesium Sulfate 2 gram Bolus, followed by 8 gram infusion (total Mg dose 10 grams)- Mg less than or equal to 1mg/dL  2 g Intravenous PRN Neelam Saravia MD        Or   • Magnesium Sulfate 6 gram Infusion (2 gm x 3) -Mg 1.1 -1.5 mg/dL  2 g Intravenous PRN Neelam Saravia MD        Or   • magnesium sulfate 4 gram infusion- Mg 1.6-1.9 mg/dL  4 g Intravenous PRN Neelam Saravia MD       • mesalamine (DELZICOL) delayed release capsule 800 mg  800 mg Oral TID Nish Morales DO   800 mg at 12/13/17 1711   • methylPREDNISolone sodium succinate (SOLU-Medrol) injection 60 mg  60 mg Intravenous Daily Nish Morales, DO   60 mg at 12/13/17 0840   • morphine injection 2 mg  2 mg Intravenous Q2H PRN Sony Burris MD   2 mg at 12/12/17 1040   • octreotide (sandoSTATIN) injection 100 mcg  100 mcg Subcutaneous TID Nish Morales, DO   100 mcg at 12/13/17 1711   • potassium & sodium phosphates (PHOS-NAK) 280-160-250 MG packet - for Phosphorus less than 1.25 mg/dL  1 packet Oral Q6H PRN Neelam Saravia MD        Or   • potassium & sodium phosphates (PHOS-NAK) 280-160-250 MG packet - for Phosphorus 1.25 - 2.1 mg/dL  1 packet Oral Once PRN Neelam Saravia MD       • potassium chloride (KLOR-CON) packet 40 mEq  40 mEq Oral PRN Neelam Saravia MD   40 mEq at 12/13/17 1807   • potassium chloride (MICRO-K) CR capsule 40 mEq  40 mEq Oral PRN Neelam Saravia MD   40 mEq at 12/13/17 0840   • sodium chloride 0.9 % flush 1-10 mL  1-10 mL Intravenous PRN Sony Burris MD       • sodium chloride  0.9 % flush 10 mL  10 mL Intravenous PRN Carmine Ponce PA-C       • sodium chloride 0.9 % flush 10 mL  10 mL Intravenous PRN Gabe Blount MD   10 mL at 12/11/17 0955       Assessment and Plan:    Principal Problem:    Lower abdominal pain  Active Problems:    Weakness    Ulcerative colitis  1.  Indeterminate troponin.  Patient had a nuclear Cardiolite stress test which was suggestive of ischemia.  Patient is currently not having any symptoms of chest pain suggestive of angina.  Patient at the present time would be treated medically.  Should the patient get transferred to [facility] with for what the records of the positive nuclear stress tests.  2.  Echodensity noted in the left ventricular apex suggestive of thrombus.  Patient has good LV function systolic function.  Empirically would not start the patient on anticoagulation secondary to the patient history of gastrointestinal bleeding and anemia secondary to ulcerative colitis.  3.  Arterial hypertension.  Patient blood pressure is currently controlled.  4.  Anemia.  Patient hemoglobin has remained stable.        The above plan of management were discussed with the patient            Gabe Blount MD  12/13/17  9:29 PM      Time: Time spent in face-to-face interaction 20 minutes    Dictated utilizing Dragon dictation.        Electronically signed by Gabe Blount MD at 12/13/2017 11:55 PM      Leeroy Navarro MD at 12/14/2017  3:00 PM  Version 1 of 1             [facility] Medicine Services  INPATIENT PROGRESS NOTE    Length of Stay: 9  Date of Admission: 12/4/2017  Primary Care Physician: DESTINY Arreguin    Subjective   Chief Complaint:  Abdominal pain and diarrhea  HPI:  Patient continues to have intractable diarrhea and abdominal pain.  She states that the diarrhea may have slowed down a little bit.  She has reluctantly agreed to consider transfer to [place].    Review of Systems    Constitutional: Negative for appetite change, chills, fatigue, fever and unexpected weight change.   Respiratory: Negative for cough, choking, chest tightness, shortness of breath and wheezing.    Cardiovascular: Negative for chest pain, palpitations and leg swelling.   Gastrointestinal: Positive for abdominal pain and diarrhea. Negative for blood in stool, constipation, nausea and vomiting.   Genitourinary: Negative for dysuria, flank pain and hematuria.   Neurological: Positive for weakness. Negative for dizziness, seizures, syncope, speech difficulty, light-headedness, numbness and headaches.   Hematological: Does not bruise/bleed easily.        All pertinent negatives and positives are as above. All other systems have been reviewed and are negative unless otherwise stated.     Objective    Temp:  [97.3 °F (36.3 °C)-98.7 °F (37.1 °C)] 98 °F (36.7 °C)  Heart Rate:  [] 104  Resp:  [18] 18  BP: (105-146)/(60-86) 142/67    Physical Exam   Constitutional: She appears well-developed and well-nourished.   HENT:   Head: Normocephalic and atraumatic.   Eyes: EOM are normal. Pupils are equal, round, and reactive to light.   Neck: Normal range of motion. Neck supple.   Cardiovascular: Normal rate, regular rhythm and normal heart sounds.  Exam reveals no gallop and no friction rub.    No murmur heard.  Pulmonary/Chest: Effort normal and breath sounds normal. No respiratory distress. She has no wheezes. She has no rales. She exhibits no tenderness.   Abdominal: Soft. Bowel sounds are normal. She exhibits distension. There is no tenderness. There is no guarding.   Musculoskeletal: She exhibits no edema.   Skin: Skin is warm and dry.   Psychiatric: She has a normal mood and affect. Her behavior is normal. Thought content normal.   Vitals reviewed.          Results Review:  I have reviewed the labs, radiology results, and diagnostic studies.    Laboratory Data:     Results from last 7 days  Lab Units 12/14/17  0652  12/13/17  2202 12/13/17  0605 12/12/17  0546   SODIUM mmol/L 137  --  137 133*   POTASSIUM mmol/L 4.3 4.9 3.4* 4.4   CHLORIDE mmol/L 102  --  102 100   CO2 mmol/L 28.0  --  28.0 28.0   BUN mg/dL 11  --  10 12   CREATININE mg/dL 0.62  --  0.64 0.54   GLUCOSE mg/dL 130*  --  130* 116*   CALCIUM mg/dL 7.5*  --  7.6* 7.3*   BILIRUBIN mg/dL 0.2  --  0.3 0.3   ALK PHOS U/L 67  --  78 72   ALT (SGPT) U/L 34  --  41 37   AST (SGOT) U/L 22  --  20 17   ANION GAP mmol/L 7.0  --  7.0 5.0     Estimated Creatinine Clearance: 57.9 mL/min (by C-G formula based on Cr of 0.62).    Results from last 7 days  Lab Units 12/09/17  1156   MAGNESIUM mg/dL 1.9       Results from last 7 days  Lab Units 12/07/17  1739   URIC ACID mg/dL 4.9       Results from last 7 days  Lab Units 12/14/17  0616 12/13/17  0605 12/12/17  0546 12/11/17  0545 12/10/17  0629   WBC 10*3/mm3 8.71 7.97 10.97* 10.64* 8.90   HEMOGLOBIN g/dL 7.6* 8.2* 8.3* 8.4* 9.4*   HEMATOCRIT % 23.2* 25.0* 25.0* 25.7* 29.5*   PLATELETS 10*3/mm3 163 149* 140* 140* 116*       Radiology Data:   Imaging Results (last 24 hours)     ** No results found for the last 24 hours. **          I have reviewed the patient current medications.     Assessment/Plan     Hospital Problem List     * (Principal)Lower abdominal pain    Overview Deleted 12/4/2017 12:39 PM by Sony Burris MD            Weakness    Ulcerative colitis (Chronic)          Plan:    1.  Acute flare of Ulcerative colitis:  No significant improvement.  2.  Chronic diarrhea:  On octreotide and lomotil.  No significant improvement.  3.  Elevated troponin:  Stress test shows a medium-sized, moderate-to-severe area of ischemia located in the anterior wall and apex. High risk study.  4.  Left ventricular thrombus:  BONIFACIO done this morning.  Results pending.  5.  DVT Prophylaxis:  SCDs and Teds.      Patient has reluctantly agreed to go to [place] for at least a second opinion.            This document has been electronically signed  by Leeroy Navarro MD on December 14, 2017 3:00 PM         Electronically signed by Leeroy Navarro MD at 12/14/2017  3:08 PM      Leeroy Navarro MD at 12/15/2017  5:34 PM  Version 1 of 1             [facility] Medicine Services  INPATIENT PROGRESS NOTE    Length of Stay: 10  Date of Admission: 12/4/2017  Primary Care Physician: DESTINY Arreguin    Subjective   Chief Complaint:  Abdominal pain and diarrhea  HPI:  Patient continues to have intractable diarrhea and abdominal pain.  She states that the diarrhea may have slowed down a little bit.  She has reluctantly agreed to transfer.    Review of Systems   Constitutional: Negative for appetite change, chills, fatigue, fever and unexpected weight change.   Respiratory: Negative for cough, choking, chest tightness, shortness of breath and wheezing.    Cardiovascular: Negative for chest pain, palpitations and leg swelling.   Gastrointestinal: Positive for abdominal pain and diarrhea. Negative for blood in stool, constipation, nausea and vomiting.   Genitourinary: Negative for dysuria, flank pain and hematuria.   Neurological: Positive for weakness. Negative for dizziness, seizures, syncope, speech difficulty, light-headedness, numbness and headaches.   Hematological: Does not bruise/bleed easily.        All pertinent negatives and positives are as above. All other systems have been reviewed and are negative unless otherwise stated.     Objective    Temp:  [96.5 °F (35.8 °C)-99.9 °F (37.7 °C)] 99.9 °F (37.7 °C)  Heart Rate:  [] 100  Resp:  [16-18] 18  BP: (128-145)/(68-78) 145/78    Physical Exam   Constitutional: She appears well-developed and well-nourished.   HENT:   Head: Normocephalic and atraumatic.   Eyes: EOM are normal. Pupils are equal, round, and reactive to light.   Neck: Normal range of motion. Neck supple.   Cardiovascular: Normal rate, regular rhythm and normal heart sounds.  Exam reveals no gallop  and no friction rub.    No murmur heard.  Pulmonary/Chest: Effort normal and breath sounds normal. No respiratory distress. She has no wheezes. She has no rales. She exhibits no tenderness.   Abdominal: Soft. Bowel sounds are normal. She exhibits distension. There is no tenderness. There is no guarding.   Musculoskeletal: She exhibits no edema.   Skin: Skin is warm and dry.   Psychiatric: She has a normal mood and affect. Her behavior is normal. Thought content normal.   Vitals reviewed.          Results Review:  I have reviewed the labs, radiology results, and diagnostic studies.    Laboratory Data:     Results from last 7 days  Lab Units 12/15/17  0608 12/14/17  0616 12/13/17  2202 12/13/17  0605   SODIUM mmol/L 137 137  --  137   POTASSIUM mmol/L 4.7 4.3 4.9 3.4*   CHLORIDE mmol/L 98 102  --  102   CO2 mmol/L 32.0* 28.0  --  28.0   BUN mg/dL 9 11  --  10   CREATININE mg/dL 0.66 0.62  --  0.64   GLUCOSE mg/dL 112* 130*  --  130*   CALCIUM mg/dL 7.7* 7.5*  --  7.6*   BILIRUBIN mg/dL 0.3 0.2  --  0.3   ALK PHOS U/L 73 67  --  78   ALT (SGPT) U/L 34 34  --  41   AST (SGOT) U/L 21 22  --  20   ANION GAP mmol/L 7.0 7.0  --  7.0     Estimated Creatinine Clearance: 57.9 mL/min (by C-G formula based on Cr of 0.66).    Results from last 7 days  Lab Units 12/09/17  1156   MAGNESIUM mg/dL 1.9           Results from last 7 days  Lab Units 12/15/17  0608 12/14/17  0616 12/13/17  0605 12/12/17  0546 12/11/17  0545   WBC 10*3/mm3 9.42 8.71 7.97 10.97* 10.64*   HEMOGLOBIN g/dL 7.8* 7.6* 8.2* 8.3* 8.4*   HEMATOCRIT % 24.4* 23.2* 25.0* 25.0* 25.7*   PLATELETS 10*3/mm3 180 163 149* 140* 140*       Radiology Data:   Imaging Results (last 24 hours)     ** No results found for the last 24 hours. **          I have reviewed the patient current medications.     Assessment/Plan     Hospital Problem List     * (Principal)Lower abdominal pain    Overview Deleted 12/4/2017 12:39 PM by Sony Burris MD            Weakness    Ulcerative  colitis (Chronic)          Plan:    1.  Acute flare of Ulcerative colitis:  No significant improvement.  2.  Chronic diarrhea:  On octreotide and lomotil.  No significant improvement.  3.  Elevated troponin:  Stress test shows a medium-sized, moderate-to-severe area of ischemia located in the anterior wall and apex. High risk study.  4.  Left ventricular thrombus:  BONIFACIO done this morning.  Results pending.  5.  DVT Prophylaxis:  SCDs and Teds.      [facility] has no beds and is on critical diversion.  They are not accepting any transfers that aren't already established with their facility.  I will attempt to contact [facility].            This document has been electronically signed by Leeroy Navarro MD on December 15, 2017 5:34 PM         Electronically signed by Leeroy Navarro MD at 12/15/2017  5:36 PM      Santi Rod MD at 12/16/2017 12:22 PM  Version 1 of 1             [facility] Medicine Services  INPATIENT PROGRESS NOTE    Length of Stay: 11  Date of Admission: 12/4/2017  Primary Care Physician: DESTINY Arreguin    Subjective   Chief Complaint: abdominal pain diarrhea  HPI:    Still has abdominal pain and diarrhea is still the same.  No nausea and vomiting  Has a small ventricular thrombus on TTE.  Needs to get transferred for cardiac MRI    Review of Systems   Constitutional: Negative for activity change.   Respiratory: Negative for chest tightness and shortness of breath.    Cardiovascular: Negative for chest pain.        All pertinent negatives and positives are as above. All other systems have been reviewed and are negative unless otherwise stated.     Objective    Temp:  [96 °F (35.6 °C)-99.9 °F (37.7 °C)] 97.9 °F (36.6 °C)  Heart Rate:  [] 93  Resp:  [18-20] 20  BP: (145-152)/(76-82) 150/78  Physical Exam   Constitutional: She appears well-developed and well-nourished. No distress.   HENT:   Head: Normocephalic and atraumatic.   Cardiovascular:  Normal rate.    Pulmonary/Chest: Effort normal. No respiratory distress. She has no wheezes.   Abdominal: Soft. She exhibits no distension.   Musculoskeletal: Normal range of motion.   Neurological: She is alert. No cranial nerve deficit.   Skin: Skin is warm. She is not diaphoretic.   Psychiatric: She has a normal mood and affect. Her behavior is normal. Judgment and thought content normal.   Vitals reviewed.          Results Review:  I have reviewed the labs, radiology results, and diagnostic studies.    Laboratory Data:   Lab Results (last 24 hours)     Procedure Component Value Units Date/Time    CBC (No Diff) [[id number]]  (Abnormal) Collected:  12/16/17 0642    Specimen:  Blood Updated:  12/16/17 0718     WBC 9.02 10*3/mm3      RBC 2.68 (L) 10*6/mm3      Hemoglobin 8.0 (L) g/dL      Hematocrit 24.7 (L) %      MCV 92.2 fL      MCH 29.9 pg      MCHC 32.4 g/dL      RDW 15.1 (H) %      RDW-SD 51.0 (H) fl      MPV 9.3 fL      Platelets 186 10*3/mm3     Comprehensive Metabolic Panel [[id number]]  (Abnormal) Collected:  12/16/17 0642    Specimen:  Blood Updated:  12/16/17 0725     Glucose 120 (H) mg/dL      BUN 8 mg/dL      Creatinine 0.56 mg/dL      Sodium 135 (L) mmol/L      Potassium 3.7 mmol/L      Chloride 97 mmol/L      CO2 33.0 (H) mmol/L      Calcium 7.8 (L) mg/dL      Total Protein 5.3 (L) g/dL      Albumin 2.40 (L) g/dL      ALT (SGPT) 37 U/L      AST (SGOT) 22 U/L      Alkaline Phosphatase 75 U/L      Total Bilirubin 0.2 mg/dL      eGFR Non African Amer 106 mL/min/1.73      Globulin 2.9 gm/dL      A/G Ratio 0.8 (L) g/dL      BUN/Creatinine Ratio 14.3     Anion Gap 5.0 mmol/L     Narrative:       The MDRD GFR formula is only valid for adults with stable renal function between ages 18 and 70.          Culture Data:   No results found for: BLOODCX  No results found for: URINECX  No results found for: RESPCX  No results found for: WOUNDCX  No results found for: STOOLCX  No components found for:  BODYFLD    Radiology Data:   Imaging Results (last 24 hours)     ** No results found for the last 24 hours. **          I have reviewed the patient current medications.     Assessment/Plan     Hospital Problem List     * (Principal)Lower abdominal pain    Overview Deleted 12/4/2017 12:39 PM by Sony Burris MD            Weakness    Ulcerative colitis (Chronic)        Ulcerative colitis - still has diarrhea.  No improvement, continue solumedrol and mesalamine  Left ventricular thrombus - needs to get transferred for cardiac MRI.  Will contact [facility]  No anticoagulation started as she has bloody diarrhea   Elevated troponin - stress test shows evidence of ischemia.  Will require further management by cardiology              Santi Rod MD   12/16/17   12:22 PM       Electronically signed by Santi Rod MD at 12/16/2017 12:30 PM        Consult Notes (last 72 hours) (Notes from 12/13/2017 12:54 PM through 12/16/2017 12:54 PM)     No notes of this type exist for this encounter.

## 2017-12-16 NOTE — DISCHARGE PLACEMENT REQUEST
"Kira Borrero (72 y.o. Female)     Date of Birth Social Security Number Address Home Phone MRN    1945  [address] [id number] [phone] [id number]    Catholic Marital Status          None        Admission Date Admission Type Admitting Provider Attending Provider Department, Room/Bed    12/4/17 Emergency Neelam Saravia MD Bhutta, Shujera Asad, MD [address], [address]    Discharge Date Discharge Disposition Discharge Destination                      Attending Provider: Neelam Saravia MD     Allergies:  No Known Allergies    Isolation:  None   Infection:  None   Code Status:  FULL    Ht:  160 cm (63\")   Wt:  57.7 kg (127 lb 4.8 oz)    Admission Cmt:  None   Principal Problem:  Lower abdominal pain [R10.30]                 Active Insurance as of 12/4/2017     Primary Coverage     Payor Plan Insurance Group Employer/Plan Group    MEDICARE MEDICARE A & B      Payor Plan Address Payor Plan Phone Number Effective From Effective To    [address] [phone] 4/1/2010     [place] [address]       Subscriber Name Subscriber Birth Date Member ID       KIRA BORRERO 1945 [id number]           Secondary Coverage     Payor Plan Insurance Group Employer/Plan Group    [employer] [place] WELL[place]ARE [place]ICA[place]      Payor Plan Address Payor Plan Phone Number Effective From Effective To    [address] [address] [phone] 9/6/2017     [place] [address]       Subscriber Name Subscriber Birth Date Member ID       KIRA BORRERO 1945 [id number]                 Emergency Contacts      (Rel.) Home Phone Work Phone Mobile Phone    Abdi Borrero (Son) [phone] -- --           Called [facility] transfer center and they  stated that they did not have a transfer  request based on their records..LEIDY GONZALEZ CM         12/15/17 2126 -- Awaiting to hear back if  has  spoken to an accepting MD at [facility]..  LEIDY GONZALEZ CM       12/14/17 1103 -- " Multidisciplinary rounds complete. Patient  was agreeable to transfer to [place] for  GI and Cardio. Patient has complicated  medical case involving heart and requiring  blood thinners post intervention and the  need to have colon surgery.   spent a great deal of time discussing  challenges facility has trying to treat this  patient. Patient and all of family agreed  to transfer to [place] and   will get call [facility] transfer center..  LEIDY GONZALEZ CM       12/12/17 1018 -- Multidisciplinary rounds postponed. Patient  off unit for BONIFACIO which was not done on 12/11  due to patient eating..LEIDY GONZALEZ CM       12/08/17 1326 -- Patient went for BONIFACIO in regards to chest  pain..LEIDY GONZALEZ CM       12/07/17 1502 -- Patient has had fewer episodes of diarrhea..  MLAW RN CM       12/06/17 1547 -- LSW assesment complete. pt resides at home  alone. pt reports good support system. pt  plans to return home at d/c and did not  anticipate any needs. pt may benefit from  home health follow up. LSW awaiting  additional recomendations from MD and  therapy. LSW/case mgt will follow up as  consulted and complete arrangements as  ordered.          12/05/17 7327 -- Discussed patient status with  and  agreed to inpatient. Order entered and  notified ER to change attending MD from Dr. Egan to ..LEIDY GONZALEZ CM       12/04/17 1218 -- S/P fall and screened pos for sepsis -  Inpatient

## 2017-12-16 NOTE — PLAN OF CARE
Problem: Patient Care Overview (Adult)  Goal: Plan of Care Review  Outcome: Ongoing (interventions implemented as appropriate)    12/16/17 0500   Coping/Psychosocial Response Interventions   Plan Of Care Reviewed With patient   Patient Care Overview   Progress no change       Goal: Adult Individualization and Mutuality  Outcome: Ongoing (interventions implemented as appropriate)  Goal: Discharge Needs Assessment  Outcome: Ongoing (interventions implemented as appropriate)    Problem: Pain, Acute (Adult)  Goal: Acceptable Pain Control/Comfort Level  Outcome: Ongoing (interventions implemented as appropriate)    Problem: Fall Risk (Adult)  Goal: Absence of Falls  Outcome: Ongoing (interventions implemented as appropriate)    Problem: Pressure Ulcer Risk (Luis Scale) (Adult,Obstetrics,Pediatric)  Goal: Skin Integrity  Outcome: Ongoing (interventions implemented as appropriate)

## 2017-12-16 NOTE — DISCHARGE PLACEMENT REQUEST
"Kira Borrero (72 y.o. Female)     Date of Birth Social Security Number Address Home Phone MRN    1945  [address] [id number] [phone] [id number]    Mu-ism Marital Status          None        Admission Date Admission Type Admitting Provider Attending Provider Department, Room/Bed    12/4/17 Emergency Neelam Saravia MD Bhutta, Shujera Asad, MD [address], [address]    Discharge Date Discharge Disposition Discharge Destination                      Attending Provider: Neelam Saravia MD     Allergies:  No Known Allergies    Isolation:  None   Infection:  None   Code Status:  FULL    Ht:  160 cm (63\")   Wt:  57.7 kg (127 lb 4.8 oz)    Admission Cmt:  None   Principal Problem:  Lower abdominal pain [R10.30]                 Active Insurance as of 12/4/2017     Primary Coverage     Payor Plan Insurance Group Employer/Plan Group    MEDICARE MEDICARE A & B      Payor Plan Address Payor Plan Phone Number Effective From Effective To    [address] [phone] 4/1/2010     [place] [address]       Subscriber Name Subscriber Birth Date Member ID       KIRA BORRERO 1945 [id number]           Secondary Coverage     Payor Plan Insurance Group Employer/Plan Group    [employer]ICA[place]      Payor Plan Address Payor Plan Phone Number Effective From Effective To    [address] [address] [phone] 9/6/2017     [place] [address]       Subscriber Name Subscriber Birth Date Member ID       KIRA BORRERO 1945 [id number]                 Emergency Contacts      (Rel.) Home Phone Work Phone Mobile Phone    Abdi Borrero (Son) [phone] -- --              "

## 2017-12-16 NOTE — PROGRESS NOTES
[facility] Medicine Services  INPATIENT PROGRESS NOTE    Length of Stay: 11  Date of Admission: 12/4/2017  Primary Care Physician: DESTINY Arreguin    Subjective   Chief Complaint: abdominal pain diarrhea  HPI:    Still has abdominal pain and diarrhea is still the same.  No nausea and vomiting  Has a small ventricular thrombus on TTE.  Needs to get transferred for cardiac MRI    Review of Systems   Constitutional: Negative for activity change.   Respiratory: Negative for chest tightness and shortness of breath.    Cardiovascular: Negative for chest pain.        All pertinent negatives and positives are as above. All other systems have been reviewed and are negative unless otherwise stated.     Objective    Temp:  [96 °F (35.6 °C)-99.9 °F (37.7 °C)] 97.9 °F (36.6 °C)  Heart Rate:  [] 93  Resp:  [18-20] 20  BP: (145-152)/(76-82) 150/78  Physical Exam   Constitutional: She appears well-developed and well-nourished. No distress.   HENT:   Head: Normocephalic and atraumatic.   Cardiovascular: Normal rate.    Pulmonary/Chest: Effort normal. No respiratory distress. She has no wheezes.   Abdominal: Soft. She exhibits no distension.   Musculoskeletal: Normal range of motion.   Neurological: She is alert. No cranial nerve deficit.   Skin: Skin is warm. She is not diaphoretic.   Psychiatric: She has a normal mood and affect. Her behavior is normal. Judgment and thought content normal.   Vitals reviewed.          Results Review:  I have reviewed the labs, radiology results, and diagnostic studies.    Laboratory Data:   Lab Results (last 24 hours)     Procedure Component Value Units Date/Time    CBC (No Diff) [[id number]]  (Abnormal) Collected:  12/16/17 0642    Specimen:  Blood Updated:  12/16/17 0718     WBC 9.02 10*3/mm3      RBC 2.68 (L) 10*6/mm3      Hemoglobin 8.0 (L) g/dL      Hematocrit 24.7 (L) %      MCV 92.2 fL      MCH 29.9 pg      MCHC 32.4 g/dL      RDW 15.1 (H)  %      RDW-SD 51.0 (H) fl      MPV 9.3 fL      Platelets 186 10*3/mm3     Comprehensive Metabolic Panel [[id number]]  (Abnormal) Collected:  12/16/17 0642    Specimen:  Blood Updated:  12/16/17 0725     Glucose 120 (H) mg/dL      BUN 8 mg/dL      Creatinine 0.56 mg/dL      Sodium 135 (L) mmol/L      Potassium 3.7 mmol/L      Chloride 97 mmol/L      CO2 33.0 (H) mmol/L      Calcium 7.8 (L) mg/dL      Total Protein 5.3 (L) g/dL      Albumin 2.40 (L) g/dL      ALT (SGPT) 37 U/L      AST (SGOT) 22 U/L      Alkaline Phosphatase 75 U/L      Total Bilirubin 0.2 mg/dL      eGFR Non African Amer 106 mL/min/1.73      Globulin 2.9 gm/dL      A/G Ratio 0.8 (L) g/dL      BUN/Creatinine Ratio 14.3     Anion Gap 5.0 mmol/L     Narrative:       The MDRD GFR formula is only valid for adults with stable renal function between ages 18 and 70.          Culture Data:   No results found for: BLOODCX  No results found for: URINECX  No results found for: RESPCX  No results found for: WOUNDCX  No results found for: STOOLCX  No components found for: BODYFLD    Radiology Data:   Imaging Results (last 24 hours)     ** No results found for the last 24 hours. **          I have reviewed the patient current medications.     Assessment/Plan     Hospital Problem List     * (Principal)Lower abdominal pain    Overview Deleted 12/4/2017 12:39 PM by Sony Burris MD            Weakness    Ulcerative colitis (Chronic)        Ulcerative colitis - still has diarrhea.  No improvement, continue solumedrol and mesalamine  Left ventricular thrombus - needs to get transferred for cardiac MRI.  Will contact [facility]  No anticoagulation started as she has bloody diarrhea   Elevated troponin - stress test shows evidence of ischemia.  Will require further management by cardiology              Santi Rod MD   12/16/17   12:22 PM

## 2017-12-16 NOTE — NURSING NOTE
Pt agreed to allow an attempt to start new IV, but only one attempt. Attempted in lt forearm, unsuccessful. Educated pt on need to attempt a new IV due to it being in there since 12/8 and pt refused another attempt.

## 2017-12-16 NOTE — DISCHARGE SUMMARY
[facility] Medicine Services  DISCHARGE SUMMARY       Date of Admission: 12/4/2017  Date of Discharge:  12/16/2017  Primary Care Physician: DESTINY Arreguin    Presenting Problem/History of Present Illness:  Weakness [R53.1]  Lower abdominal pain [R10.30]  NSTEMI (non-ST elevated myocardial infarction) [I21.4]  Ulcerative colitis with complication, unspecified location [K51.919]  Weakness [R53.1]       Final Discharge Diagnoses:  Hospital Problem List     * (Principal)Lower abdominal pain    Overview Deleted 12/4/2017 12:39 PM by Sony Burris MD            Weakness    Ulcerative colitis (Chronic)      cardiac thrombus    Consults:   Consults     Date and Time Order Name Status Description    12/7/2017 1311 Inpatient Consult to Cardiology Completed     12/4/2017 1548 Inpatient Consult to Gastroenterology Completed           Procedures Performed: BONIFACIO                Pertinent Test Results: left ventricle thrombus    Chief Complaint on Day of Discharge: diarrhea     Hospital Course:  The patient is a 72 y.o. female who presented to [facility] with worsening lower abdominal pain, cramping, accompanied by looser stools, sometimes bloody. She has a longstanding history of ulcerative colitis, at times controlled, sometimes not.  Patient was seen by our gastroenterologist and started on flagyl and solumedrol.  She was admitted on 12/4/17 and after many days of treatment she did not improve with her diarrhea or abdominal pain.  She also had some chest pain in her inpatient stay and cardiac enzymes were marginally elevated.  Cardiology  Evaluated her and stress test and echocardiogram was ordered.  Stress test showed evidence of cardiac ischemia.  And echocardiogram which was repeated with a BONIFACIO showed a left ventricle thrombus.  No anticoagulation was done due to bloody diarrhea.  CT abdomen showed diffuse thickening of the colon.  Stool culture was negative.   "No cardiac intervention was done as her ulcerative colitis flareup was not improving.  Patient was then transferred to [facility] for further care of her ulcerative colitis and cardiac thrombus .      Condition on Discharge:  stable    Physical Exam on Discharge:  /71 (BP Location: Right arm, Patient Position: Lying)  Pulse 96  Temp 98.6 °F (37 °C) (Oral)   Resp 18  Ht 160 cm (63\")  Wt 57.7 kg (127 lb 4.8 oz)  SpO2 95%  BMI 22.55 kg/m2  Physical Exam   Constitutional: She appears well-developed and well-nourished. No distress.   HENT:   Head: Normocephalic and atraumatic.   Cardiovascular: Normal rate.    Pulmonary/Chest: Effort normal. No respiratory distress. She has no wheezes.   Abdominal: Soft. She exhibits no distension. There is no tenderness.   Musculoskeletal: Normal range of motion.   Neurological: She is alert. No cranial nerve deficit.   Skin: Skin is warm. She is not diaphoretic.   Psychiatric: She has a normal mood and affect. Her behavior is normal. Judgment and thought content normal.   Vitals reviewed.        Discharge Disposition:  Short Term Hospital (DC - External)    Discharge Medications:   Sánchez, Damaris Poornima   Home Medication Instructions RICKY:[id number]    Printed on:12/16/17 1217   Medication Information                      acidophilus (FLORANEX) tablet tablet  Take 1 tablet by mouth 3 (Three) Times a Day.             Adalimumab (HUMIRA) 10 MG/0.2ML Prefilled Syringe Kit  Inject  under the skin.             Calcium Carbonate-Vitamin D (CALCIUM 600+D) 600-200 MG-UNIT tablet  Take 1 tablet by mouth 2 (Two) Times a Day.             famotidine (PEPCID) 40 MG tablet  Take 1 tablet by mouth Daily.             ferrous sulfate 324 (65 Fe) MG tablet delayed-release EC tablet  Take 1 tablet by mouth 2 (Two) Times a Day With Meals.             mesalamine (DELZICOL) 400 MG capsule delayed-release delayed release capsule  Take 1 capsule by mouth 3 (Three) Times a Day.           "   predniSONE (DELTASONE) 20 MG tablet  Take 1 tablet by mouth 2 (Two) Times a Day.             traMADol (ULTRAM) 50 MG tablet  Take 50 mg by mouth Every 6 (Six) Hours As Needed for Moderate Pain .                 Discharge Diet:   Diet Instructions     Diet: Cardiac       Discharge Diet:  Cardiac               Followup care with [facility]    Follow-up Appointments:   No future appointments.  Santi Rod MD  12/16/17  4:26 PM

## 2017-12-17 NOTE — THERAPY DISCHARGE NOTE
Acute Care - Occupational Therapy Discharge Summary  [facility]     Patient Name: Damaris Sánchez  : 1945  MRN: [id number]    Today's Date: 2017  Onset of Illness/Injury or Date of Surgery Date: 17    Date of Referral to OT: 17  Referring Physician: Dr. Neelam Saravia      Admit Date: 2017        OT Recommendation and Plan    Visit Dx:    ICD-10-CM ICD-9-CM   1. Weakness R53.1 780.79   2. Ulcerative colitis with complication, unspecified location K51.919 556.9   3. NSTEMI (non-ST elevated myocardial infarction) I21.4 410.70   4. Lower abdominal pain R10.30 789.09   5. Decreased activities of daily living (ADL) Z78.9 V49.89   6. Impaired functional mobility, balance, gait, and endurance Z74.09 V49.89   7. Impaired mobility and ADLs Z74.09 799.89   8. Troponin level elevated R74.8 790.6   9. Chest pain, unspecified type R07.9 786.50   10. Elevated troponin R74.8 790.6   11. Chest pain at rest R07.9 786.50                     OT Goals       17 1457 17 1530 17 1416    Transfer Training OT LTG    Transfer Training OT LTG, Date Goal Reviewed  17  -CS (r) PG (t) CS (c) 17  -CS (r) PG (t) CS (c)    Transfer Training OT LTG, Reason Goal Not Met discharged from [facility]      Strength OT LTG    Strength Goal OT LTG, Date Goal Reviewed  17  -CS (r) PG (t) CS (c) 17  -CS (r) PG (t) CS (c)    Strength Goal OT LTG, Reason Goal Not Met discharged from [facility]      ADL OT LTG    ADL OT LTG, Date Goal Reviewed  17  -CS (r) PG (t) CS (c) 17  -CS (r) PG (t) CS (c)    ADL OT LTG, Reason Goal Not Met discharged from [facility]        17 0937 17 1545 17 1100    Transfer Training OT LTG    Transfer Training OT LTG, Date Established   17  -NN    Transfer Training OT LTG, Time to Achieve   by discharge  -NN    Transfer Training OT LTG, Activity Type   all transfers  -NN    Transfer Training OT LTG, [place] Level    conditional independence  -NN    Transfer Training OT LTG, Assist Device   walker, rolling  -NN    Transfer Training OT LTG, Date Goal Reviewed 12/08/17  -KD 12/07/17  -CS (r) PG (t) CS (c)     Transfer Training OT LTG, Outcome goal not met  -KD      Strength OT LTG    Strength Goal OT LTG, Date Established   12/06/17  -NN    Strength Goal OT LTG, Time to Achieve   by discharge  -NN    Strength Goal OT LTG, Measure to Achieve   Pt. will complete BUE strengthening exercises all planes and joints to increase BUE strength to 5/5 for ADLs.   -NN    Strength Goal OT LTG, Date Goal Reviewed 12/08/17  -KD 12/07/17  -CS (r) PG (t) CS (c)     Strength Goal OT LTG, Outcome goal not met  -KD      ADL OT LTG    ADL OT LTG, Date Established   12/06/17  -NN    ADL OT LTG, Time to Achieve   by discharge  -NN    ADL OT LTG, Activity Type   ADL skills  -NN    ADL OT LTG, [place] Level   independent  -NN    ADL OT LTG, Date Goal Reviewed 12/08/17  -KD 12/07/17  -CS (r) PG (t) CS (c)     ADL OT LTG, Outcome goal not met  -KD        User Key  (r) = Recorded By, (t) = Taken By, (c) = Cosigned By    Initials Name Provider Type     Trudi Milton, OTR/L Occupational Therapist    KD Swetha Kitchen, LINDA/L Occupational Therapy Assistant    LILIANA Watters LINDA/L Occupational Therapy Assistant    BAY Brannon, OTR/L Occupational Therapist    RENATA Patino, OT Student OT Student                  OT Discharge Summary  Anticipated Discharge Disposition: home with home health  Reason for Discharge: Discharge from facility, Per MD order  Outcomes Achieved: Refer to plan of care for updates on goals achieved, Unable to make functional progress toward goals at this time  Discharge Destination: other (comment) ([facility])  Pt was on hold for OT at the time of t/f.   Trudi Milton, OTR/L  12/17/2017

## 2017-12-17 NOTE — PLAN OF CARE
Problem: Inpatient Physical Therapy  Goal: Transfer Training Goal 1 LTG- PT  Outcome: Unable to achieve outcome(s) by discharge Date Met:  12/17/17 12/06/17 1735 12/17/17 1552   Transfer Training PT LTG   Transfer Training PT LTG, Date Established 12/06/17 --    Transfer Training PT LTG, Time to Achieve by discharge --    Transfer Training PT LTG, Activity Type bed to chair /chair to bed;sit to stand/stand to sit --    Transfer Training PT LTG, [place] Level conditional independence --    Transfer Training PT LTG, Date Goal Reviewed --  12/17/17   Transfer Training PT LTG, Outcome --  goal not met   Transfer Training PT LTG, Reason Goal Not Met --  discharged from facility       Goal: Gait Training Goal LTG- PT  Outcome: Unable to achieve outcome(s) by discharge Date Met:  12/17/17 12/06/17 1735 12/17/17 1552   Gait Training PT LTG   Gait Training Goal PT LTG, Date Established 12/06/17 --    Gait Training Goal PT LTG, Time to Achieve by discharge --    Gait Training Goal PT LTG, [place] Level conditional independence --    Gait Training Goal PT LTG, Assist Device (aad) --    Gait Training Goal PT LTG, Distance to Achieve 150ft --    Gait Training Goal PT LTG, Additional Goal 300ft --    Gait Training Goal PT LTG, Date Goal Reviewed --  12/17/17   Gait Training Goal PT LTG, Outcome --  goal not met   Gait Training Goal PT LTG, Reason Goal Not Met --  discharged from facility       Goal: Stair Training Goal LTG- PT  Outcome: Unable to achieve outcome(s) by discharge Date Met:  12/17/17 12/06/17 1735 12/17/17 1552   Stair Training PT LTG   Stair Training Goal PT LTG, Date Established 12/06/17 --    Stair Training Goal PT LTG, Time to Achieve by discharge --    Stair Training Goal PT LTG, Number of Steps 2 --    Stair Training Goal PT LTG, [place] Level conditional independence --    Stair Training Goal PT LTG, Date Goal Reviewed --  12/17/17   Stair Training Goal PT LTG, Outcome --  goal not met    Stair Training Goal PT LTG, Reason Goal Not Met --  discharged from facility       Goal: Strength Goal LTG- PT  Outcome: Unable to achieve outcome(s) by discharge Date Met:  12/17/17 12/06/17 1735 12/17/17 1552   Strength Goal PT LTG   Strength Goal PT LTG, Date Established 12/06/17 --    Strength Goal PT LTG, Time to Achieve by discharge --    Strength Goal PT LTG, Measure to Achieve Pt will tolerate 15 reps of AROM exercises --    Strength Goal PT LTG, Functional Goal Pt will progress to standing exercises --    Strength Goal PT LTG, Date Goal Reviewed --  12/17/17   Strength Goal PT LTG, Outcome --  goal not met   Strength Goal PT LTG, Reason Goal Not Met --  discharged from facility       Goal: Patient Education Goal LTG- PT  Outcome: Unable to achieve outcome(s) by discharge Date Met:  12/17/17 12/06/17 1735 12/17/17 1552   Patient Education PT LTG   Patient Education PT LTG, Date Established 12/06/17 --    Patient Education PT LTG, Time to Achieve by discharge --    Patient Education PT LTG, Education Type HEP;gait;transfers;home safety --    Patient Education PT LTG, Date Goal Reviewed --  12/17/17   Patient Education PT LTG Outcome --  goal not met   Patient Education PT LTG, Reason Goal Not Met --  discharged from facility

## 2017-12-17 NOTE — THERAPY DISCHARGE NOTE
Acute Care - Physical Therapy Discharge Summary  [facility]       Patient Name: Damaris Sánchez  : 1945  MRN: [id number]    Today's Date: 2017  Onset of Illness/Injury or Date of Surgery Date: 17    Date of Referral to PT: 17  Referring Physician: Dr. eNelam Saravia      Admit Date: 2017      PT Recommendation and Plan    Visit Dx:    ICD-10-CM ICD-9-CM   1. Weakness R53.1 780.79   2. Ulcerative colitis with complication, unspecified location K51.919 556.9   3. NSTEMI (non-ST elevated myocardial infarction) I21.4 410.70   4. Lower abdominal pain R10.30 789.09   5. Decreased activities of daily living (ADL) Z78.9 V49.89   6. Impaired functional mobility, balance, gait, and endurance Z74.09 V49.89   7. Impaired mobility and ADLs Z74.09 799.89   8. Troponin level elevated R74.8 790.6   9. Chest pain, unspecified type R07.9 786.50   10. Elevated troponin R74.8 790.6   11. Chest pain at rest R07.9 786.50                       IP PT Goals       17 1552 17 1020 17 1544    Transfer Training PT LTG    Transfer Training PT  LTG, Date Goal Reviewed 17  -MN 17  -SEJAL 17  -SEJAL    Transfer Training PT LTG, Outcome goal not met  -MN goal ongoing  -SEJAL goal ongoing  -SEJAL    Transfer Training PT LTG, Reason Goal Not Met discharged from facility  -MN      Gait Training PT LTG    Gait Training Goal PT LTG, Date Goal Reviewed 17  -MN 17  -SEJAL 17  -SEJAL    Gait Training Goal PT LTG, Outcome goal not met  -MN goal ongoing  -SEJAL goal ongoing  -SEJAL    Gait Training Goal PT LTG, Reason Goal Not Met discharged from facility  -MN      Stair Training PT LTG    Stair Training Goal PT LTG, Date Goal Reviewed 17  -MN 17  -SEJAL 17  -SEJAL    Stair Training Goal PT LTG, Outcome goal not met  -MN goal ongoing  -SEJAL goal ongoing  -SEJAL    Stair Training Goal PT LTG, Reason Goal Not Met discharged from facility  -MN      Strength Goal PT LTG    Strength Goal PT  LTG, Date Goal Reviewed 12/17/17  -MN 12/12/17  -SEJAL 12/11/17  -SEJAL    Strength Goal PT LTG, Outcome goal not met  -MN goal ongoing  -SEJAL goal ongoing  -SEJAL    Strength Goal PT LTG, Reason Goal Not Met discharged from facility  -MN      Patient Education PT LTG    Patient Education PT LTG, Date Goal Reviewed 12/17/17  -MN 12/12/17  -SEJAL 12/11/17  -SEJAL    Patient Education PT LTG Outcome goal not met  -MN goal ongoing  -SEJAL goal ongoing  -SEJAL    Patient Education PT LTG, Reason Goal Not Met discharged from facility  -MN        12/07/17 1119 12/07/17 1005 12/06/17 1735    Transfer Training PT LTG    Transfer Training PT LTG, Date Established   12/06/17  -JCA    Transfer Training PT LTG, Time to Achieve   by discharge  -JCA    Transfer Training PT LTG, Activity Type   bed to chair /chair to bed;sit to stand/stand to sit  -JCA    Transfer Training PT LTG, [place] Level   conditional independence  -JCA    Transfer Training PT  LTG, Date Goal Reviewed  12/07/17  -SEJAL     Transfer Training PT LTG, Outcome  goal ongoing  -SEJAL goal ongoing  -JCA    Gait Training PT LTG    Gait Training Goal PT LTG, Date Established   12/06/17  -JCA    Gait Training Goal PT LTG, Time to Achieve   by discharge  -JCA    Gait Training Goal PT LTG, [place] Level   conditional independence  -JCA    Gait Training Goal PT LTG, Assist Device   --   aad  -JCA    Gait Training Goal PT LTG, Distance to Achieve   150ft  -JCA    Gait Training Goal PT LTG, Additional Goal   300ft  -JCA    Gait Training Goal PT LTG, Date Goal Reviewed  12/07/17  -SEJAL     Gait Training Goal PT LTG, Outcome  goal ongoing  -SEJAL goal ongoing  -JCA    Stair Training PT LTG    Stair Training Goal PT LTG, Date Established   12/06/17  -JCA    Stair Training Goal PT LTG, Time to Achieve   by discharge  -JCA    Stair Training Goal PT LTG, Number of Steps   2  -JCA    Stair Training Goal PT LTG, [place] Level   conditional independence  -JCA    Stair Training Goal PT LTG, Date Goal  Reviewed  12/07/17  -     Stair Training Goal PT LTG, Outcome  goal ongoing  - goal ongoing  -A    Strength Goal PT LTG    Strength Goal PT LTG, Date Established   12/06/17  -A    Strength Goal PT LTG, Time to Achieve   by discharge  -[facility]    Strength Goal PT LTG, Measure to Achieve   Pt will tolerate 15 reps of AROM exercises  -[facility]    Strength Goal PT LTG, Functional Goal   Pt will progress to standing exercises  -[facility]    Strength Goal PT LTG, Date Goal Reviewed 12/07/17  -      Strength Goal PT LTG, Outcome goal partially met  -SEJAL  goal ongoing  -A    Patient Education PT LTG    Patient Education PT LTG, Date Established   12/06/17  -[facility]    Patient Education PT LTG, Time to Achieve   by discharge  -[facility]    Patient Education PT LTG, Education Type   HEP;gait;transfers;home safety  -[facility]    Patient Education PT LTG, Date Goal Reviewed  12/07/17  -     Patient Education PT LTG Outcome  goal ongoing  - goal ongoing  -[facility]      12/06/17 1734          Strength Goal PT LTG    Strength Goal PT LTG, Date Established (P)  12/06/17  -A      Strength Goal PT LTG, Time to Achieve (P)  by discharge  -[facility]      Strength Goal PT LTG, Measure to Achieve (P)  Pt will tolerate 15 reps of AROM exercises  -[facility]      Patient Education PT LTG    Patient Education PT LTG, Date Established (P)  12/06/17  -[facility]      Patient Education PT LTG, Time to Achieve (P)  by discharge  -[facility]      Patient Education PT LTG, Education Type (P)  HEP;gait;transfers;home safety  -[facility]      Patient Education PT LTG Outcome (P)  goal ongoing  [facility]        User Key  (r) = Recorded By, (t) = Taken By, (c) = Cosigned By    Initials Name Provider Type    BRANDON Buitrago, PT Physical Therapist    CHRISTOPHER Guerrero, PT Physical Therapist    SEJAL Ross, PTA Physical Therapy Assistant              PT Discharge Summary  Anticipated Discharge Disposition: inpatient rehabilitation facility  Reason for Discharge: Discharge from facility, Per MD  order  Outcomes Achieved: Unable to make functional progress toward goals at this time  Discharge Destination: other (comment) ([facility])      Alis Buitrago, PT   12/17/2017

## 2018-01-01 ENCOUNTER — LAB REQUISITION (OUTPATIENT)
Dept: LAB | Facility: HOSPITAL | Age: 73
End: 2018-01-01

## 2018-01-01 ENCOUNTER — ANESTHESIA EVENT (OUTPATIENT)
Dept: ICU | Facility: HOSPITAL | Age: 73
End: 2018-01-01

## 2018-01-01 ENCOUNTER — APPOINTMENT (OUTPATIENT)
Dept: GENERAL RADIOLOGY | Facility: HOSPITAL | Age: 73
End: 2018-01-01

## 2018-01-01 ENCOUNTER — ANESTHESIA (OUTPATIENT)
Dept: ICU | Facility: HOSPITAL | Age: 73
End: 2018-01-01

## 2018-01-01 ENCOUNTER — HOSPITAL ENCOUNTER (INPATIENT)
Facility: HOSPITAL | Age: 73
LOS: 3 days | End: 2018-01-12
Attending: EMERGENCY MEDICINE | Admitting: INTERNAL MEDICINE

## 2018-01-01 ENCOUNTER — APPOINTMENT (OUTPATIENT)
Dept: CARDIOLOGY | Facility: HOSPITAL | Age: 73
End: 2018-01-01
Attending: INTERNAL MEDICINE

## 2018-01-01 VITALS
DIASTOLIC BLOOD PRESSURE: 23 MMHG | RESPIRATION RATE: 25 BRPM | WEIGHT: 158.29 LBS | HEIGHT: 63 IN | SYSTOLIC BLOOD PRESSURE: 28 MMHG | BODY MASS INDEX: 28.05 KG/M2 | TEMPERATURE: 100 F | HEART RATE: 109 BPM | OXYGEN SATURATION: 67 %

## 2018-01-01 DIAGNOSIS — R06.00 DYSPNEA, UNSPECIFIED TYPE: ICD-10-CM

## 2018-01-01 DIAGNOSIS — J96.02 ACUTE RESPIRATORY FAILURE WITH HYPOXIA AND HYPERCAPNIA (HCC): Primary | ICD-10-CM

## 2018-01-01 DIAGNOSIS — E87.5 HYPERKALEMIA: ICD-10-CM

## 2018-01-01 DIAGNOSIS — E86.0 DEHYDRATION: ICD-10-CM

## 2018-01-01 DIAGNOSIS — N28.9 ACUTE RENAL INSUFFICIENCY: ICD-10-CM

## 2018-01-01 DIAGNOSIS — J18.9 PNEUMONIA DUE TO INFECTIOUS ORGANISM, UNSPECIFIED LATERALITY, UNSPECIFIED PART OF LUNG: ICD-10-CM

## 2018-01-01 DIAGNOSIS — J96.01 ACUTE RESPIRATORY FAILURE WITH HYPOXIA AND HYPERCAPNIA (HCC): Primary | ICD-10-CM

## 2018-01-01 DIAGNOSIS — A41.9 SEPSIS, DUE TO UNSPECIFIED ORGANISM: ICD-10-CM

## 2018-01-01 LAB
ALBUMIN SERPL-MCNC: 1.8 G/DL (ref 3.4–4.8)
ALBUMIN SERPL-MCNC: 1.9 G/DL (ref 3.4–4.8)
ALBUMIN SERPL-MCNC: 2 G/DL (ref 3.4–4.8)
ALBUMIN SERPL-MCNC: 2.1 G/DL (ref 3.4–4.8)
ALBUMIN SERPL-MCNC: 2.2 G/DL (ref 3.4–4.8)
ALBUMIN/GLOB SERPL: 0.7 G/DL (ref 1.1–1.8)
ALBUMIN/GLOB SERPL: 0.8 G/DL (ref 1.1–1.8)
ALBUMIN/GLOB SERPL: 0.8 G/DL (ref 1.1–1.8)
ALBUMIN/GLOB SERPL: 0.9 G/DL (ref 1.1–1.8)
ALBUMIN/GLOB SERPL: 0.9 G/DL (ref 1.1–1.8)
ALP SERPL-CCNC: 120 U/L (ref 38–126)
ALP SERPL-CCNC: 121 U/L (ref 38–126)
ALP SERPL-CCNC: 126 U/L (ref 38–126)
ALP SERPL-CCNC: 129 U/L (ref 38–126)
ALP SERPL-CCNC: 98 U/L (ref 38–126)
ALT SERPL W P-5'-P-CCNC: 37 U/L (ref 9–52)
ALT SERPL W P-5'-P-CCNC: 38 U/L (ref 9–52)
ALT SERPL W P-5'-P-CCNC: 39 U/L (ref 9–52)
ALT SERPL W P-5'-P-CCNC: 40 U/L (ref 9–52)
ALT SERPL W P-5'-P-CCNC: 41 U/L (ref 9–52)
ANION GAP SERPL CALCULATED.3IONS-SCNC: 12 MMOL/L (ref 5–15)
ANION GAP SERPL CALCULATED.3IONS-SCNC: 13 MMOL/L (ref 5–15)
ANION GAP SERPL CALCULATED.3IONS-SCNC: 14 MMOL/L (ref 5–15)
ANION GAP SERPL CALCULATED.3IONS-SCNC: 14 MMOL/L (ref 5–15)
ANION GAP SERPL CALCULATED.3IONS-SCNC: 17 MMOL/L (ref 5–15)
ANION GAP SERPL CALCULATED.3IONS-SCNC: 7 MMOL/L (ref 5–15)
ANISOCYTOSIS BLD QL: ABNORMAL
ANISOCYTOSIS BLD QL: ABNORMAL
APTT PPP: 125.5 SECONDS (ref 20–40.3)
APTT PPP: 162.4 SECONDS (ref 20–40.3)
APTT PPP: 189.1 SECONDS (ref 20–40.3)
APTT PPP: 38.8 SECONDS (ref 20–40.3)
APTT PPP: 41.5 SECONDS (ref 20–40.3)
ARTERIAL PATENCY WRIST A: ABNORMAL
AST SERPL-CCNC: 20 U/L (ref 14–36)
AST SERPL-CCNC: 39 U/L (ref 14–36)
AST SERPL-CCNC: 60 U/L (ref 14–36)
AST SERPL-CCNC: 60 U/L (ref 14–36)
AST SERPL-CCNC: 61 U/L (ref 14–36)
ATMOSPHERIC PRESS: ABNORMAL MMHG
BACTERIA SPEC AEROBE CULT: ABNORMAL
BACTERIA SPEC AEROBE CULT: ABNORMAL
BACTERIA UR QL AUTO: ABNORMAL /HPF
BASE EXCESS BLDA CALC-SCNC: -17 MMOL/L (ref -2.4–2.4)
BASE EXCESS BLDA CALC-SCNC: -4 MMOL/L (ref -2.4–2.4)
BASE EXCESS BLDA CALC-SCNC: -6 MMOL/L (ref -2.4–2.4)
BASE EXCESS BLDA CALC-SCNC: -7.6 MMOL/L (ref -2.4–2.4)
BASE EXCESS BLDA CALC-SCNC: -7.6 MMOL/L (ref -2.4–2.4)
BASE EXCESS BLDA CALC-SCNC: -8.3 MMOL/L (ref -2.4–2.4)
BASOPHILS # BLD AUTO: 0.01 10*3/MM3 (ref 0–0.2)
BASOPHILS # BLD AUTO: 0.03 10*3/MM3 (ref 0–0.2)
BASOPHILS NFR BLD AUTO: 0.1 % (ref 0–2)
BASOPHILS NFR BLD AUTO: 0.1 % (ref 0–2)
BDY SITE: ABNORMAL
BH CV ECHO MEAS - ACS: 1.7 CM
BH CV ECHO MEAS - AO ROOT AREA: 5.7 CM^2
BH CV ECHO MEAS - AO ROOT DIAM: 2.7 CM
BH CV ECHO MEAS - EDV(CUBED): 21.3 ML
BH CV ECHO MEAS - EDV(TEICH): 28.8 ML
BH CV ECHO MEAS - EF(CUBED): 89.6 %
BH CV ECHO MEAS - EF(TEICH): 85.5 %
BH CV ECHO MEAS - ESV(CUBED): 2.2 ML
BH CV ECHO MEAS - ESV(TEICH): 4.2 ML
BH CV ECHO MEAS - FS: 53 %
BH CV ECHO MEAS - IVS/LVPW: 0.97
BH CV ECHO MEAS - IVSD: 0.87 CM
BH CV ECHO MEAS - LV MASS(C)D: 60.6 GRAMS
BH CV ECHO MEAS - LVIDD: 2.8 CM
BH CV ECHO MEAS - LVIDS: 1.3 CM
BH CV ECHO MEAS - LVPWD: 0.9 CM
BH CV ECHO MEAS - SV(CUBED): 19.1 ML
BH CV ECHO MEAS - SV(TEICH): 24.6 ML
BILIRUB SERPL-MCNC: 0.2 MG/DL (ref 0.2–1.3)
BILIRUB SERPL-MCNC: 0.3 MG/DL (ref 0.2–1.3)
BILIRUB SERPL-MCNC: 0.3 MG/DL (ref 0.2–1.3)
BILIRUB SERPL-MCNC: 0.4 MG/DL (ref 0.2–1.3)
BILIRUB SERPL-MCNC: 0.4 MG/DL (ref 0.2–1.3)
BILIRUB UR QL STRIP: NEGATIVE
BUN BLD-MCNC: 22 MG/DL (ref 7–21)
BUN BLD-MCNC: 37 MG/DL (ref 7–21)
BUN BLD-MCNC: 38 MG/DL (ref 7–21)
BUN BLD-MCNC: 38 MG/DL (ref 7–21)
BUN BLD-MCNC: 40 MG/DL (ref 7–21)
BUN BLD-MCNC: 42 MG/DL (ref 7–21)
BUN/CREAT SERPL: 24.5 (ref 7–25)
BUN/CREAT SERPL: 25.7 (ref 7–25)
BUN/CREAT SERPL: 30.4 (ref 7–25)
BUN/CREAT SERPL: 30.8 (ref 7–25)
BUN/CREAT SERPL: 30.9 (ref 7–25)
BUN/CREAT SERPL: 36.1 (ref 7–25)
CA-I BLD-MCNC: 4 MG/DL (ref 4.5–4.9)
CA-I BLD-MCNC: 4.1 MG/DL (ref 4.5–4.9)
CA-I BLD-MCNC: 4.3 MG/DL (ref 4.5–4.9)
CA-I BLD-MCNC: 4.4 MG/DL (ref 4.5–4.9)
CALCIUM SPEC-SCNC: 6.4 MG/DL (ref 8.4–10.2)
CALCIUM SPEC-SCNC: 6.5 MG/DL (ref 8.4–10.2)
CALCIUM SPEC-SCNC: 6.6 MG/DL (ref 8.4–10.2)
CALCIUM SPEC-SCNC: 6.7 MG/DL (ref 8.4–10.2)
CALCIUM SPEC-SCNC: 7.1 MG/DL (ref 8.4–10.2)
CALCIUM SPEC-SCNC: 8.4 MG/DL (ref 8.4–10.2)
CHLORIDE SERPL-SCNC: 103 MMOL/L (ref 95–110)
CHLORIDE SERPL-SCNC: 107 MMOL/L (ref 95–110)
CHLORIDE SERPL-SCNC: 109 MMOL/L (ref 95–110)
CK MB SERPL-CCNC: 0.7 NG/ML (ref 0–5)
CK SERPL-CCNC: <20 U/L (ref 30–135)
CLARITY UR: ABNORMAL
CMV DNA SERPL NAA+PROBE-ACNC: NORMAL IU/ML
CO2 BLDA-SCNC: 13.9 MMOL/L (ref 23–27)
CO2 BLDA-SCNC: 17.6 MMOL/L (ref 23–27)
CO2 BLDA-SCNC: 18.3 MMOL/L (ref 23–27)
CO2 BLDA-SCNC: 18.9 MMOL/L (ref 23–27)
CO2 BLDA-SCNC: 19.9 MMOL/L (ref 23–27)
CO2 BLDA-SCNC: 21.8 MMOL/L (ref 23–27)
CO2 SERPL-SCNC: 14 MMOL/L (ref 22–31)
CO2 SERPL-SCNC: 18 MMOL/L (ref 22–31)
CO2 SERPL-SCNC: 18 MMOL/L (ref 22–31)
CO2 SERPL-SCNC: 19 MMOL/L (ref 22–31)
CO2 SERPL-SCNC: 20 MMOL/L (ref 22–31)
CO2 SERPL-SCNC: 27 MMOL/L (ref 22–31)
COLOR UR: YELLOW
CREAT BLD-MCNC: 0.61 MG/DL (ref 0.5–1)
CREAT BLD-MCNC: 1.25 MG/DL (ref 0.5–1)
CREAT BLD-MCNC: 1.3 MG/DL (ref 0.5–1)
CREAT BLD-MCNC: 1.36 MG/DL (ref 0.5–1)
CREAT BLD-MCNC: 1.48 MG/DL (ref 0.5–1)
CREAT BLD-MCNC: 1.51 MG/DL (ref 0.5–1)
D-LACTATE SERPL-SCNC: 1.3 MMOL/L (ref 0.5–2)
D-LACTATE SERPL-SCNC: 10 MMOL/L (ref 0.5–2)
D-LACTATE SERPL-SCNC: 3.4 MMOL/L (ref 0.5–2)
DEPRECATED RDW RBC AUTO: 68.4 FL (ref 36.4–46.3)
DEPRECATED RDW RBC AUTO: 69.3 FL (ref 36.4–46.3)
DEPRECATED RDW RBC AUTO: 71.6 FL (ref 36.4–46.3)
DEPRECATED RDW RBC AUTO: 71.9 FL (ref 36.4–46.3)
DEPRECATED RDW RBC AUTO: 72.7 FL (ref 36.4–46.3)
EOSINOPHIL # BLD AUTO: 0 10*3/MM3 (ref 0–0.7)
EOSINOPHIL # BLD AUTO: 0 10*3/MM3 (ref 0–0.7)
EOSINOPHIL NFR BLD AUTO: 0 % (ref 0–7)
EOSINOPHIL NFR BLD AUTO: 0 % (ref 0–7)
ERYTHROCYTE [DISTWIDTH] IN BLOOD BY AUTOMATED COUNT: 20 % (ref 11.5–14.5)
ERYTHROCYTE [DISTWIDTH] IN BLOOD BY AUTOMATED COUNT: 20.2 % (ref 11.5–14.5)
ERYTHROCYTE [DISTWIDTH] IN BLOOD BY AUTOMATED COUNT: 20.8 % (ref 11.5–14.5)
ERYTHROCYTE [DISTWIDTH] IN BLOOD BY AUTOMATED COUNT: 21.1 % (ref 11.5–14.5)
ERYTHROCYTE [DISTWIDTH] IN BLOOD BY AUTOMATED COUNT: 21.2 % (ref 11.5–14.5)
FLUAV AG NPH QL: NEGATIVE
FLUBV AG NPH QL IA: NEGATIVE
GFR SERPL CREATININE-BSD FRML MDRD: 34 ML/MIN/1.73 (ref 39–90)
GFR SERPL CREATININE-BSD FRML MDRD: 35 ML/MIN/1.73 (ref 60–90)
GFR SERPL CREATININE-BSD FRML MDRD: 38 ML/MIN/1.73 (ref 60–90)
GFR SERPL CREATININE-BSD FRML MDRD: 40 ML/MIN/1.73 (ref 39–90)
GFR SERPL CREATININE-BSD FRML MDRD: 42 ML/MIN/1.73 (ref 60–90)
GFR SERPL CREATININE-BSD FRML MDRD: 96 ML/MIN/1.73 (ref 39–90)
GLOBULIN UR ELPH-MCNC: 2.3 GM/DL (ref 2.3–3.5)
GLOBULIN UR ELPH-MCNC: 2.3 GM/DL (ref 2.3–3.5)
GLOBULIN UR ELPH-MCNC: 2.4 GM/DL (ref 2.3–3.5)
GLOBULIN UR ELPH-MCNC: 2.6 GM/DL (ref 2.3–3.5)
GLOBULIN UR ELPH-MCNC: 2.9 GM/DL (ref 2.3–3.5)
GLUCOSE BLD-MCNC: 128 MG/DL (ref 60–100)
GLUCOSE BLD-MCNC: 136 MG/DL (ref 60–100)
GLUCOSE BLD-MCNC: 72 MG/DL (ref 60–100)
GLUCOSE BLD-MCNC: 77 MG/DL (ref 60–100)
GLUCOSE BLD-MCNC: 93 MG/DL (ref 60–100)
GLUCOSE BLD-MCNC: 93 MG/DL (ref 60–100)
GLUCOSE BLDA-MCNC: 100 MMOL/L
GLUCOSE BLDA-MCNC: 115 MMOL/L
GLUCOSE BLDA-MCNC: 127 MMOL/L
GLUCOSE BLDA-MCNC: 79 MMOL/L
GLUCOSE BLDA-MCNC: 79 MMOL/L
GLUCOSE BLDA-MCNC: 85 MMOL/L
GLUCOSE BLDC GLUCOMTR-MCNC: 73 MG/DL (ref 70–130)
GLUCOSE UR STRIP-MCNC: NEGATIVE MG/DL
HCO3 BLDA-SCNC: 12.5 MMOL/L (ref 22–26)
HCO3 BLDA-SCNC: 16.6 MMOL/L (ref 22–26)
HCO3 BLDA-SCNC: 17.3 MMOL/L (ref 22–26)
HCO3 BLDA-SCNC: 17.8 MMOL/L (ref 22–26)
HCO3 BLDA-SCNC: 18.8 MMOL/L (ref 22–26)
HCO3 BLDA-SCNC: 20.7 MMOL/L (ref 22–26)
HCT VFR BLD AUTO: 26.3 % (ref 35–45)
HCT VFR BLD AUTO: 26.3 % (ref 35–45)
HCT VFR BLD AUTO: 27.4 % (ref 35–45)
HCT VFR BLD AUTO: 31.6 % (ref 35–45)
HCT VFR BLD AUTO: 32.1 % (ref 35–45)
HCT VFR BLD CALC: 26 % (ref 38–47)
HCT VFR BLD CALC: 27 % (ref 38–47)
HCT VFR BLD CALC: 27 % (ref 38–47)
HCT VFR BLD CALC: 28 % (ref 38–47)
HCT VFR BLD CALC: 31 % (ref 38–47)
HCT VFR BLD CALC: 31 % (ref 38–47)
HEMOCCULT STL QL: POSITIVE
HGB BLD-MCNC: 10.4 G/DL (ref 12–15.5)
HGB BLD-MCNC: 8.2 G/DL (ref 12–15.5)
HGB BLD-MCNC: 8.4 G/DL (ref 12–15.5)
HGB BLD-MCNC: 8.7 G/DL (ref 12–15.5)
HGB BLD-MCNC: 9.9 G/DL (ref 12–15.5)
HGB BLDA-MCNC: 10.4 G/DL (ref 12–16)
HGB BLDA-MCNC: 10.6 G/DL (ref 12–16)
HGB BLDA-MCNC: 8.9 G/DL (ref 12–16)
HGB BLDA-MCNC: 9.2 G/DL (ref 12–16)
HGB BLDA-MCNC: 9.2 G/DL (ref 12–16)
HGB BLDA-MCNC: 9.5 G/DL (ref 12–16)
HGB UR QL STRIP.AUTO: ABNORMAL
HOLD SPECIMEN: NORMAL
HYALINE CASTS UR QL AUTO: ABNORMAL /LPF
HYPOCHROMIA BLD QL: ABNORMAL
HYPOCHROMIA BLD QL: ABNORMAL
IMM GRANULOCYTES # BLD: 0.16 10*3/MM3 (ref 0–0.02)
IMM GRANULOCYTES # BLD: 1.41 10*3/MM3 (ref 0–0.02)
IMM GRANULOCYTES NFR BLD: 1.3 % (ref 0–0.5)
IMM GRANULOCYTES NFR BLD: 5.7 % (ref 0–0.5)
INR PPP: 1.27 (ref 0.8–1.2)
INR PPP: 1.29 (ref 0.8–1.2)
KETONES UR QL STRIP: NEGATIVE
LEUKOCYTE ESTERASE UR QL STRIP.AUTO: NEGATIVE
LOG 10 CMV QN DNA PI: NORMAL LOG10 IU/ML
LYMPHOCYTES # BLD AUTO: 1.05 10*3/MM3 (ref 0.6–4.2)
LYMPHOCYTES # BLD AUTO: 1.79 10*3/MM3 (ref 0.6–4.2)
LYMPHOCYTES # BLD MANUAL: 0.91 10*3/MM3 (ref 0.6–4.2)
LYMPHOCYTES # BLD MANUAL: 1.49 10*3/MM3 (ref 0.6–4.2)
LYMPHOCYTES NFR BLD AUTO: 7.2 % (ref 10–50)
LYMPHOCYTES NFR BLD AUTO: 8.5 % (ref 10–50)
LYMPHOCYTES NFR BLD MANUAL: 4 % (ref 0–12)
LYMPHOCYTES NFR BLD MANUAL: 5 % (ref 10–50)
LYMPHOCYTES NFR BLD MANUAL: 6 % (ref 10–50)
MAGNESIUM SERPL-MCNC: 1.8 MG/DL (ref 1.6–2.3)
MAGNESIUM SERPL-MCNC: 1.9 MG/DL (ref 1.6–2.3)
MCH RBC QN AUTO: 29.7 PG (ref 26.5–34)
MCH RBC QN AUTO: 30.2 PG (ref 26.5–34)
MCH RBC QN AUTO: 30.3 PG (ref 26.5–34)
MCH RBC QN AUTO: 30.3 PG (ref 26.5–34)
MCH RBC QN AUTO: 30.4 PG (ref 26.5–34)
MCHC RBC AUTO-ENTMCNC: 31.2 G/DL (ref 31.4–36)
MCHC RBC AUTO-ENTMCNC: 31.3 G/DL (ref 31.4–36)
MCHC RBC AUTO-ENTMCNC: 31.8 G/DL (ref 31.4–36)
MCHC RBC AUTO-ENTMCNC: 31.9 G/DL (ref 31.4–36)
MCHC RBC AUTO-ENTMCNC: 32.4 G/DL (ref 31.4–36)
MCV RBC AUTO: 93.6 FL (ref 80–98)
MCV RBC AUTO: 94.6 FL (ref 80–98)
MCV RBC AUTO: 95.3 FL (ref 80–98)
MCV RBC AUTO: 95.8 FL (ref 80–98)
MCV RBC AUTO: 96.6 FL (ref 80–98)
METAMYELOCYTES NFR BLD MANUAL: 2 % (ref 0–0)
MODALITY: ABNORMAL
MONOCYTES # BLD AUTO: 0.28 10*3/MM3 (ref 0–0.9)
MONOCYTES # BLD AUTO: 0.68 10*3/MM3 (ref 0–0.9)
MONOCYTES # BLD AUTO: 0.73 10*3/MM3 (ref 0–0.9)
MONOCYTES NFR BLD AUTO: 2.3 % (ref 0–12)
MONOCYTES NFR BLD AUTO: 2.7 % (ref 0–12)
NEUTROPHILS # BLD AUTO: 10.81 10*3/MM3 (ref 2–8.6)
NEUTROPHILS # BLD AUTO: 16.52 10*3/MM3 (ref 2–8.6)
NEUTROPHILS # BLD AUTO: 20.99 10*3/MM3 (ref 2–8.6)
NEUTROPHILS # BLD AUTO: 22.91 10*3/MM3 (ref 2–8.6)
NEUTROPHILS NFR BLD AUTO: 84.3 % (ref 37–80)
NEUTROPHILS NFR BLD AUTO: 87.8 % (ref 37–80)
NEUTROPHILS NFR BLD MANUAL: 87 % (ref 37–80)
NEUTROPHILS NFR BLD MANUAL: 87 % (ref 37–80)
NEUTS BAND NFR BLD MANUAL: 4 % (ref 0–5)
NEUTS BAND NFR BLD MANUAL: 5 % (ref 0–5)
NITRITE UR QL STRIP: NEGATIVE
NRBC SPEC MANUAL: 1 /100 WBC (ref 0–0)
NT-PROBNP SERPL-MCNC: 2600 PG/ML (ref 0–900)
PCO2 BLDA: 32.3 MM HG (ref 35–45)
PCO2 BLDA: 32.6 MM HG (ref 35–45)
PCO2 BLDA: 34.6 MM HG (ref 35–45)
PCO2 BLDA: 35.7 MM HG (ref 35–45)
PCO2 BLDA: 36 MM HG (ref 35–45)
PCO2 BLDA: 45.5 MM HG (ref 35–45)
PH BLDA: 7.06 PH UNITS (ref 7.35–7.45)
PH BLDA: 7.32 PH UNITS (ref 7.35–7.45)
PH BLDA: 7.33 PH UNITS (ref 7.35–7.45)
PH BLDA: 7.34 PH UNITS (ref 7.35–7.45)
PH BLDA: 7.35 PH UNITS (ref 7.35–7.45)
PH BLDA: 7.38 PH UNITS (ref 7.35–7.45)
PH UR STRIP.AUTO: <=5 [PH] (ref 5–9)
PLAT MORPH BLD: NORMAL
PLATELET # BLD AUTO: 103 10*3/MM3 (ref 150–450)
PLATELET # BLD AUTO: 134 10*3/MM3 (ref 150–450)
PLATELET # BLD AUTO: 176 10*3/MM3 (ref 150–450)
PLATELET # BLD AUTO: 63 10*3/MM3 (ref 150–450)
PLATELET # BLD AUTO: 80 10*3/MM3 (ref 150–450)
PMV BLD AUTO: 10.2 FL (ref 8–12)
PMV BLD AUTO: 10.2 FL (ref 8–12)
PMV BLD AUTO: 10.7 FL (ref 8–12)
PMV BLD AUTO: 11.9 FL (ref 8–12)
PMV BLD AUTO: 9.4 FL (ref 8–12)
PO2 BLDA: 136.9 MM HG (ref 80–105)
PO2 BLDA: 410.2 MM HG (ref 80–105)
PO2 BLDA: 446.2 MM HG (ref 80–105)
PO2 BLDA: 85.6 MM HG (ref 80–105)
PO2 BLDA: 89.1 MM HG (ref 80–105)
PO2 BLDA: 90.6 MM HG (ref 80–105)
POIKILOCYTOSIS BLD QL SMEAR: ABNORMAL
POLYCHROMASIA BLD QL SMEAR: ABNORMAL
POLYCHROMASIA BLD QL SMEAR: ABNORMAL
POTASSIUM BLD-SCNC: 3.6 MMOL/L (ref 3.5–5.1)
POTASSIUM BLD-SCNC: 3.7 MMOL/L (ref 3.5–5.1)
POTASSIUM BLD-SCNC: 3.8 MMOL/L (ref 3.5–5.1)
POTASSIUM BLD-SCNC: 4 MMOL/L (ref 3.5–5.1)
POTASSIUM BLD-SCNC: 4.3 MMOL/L (ref 3.5–5.1)
POTASSIUM BLD-SCNC: 5.4 MMOL/L (ref 3.5–5.1)
POTASSIUM BLDA-SCNC: 3.4 MMOL/L (ref 3.6–4.9)
POTASSIUM BLDA-SCNC: 3.47 MMOL/L (ref 3.6–4.9)
POTASSIUM BLDA-SCNC: 3.55 MMOL/L (ref 3.6–4.9)
POTASSIUM BLDA-SCNC: 4.37 MMOL/L (ref 3.6–4.9)
POTASSIUM BLDA-SCNC: 4.84 MMOL/L (ref 3.6–4.9)
POTASSIUM BLDA-SCNC: 5.06 MMOL/L (ref 3.6–4.9)
PROT SERPL-MCNC: 4.2 G/DL (ref 6.3–8.6)
PROT SERPL-MCNC: 4.3 G/DL (ref 6.3–8.6)
PROT SERPL-MCNC: 4.4 G/DL (ref 6.3–8.6)
PROT SERPL-MCNC: 4.5 G/DL (ref 6.3–8.6)
PROT SERPL-MCNC: 5.1 G/DL (ref 6.3–8.6)
PROT UR QL STRIP: ABNORMAL
PROTHROMBIN TIME: 15.9 SECONDS (ref 11.1–15.3)
PROTHROMBIN TIME: 16.1 SECONDS (ref 11.1–15.3)
RBC # BLD AUTO: 2.76 10*6/MM3 (ref 3.77–5.16)
RBC # BLD AUTO: 2.78 10*6/MM3 (ref 3.77–5.16)
RBC # BLD AUTO: 2.86 10*6/MM3 (ref 3.77–5.16)
RBC # BLD AUTO: 3.27 10*6/MM3 (ref 3.77–5.16)
RBC # BLD AUTO: 3.43 10*6/MM3 (ref 3.77–5.16)
RBC # UR: ABNORMAL /HPF
REF LAB TEST METHOD: ABNORMAL
SAO2 % BLDCOA: 95.6 % (ref 94–100)
SAO2 % BLDCOA: 95.8 % (ref 94–100)
SAO2 % BLDCOA: 96.1 %
SAO2 % BLDCOA: 98.6 %
SAO2 % BLDCOA: 99.8 %
SAO2 % BLDCOA: 99.8 %
SMALL PLATELETS BLD QL SMEAR: ABNORMAL
SODIUM BLD-SCNC: 137 MMOL/L (ref 137–145)
SODIUM BLD-SCNC: 138 MMOL/L (ref 137–145)
SODIUM BLD-SCNC: 139 MMOL/L (ref 137–145)
SODIUM BLD-SCNC: 141 MMOL/L (ref 137–145)
SODIUM BLD-SCNC: 141 MMOL/L (ref 137–145)
SODIUM BLD-SCNC: 143 MMOL/L (ref 137–145)
SODIUM BLDA-SCNC: 136.3 MMOL/L (ref 138–146)
SODIUM BLDA-SCNC: 136.4 MMOL/L (ref 138–146)
SODIUM BLDA-SCNC: 139.6 MMOL/L (ref 138–146)
SODIUM BLDA-SCNC: 140.2 MMOL/L (ref 138–146)
SODIUM BLDA-SCNC: 140.7 MMOL/L (ref 138–146)
SODIUM BLDA-SCNC: 141.7 MMOL/L (ref 138–146)
SP GR UR STRIP: 1.02 (ref 1–1.03)
SQUAMOUS #/AREA URNS HPF: ABNORMAL /HPF
TRANS CELLS #/AREA URNS HPF: ABNORMAL /HPF
TROPONIN I SERPL-MCNC: 0.04 NG/ML
TROPONIN I SERPL-MCNC: 0.07 NG/ML
UROBILINOGEN UR QL STRIP: ABNORMAL
VANCOMYCIN PEAK SERPL-MCNC: 29.14 MCG/ML (ref 30–40)
VANCOMYCIN SERPL-MCNC: 12.06 MCG/ML
WBC MORPH BLD: NORMAL
WBC MORPH BLD: NORMAL
WBC NRBC COR # BLD: 12.31 10*3/MM3 (ref 3.2–9.8)
WBC NRBC COR # BLD: 18.15 10*3/MM3 (ref 3.2–9.8)
WBC NRBC COR # BLD: 24 10*3/MM3 (ref 3.2–9.8)
WBC NRBC COR # BLD: 24.9 10*3/MM3 (ref 3.2–9.8)
WBC NRBC COR # BLD: 27.75 10*3/MM3 (ref 3.2–9.8)
WBC UR QL AUTO: ABNORMAL /HPF
WHOLE BLOOD HOLD SPECIMEN: NORMAL
WHOLE BLOOD HOLD SPECIMEN: NORMAL
YEAST URNS QL MICRO: ABNORMAL /HPF

## 2018-01-01 PROCEDURE — 87086 URINE CULTURE/COLONY COUNT: CPT | Performed by: EMERGENCY MEDICINE

## 2018-01-01 PROCEDURE — 83880 ASSAY OF NATRIURETIC PEPTIDE: CPT | Performed by: EMERGENCY MEDICINE

## 2018-01-01 PROCEDURE — 80202 ASSAY OF VANCOMYCIN: CPT | Performed by: HOSPITALIST

## 2018-01-01 PROCEDURE — 85730 THROMBOPLASTIN TIME PARTIAL: CPT | Performed by: INTERNAL MEDICINE

## 2018-01-01 PROCEDURE — 25010000002 PIPERACILLIN SOD-TAZOBACTAM PER 1 G: Performed by: INTERNAL MEDICINE

## 2018-01-01 PROCEDURE — 25010000002 CALCIUM GLUCONATE PER 10 ML: Performed by: INTERNAL MEDICINE

## 2018-01-01 PROCEDURE — 87040 BLOOD CULTURE FOR BACTERIA: CPT | Performed by: EMERGENCY MEDICINE

## 2018-01-01 PROCEDURE — 25010000002 HEPARIN (PORCINE) PER 1000 UNITS: Performed by: INTERNAL MEDICINE

## 2018-01-01 PROCEDURE — 36680 INSERT NEEDLE BONE CAVITY: CPT

## 2018-01-01 PROCEDURE — 80053 COMPREHEN METABOLIC PANEL: CPT | Performed by: INTERNAL MEDICINE

## 2018-01-01 PROCEDURE — 25010000002 MIDAZOLAM PER 1 MG

## 2018-01-01 PROCEDURE — 80053 COMPREHEN METABOLIC PANEL: CPT | Performed by: FAMILY MEDICINE

## 2018-01-01 PROCEDURE — 99233 SBSQ HOSP IP/OBS HIGH 50: CPT | Performed by: INTERNAL MEDICINE

## 2018-01-01 PROCEDURE — 82803 BLOOD GASES ANY COMBINATION: CPT | Performed by: INTERNAL MEDICINE

## 2018-01-01 PROCEDURE — 93325 DOPPLER ECHO COLOR FLOW MAPG: CPT

## 2018-01-01 PROCEDURE — 85007 BL SMEAR W/DIFF WBC COUNT: CPT | Performed by: EMERGENCY MEDICINE

## 2018-01-01 PROCEDURE — 82272 OCCULT BLD FECES 1-3 TESTS: CPT | Performed by: INTERNAL MEDICINE

## 2018-01-01 PROCEDURE — 99232 SBSQ HOSP IP/OBS MODERATE 35: CPT | Performed by: INTERNAL MEDICINE

## 2018-01-01 PROCEDURE — 94799 UNLISTED PULMONARY SVC/PX: CPT

## 2018-01-01 PROCEDURE — 85007 BL SMEAR W/DIFF WBC COUNT: CPT | Performed by: INTERNAL MEDICINE

## 2018-01-01 PROCEDURE — 83735 ASSAY OF MAGNESIUM: CPT | Performed by: INTERNAL MEDICINE

## 2018-01-01 PROCEDURE — 94002 VENT MGMT INPAT INIT DAY: CPT

## 2018-01-01 PROCEDURE — 82803 BLOOD GASES ANY COMBINATION: CPT | Performed by: EMERGENCY MEDICINE

## 2018-01-01 PROCEDURE — 36600 WITHDRAWAL OF ARTERIAL BLOOD: CPT

## 2018-01-01 PROCEDURE — 82803 BLOOD GASES ANY COMBINATION: CPT | Performed by: HOSPITALIST

## 2018-01-01 PROCEDURE — 85610 PROTHROMBIN TIME: CPT | Performed by: EMERGENCY MEDICINE

## 2018-01-01 PROCEDURE — 31500 INSERT EMERGENCY AIRWAY: CPT

## 2018-01-01 PROCEDURE — 94760 N-INVAS EAR/PLS OXIMETRY 1: CPT

## 2018-01-01 PROCEDURE — 25010000002 FLUCONAZOLE 100-0.9 MG/50ML-% SOLUTION: Performed by: INTERNAL MEDICINE

## 2018-01-01 PROCEDURE — 25010000002 MIDAZOLAM 50 MG/10ML SOLUTION 10 ML VIAL: Performed by: HOSPITALIST

## 2018-01-01 PROCEDURE — 99291 CRITICAL CARE FIRST HOUR: CPT

## 2018-01-01 PROCEDURE — 83605 ASSAY OF LACTIC ACID: CPT | Performed by: EMERGENCY MEDICINE

## 2018-01-01 PROCEDURE — B548ZZA ULTRASONOGRAPHY OF SUPERIOR VENA CAVA, GUIDANCE: ICD-10-PCS | Performed by: EMERGENCY MEDICINE

## 2018-01-01 PROCEDURE — 25010000002 ALBUMIN HUMAN 5% PER 50 ML: Performed by: INTERNAL MEDICINE

## 2018-01-01 PROCEDURE — 25010000002 CEFTRIAXONE: Performed by: EMERGENCY MEDICINE

## 2018-01-01 PROCEDURE — 87804 INFLUENZA ASSAY W/OPTIC: CPT | Performed by: EMERGENCY MEDICINE

## 2018-01-01 PROCEDURE — 82553 CREATINE MB FRACTION: CPT | Performed by: EMERGENCY MEDICINE

## 2018-01-01 PROCEDURE — 85730 THROMBOPLASTIN TIME PARTIAL: CPT | Performed by: EMERGENCY MEDICINE

## 2018-01-01 PROCEDURE — 99291 CRITICAL CARE FIRST HOUR: CPT | Performed by: INTERNAL MEDICINE

## 2018-01-01 PROCEDURE — 02HV33Z INSERTION OF INFUSION DEVICE INTO SUPERIOR VENA CAVA, PERCUTANEOUS APPROACH: ICD-10-PCS | Performed by: EMERGENCY MEDICINE

## 2018-01-01 PROCEDURE — 85027 COMPLETE CBC AUTOMATED: CPT | Performed by: INTERNAL MEDICINE

## 2018-01-01 PROCEDURE — 25010000002 HEPARIN (PORCINE) PER 1000 UNITS: Performed by: EMERGENCY MEDICINE

## 2018-01-01 PROCEDURE — 25010000002 PIPERACILLIN SOD-TAZOBACTAM PER 1 G: Performed by: HOSPITALIST

## 2018-01-01 PROCEDURE — 93005 ELECTROCARDIOGRAM TRACING: CPT | Performed by: HOSPITALIST

## 2018-01-01 PROCEDURE — 85025 COMPLETE CBC W/AUTO DIFF WBC: CPT | Performed by: FAMILY MEDICINE

## 2018-01-01 PROCEDURE — 71045 X-RAY EXAM CHEST 1 VIEW: CPT

## 2018-01-01 PROCEDURE — 81001 URINALYSIS AUTO W/SCOPE: CPT | Performed by: EMERGENCY MEDICINE

## 2018-01-01 PROCEDURE — 84484 ASSAY OF TROPONIN QUANT: CPT | Performed by: EMERGENCY MEDICINE

## 2018-01-01 PROCEDURE — 25010000002 PIPERACILLIN SOD-TAZOBACTAM PER 1 G: Performed by: EMERGENCY MEDICINE

## 2018-01-01 PROCEDURE — P9041 ALBUMIN (HUMAN),5%, 50ML: HCPCS | Performed by: INTERNAL MEDICINE

## 2018-01-01 PROCEDURE — 85025 COMPLETE CBC W/AUTO DIFF WBC: CPT | Performed by: INTERNAL MEDICINE

## 2018-01-01 PROCEDURE — 93010 ELECTROCARDIOGRAM REPORT: CPT | Performed by: INTERNAL MEDICINE

## 2018-01-01 PROCEDURE — 25010000002 SUCCINYLCHOLINE PER 20 MG: Performed by: EMERGENCY MEDICINE

## 2018-01-01 PROCEDURE — 82550 ASSAY OF CK (CPK): CPT | Performed by: EMERGENCY MEDICINE

## 2018-01-01 PROCEDURE — 25010000002 VANCOMYCIN PER 500 MG: Performed by: HOSPITALIST

## 2018-01-01 PROCEDURE — 85610 PROTHROMBIN TIME: CPT | Performed by: INTERNAL MEDICINE

## 2018-01-01 PROCEDURE — 51702 INSERT TEMP BLADDER CATH: CPT

## 2018-01-01 PROCEDURE — 5A1945Z RESPIRATORY VENTILATION, 24-96 CONSECUTIVE HOURS: ICD-10-PCS | Performed by: EMERGENCY MEDICINE

## 2018-01-01 PROCEDURE — 93308 TTE F-UP OR LMTD: CPT

## 2018-01-01 PROCEDURE — 25010000002 DOPAMINE PER 40 MG: Performed by: EMERGENCY MEDICINE

## 2018-01-01 PROCEDURE — 25010000002 HYDROCORTISONE SODIUM SUCCINATE 100 MG RECONSTITUTED SOLUTION: Performed by: INTERNAL MEDICINE

## 2018-01-01 PROCEDURE — 80202 ASSAY OF VANCOMYCIN: CPT | Performed by: INTERNAL MEDICINE

## 2018-01-01 PROCEDURE — 82962 GLUCOSE BLOOD TEST: CPT

## 2018-01-01 PROCEDURE — 83605 ASSAY OF LACTIC ACID: CPT | Performed by: INTERNAL MEDICINE

## 2018-01-01 PROCEDURE — 94003 VENT MGMT INPAT SUBQ DAY: CPT

## 2018-01-01 PROCEDURE — 0BH17EZ INSERTION OF ENDOTRACHEAL AIRWAY INTO TRACHEA, VIA NATURAL OR ARTIFICIAL OPENING: ICD-10-PCS | Performed by: EMERGENCY MEDICINE

## 2018-01-01 PROCEDURE — 80053 COMPREHEN METABOLIC PANEL: CPT | Performed by: EMERGENCY MEDICINE

## 2018-01-01 PROCEDURE — 85025 COMPLETE CBC W/AUTO DIFF WBC: CPT | Performed by: EMERGENCY MEDICINE

## 2018-01-01 RX ORDER — SODIUM CHLORIDE 9 MG/ML
INJECTION, SOLUTION INTRAVENOUS
Status: COMPLETED
Start: 2018-01-01 | End: 2018-01-01

## 2018-01-01 RX ORDER — CALCIUM GLUCONATE 94 MG/ML
1 INJECTION, SOLUTION INTRAVENOUS ONCE
Status: DISCONTINUED | OUTPATIENT
Start: 2018-01-01 | End: 2018-01-01

## 2018-01-01 RX ORDER — ATORVASTATIN CALCIUM 20 MG/1
20 TABLET, FILM COATED ORAL NIGHTLY
COMMUNITY

## 2018-01-01 RX ORDER — ETOMIDATE 2 MG/ML
INJECTION INTRAVENOUS
Status: DISCONTINUED
Start: 2018-01-01 | End: 2018-01-01 | Stop reason: HOSPADM

## 2018-01-01 RX ORDER — SODIUM CHLORIDE 0.9 % (FLUSH) 0.9 %
10 SYRINGE (ML) INJECTION AS NEEDED
Status: DISCONTINUED | OUTPATIENT
Start: 2018-01-01 | End: 2018-01-01

## 2018-01-01 RX ORDER — MIDAZOLAM HYDROCHLORIDE 1 MG/ML
INJECTION INTRAMUSCULAR; INTRAVENOUS
Status: COMPLETED
Start: 2018-01-01 | End: 2018-01-01

## 2018-01-01 RX ORDER — SODIUM CHLORIDE 0.9 % (FLUSH) 0.9 %
1-10 SYRINGE (ML) INJECTION AS NEEDED
Status: DISCONTINUED | OUTPATIENT
Start: 2018-01-01 | End: 2018-01-01

## 2018-01-01 RX ORDER — CALCIUM GLUCONATE 94 MG/ML
1 INJECTION, SOLUTION INTRAVENOUS ONCE
Status: DISCONTINUED | OUTPATIENT
Start: 2018-01-01 | End: 2018-01-01 | Stop reason: CLARIF

## 2018-01-01 RX ORDER — VALGANCICLOVIR 450 MG/1
900 TABLET, FILM COATED ORAL 2 TIMES DAILY
COMMUNITY

## 2018-01-01 RX ORDER — MIDAZOLAM HYDROCHLORIDE 1 MG/ML
2 INJECTION INTRAMUSCULAR; INTRAVENOUS ONCE
Status: COMPLETED | OUTPATIENT
Start: 2018-01-01 | End: 2018-01-01

## 2018-01-01 RX ORDER — MORPHINE SULFATE 2 MG/ML
2 INJECTION, SOLUTION INTRAMUSCULAR; INTRAVENOUS
Status: DISCONTINUED | OUTPATIENT
Start: 2018-01-01 | End: 2018-01-01 | Stop reason: HOSPADM

## 2018-01-01 RX ORDER — SODIUM CHLORIDE 9 MG/ML
100 INJECTION, SOLUTION INTRAVENOUS CONTINUOUS
Status: DISCONTINUED | OUTPATIENT
Start: 2018-01-01 | End: 2018-01-01

## 2018-01-01 RX ORDER — ALBUMIN, HUMAN INJ 5% 5 %
25 SOLUTION INTRAVENOUS ONCE
Status: COMPLETED | OUTPATIENT
Start: 2018-01-01 | End: 2018-01-01

## 2018-01-01 RX ORDER — CLOPIDOGREL BISULFATE 75 MG/1
75 TABLET ORAL DAILY
COMMUNITY

## 2018-01-01 RX ORDER — VALGANCICLOVIR 450 MG/1
450 TABLET, FILM COATED ORAL 2 TIMES DAILY
Status: DISCONTINUED | OUTPATIENT
Start: 2018-01-01 | End: 2018-01-01

## 2018-01-01 RX ORDER — HEPARIN SODIUM 5000 [USP'U]/ML
40 INJECTION, SOLUTION INTRAVENOUS; SUBCUTANEOUS AS NEEDED
Status: DISCONTINUED | OUTPATIENT
Start: 2018-01-01 | End: 2018-01-01

## 2018-01-01 RX ORDER — SODIUM POLYSTYRENE SULFONATE 15 G/60ML
15 SUSPENSION ORAL; RECTAL ONCE
Status: COMPLETED | OUTPATIENT
Start: 2018-01-01 | End: 2018-01-01

## 2018-01-01 RX ORDER — CHLORHEXIDINE GLUCONATE 0.12 MG/ML
15 RINSE ORAL EVERY 12 HOURS SCHEDULED
Status: DISCONTINUED | OUTPATIENT
Start: 2018-01-01 | End: 2018-01-01 | Stop reason: HOSPADM

## 2018-01-01 RX ORDER — LORAZEPAM 2 MG/ML
1 INJECTION INTRAMUSCULAR EVERY 4 HOURS PRN
Status: DISCONTINUED | OUTPATIENT
Start: 2018-01-01 | End: 2018-01-01 | Stop reason: HOSPADM

## 2018-01-01 RX ORDER — FLUMAZENIL 0.1 MG/ML
INJECTION INTRAVENOUS
Status: DISCONTINUED
Start: 2018-01-01 | End: 2018-01-01 | Stop reason: HOSPADM

## 2018-01-01 RX ORDER — VECURONIUM BROMIDE 20 MG/20ML
INJECTION, POWDER, LYOPHILIZED, FOR SOLUTION INTRAVENOUS
Status: DISCONTINUED
Start: 2018-01-01 | End: 2018-01-01 | Stop reason: HOSPADM

## 2018-01-01 RX ORDER — ASPIRIN 81 MG/1
81 TABLET ORAL DAILY
COMMUNITY

## 2018-01-01 RX ORDER — VALGANCICLOVIR 450 MG/1
450 TABLET, FILM COATED ORAL 2 TIMES DAILY
Status: DISCONTINUED | OUTPATIENT
Start: 2018-01-01 | End: 2018-01-01 | Stop reason: SDUPTHER

## 2018-01-01 RX ORDER — HYDROCODONE BITARTRATE AND ACETAMINOPHEN 7.5; 325 MG/1; MG/1
1 TABLET ORAL EVERY 4 HOURS PRN
COMMUNITY

## 2018-01-01 RX ORDER — SUCCINYLCHOLINE CHLORIDE 20 MG/ML
100 INJECTION INTRAMUSCULAR; INTRAVENOUS ONCE
Status: COMPLETED | OUTPATIENT
Start: 2018-01-01 | End: 2018-01-01

## 2018-01-01 RX ORDER — HEPARIN SODIUM 5000 [USP'U]/ML
80 INJECTION, SOLUTION INTRAVENOUS; SUBCUTANEOUS AS NEEDED
Status: DISCONTINUED | OUTPATIENT
Start: 2018-01-01 | End: 2018-01-01

## 2018-01-01 RX ORDER — LIDOCAINE HYDROCHLORIDE 10 MG/ML
INJECTION, SOLUTION EPIDURAL; INFILTRATION; INTRACAUDAL; PERINEURAL
Status: DISCONTINUED
Start: 2018-01-01 | End: 2018-01-01 | Stop reason: HOSPADM

## 2018-01-01 RX ORDER — FAMOTIDINE 10 MG/ML
20 INJECTION, SOLUTION INTRAVENOUS EVERY 12 HOURS SCHEDULED
Status: DISCONTINUED | OUTPATIENT
Start: 2018-01-01 | End: 2018-01-01

## 2018-01-01 RX ORDER — DOPAMINE HYDROCHLORIDE 160 MG/100ML
INJECTION, SOLUTION INTRAVENOUS
Status: DISCONTINUED
Start: 2018-01-01 | End: 2018-01-01 | Stop reason: HOSPADM

## 2018-01-01 RX ORDER — DOPAMINE HYDROCHLORIDE 160 MG/100ML
2-20 INJECTION, SOLUTION INTRAVENOUS
Status: DISCONTINUED | OUTPATIENT
Start: 2018-01-01 | End: 2018-01-01

## 2018-01-01 RX ORDER — FLUCONAZOLE, SODIUM CHLORIDE 2 MG/ML
100 INJECTION INTRAVENOUS DAILY
Status: DISCONTINUED | OUTPATIENT
Start: 2018-01-01 | End: 2018-01-01

## 2018-01-01 RX ORDER — HEPARIN SODIUM 5000 [USP'U]/ML
80 INJECTION, SOLUTION INTRAVENOUS; SUBCUTANEOUS ONCE
Status: COMPLETED | OUTPATIENT
Start: 2018-01-01 | End: 2018-01-01

## 2018-01-01 RX ADMIN — CALCIUM GLUCONATE 1 G: 94 INJECTION, SOLUTION INTRAVENOUS at 12:41

## 2018-01-01 RX ADMIN — HYDROCORTISONE: 1 CREAM TOPICAL at 08:22

## 2018-01-01 RX ADMIN — FAMOTIDINE 20 MG: 10 INJECTION, SOLUTION INTRAVENOUS at 10:26

## 2018-01-01 RX ADMIN — HYDROCORTISONE: 1 CREAM TOPICAL at 20:27

## 2018-01-01 RX ADMIN — SODIUM BICARBONATE 150 ML/HR: 84 INJECTION INTRAVENOUS at 18:25

## 2018-01-01 RX ADMIN — SODIUM POLYSTYRENE SULFONATE 15 G: 15 SUSPENSION ORAL; RECTAL at 10:08

## 2018-01-01 RX ADMIN — FAMOTIDINE 20 MG: 10 INJECTION, SOLUTION INTRAVENOUS at 10:16

## 2018-01-01 RX ADMIN — VALGANCICLOVIR 450 MG: 450 TABLET, FILM COATED ORAL at 21:23

## 2018-01-01 RX ADMIN — TAZOBACTAM SODIUM AND PIPERACILLIN SODIUM 3.38 G: 375; 3 INJECTION, SOLUTION INTRAVENOUS at 03:35

## 2018-01-01 RX ADMIN — SODIUM BICARBONATE 150 ML/HR: 84 INJECTION INTRAVENOUS at 02:49

## 2018-01-01 RX ADMIN — TAZOBACTAM SODIUM AND PIPERACILLIN SODIUM 3.38 G: 375; 3 INJECTION, SOLUTION INTRAVENOUS at 11:45

## 2018-01-01 RX ADMIN — NOREPINEPHRINE BITARTRATE 0.1 MCG/KG/MIN: 1 INJECTION INTRAVENOUS at 05:34

## 2018-01-01 RX ADMIN — NOREPINEPHRINE BITARTRATE 0.3 MCG/KG/MIN: 1 INJECTION INTRAVENOUS at 18:34

## 2018-01-01 RX ADMIN — SODIUM BICARBONATE 150 ML/HR: 84 INJECTION INTRAVENOUS at 11:11

## 2018-01-01 RX ADMIN — FLUCONAZOLE 100 MG: 2 INJECTION INTRAVENOUS at 15:17

## 2018-01-01 RX ADMIN — VANCOMYCIN HYDROCHLORIDE 1250 MG: 5 INJECTION, POWDER, LYOPHILIZED, FOR SOLUTION INTRAVENOUS at 11:35

## 2018-01-01 RX ADMIN — MIDAZOLAM 6 MG/HR: 5 INJECTION INTRAMUSCULAR; INTRAVENOUS at 20:37

## 2018-01-01 RX ADMIN — NOREPINEPHRINE BITARTRATE 0.3 MCG/KG/MIN: 1 INJECTION INTRAVENOUS at 11:36

## 2018-01-01 RX ADMIN — TAZOBACTAM SODIUM AND PIPERACILLIN SODIUM 3.38 G: 375; 3 INJECTION, SOLUTION INTRAVENOUS at 19:23

## 2018-01-01 RX ADMIN — SODIUM CHLORIDE: 900 INJECTION, SOLUTION INTRAVENOUS at 14:27

## 2018-01-01 RX ADMIN — VALGANCICLOVIR 450 MG: 450 TABLET, FILM COATED ORAL at 12:25

## 2018-01-01 RX ADMIN — SODIUM CHLORIDE: 9 INJECTION, SOLUTION INTRAVENOUS at 12:09

## 2018-01-01 RX ADMIN — HEPARIN SODIUM 4700 UNITS: 5000 INJECTION, SOLUTION INTRAVENOUS; SUBCUTANEOUS at 04:00

## 2018-01-01 RX ADMIN — CHLORHEXIDINE GLUCONATE 15 ML: 1.2 RINSE ORAL at 20:27

## 2018-01-01 RX ADMIN — VASOPRESSIN 0.04 UNITS/MIN: 20 INJECTION INTRAVENOUS at 10:00

## 2018-01-01 RX ADMIN — MIDAZOLAM 8 MG/HR: 5 INJECTION INTRAMUSCULAR; INTRAVENOUS at 12:41

## 2018-01-01 RX ADMIN — MIDAZOLAM 8 MG/HR: 5 INJECTION INTRAMUSCULAR; INTRAVENOUS at 20:55

## 2018-01-01 RX ADMIN — TAZOBACTAM SODIUM AND PIPERACILLIN SODIUM 3.38 G: 375; 3 INJECTION, SOLUTION INTRAVENOUS at 11:11

## 2018-01-01 RX ADMIN — TAZOBACTAM SODIUM AND PIPERACILLIN SODIUM 3.38 G: 375; 3 INJECTION, SOLUTION INTRAVENOUS at 02:47

## 2018-01-01 RX ADMIN — VASOPRESSIN 0.04 UNITS/MIN: 20 INJECTION INTRAVENOUS at 18:33

## 2018-01-01 RX ADMIN — FAMOTIDINE 20 MG: 10 INJECTION, SOLUTION INTRAVENOUS at 20:27

## 2018-01-01 RX ADMIN — SODIUM CHLORIDE 1770 ML: 900 INJECTION, SOLUTION INTRAVENOUS at 02:59

## 2018-01-01 RX ADMIN — CALCIUM GLUCONATE 1 G: 94 INJECTION, SOLUTION INTRAVENOUS at 10:05

## 2018-01-01 RX ADMIN — HYDROCORTISONE SODIUM SUCCINATE 100 MG: 100 INJECTION, POWDER, FOR SOLUTION INTRAMUSCULAR; INTRAVENOUS at 17:14

## 2018-01-01 RX ADMIN — ALBUMIN HUMAN 25 G: 0.05 INJECTION, SOLUTION INTRAVENOUS at 17:50

## 2018-01-01 RX ADMIN — Medication 10 ML: at 20:31

## 2018-01-01 RX ADMIN — SODIUM BICARBONATE 50 MEQ: 84 INJECTION, SOLUTION INTRAVENOUS at 18:28

## 2018-01-01 RX ADMIN — SODIUM CHLORIDE 100 ML/HR: 9 INJECTION, SOLUTION INTRAVENOUS at 10:10

## 2018-01-01 RX ADMIN — NOREPINEPHRINE BITARTRATE 0.14 MCG/KG/MIN: 1 INJECTION INTRAVENOUS at 20:50

## 2018-01-01 RX ADMIN — NOREPINEPHRINE BITARTRATE 18 MCG/KG/MIN: 1 INJECTION INTRAVENOUS at 05:26

## 2018-01-01 RX ADMIN — MIDAZOLAM HYDROCHLORIDE 2 MG: 1 INJECTION INTRAMUSCULAR; INTRAVENOUS at 03:11

## 2018-01-01 RX ADMIN — ALBUMIN HUMAN 25 G: 0.05 INJECTION, SOLUTION INTRAVENOUS at 11:37

## 2018-01-01 RX ADMIN — HEPARIN SODIUM 18 UNITS/KG/HR: 10000 INJECTION, SOLUTION INTRAVENOUS at 12:30

## 2018-01-01 RX ADMIN — MIDAZOLAM 2 MG: 1 INJECTION INTRAMUSCULAR; INTRAVENOUS at 04:00

## 2018-01-01 RX ADMIN — MIDAZOLAM 8 MG/HR: 5 INJECTION INTRAMUSCULAR; INTRAVENOUS at 14:23

## 2018-01-01 RX ADMIN — MIDAZOLAM 8 MG/HR: 5 INJECTION INTRAMUSCULAR; INTRAVENOUS at 05:18

## 2018-01-01 RX ADMIN — SUCCINYLCHOLINE CHLORIDE 100 MG: 20 INJECTION, SOLUTION INTRAMUSCULAR; INTRAVENOUS at 03:01

## 2018-01-01 RX ADMIN — FAMOTIDINE 20 MG: 10 INJECTION, SOLUTION INTRAVENOUS at 20:18

## 2018-01-01 RX ADMIN — TAZOBACTAM SODIUM AND PIPERACILLIN SODIUM 4.5 G: 500; 4 INJECTION, SOLUTION INTRAVENOUS at 11:35

## 2018-01-01 RX ADMIN — FLUCONAZOLE 100 MG: 2 INJECTION INTRAVENOUS at 10:26

## 2018-01-01 RX ADMIN — CEFTRIAXONE 1 G: 1 INJECTION, POWDER, FOR SOLUTION INTRAMUSCULAR; INTRAVENOUS at 03:30

## 2018-01-01 RX ADMIN — HYDROCORTISONE 1 EACH: 1 CREAM TOPICAL at 20:18

## 2018-01-01 RX ADMIN — SODIUM BICARBONATE 100 ML/HR: 84 INJECTION INTRAVENOUS at 00:44

## 2018-01-01 RX ADMIN — CHLORHEXIDINE GLUCONATE 15 ML: 1.2 RINSE ORAL at 10:16

## 2018-01-01 RX ADMIN — SODIUM CHLORIDE 500 ML: 9 INJECTION, SOLUTION INTRAVENOUS at 10:09

## 2018-01-01 RX ADMIN — CHLORHEXIDINE GLUCONATE 15 ML: 1.2 RINSE ORAL at 08:22

## 2018-01-01 RX ADMIN — VANCOMYCIN HYDROCHLORIDE 1000 MG: 1 INJECTION, POWDER, LYOPHILIZED, FOR SOLUTION INTRAVENOUS at 14:11

## 2018-01-01 RX ADMIN — CHLORHEXIDINE GLUCONATE 15 ML: 1.2 RINSE ORAL at 20:18

## 2018-01-01 RX ADMIN — FAMOTIDINE 20 MG: 10 INJECTION, SOLUTION INTRAVENOUS at 08:22

## 2018-01-01 RX ADMIN — TAZOBACTAM SODIUM AND PIPERACILLIN SODIUM 4.5 G: 500; 4 INJECTION, SOLUTION INTRAVENOUS at 05:46

## 2018-01-01 RX ADMIN — HYDROCORTISONE SODIUM SUCCINATE 100 MG: 100 INJECTION, POWDER, FOR SOLUTION INTRAMUSCULAR; INTRAVENOUS at 10:25

## 2018-01-01 RX ADMIN — MIDAZOLAM 2 MG: 1 INJECTION INTRAMUSCULAR; INTRAVENOUS at 03:11

## 2018-01-01 RX ADMIN — DOPAMINE HYDROCHLORIDE 10 MCG/KG/MIN: 160 INJECTION, SOLUTION INTRAVENOUS at 04:29

## 2018-01-01 RX ADMIN — CALCIUM GLUCONATE 1 G: 94 INJECTION, SOLUTION INTRAVENOUS at 10:18

## 2018-01-01 RX ADMIN — MIDAZOLAM 1 MG/HR: 5 INJECTION INTRAMUSCULAR; INTRAVENOUS at 06:19

## 2018-01-01 RX ADMIN — TAZOBACTAM SODIUM AND PIPERACILLIN SODIUM 3.38 G: 375; 3 INJECTION, SOLUTION INTRAVENOUS at 19:33

## 2018-01-01 RX ADMIN — SODIUM BICARBONATE 100 ML/HR: 84 INJECTION INTRAVENOUS at 16:12

## 2018-01-01 RX ADMIN — SODIUM BICARBONATE 50 MEQ: 84 INJECTION, SOLUTION INTRAVENOUS at 14:07

## 2018-01-01 RX ADMIN — HYDROCORTISONE: 1 CREAM TOPICAL at 11:38

## 2018-01-01 RX ADMIN — MIDAZOLAM 6 MG/HR: 5 INJECTION INTRAMUSCULAR; INTRAVENOUS at 11:33

## 2018-01-09 PROBLEM — J96.02 ACUTE RESPIRATORY FAILURE WITH HYPOXIA AND HYPERCAPNIA (HCC): Status: ACTIVE | Noted: 2018-01-01

## 2018-01-09 PROBLEM — J96.01 ACUTE RESPIRATORY FAILURE WITH HYPOXIA AND HYPERCAPNIA (HCC): Status: ACTIVE | Noted: 2018-01-01

## 2018-01-09 NOTE — PROGRESS NOTES
[facility] Medicine Services  INPATIENT PROGRESS NOTE    Length of Stay: 0  Date of Admission: 1/9/2018  Primary Care Physician: DESTINY Arreguin    Subjective   Chief Complaint:   Chief Complaint   Patient presents with   • Shortness of Breath         HPI  Respiratory distress according  .will start heparin drip cannot do CTA of chest to rule out PE as cr is high     Review of Systems   Unable to perform ROS: Acuity of condition        All pertinent negatives and positives are as above. All other systems have been reviewed and are negative unless otherwise stated.     Objective    Temp:  [98.6 °F (37 °C)-101.1 °F (38.4 °C)] 99.7 °F (37.6 °C)  Heart Rate:  [118-150] 125  Resp:  [16-38] 27  BP: ()/(24-84) 101/58  FiO2 (%):  [40 %-100 %] 40 %  Physical Exam   Constitutional: She appears well-nourished.   HENT:   Head: Normocephalic and atraumatic.   Eyes: EOM are normal. Pupils are equal, round, and reactive to light.   Neck: Normal range of motion. Neck supple.   Cardiovascular: Regular rhythm.    No murmur heard.  Pulmonary/Chest: Effort normal and breath sounds normal.   Abdominal: Soft. Bowel sounds are normal.   Neurological:   Intubated    Skin: Skin is warm and dry.           Results Review:  I have reviewed the labs, radiology results, and diagnostic studies.    Laboratory Data:   Lab Results (last 24 hours)     Procedure Component Value Units Date/Time    Blood Gas, Arterial [[id number]]  (Abnormal) Collected:  01/09/18 0336    Specimen:  Arterial Blood Updated:  01/09/18 0338     Site --      Not performed at this site.        Michael's Test --      Not performed at this site.        pH, Arterial 7.057 (L) pH units      pCO2, Arterial 45.5 (H) mm Hg      pO2, Arterial 410.2 (H) mm Hg      HCO3, Arterial 12.5 (L) mmol/L      Base Excess, Arterial -17.0 (L) mmol/L      O2 Saturation, Arterial 99.8 %      Hemoglobin, Blood Gas 9.5 (L) g/dL      Hematocrit, Blood  Gas 28.0 (L) %      CO2 Content 13.9 (L)     Sodium, Arterial 136.4 (L) mmol/L      Potassium, Arterial 5.06 (H) mmol/L      Glucose, Arterial 127 mmol/L      Barometric Pressure for Blood Gas -- mmHg       Not performed at this site.        Modality --      Not performed at this site.        Ionized Calcium 4.30 (L) mg/dL     CBC Auto Differential [[id number]]  (Abnormal) Collected:  01/09/18 0336    Specimen:  Blood Updated:  01/09/18 0403     WBC 18.15 (H) 10*3/mm3      RBC 3.27 (L) 10*6/mm3      Hemoglobin 9.9 (L) g/dL      Hematocrit 31.6 (L) %      MCV 96.6 fL      MCH 30.3 pg      MCHC 31.3 (L) g/dL      RDW 20.0 (H) %      RDW-SD 69.3 (H) fl      MPV 10.7 fL      Platelets 134 (L) 10*3/mm3     CK [[id number]]  (Abnormal) Collected:  01/09/18 0336    Specimen:  Blood Updated:  01/09/18 0405     Creatine Kinase <20 (L) U/L     Lactic Acid, Plasma [[id number]]  (Abnormal) Collected:  01/09/18 0336    Specimen:  Blood Updated:  01/09/18 0408     Lactate 10.0 (C) mmol/L     Comprehensive Metabolic Panel [[id number]]  (Abnormal) Collected:  01/09/18 0336    Specimen:  Blood Updated:  01/09/18 0409     Glucose 136 (H) mg/dL      BUN 37 (H) mg/dL      Creatinine 1.51 (H) mg/dL      Sodium 138 mmol/L      Potassium 5.4 (H) mmol/L      Chloride 107 mmol/L      CO2 14.0 (L) mmol/L      Calcium 7.1 (L) mg/dL      Total Protein 4.4 (L) g/dL      Albumin 1.80 (L) g/dL      ALT (SGPT) 38 U/L      AST (SGOT) 39 (H) U/L      Alkaline Phosphatase 129 (H) U/L      Total Bilirubin 0.2 mg/dL      eGFR Non African Amer 34 (L) mL/min/1.73      Globulin 2.6 gm/dL      A/G Ratio 0.7 (L) g/dL      BUN/Creatinine Ratio 24.5     Anion Gap 17.0 (H) mmol/L     Narrative:       The MDRD GFR formula is only valid for adults with stable renal function between ages 18 and 70.    Protime-INR [[id number]]  (Abnormal) Collected:  01/09/18 0339    Specimen:  Blood Updated:  01/09/18 0411     Protime 15.9 (H) Seconds      INR 1.27 (H)    Narrative:        Therapeutic range for most indications is 2.0-3.0 INR,  or 2.5-3.5 for mechanical heart valves.    aPTT [[id number]]  (Normal) Collected:  01/09/18 0339    Specimen:  Blood Updated:  01/09/18 0411     PTT 38.8 seconds     Narrative:       The recommended Heparin therapeutic range is 68-97 seconds.    Influenza Antigen, Rapid - Swab, Nasopharynx [[id number]]  (Normal) Collected:  01/09/18 0355    Specimen:  Swab from Nasopharynx Updated:  01/09/18 0411     Influenza A Ag, EIA Negative     Influenza B Ag, EIA Negative    Blood Culture - Blood, Blood, Venous Line [[id number]] Collected:  01/09/18 0401    Specimen:  Blood from Blood, Central Line Updated:  01/09/18 0413    BNP [[id number]]  (Abnormal) Collected:  01/09/18 0336    Specimen:  Blood Updated:  01/09/18 0417     proBNP 2600.0 (H) pg/mL     CK-MB [[id number]]  (Normal) Collected:  01/09/18 0336    Specimen:  Blood Updated:  01/09/18 0417     CKMB 0.70 ng/mL     Troponin [[id number]]  (Abnormal) Collected:  01/09/18 0336    Specimen:  Blood Updated:  01/09/18 0418     Troponin I 0.045 (H) ng/mL     Rudolph Draw [[id number]] Collected:  01/09/18 0336    Specimen:  Blood Updated:  01/09/18 0446    Narrative:       The following orders were created for panel order Rudolph Draw.  Procedure                               Abnormality         Status                     ---------                               -----------         ------                     Light Blue Top[[id number]]                                                              Green Top (Gel)[[id number]]                                  Final result               Lavender Top[[id number]]                                     Final result               Gold Top - SST[[id number]]                                   Final result                 Please view results for these tests on the individual orders.    Green Top (Gel) [[id number]] Collected:  01/09/18 0336    Specimen:  Blood Updated:  01/09/18 0446     Extra Tube  Hold for add-ons.      Auto resulted.       Lavender Top [[id number]] Collected:  01/09/18 0336    Specimen:  Blood Updated:  01/09/18 0446     Extra Tube hold for add-on      Auto resulted       Gold Top - SST [[id number]] Collected:  01/09/18 0336    Specimen:  Blood Updated:  01/09/18 0446     Extra Tube Hold for add-ons.      Auto resulted.       CBC & Differential [[id number]] Collected:  01/09/18 0336    Specimen:  Blood Updated:  01/09/18 0518    Narrative:       The following orders were created for panel order CBC & Differential.  Procedure                               Abnormality         Status                     ---------                               -----------         ------                     Manual Differential[[id number]]          Abnormal            Final result               Scan Slide[[id number]]                                                                  CBC Auto Differential[[id number]]        Abnormal            Final result                 Please view results for these tests on the individual orders.    Manual Differential [[id number]]  (Abnormal) Collected:  01/09/18 0336    Specimen:  Blood Updated:  01/09/18 0518     Neutrophil % 87.0 (H) %      Lymphocyte % 5.0 (L) %      Monocyte % 4.0 %      Bands %  4.0 %      Neutrophils Absolute 16.52 (H) 10*3/mm3      Lymphocytes Absolute 0.91 10*3/mm3      Monocytes Absolute 0.73 10*3/mm3      Anisocytosis Slight/1+     Hypochromia Slight/1+     Polychromasia Slight/1+     WBC Morphology Normal     Platelet Morphology Normal    Blood Culture - Blood, Blood, Venous Line [[id number]] Collected:  01/09/18 0537    Specimen:  Blood from Arm, Left Updated:  01/09/18 0542    Troponin [[id number]]  (Abnormal) Collected:  01/09/18 0537    Specimen:  Blood Updated:  01/09/18 0616     Troponin I 0.070 (H) ng/mL     Lactic Acid, Reflex Timer [[id number]] Collected:  01/09/18 0336    Specimen:  Blood Updated:  01/09/18 0716     Extra Tube Hold for add-ons.       Auto resulted.       Urinalysis With / Culture If Indicated - Urine, Clean Catch [[id number]]  (Abnormal) Collected:  01/09/18 0749    Specimen:  Urine from Urine, Clean Catch Updated:  01/09/18 0752     Color, UA Yellow     Appearance, UA Cloudy (A)     pH, UA <=5.0     Specific Gravity, UA 1.025     Glucose, UA Negative     Ketones, UA Negative     Bilirubin, UA Negative     Blood, UA Large (3+) (A)     Protein,  mg/dL (2+) (A)     Leuk Esterase, UA Negative     Nitrite, UA Negative     Urobilinogen, UA 0.2 E.U./dL    Lactic Acid, Reflex [[id number]]  (Abnormal) Collected:  01/09/18 0726    Specimen:  Blood Updated:  01/09/18 0754     Lactate 3.4 (C) mmol/L     Urine Culture - Urine, Urine, Clean Catch [[id number]] Collected:  01/09/18 0749    Specimen:  Urine from Urine, Clean Catch Updated:  01/09/18 0831    Urinalysis, Microscopic Only - Urine, Clean Catch [[id number]]  (Abnormal) Collected:  01/09/18 0749    Specimen:  Urine from Urine, Clean Catch Updated:  01/09/18 0838     RBC, UA 6-12 (A) /HPF      WBC, UA 3-5 /HPF      Bacteria, UA Trace (A) /HPF      Squamous Epithelial Cells, UA 0-2 /HPF      Transitional Epithelial Cells, UA 0-2 /HPF      Yeast, UA  /HPF      Moderate/2+ Budding Yeast w/Hyphae     Hyaline Casts, UA 3-6 /LPF      Methodology Manual Light Microscopy    Blood Gas, Arterial [[id number]]  (Abnormal) Collected:  01/09/18 1151    Specimen:  Arterial Blood Updated:  01/09/18 1157     Site --      Not performed at this site.        Michael's Test --      Not performed at this site.        pH, Arterial 7.315 (L) pH units      pCO2, Arterial 35.7 mm Hg      pO2, Arterial 89.1 mm Hg      HCO3, Arterial 17.8 (L) mmol/L      Base Excess, Arterial -7.6 (L) mmol/L      O2 Saturation, Arterial 95.8 %      Hemoglobin, Blood Gas 10.6 (L) g/dL      Hematocrit, Blood Gas 31.0 %      CO2 Content 18.9 (L)     Sodium, Arterial 136.3 (L) mmol/L      Potassium, Arterial 4.84 mmol/L      Glucose, Arterial 85  mmol/L      Barometric Pressure for Blood Gas -- mmHg       Not performed at this site.        Modality --      Not performed at this site.        Ionized Calcium 4.40 (L) mg/dL     Lactic Acid, Plasma [[id number]]  (Normal) Collected:  01/09/18 1147    Specimen:  Blood Updated:  01/09/18 1213     Lactate 1.3 mmol/L     Protime-INR [[id number]]  (Abnormal) Collected:  01/09/18 1146    Specimen:  Blood Updated:  01/09/18 1223     Protime 16.1 (H) Seconds      INR 1.29 (H)    Narrative:       Therapeutic range for most indications is 2.0-3.0 INR,  or 2.5-3.5 for mechanical heart valves.    aPTT [[id number]]  (Abnormal) Collected:  01/09/18 1146    Specimen:  Blood Updated:  01/09/18 1223     PTT 41.5 (H) seconds     Narrative:       The recommended Heparin therapeutic range is 68-97 seconds.          Culture Data:   No results found for: BLOODCX  No results found for: URINECX  No results found for: RESPCX  No results found for: WOUNDCX  No results found for: STOOLCX  No components found for: BODYFLD    Radiology Data:   Imaging Results (last 24 hours)     Procedure Component Value Units Date/Time    XR Chest 1 View [[id number]] Collected:  01/09/18 0302     Updated:  01/09/18 0325    Narrative:       Exam: AP portable chest    INDICATION: ET tube placement    FINDINGS: The bony structures are intact. Cardiomediastinal size  unremarkable. Plaque is present in the aorta. ET tube tip is in  right mainstem bronchus. The tube was retracted to 2 cm after the  x-ray was taken. Prominence of central bronchovascular markings.  No pneumothorax or pleural effusion      Impression:       ET tube tip in the right mainstem bronchus. The ET  tube was retracted 2 cm after the x-ray.    Electronically signed by:  Alber Rahman MD  1/9/2018 3:24 AM CST  Workstation: [id number]    XR Chest Post CVA Port [[id number]] Collected:  01/09/18 0320     Updated:  01/09/18 0347    Narrative:       Exam: AP portable chest    INDICATION: Central  line placement    COMPARISON: 12/8/2017    FINDINGS: AP portable chest. ET tube tip is 4.3 cm above the  rachel. Right subclavian central venous catheter tip is at the  cavoatrial junction. No visible pneumothorax. Heart size normal.  There is prominence of central bronchovascular markings.      Impression:       1. ET tube tip is 4.3 cm above the rachel  2. Right subclavian central venous catheter tip is at the  cavoatrial junction    Electronically signed by:  Alber Rahman MD  1/9/2018 3:46 AM CST  Workstation: [id number]    XR Chest 1 View [[id number]] Collected:  01/09/18 0813     Updated:  01/09/18 0843    Narrative:         PROCEDURE: Single chest view AP    REASON FOR EXAM:Confirm ET Tube Placement, J96.01 Acute  respiratory failure with hypoxia J96.02 Acute respiratory failure  with hypercapnia A41.9 Sepsis, unspecified organism E87.5  Hyperkalemia N28.9 Disorder of kidney and ureter, unspecified  J18.9 Pneumonia, unspecified organism    FINDINGS: Comparison study dated January 9, 2018. ET tube with  tip 3.7 cm above rachel. NG tube with tip below level diaphragm.  Right subclavian central venous catheter with tip overlying the  SVC. Cardiac and pulmonary vasculature are normal. Lungs are  clear. Very small right pleural effusion. No acute osseous  abnormality.      Impression:       1.  Tubes and lines as described above.  2.  Very small right pleural effusion.  3.  Otherwise unremarkable chest.    Electronically signed by:  Daron Amaro MD  1/9/2018 8:42 AM CST  Workstation: [id number]          I have reviewed the patient current medications.     Assessment/Plan     Hospital Problem List     Acute respiratory failure with hypoxia and hypercapnia      fever   Hypotension  Hx of DVT s/p ivc filter   Hyperkaliemia  Hypocalcemia  RENU vs CKD  lactic acidosis   Hx of Ulcerative colitis     Plan:  Continue iv antibiotics   Monitor blood culture   Obtain respiratory cultures  Cannot obtain CTA of chest as  creatinine is elevated will start a heparin drip   Once creatinine is better will do a CTA  Repeat lactic acid   Vent management   Continue pressor   Replete calcium  Give kayexalate    give a bolus of fluids then  Fluid maintenance   Continue home medication as medical course dictates   Tylenol prn for fever   DVT GI prophylaxis                   Vic Abraham MD   01/09/18   12:29 PM

## 2018-01-09 NOTE — PAYOR COMM NOTE
"Kira Borrero (72 y.o. Female)     Date of Birth Social Security Number Address Home Phone MRN    1945  [id number] [id number] [phone] [id number]    Mosque Marital Status          None        Admission Date Admission Type Admitting Provider Attending Provider Department, Room/Bed    1/9/18 Emergency Leeroy Navarro MD Williams, Kevin L, MD [facility] CRITICAL CARE, 903/1    Discharge Date Discharge Disposition Discharge Destination                      Attending Provider: Leeroy Navarro MD     Allergies:  No Known Allergies    Isolation:  None   Infection:  None   Code Status:  FULL    Ht:  160 cm (63\")   Wt:  61.2 kg (134 lb 14.7 oz)    Admission Cmt:  None   Principal Problem:  None                Active Insurance as of 1/9/2018     Primary Coverage     Payor Plan Insurance Group Employer/Plan Group    MEDICARE MEDICARE A & B      Payor Plan Address Payor Plan Phone Number Effective From Effective To    [address] [phone] 4/1/2010     [place] [address]       Subscriber Name Subscriber Birth Date Member ID       KIRA BORRERO 1945 [id number]           Secondary Coverage     Payor Plan Insurance Group Employer/Plan Group    [employer]ICA[place]      Payor Plan Address Payor Plan Phone Number Effective From Effective To    [address] [address] [phone] 9/6/2017     [place] [address]       Subscriber Name Subscriber Birth Date Member ID       KIRA BORRERO 1945 [id number]                 Emergency Contacts      (Rel.) Home Phone Work Phone Mobile Phone    Abdi Borrero (Son) [phone] -- --        [facility]  [phone]     Phone  [phone]    Fax  Admit Inpatient  ICU  Secondary to [facility]     History & Physical     No notes of this type exist for this encounter.           Emergency Department Notes      Mimi Gutierrez RN at 1/9/2018  3:19 AM          MD " at bedside attempting central line at this time.      Mimi Gutierrez RN  01/09/18 0320       Electronically signed by Mimi Gutierrez RN at 1/9/2018  3:20 AM      Snow Jessica MD at 1/9/2018  3:25 AM     Attestation signed by Jagdish Coles MD at 1/9/2018  6:57 AM              For this patient encounter, I reviewed the Resident documentation, treatment plan, and medical decision making. Jagdish Coles MD 1/9/2018 6:57 AM                               Endotracheal Intubation Procedure Note  Indication for endotracheal intubation: respiratory failure  Sedation: midazolam  Paralytic: succinylcholine  Lidocaine: no  Atropine: no  Equipment: Olsen 3 laryngoscope blade  Cricoid Pressure: yes  Number of attempts: 1  ETT location confirmed by by auscultation, by CXR and ETCO2 monitor.    Signature  Snow Jessica MD  [facility] Medicine Resident, PGY II        This document has been electronically signed by Snow Jessica MD on January 9, 2018 3:26 AM                           Snow Jessica MD  Resident  01/09/18 0326       Electronically signed by Jagdish Coles MD at 1/9/2018  6:57 AM      Jagdish Coles MD at 1/9/2018  4:22 AM      Procedure Orders:    1. Insert Central Line At Bedside [[id number]] ordered by Jagdish Coles MD at 01/09/18 0431                Subjective   HPI Comments: Patient presents from the nursing home in critical condition.  Patient was noted to have respiratory distress.  Speaking with family patient has had several days of worsening respiratory function and more somnolence and decreased mental status.  Patient was noted to have acute demise at the nursing home within like 30-60 minutes.  EMS brought patient with very low saturations with decreasing signs of life.  Patient was noted to have agonal breathing the low pulse at the time.  Blood pressure was unable to be assessed secondary to his weakness.  Patient was emergently intubated at that time.   Patient has been being treated for pneumonia.  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  He has not been able to be ruled out in this case.      History provided by:  Patient   used: No        Review of Systems   Unable to perform ROS: Severe respiratory distress   Respiratory: Positive for shortness of breath.    Psychiatric/Behavioral: Positive for confusion.       Past Medical History:   Diagnosis Date   • Ulcerative colitis        No Known Allergies    Past Surgical History:   Procedure Laterality Date   • COLONOSCOPY     • SIGMOIDOSCOPY N/A 9/13/2017       Family History   Problem Relation Age of Onset   • Hypertension Father        Social History     Social History   • Marital status:      Spouse name: N/A   • Number of children: N/A   • Years of education: N/A     Social History Main Topics   • Smoking status: Former Smoker   • Smokeless tobacco: Never Used   • Alcohol use No   • Drug use: No   • Sexual activity: Not Currently     Other Topics Concern   • None     Social History Narrative           Objective   Physical Exam   Constitutional: She appears distressed.   Cachectic appearing ill female.   HENT:   Head: Normocephalic and atraumatic.   Eyes:   2 mm minimally responsive   Neck: Normal range of motion. Neck supple. No tracheal deviation present.   Cardiovascular: Regular rhythm.    Tachycardic.   Pulmonary/Chest: She is in respiratory distress.   Very weak respiratory effort.  Agonal-type breathing.  Patient respiratory failure.   Musculoskeletal:   Appears to be able to move extremities.  There is diffuse pallor noted.  Some lividity to the upper torso.   Lymphadenopathy:     She has no cervical adenopathy.   Neurological:   Patient opens her eyes to painful stimuli.  Patient will localize to pain.   is nonverbal.  Patient does appear to be moving all her extremities.   Skin: There is pallor.   Pallor and lividity in the upper torso.   Nursing note and  vitals reviewed.      Central Line At Bedside  Date/Time: 1/9/2018 3:30 AM  Performed by: JESSICA KHALIL  Authorized by: JESSICA KHALIL     Consent:     Consent obtained:  Emergent situation  Pre-procedure details:     Hand hygiene: Hand hygiene performed prior to insertion      Sterile barrier technique: All elements of maximal sterile technique followed      Skin preparation:  2% chlorhexidine    Skin preparation agent: Skin preparation agent completely dried prior to procedure    Anesthesia (see MAR for exact dosages):     Anesthesia method:  Local infiltration    Local anesthetic:  Lidocaine 1% w/o epi  Procedure details:     Location:  R subclavian    Patient position:  Flat    Procedural supplies:  Triple lumen    Catheter size:  7 Fr    Landmarks identified: yes      Ultrasound guidance: no      Number of attempts:  1    Successful placement: yes    Post-procedure details:     Post-procedure:  Dressing applied    Assessment:  Blood return through all ports, no pneumothorax on x-ray, placement verified by x-ray and free fluid flow    Patient tolerance of procedure:  Tolerated well, no immediate complications            ED Course  ED Course      Labs Reviewed   TROPONIN (IN-HOUSE) - Abnormal; Notable for the following:        Result Value    Troponin I 0.045 (*)     All other components within normal limits   COMPREHENSIVE METABOLIC PANEL - Abnormal; Notable for the following:     Glucose 136 (*)     BUN 37 (*)     Creatinine 1.51 (*)     Potassium 5.4 (*)     CO2 14.0 (*)     Calcium 7.1 (*)     Total Protein 4.4 (*)     Albumin 1.80 (*)     AST (SGOT) 39 (*)     Alkaline Phosphatase 129 (*)     eGFR Non  Amer 34 (*)     A/G Ratio 0.7 (*)     Anion Gap 17.0 (*)     All other components within normal limits    Narrative:     The MDRD GFR formula is only valid for adults with stable renal function between ages 18 and 70.   BNP (IN-HOUSE) - Abnormal; Notable for the following:     proBNP 2600.0 (*)      All other components within normal limits   BLOOD GAS, ARTERIAL - Abnormal; Notable for the following:     pH, Arterial 7.057 (*)     pCO2, Arterial 45.5 (*)     pO2, Arterial 410.2 (*)     HCO3, Arterial 12.5 (*)     Base Excess, Arterial -17.0 (*)     Hemoglobin, Blood Gas 9.5 (*)     Hematocrit, Blood Gas 28.0 (*)     CO2 Content 13.9 (*)     Sodium, Arterial 136.4 (*)     Potassium, Arterial 5.06 (*)     Ionized Calcium 4.30 (*)     All other components within normal limits   CK - Abnormal; Notable for the following:     Creatine Kinase <20 (*)     All other components within normal limits   LACTIC ACID, PLASMA - Abnormal; Notable for the following:     Lactate 10.0 (*)     All other components within normal limits   CBC WITH AUTO DIFFERENTIAL - Abnormal; Notable for the following:     WBC 18.15 (*)     RBC 3.27 (*)     Hemoglobin 9.9 (*)     Hematocrit 31.6 (*)     MCHC 31.3 (*)     RDW 20.0 (*)     RDW-SD 69.3 (*)     Platelets 134 (*)     All other components within normal limits   PROTIME-INR - Abnormal; Notable for the following:     Protime 15.9 (*)     INR 1.27 (*)     All other components within normal limits    Narrative:     Therapeutic range for most indications is 2.0-3.0 INR,  or 2.5-3.5 for mechanical heart valves.   INFLUENZA ANTIGEN, RAPID - Normal   CK MB - Normal   APTT - Normal    Narrative:     The recommended Heparin therapeutic range is 68-97 seconds.   BLOOD CULTURE   BLOOD CULTURE   RAINBOW DRAW    Narrative:     The following orders were created for panel order [place] Draw.  Procedure                               Abnormality         Status                     ---------                               -----------         ------                     Light Blue Top[[id number]]                                                              Green Top (Gel)[[id number]]                                  In process                 Lavender Top[[id number]]                                     In process                  Gold Top - SST[[id number]]                                   In process                   Please view results for these tests on the individual orders.   TROPONIN (IN-HOUSE)   URINALYSIS W/ CULTURE IF INDICATED   LACTIC ACID REFLEX TIMER   CBC AND DIFFERENTIAL    Narrative:     The following orders were created for panel order CBC & Differential.  Procedure                               Abnormality         Status                     ---------                               -----------         ------                     Manual Differential[[id number]]                              In process                 Scan Slide[[id number]]                                                                  CBC Auto Differential[[id number]]        Abnormal            Final result                 Please view results for these tests on the individual orders.   GREEN TOP   LAVENDER TOP   GOLD TOP - SST   MANUAL DIFFERENTIAL       XR Chest Post CVA Port   Final Result   1. ET tube tip is 4.3 cm above the rachel   2. Right subclavian central venous catheter tip is at the   cavoatrial junction      Electronically signed by:  Alber Rahman MD  1/9/2018 3:46 AM CST   Workstation: [id number]      XR Chest 1 View   Final Result   ET tube tip in the right mainstem bronchus. The ET   tube was retracted 2 cm after the x-ray.      Electronically signed by:  Alber Rahman MD  1/9/2018 3:24 AM CST   Workstation: [employer]          Findings discussed with family at bedside.  Patient is in critical condition.  Patient be admitted to the ICU for presumed pneumonia with respiratory failure.  Cannot rule out PE at this time with patient's history.  Discussed with family the anticoagulation.  Patient will not be eligible for CTA secondary to her decreased renal function.  Random less bleeding ensues.  Patient started on antibiotics and sepsis bolus.  Patient with map under 65 which initiated a dopamine drip.  We'll attempt to  titrate that to a map of 65.          MDM  Number of Diagnoses or Management Options  Acute renal insufficiency:   Acute respiratory failure with hypoxia and hypercapnia:   Hyperkalemia:   Pneumonia due to infectious organism, unspecified laterality, unspecified part of lung:   Sepsis, due to unspecified organism:   Critical Care  Total time providing critical care:  minutes      Final diagnoses:   Acute respiratory failure with hypoxia and hypercapnia   Sepsis, due to unspecified organism   Hyperkalemia   Acute renal insufficiency   Pneumonia due to infectious organism, unspecified laterality, unspecified part of lung            Jagdish Coles MD  01/09/18 0700       Electronically signed by Jagdish Coles MD at 1/9/2018  7:00 AM          Lab Results (last 24 hours)     Procedure Component Value Units Date/Time    Blood Gas, Arterial [[id number]]  (Abnormal) Collected:  01/09/18 0336    Specimen:  Arterial Blood Updated:  01/09/18 0338     Site --      Not performed at this site.        Michael's Test --      Not performed at this site.        pH, Arterial 7.057 (L) pH units      pCO2, Arterial 45.5 (H) mm Hg      pO2, Arterial 410.2 (H) mm Hg      HCO3, Arterial 12.5 (L) mmol/L      Base Excess, Arterial -17.0 (L) mmol/L      O2 Saturation, Arterial 99.8 %      Hemoglobin, Blood Gas 9.5 (L) g/dL      Hematocrit, Blood Gas 28.0 (L) %      CO2 Content 13.9 (L)     Sodium, Arterial 136.4 (L) mmol/L      Potassium, Arterial 5.06 (H) mmol/L      Glucose, Arterial 127 mmol/L      Barometric Pressure for Blood Gas -- mmHg       Not performed at this site.        Modality --      Not performed at this site.        Ionized Calcium 4.30 (L) mg/dL     CBC Auto Differential [[id number]]  (Abnormal) Collected:  01/09/18 0336    Specimen:  Blood Updated:  01/09/18 0403     WBC 18.15 (H) 10*3/mm3      RBC 3.27 (L) 10*6/mm3      Hemoglobin 9.9 (L) g/dL      Hematocrit 31.6 (L) %      MCV 96.6 fL      MCH 30.3 pg      MCHC  31.3 (L) g/dL      RDW 20.0 (H) %      RDW-SD 69.3 (H) fl      MPV 10.7 fL      Platelets 134 (L) 10*3/mm3     CK [[id number]]  (Abnormal) Collected:  01/09/18 0336    Specimen:  Blood Updated:  01/09/18 0405     Creatine Kinase <20 (L) U/L     Lactic Acid, Plasma [[id number]]  (Abnormal) Collected:  01/09/18 0336    Specimen:  Blood Updated:  01/09/18 0408     Lactate 10.0 (C) mmol/L     Comprehensive Metabolic Panel [[id number]]  (Abnormal) Collected:  01/09/18 0336    Specimen:  Blood Updated:  01/09/18 0409     Glucose 136 (H) mg/dL      BUN 37 (H) mg/dL      Creatinine 1.51 (H) mg/dL      Sodium 138 mmol/L      Potassium 5.4 (H) mmol/L      Chloride 107 mmol/L      CO2 14.0 (L) mmol/L      Calcium 7.1 (L) mg/dL      Total Protein 4.4 (L) g/dL      Albumin 1.80 (L) g/dL      ALT (SGPT) 38 U/L      AST (SGOT) 39 (H) U/L      Alkaline Phosphatase 129 (H) U/L      Total Bilirubin 0.2 mg/dL      eGFR Non African Amer 34 (L) mL/min/1.73      Globulin 2.6 gm/dL      A/G Ratio 0.7 (L) g/dL      BUN/Creatinine Ratio 24.5     Anion Gap 17.0 (H) mmol/L     Narrative:       The MDRD GFR formula is only valid for adults with stable renal function between ages 18 and 70.    Protime-INR [[id number]]  (Abnormal) Collected:  01/09/18 0339    Specimen:  Blood Updated:  01/09/18 0411     Protime 15.9 (H) Seconds      INR 1.27 (H)    Narrative:       Therapeutic range for most indications is 2.0-3.0 INR,  or 2.5-3.5 for mechanical heart valves.    aPTT [[id number]]  (Normal) Collected:  01/09/18 0339    Specimen:  Blood Updated:  01/09/18 0411     PTT 38.8 seconds     Narrative:       The recommended Heparin therapeutic range is 68-97 seconds.    Influenza Antigen, Rapid - Swab, Nasopharynx [[id number]]  (Normal) Collected:  01/09/18 0355    Specimen:  Swab from Nasopharynx Updated:  01/09/18 0411     Influenza A Ag, EIA Negative     Influenza B Ag, EIA Negative    Blood Culture - Blood, Blood, Venous Line [[id number]] Collected:   01/09/18 0401    Specimen:  Blood from Blood, Central Line Updated:  01/09/18 0413    BNP [[id number]]  (Abnormal) Collected:  01/09/18 0336    Specimen:  Blood Updated:  01/09/18 0417     proBNP 2600.0 (H) pg/mL     CK-MB [[id number]]  (Normal) Collected:  01/09/18 0336    Specimen:  Blood Updated:  01/09/18 0417     CKMB 0.70 ng/mL     Troponin [[id number]]  (Abnormal) Collected:  01/09/18 0336    Specimen:  Blood Updated:  01/09/18 0418     Troponin I 0.045 (H) ng/mL     [place] Draw [[id number]] Collected:  01/09/18 0336    Specimen:  Blood Updated:  01/09/18 0446    Narrative:       The following orders were created for panel order [place] Draw.  Procedure                               Abnormality         Status                     ---------                               -----------         ------                     Light Blue Top[[id number]]                                                              Green Top (Gel)[[id number]]                                  Final result               Lavender Top[[id number]]                                     Final result               Gold Top - SST[[id number]]                                   Final result                 Please view results for these tests on the individual orders.    Green Top (Gel) [[id number]] Collected:  01/09/18 0336    Specimen:  Blood Updated:  01/09/18 0446     Extra Tube Hold for add-ons.      Auto resulted.       Lavender Top [[id number]] Collected:  01/09/18 0336    Specimen:  Blood Updated:  01/09/18 0446     Extra Tube hold for add-on      Auto resulted       Gold Top - SST [[id number]] Collected:  01/09/18 0336    Specimen:  Blood Updated:  01/09/18 0446     Extra Tube Hold for add-ons.      Auto resulted.       CBC & Differential [[id number]] Collected:  01/09/18 0336    Specimen:  Blood Updated:  01/09/18 0518    Narrative:       The following orders were created for panel order CBC & Differential.  Procedure                                Abnormality         Status                     ---------                               -----------         ------                     Manual Differential[[id number]]          Abnormal            Final result               Scan Slide[[id number]]                                                                  CBC Auto Differential[[id number]]        Abnormal            Final result                 Please view results for these tests on the individual orders.    Manual Differential [[id number]]  (Abnormal) Collected:  01/09/18 0336    Specimen:  Blood Updated:  01/09/18 0518     Neutrophil % 87.0 (H) %      Lymphocyte % 5.0 (L) %      Monocyte % 4.0 %      Bands %  4.0 %      Neutrophils Absolute 16.52 (H) 10*3/mm3      Lymphocytes Absolute 0.91 10*3/mm3      Monocytes Absolute 0.73 10*3/mm3      Anisocytosis Slight/1+     Hypochromia Slight/1+     Polychromasia Slight/1+     WBC Morphology Normal     Platelet Morphology Normal    Blood Culture - Blood, Blood, Venous Line [[id number]] Collected:  01/09/18 0537    Specimen:  Blood from Arm, Left Updated:  01/09/18 0542    Troponin [[id number]]  (Abnormal) Collected:  01/09/18 0537    Specimen:  Blood Updated:  01/09/18 0616     Troponin I 0.070 (H) ng/mL     Lactic Acid, Reflex Timer [[id number]] Collected:  01/09/18 0336    Specimen:  Blood Updated:  01/09/18 0716     Extra Tube Hold for add-ons.      Auto resulted.       Urinalysis With / Culture If Indicated - Urine, Clean Catch [[id number]]  (Abnormal) Collected:  01/09/18 0749    Specimen:  Urine from Urine, Clean Catch Updated:  01/09/18 0752     Color, UA Yellow     Appearance, UA Cloudy (A)     pH, UA <=5.0     Specific Gravity, UA 1.025     Glucose, UA Negative     Ketones, UA Negative     Bilirubin, UA Negative     Blood, UA Large (3+) (A)     Protein,  mg/dL (2+) (A)     Leuk Esterase, UA Negative     Nitrite, UA Negative     Urobilinogen, UA 0.2 E.U./dL    Lactic Acid, Reflex [[id number]]  (Abnormal)  Collected:  01/09/18 0726    Specimen:  Blood Updated:  01/09/18 0754     Lactate 3.4 (C) mmol/L     Urine Culture - Urine, Urine, Clean Catch [[id number]] Collected:  01/09/18 0749    Specimen:  Urine from Urine, Clean Catch Updated:  01/09/18 0831    Urinalysis, Microscopic Only - Urine, Clean Catch [[id number]]  (Abnormal) Collected:  01/09/18 0749    Specimen:  Urine from Urine, Clean Catch Updated:  01/09/18 0838     RBC, UA 6-12 (A) /HPF      WBC, UA 3-5 /HPF      Bacteria, UA Trace (A) /HPF      Squamous Epithelial Cells, UA 0-2 /HPF      Transitional Epithelial Cells, UA 0-2 /HPF      Yeast, UA  /HPF      Moderate/2+ Budding Yeast w/Hyphae     Hyaline Casts, UA 3-6 /LPF      Methodology Manual Light Microscopy        Ventilator/Non-Invasive Ventilation Settings     Start     Ordered    01/09/18 0801  Ventilator - AC/VC  Continuous     Question:  Vent Mode  Answer:  AC/VC    01/09/18 0804          Physician Progress Notes (last 24 hours) (Notes from 1/8/2018 10:21 AM through 1/9/2018 10:21 AM)     No notes of this type exist for this encounter.        Medical Student Notes (last 24 hours) (Notes from 1/8/2018 10:21 AM through 1/9/2018 10:21 AM)     No notes of this type exist for this encounter.        Consult Notes (last 24 hours) (Notes from 1/8/2018 10:21 AM through 1/9/2018 10:21 AM)     No notes of this type exist for this encounter.

## 2018-01-09 NOTE — CONSULTS
"Adult Nutrition  Assessment    Patient Name:  Damaris Sánchez  YOB: 1945  MRN: [id number]  Admit Date:  1/9/2018    Assessment Date:  1/9/2018    Comments:  Pt presents NPO on the vent.  Questionable diet and wt hx.  Dxed with Acute resp failure with pneumonia, sepsis, and COLE.  Also noted to have Cdiff.  RD will monitor her tx plans and the need for nutrition support.  AT this time, she is receiving no Diprivan.            Reason for Assessment       01/09/18 1500    Reason for Assessment    Reason For Assessment/Visit multidisciplinary rounds    Identified At Risk By Screening Criteria diagnosis    Infectious Disease C. diff;Sepsis    Pulmonary/Critical Care Acute respiratory failure;Pneumonia;Ventilator    Renal CRI                Nutrition/Diet History       01/09/18 1501    Nutrition/Diet History    Typical Food/Fluid Intake Pt resting on the vent--sedated              Labs/Tests/Procedures/Meds       01/09/18 1502    Labs/Tests/Procedures/Meds    Labs/Tests Review Reviewed;K+;Glucose;BUN;Creat    Medication Review Reviewed, pertinent            Physical Findings       01/09/18 1503    Physical Findings/Assessment    Additional Documentation Physical Appearance (Group)    Physical Appearance    Overall Physical Appearance on ventilator support    Tubes orogastric tube            Estimated/Assessed Needs       01/09/18 1503    Calculation Measurements    Weight Used For Calculations 60.8 kg (134 lb)    Height Used for Calculations 1.6 m (5' 3\")    Estimated/Assessed Energy Needs    Energy Need Method Deweese-St Jeor    Age 72    RMR (Deweese-St. Jeor Equation) 1086.94    Activity Factors (Deweese St Jeor)  Out of bed, ambulatory  1.3    Total estimated needs (Deweese St. Jeor) 1400    Estimated/Assessed Protein Needs    Weight Used for Protein Calculation 60.8 kg (134 lb)    Protein (gm/kg) 1.0    1.0 Gm Protein (gm) 60.78    Estimated Protein Range 48-61    Estimated/Assessed Fluid Needs    Fluid " Need Method Other (comment)   1500cc/day            Nutrition Prescription Ordered       01/09/18 1504    Nutrition Prescription PO    Current PO Diet NPO              Electronically signed by:  Candelaria Avila RD  01/09/18 3:07 PM

## 2018-01-09 NOTE — PROGRESS NOTES
Pharmacokinetics by Pharmacy - Vancomycin    Damaris Sánchez is a 72 y.o. female   [Ht:  ; Wt: 61.2 kg (134 lb 14.7 oz)]    Estimated Creatinine Clearance: 32.5 mL/min (by C-G formula based on Cr of 1.51).   Lab Results   Component Value Date    CREATININE 1.51 (H) 01/09/2018    CREATININE 0.61 01/07/2018    CREATININE 0.56 12/16/2017      Lab Results   Component Value Date    WBC 18.15 (H) 01/09/2018    WBC 12.31 (H) 01/07/2018    WBC 9.02 12/16/2017      Temp Readings from Last 1 Encounters:   01/09/18 98.6 °F (37 °C) (Oral)       Indication for use: Sepsis/Pneumonia     Baseline culture results:  Microbiology Results (last 10 days)       Procedure Component Value - Date/Time      Influenza Antigen, Rapid - Swab, Nasopharynx [[id number]]  (Normal) Collected:  01/09/18 0355    Lab Status:  Final result Specimen:  Swab from Nasopharynx Updated:  01/09/18 0411     Influenza A Ag, EIA Negative     Influenza B Ag, EIA Negative               Assessment/Plan  Initiated Vancomycin 1250 mg IVPB x 1 dose. Scr increased from 0.61 on 1/7 to 1.51 on 1/9  Will order Vancomycin random level on 1/10 to ensure medication is clearing appropriately.  Pharmacy will monitor renal function and adjust dose accordingly.    Kae Gamboa RPH  01/09/18 11:55 AM

## 2018-01-09 NOTE — PLAN OF CARE
Problem: Mechanical Ventilation, Invasive (Adult)  Goal: Signs and Symptoms of Listed Potential Problems Will be Absent or Manageable (Mechanical Ventilation, Invasive)  Outcome: Ongoing (interventions implemented as appropriate)   01/09/18 1505   Mechanical Ventilation, Invasive   Problems Assessed (Mechanical Ventilation, Invasive) inability to wean;malnutrition   Problems Present (Mechanical Ventilation, Invasive) inability to wean

## 2018-01-09 NOTE — ANESTHESIA PROCEDURE NOTES
Arterial Line    Patient location during procedure: ICU  Start time: 1/9/2018 11:20 AM  Stop Time:1/9/2018 11:25 AM       Line placed for hemodynamic monitoring, ABGs/Labs/ISTAT and MD/Surgeon request.  Performed By   Anesthesiologist: JR BHAT  Preanesthetic Checklist  Completed: patient identified, site marked, surgical consent, pre-op evaluation, timeout performed, IV checked, risks and benefits discussed and monitors and equipment checked  Arterial Line Prep   Sterile Tech: cap, gloves, mask and gown  Prep: ChloraPrep  Patient monitoring: blood pressure monitoring, continuous pulse oximetry and EKG  Arterial Line Procedure   Laterality:right  Location:  radial artery  Catheter size: 20 G   Guidance: ultrasound guided  PROCEDURE NOTE/ULTRASOUND INTERPRETATION.  Using ultrasound guidance the potential vascular sites for insertion of the catheter were visualized to determine the patency of the vessel to be used for vascular access.  After selecting the appropriate site for insertion, the needle was visualized under ultrasound being inserted into the radial artery, followed by ultrasound confirmation of wire and catheter placement. There were no abnormalities seen on ultrasound; an image was taken; and the patient tolerated the procedure with no complications.   Number of attempts: 2  Successful placement: yes          Post Assessment   Dressing Type: biopatch applied, occlusive dressing applied, secured with tape and wrist guard applied.   Complications no  Circ/Move/Sens Assessment: normal.   Patient Tolerance: patient tolerated the procedure well with no apparent complications

## 2018-01-09 NOTE — ED PROVIDER NOTES
Endotracheal Intubation Procedure Note  Indication for endotracheal intubation: respiratory failure  Sedation: midazolam  Paralytic: succinylcholine  Lidocaine: no  Atropine: no  Equipment: Olsen 3 laryngoscope blade  Cricoid Pressure: yes  Number of attempts: 1  ETT location confirmed by by auscultation, by CXR and ETCO2 monitor.    Signature  Snow Jessica MD  [facility] Medicine Resident, PGY II        This document has been electronically signed by Snow Jessica MD on January 9, 2018 3:26 AM                           Snow Jessica MD  Resident  01/09/18 0326

## 2018-01-09 NOTE — ED PROVIDER NOTES
Subjective   HPI Comments: Patient presents from the nursing home in critical condition.  Patient was noted to have respiratory distress.  Speaking with family patient has had several days of worsening respiratory function and more somnolence and decreased mental status.  Patient was noted to have acute demise at the nursing home within like 30-60 minutes.  EMS brought patient with very low saturations with decreasing signs of life.  Patient was noted to have agonal breathing the low pulse at the time.  Blood pressure was unable to be assessed secondary to his weakness.  Patient was emergently intubated at that time.  Patient has been being treated for pneumonia.  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  He has not been able to be ruled out in this case.      History provided by:  Patient   used: No        Review of Systems   Unable to perform ROS: Severe respiratory distress   Respiratory: Positive for shortness of breath.    Psychiatric/Behavioral: Positive for confusion.       Past Medical History:   Diagnosis Date   • Ulcerative colitis        No Known Allergies    Past Surgical History:   Procedure Laterality Date   • COLONOSCOPY     • SIGMOIDOSCOPY N/A 9/13/2017       Family History   Problem Relation Age of Onset   • Hypertension Father        Social History     Social History   • Marital status:      Spouse name: N/A   • Number of children: N/A   • Years of education: N/A     Social History Main Topics   • Smoking status: Former Smoker   • Smokeless tobacco: Never Used   • Alcohol use No   • Drug use: No   • Sexual activity: Not Currently     Other Topics Concern   • None     Social History Narrative           Objective   Physical Exam   Constitutional: She appears distressed.   Cachectic appearing ill female.   HENT:   Head: Normocephalic and atraumatic.   Eyes:   2 mm minimally responsive   Neck: Normal range of motion. Neck supple. No tracheal deviation present.    Cardiovascular: Regular rhythm.    Tachycardic.   Pulmonary/Chest: She is in respiratory distress.   Very weak respiratory effort.  Agonal-type breathing.  Patient respiratory failure.   Musculoskeletal:   Appears to be able to move extremities.  There is diffuse pallor noted.  Some lividity to the upper torso.   Lymphadenopathy:     She has no cervical adenopathy.   Neurological:   Patient opens her eyes to painful stimuli.  Patient will localize to pain.   is nonverbal.  Patient does appear to be moving all her extremities.   Skin: There is pallor.   Pallor and lividity in the upper torso.   Nursing note and vitals reviewed.      Central Line At Bedside  Date/Time: 1/9/2018 3:30 AM  Performed by: JESSICA KHALIL  Authorized by: JESSICA KHALIL     Consent:     Consent obtained:  Emergent situation  Pre-procedure details:     Hand hygiene: Hand hygiene performed prior to insertion      Sterile barrier technique: All elements of maximal sterile technique followed      Skin preparation:  2% chlorhexidine    Skin preparation agent: Skin preparation agent completely dried prior to procedure    Anesthesia (see MAR for exact dosages):     Anesthesia method:  Local infiltration    Local anesthetic:  Lidocaine 1% w/o epi  Procedure details:     Location:  R subclavian    Patient position:  Flat    Procedural supplies:  Triple lumen    Catheter size:  7 Fr    Landmarks identified: yes      Ultrasound guidance: no      Number of attempts:  1    Successful placement: yes    Post-procedure details:     Post-procedure:  Dressing applied    Assessment:  Blood return through all ports, no pneumothorax on x-ray, placement verified by x-ray and free fluid flow    Patient tolerance of procedure:  Tolerated well, no immediate complications             ED Course  ED Course      Labs Reviewed   TROPONIN (IN-HOUSE) - Abnormal; Notable for the following:        Result Value    Troponin I 0.045 (*)     All other components within  normal limits   COMPREHENSIVE METABOLIC PANEL - Abnormal; Notable for the following:     Glucose 136 (*)     BUN 37 (*)     Creatinine 1.51 (*)     Potassium 5.4 (*)     CO2 14.0 (*)     Calcium 7.1 (*)     Total Protein 4.4 (*)     Albumin 1.80 (*)     AST (SGOT) 39 (*)     Alkaline Phosphatase 129 (*)     eGFR Non  Amer 34 (*)     A/G Ratio 0.7 (*)     Anion Gap 17.0 (*)     All other components within normal limits    Narrative:     The MDRD GFR formula is only valid for adults with stable renal function between ages 18 and 70.   BNP (IN-HOUSE) - Abnormal; Notable for the following:     proBNP 2600.0 (*)     All other components within normal limits   BLOOD GAS, ARTERIAL - Abnormal; Notable for the following:     pH, Arterial 7.057 (*)     pCO2, Arterial 45.5 (*)     pO2, Arterial 410.2 (*)     HCO3, Arterial 12.5 (*)     Base Excess, Arterial -17.0 (*)     Hemoglobin, Blood Gas 9.5 (*)     Hematocrit, Blood Gas 28.0 (*)     CO2 Content 13.9 (*)     Sodium, Arterial 136.4 (*)     Potassium, Arterial 5.06 (*)     Ionized Calcium 4.30 (*)     All other components within normal limits   CK - Abnormal; Notable for the following:     Creatine Kinase <20 (*)     All other components within normal limits   LACTIC ACID, PLASMA - Abnormal; Notable for the following:     Lactate 10.0 (*)     All other components within normal limits   CBC WITH AUTO DIFFERENTIAL - Abnormal; Notable for the following:     WBC 18.15 (*)     RBC 3.27 (*)     Hemoglobin 9.9 (*)     Hematocrit 31.6 (*)     MCHC 31.3 (*)     RDW 20.0 (*)     RDW-SD 69.3 (*)     Platelets 134 (*)     All other components within normal limits   PROTIME-INR - Abnormal; Notable for the following:     Protime 15.9 (*)     INR 1.27 (*)     All other components within normal limits    Narrative:     Therapeutic range for most indications is 2.0-3.0 INR,  or 2.5-3.5 for mechanical heart valves.   INFLUENZA ANTIGEN, RAPID - Normal   CK MB - Normal   APTT - Normal     Narrative:     The recommended Heparin therapeutic range is 68-97 seconds.   BLOOD CULTURE   BLOOD CULTURE   RAINBOW DRAW    Narrative:     The following orders were created for panel order [place] Draw.  Procedure                               Abnormality         Status                     ---------                               -----------         ------                     Light Blue Top[[id number]]                                                              Green Top (Gel)[[id number]]                                  In process                 Lavender Top[[id number]]                                     In process                 Gold Top - SST[[id number]]                                   In process                   Please view results for these tests on the individual orders.   TROPONIN (IN-HOUSE)   URINALYSIS W/ CULTURE IF INDICATED   LACTIC ACID REFLEX TIMER   CBC AND DIFFERENTIAL    Narrative:     The following orders were created for panel order CBC & Differential.  Procedure                               Abnormality         Status                     ---------                               -----------         ------                     Manual Differential[[id number]]                              In process                 Scan Slide[[id number]]                                                                  CBC Auto Differential[[id number]]        Abnormal            Final result                 Please view results for these tests on the individual orders.   GREEN TOP   LAVENDER TOP   GOLD TOP - SST   MANUAL DIFFERENTIAL       XR Chest Post CVA Port   Final Result   1. ET tube tip is 4.3 cm above the rachel   2. Right subclavian central venous catheter tip is at the   cavoatrial junction      Electronically signed by:  Alber Rahman MD  1/9/2018 3:46 AM CST   Workstation: [email or web address]      XR Chest 1 View   Final Result   ET tube tip in the right mainstem bronchus. The ET   tube was retracted 2 cm after  the x-ray.      Electronically signed by:  Alber Rahman MD  1/9/2018 3:24 AM CST   Workstation: [id number]          Findings discussed with family at bedside.  Patient is in critical condition.  Patient be admitted to the ICU for presumed pneumonia with respiratory failure.  Cannot rule out PE at this time with patient's history.  Discussed with family the anticoagulation.  Patient will not be eligible for CTA secondary to her decreased renal function.  Random less bleeding ensues.  Patient started on antibiotics and sepsis bolus.  Patient with map under 65 which initiated a dopamine drip.  We'll attempt to titrate that to a map of 65.          MDM  Number of Diagnoses or Management Options  Acute renal insufficiency:   Acute respiratory failure with hypoxia and hypercapnia:   Hyperkalemia:   Pneumonia due to infectious organism, unspecified laterality, unspecified part of lung:   Sepsis, due to unspecified organism:   Critical Care  Total time providing critical care:  minutes      Final diagnoses:   Acute respiratory failure with hypoxia and hypercapnia   Sepsis, due to unspecified organism   Hyperkalemia   Acute renal insufficiency   Pneumonia due to infectious organism, unspecified laterality, unspecified part of lung            Jagdish Coles MD  01/09/18 0700

## 2018-01-09 NOTE — NURSING NOTE
Patient has dry, reddened , and flakey skin on her buttocks and coccyx. Excoriation. Needs good skin care, offloading and protection. POA yes.

## 2018-01-09 NOTE — PROGRESS NOTES
Discharge Planning Assessment  [facility]     Patient Name: Damaris Sánchez  MRN: [id number]  Today's Date: 1/9/2018    Admit Date: 1/9/2018          Discharge Needs Assessment       01/09/18 1104    Living Environment    Lives With facility resident   [facility]    Type of Financial/Environmental Concern none    Transportation Available ambulance    Living Environment Comment Pt is a resident of [facility].     Living Environment    Provides Primary Care For no one, unable/limited ability to care for self    Quality Of Family Relationships supportive    Able to Return to Prior Living Arrangements yes    Living Arrangement Comments According to [facility] pt does not have official bed hold however they can accept her back once cleared medically.     Discharge Needs Assessment    Concerns To Be Addressed adjustment to diagnosis/illness concerns    Equipment Currently Used at Home wheelchair;hospital bed;oxygen    Discharge Facility/Level Of Care Needs nursing facility, skilled    Current Discharge Risk chronically ill    Discharge Disposition still a patient            Discharge Plan       01/09/18 1107    Case Management/Social Work Plan    Additional Comments LSW assesment complete. pt is a resident of [facility]. LSW contacted facility to check bed hold status and spoke with Macrina. Pt does not have bed hold however they will accept her back once cleared medically. Pt currently intubated. LSW awaiting further direction/recomendations from MD. LSW/case mgt will follow up as consulted and complete arrangements as ordered.        Discharge Placement     No information found                Demographic Summary       01/09/18 1101    Referral Information    Admission Type inpatient    Referral Source high risk screening    Reason For Consult discharge planning    Record Reviewed medical record    Contact Information    Permission Granted to Share Information With     Primary Care Physician Information    Name Dr Jean             Functional Status       01/09/18 1101    Functional Status Prior    Ambulation 1-->assistive equipment    Transferring 1-->assistive equipment    Toileting 3-->assistive equipment and person    Bathing 2-->assistive person    Dressing 2-->assistive person    Eating 2-->assistive person    Swallowing 2-->difficulty swallowing liquids/foods    IADL    Medications completely dependent    Meal Preparation completely dependent    Housekeeping completely dependent    Laundry completely dependent    Shopping completely dependent    Oral Care assistive person    Activity Tolerance    Current Activity Limitations --   Intubated    Usual Activity Tolerance poor    Current Activity Tolerance poor    Cognitive/Perceptual/Developmental    Current Mental Status/Cognitive Functioning unable to assess   Intubated    Recent Changes in Mental Status/Cognitive Functioning unable to assess    Developmental Stage (Eriksson's Stages of Development) Stage 8 (65 years-death/Late Adulthood) Integrity vs. Despair            Psychosocial     None            Abuse/Neglect     None            Legal     None            Substance Abuse     None            Patient Forms     None          JD Edwards

## 2018-01-09 NOTE — CONSULTS
[facility]  [address] [place] [address]. [address]  T - [phone]  F  [phone]     Consultation         PATIENT  DEMOGRAPHICS   PATIENT NAME: Damaris Sánchez                      PHYSICIAN: Sylvia Islas MD  : 1945  MRN: [id number]    Subjective   SUBJECTIVE   Referring Provider: Dr Abraham  Reason for Consultation: RENU ATN  History of present illness:     Ms. Sánchez is a 72-year-old lady with a past medical history of inflammatory bowel disease.  She's been diagnosed with ulcerative colitis almost 3 years ago.  She also has a history of C. difficile colitis in the past.  I have seen in the last admission for low sodium when she admitted with acute on chronic flare of ulcerative colitis.  She is transferred to [facility] and has NSTEMI and angioplasty and IVC filter placed.     This time she came in from nursing home with respiratory distress and altered mental status.  She has h/o C.difficle colitis in sep 2017 but repeat in dec 2017 was negative.   On arrival here she is agonal breathing and therefore emergently intubated.  She has a possible pneumonia and septic shock and is currently on Levophed.  There Is also concern for PE and she is on empirically treated with the heparin    Past Medical History:   Diagnosis Date   • Ulcerative colitis      Past Surgical History:   Procedure Laterality Date   • COLONOSCOPY     • SIGMOIDOSCOPY N/A 2017     Family History   Problem Relation Age of Onset   • Hypertension Father      Social History   Substance Use Topics   • Smoking status: Former Smoker   • Smokeless tobacco: Never Used   • Alcohol use No     Allergies:  Review of patient's allergies indicates no known allergies.     REVIEW OF SYSTEMS    Review of Systems   Unable to perform ROS: Intubated       Objective   OBJECTIVE   Vital Signs  Temp:  [98.6 °F (37 °C)-101.1 °F (38.4 °C)] 99.7 °F (37.6 °C)  Heart Rate:  [118-150] 127  Resp:  [16-38] 27  BP: ()/(24-84)  101/58  FiO2 (%):  [30 %-100 %] 30 %    Flowsheet Rows         First Filed Value    Admission Height      Admission Weight  61.2 kg (134 lb 14.7 oz) Documented at 01/09/2018 0500           I/O last 3 completed shifts:  In: 130 [Other:30; IV Piggyback:100]  Out: -     PHYSICAL EXAM    Physical Exam   Constitutional: She appears well-developed.   HENT:   Head: Normocephalic.   Eyes: Pupils are equal, round, and reactive to light.   Cardiovascular: Normal rate, regular rhythm and normal heart sounds.    Pulmonary/Chest: She is in respiratory distress. She has wheezes.   Abdominal: Soft. Bowel sounds are normal.   Neurological: She exhibits normal muscle tone.       RESULTS   Results Review:      Results from last 7 days  Lab Units 01/09/18  1151 01/09/18  0336 01/07/18  1317   SODIUM mmol/L  --  138 137   SODIUM, ARTERIAL mmol/L 136.3* 136.4*  --    POTASSIUM mmol/L  --  5.4* 4.3   CHLORIDE mmol/L  --  107 103   CO2 mmol/L  --  14.0* 27.0   BUN mg/dL  --  37* 22*   CREATININE mg/dL  --  1.51* 0.61   CALCIUM mg/dL  --  7.1* 8.4   BILIRUBIN mg/dL  --  0.2 0.3   ALK PHOS U/L  --  129* 121   ALT (SGPT) U/L  --  38 41   AST (SGOT) U/L  --  39* 20   GLUCOSE mg/dL  --  136* 128*   GLUCOSE, ARTERIAL mmol/L 85 127  --        Estimated Creatinine Clearance: 32.5 mL/min (by C-G formula based on Cr of 1.51).                  Results from last 7 days  Lab Units 01/09/18  0336 01/07/18  1317   WBC 10*3/mm3 18.15* 12.31*   HEMOGLOBIN g/dL 9.9* 10.4*   PLATELETS 10*3/mm3 134* 176         Results from last 7 days  Lab Units 01/09/18  1146 01/09/18  0339   INR  1.29* 1.27*        MEDICATIONS      chlorhexidine 15 mL Mouth/Throat Q12H   etomidate      famotidine 20 mg Intravenous Q12H   flumazenil      lidocaine      lidocaine PF 1%      magic butt ointment  Topical BID   piperacillin-tazobactam 3.375 g Intravenous Q8H   propofol      sodium bicarbonate 50 mEq Intravenous Once   vecuronium          heparin (porcine) 18 Units/kg/hr Last  Rate: 18 Units/kg/hr (01/09/18 1230)   midazolam 1-10 mg/hr Last Rate: 8 mg/hr (01/09/18 1423)   norepinephrine 0.02-0.3 mcg/kg/min Last Rate: 0.02 mcg/kg/min (01/09/18 6771)   Pharmacy to dose vancomycin     sodium bicarbonate drip less than 75 mEq/bag 150 mL/hr    sodium chloride 100 mL/hr Last Rate: 100 mL/hr (01/09/18 1010)     Prescriptions Prior to Admission   Medication Sig Dispense Refill Last Dose   • acidophilus (FLORANEX) tablet tablet Take 1 tablet by mouth 3 (Three) Times a Day. 90 tablet 1    • Adalimumab (HUMIRA) 10 MG/0.2ML Prefilled Syringe Kit Inject  under the skin.      • Calcium Carbonate-Vitamin D (CALCIUM 600+D) 600-200 MG-UNIT tablet Take 1 tablet by mouth 2 (Two) Times a Day.      • famotidine (PEPCID) 40 MG tablet Take 1 tablet by mouth Daily. 30 tablet 1    • ferrous sulfate 324 (65 Fe) MG tablet delayed-release EC tablet Take 1 tablet by mouth 2 (Two) Times a Day With Meals. 60 tablet 1    • mesalamine (DELZICOL) 400 MG capsule delayed-release delayed release capsule Take 1 capsule by mouth 3 (Three) Times a Day. 90 capsule 1    • predniSONE (DELTASONE) 20 MG tablet Take 1 tablet by mouth 2 (Two) Times a Day. 60 tablet 1    • traMADol (ULTRAM) 50 MG tablet Take 50 mg by mouth Every 6 (Six) Hours As Needed for Moderate Pain .        Assessment/Plan   ASSESSMENT / PLAN    Active Problems:    Acute respiratory failure with hypoxia and hypercapnia    1.acute kidney injury patient baseline creatinine is less than 1.  She has now creatinine up to 1.5 with hyperkalemia and metabolic acidosis in the setting of septic shock and likely has ATN.  Patient has been resuscitated well.  She has received 1 L of fluid bolus.  Patient is now getting albumin drip.  She is getting normal saline at 100 cc per hour.  She only has 100 cc of urine output throughout the day.    I have discussed with the family in detail and explained that she likely has oliguric ATN and may not have a recovery soon.  She may  require renal replacement therapy if increasing uremia and metabolic acidosis or hyperkalemia.  I'll switch to bicarbonate drip and give an amp of sodium bicarbonate now.  We'll check the BMP later today.  Unable to do CT angiogram due to worsening creatinine.    2.recent diagnosis of C. difficile colitis. Repeat c.diff testing is negative    3.hypoxic hypercarbic respiratory failure currently intubated    4.septic shock patient is currently on Levophed she does have elevated lactate. On zosyn and levaquin         I discussed the patients findings and my recommendations with family and nursing staff         This document has been electronically signed by Sylvia Islas MD on January 9, 2018 5:56 PM

## 2018-01-10 NOTE — PROGRESS NOTES
[facility] Medicine Services  INPATIENT PROGRESS NOTE    Length of Stay: 1  Date of Admission: 1/9/2018  Primary Care Physician: DESTINY Arreguin    Subjective   Chief Complaint:   Chief Complaint   Patient presents with   • Shortness of Breath         Shortness of Breath       Respiratory distress according  .will start heparin drip cannot do CTA of chest to rule out PE as cr is high   01/010/2018 updated the family at length  Spoke to her ID physician  In [employer] needs to be on valgancyclovir will drop the dose to 450 bid due to the increased  Creatinine  Will send  MCV PCR today   Grew  Yeast  In urine will treat it as she is immunosuppressed   Still intubated   Urine output low   Still on a pressor for BP       Review of Systems   Unable to perform ROS: Acuity of condition   Respiratory: Positive for shortness of breath.         All pertinent negatives and positives are as above. All other systems have been reviewed and are negative unless otherwise stated.     Objective    Temp:  [98.6 °F (37 °C)-99.8 °F (37.7 °C)] 98.6 °F (37 °C)  Heart Rate:  [106-137] 137  Resp:  [20-32] 32  BP: ()/(51-62) 132/57  Arterial Line BP: ()/(50-57) 87/55  FiO2 (%):  [30 %-40 %] 30 %  Physical Exam   Constitutional: She appears well-nourished.   HENT:   Head: Normocephalic and atraumatic.   Eyes: EOM are normal. Pupils are equal, round, and reactive to light.   Neck: Normal range of motion. Neck supple.   Cardiovascular: Regular rhythm.    No murmur heard.  Pulmonary/Chest: Effort normal and breath sounds normal.   Abdominal: Soft. Bowel sounds are normal.   Neurological:   Intubated    Skin: Skin is warm and dry.   Right thigh bruising from heart cath (old) POA            Results Review:  I have reviewed the labs, radiology results, and diagnostic studies.    Laboratory Data:   Lab Results (last 24 hours)     Procedure Component Value Units Date/Time    aPTT [[id number]]   (Abnormal) Collected:  01/09/18 1810    Specimen:  Blood Updated:  01/09/18 1855     .4 (H) seconds     Narrative:       The recommended Heparin therapeutic range is 68-97 seconds.    Extra Tubes [[id number]] Collected:  01/09/18 1810    Specimen:  Blood from Blood, Venous Line Updated:  01/09/18 1916    Narrative:       The following orders were created for panel order Extra Tubes.  Procedure                               Abnormality         Status                     ---------                               -----------         ------                     Gold Top - SST[[id number]]                                   Final result                 Please view results for these tests on the individual orders.    Gold Top - SST [[id number]] Collected:  01/09/18 1810    Specimen:  Blood Updated:  01/09/18 1916     Extra Tube Hold for add-ons.      Auto resulted.       Basic Metabolic Panel [[id number]]  (Abnormal) Collected:  01/09/18 2147    Specimen:  Blood Updated:  01/09/18 2234     Glucose 72 mg/dL      BUN 38 (H) mg/dL      Creatinine 1.48 (H) mg/dL      Sodium 141 mmol/L      Potassium 4.0 mmol/L      Chloride 109 mmol/L      CO2 19.0 (L) mmol/L      Calcium 6.7 (L) mg/dL      eGFR Non African Amer 35 (L) mL/min/1.73      BUN/Creatinine Ratio 25.7 (H)     Anion Gap 13.0 mmol/L     Narrative:       The MDRD GFR formula is only valid for adults with stable renal function between ages 18 and 70.    CBC (No Diff) [[id number]]  (Abnormal) Collected:  01/10/18 0206    Specimen:  Blood Updated:  01/10/18 0215     WBC 27.75 (H) 10*3/mm3      RBC 2.76 (L) 10*6/mm3      Hemoglobin 8.2 (L) g/dL      Hematocrit 26.3 (L) %      MCV 95.3 fL      MCH 29.7 pg      MCHC 31.2 (L) g/dL      RDW 20.8 (H) %      RDW-SD 71.6 (H) fl      MPV 10.2 fL      Platelets 103 (L) 10*3/mm3     Magnesium [[id number]]  (Normal) Collected:  01/10/18 0206    Specimen:  Blood Updated:  01/10/18 0220     Magnesium 1.8 mg/dL     Comprehensive Metabolic  Panel [[id number]]  (Abnormal) Collected:  01/10/18 0206    Specimen:  Blood Updated:  01/10/18 0220     Glucose 77 mg/dL      BUN 38 (H) mg/dL      Creatinine 1.25 (H) mg/dL      Sodium 141 mmol/L      Potassium 3.7 mmol/L      Chloride 109 mmol/L      CO2 18.0 (L) mmol/L      Calcium 6.4 (L) mg/dL      Total Protein 4.3 (L) g/dL      Albumin 2.00 (L) g/dL      ALT (SGPT) 40 U/L      AST (SGOT) 60 (H) U/L      Alkaline Phosphatase 98 U/L      Total Bilirubin 0.4 mg/dL      eGFR Non African Amer 42 (L) mL/min/1.73      Globulin 2.3 gm/dL      A/G Ratio 0.9 (L) g/dL      BUN/Creatinine Ratio 30.4 (H)     Anion Gap 14.0 mmol/L     Narrative:       The MDRD GFR formula is only valid for adults with stable renal function between ages 18 and 70.    aPTT [[id number]]  (Abnormal) Collected:  01/10/18 0206    Specimen:  Blood Updated:  01/10/18 0251     .1 (H) seconds     Narrative:       The recommended Heparin therapeutic range is 68-97 seconds.    Blood Gas, Arterial [[id number]]  (Abnormal) Collected:  01/10/18 0358    Specimen:  Arterial Blood Updated:  01/10/18 0413     Site --      Not performed at this site.        Michael's Test --      Not performed at this site.        pH, Arterial 7.342 (L) pH units      pCO2, Arterial 32.6 (L) mm Hg      pO2, Arterial 136.9 (H) mm Hg      HCO3, Arterial 17.3 (L) mmol/L      Base Excess, Arterial -7.6 (L) mmol/L      O2 Saturation, Arterial 98.6 %      Hemoglobin, Blood Gas 8.9 (L) g/dL      Hematocrit, Blood Gas 26.0 (L) %      CO2 Content 18.3 (L)     Sodium, Arterial 140.2 mmol/L      Potassium, Arterial 3.55 (L) mmol/L      Glucose, Arterial 79 mmol/L      Barometric Pressure for Blood Gas -- mmHg       Not performed at this site.        Modality --      Not performed at this site.        Ionized Calcium 4.10 (L) mg/dL     Blood Culture - Blood, Blood, Venous Line [[id number]]  (Normal) Collected:  01/09/18 0401    Specimen:  Blood from Blood, Central Line Updated:   01/10/18 0416     Blood Culture No growth at 24 hours    Blood Culture - Blood, Blood, Venous Line [[id number]]  (Normal) Collected:  01/09/18 0537    Specimen:  Blood from Arm, Left Updated:  01/10/18 0546     Blood Culture No growth at 24 hours    Urine Culture - Urine, Urine, Clean Catch [[id number]]  (Abnormal) Collected:  01/09/18 0749    Specimen:  Urine from Urine, Clean Catch Updated:  01/10/18 0741     Urine Culture --      >100,000 CFU/mL Yeast isolated (A)    Blood Gas, Arterial [[id number]]  (Abnormal) Collected:  01/10/18 0853    Specimen:  Arterial Blood Updated:  01/10/18 0857     Site --      Not performed at this site.        Michael's Test --      Not performed at this site.        pH, Arterial 7.354 pH units      pCO2, Arterial 34.6 (L) mm Hg      pO2, Arterial 90.6 mm Hg      HCO3, Arterial 18.8 (L) mmol/L      Base Excess, Arterial -6.0 (L) mmol/L      O2 Saturation, Arterial 96.1 %      Hemoglobin, Blood Gas 9.2 (L) g/dL      Hematocrit, Blood Gas 27.0 (L) %      CO2 Content 19.9 (L)     Sodium, Arterial 139.6 mmol/L      Potassium, Arterial 3.40 (L) mmol/L      Glucose, Arterial 79 mmol/L      Barometric Pressure for Blood Gas -- mmHg       Not performed at this site.        Modality --      Not performed at this site.        Ionized Calcium 4.00 (L) mg/dL     Vancomycin, Random [[id number]] Collected:  01/10/18 1024    Specimen:  Blood Updated:  01/10/18 1045     Vancomycin Random 12.06 mcg/mL     aPTT [[id number]]  (Abnormal) Collected:  01/10/18 1024    Specimen:  Blood Updated:  01/10/18 1057     .5 (H) seconds     Narrative:       The recommended Heparin therapeutic range is 68-97 seconds.          Culture Data:   Blood Culture   Date Value Ref Range Status   01/09/2018 No growth at 24 hours  Preliminary   01/09/2018 No growth at 24 hours  Preliminary     Urine Culture   Date Value Ref Range Status   01/09/2018 >100,000 CFU/mL Yeast isolated (A)  Preliminary     No results found for:  RESPCX  No results found for: WOUNDCX  No results found for: STOOLCX  No components found for: BODYFLD    Radiology Data:   Imaging Results (last 24 hours)     Procedure Component Value Units Date/Time    XR Chest 1 View [[id number]] Collected:  01/10/18 0529     Updated:  01/10/18 0732    Narrative:       Chest single view.        CLINICAL INDICATION: Shortness of breath. Intubated    COMPARISON: January 9, 2018.    FINDINGS: Cardiac silhouette is normal in size. Pulmonary  vascularity is unremarkable.     Infiltrative changes right upper lobe and right lower lung field.  This is a new finding since prior examination. Unfavorable  change.    Nasogastric tube in stomach. Central venous catheter right  subclavian approach catheter tip in superior vena cava in  satisfactory position. Endotracheal tube observed with tip 4.5 cm  above the bifurcation of the rachel, in satisfactory position.      Impression:       CONCLUSION: Interval development of new infiltrative changes  right lower lobe and right midlung field, right upper lobe since  prior exam. These findings most compatible with pneumonitis.  Unfavorable change.    Electronically signed by:  Jarad Peoples MD  1/10/2018 7:30 AM CST  Workstation: [id number]          I have reviewed the patient current medications.     Assessment/Plan     Hospital Problem List     Acute respiratory failure with hypoxia and hypercapnia      fever   Hypotension  Hx of DVT s/p ivc filter   Hyperkaliemia  Hypocalcemia  RENU vs CKD  lactic acidosis   Hx of Ulcerative colitis   Septic shock   cmv colitis     Plan:  Continue iv antibiotics   Monitor blood culture   Obtain respiratory cultures  Cannot obtain CTA of chest as creatinine is elevated will start a heparin drip   Once creatinine is better will do a CTA  Repeat lactic acid   Vent management   Continue pressor   Replete calcium  Give kayexalate    give a bolus of fluids then  Fluid maintenance   Continue home medication as medical course  dictates   Tylenol prn for fever   Will start valgancyclovir   DVT GI prophylaxis                   Vic Abraham MD   01/10/18   1:04 PM

## 2018-01-10 NOTE — CONSULTS
[facility] Cardiology  INPATIENT CONSULT NOTE  Damaris Sánchez  72 y.o. female    Reason for the consult: History of CAD with recent stent placement    History of present Illness- history is obtained from the chart and the family, 72-year-old lady who has been in and out of the hospital since September of last year was sent to [facility] last month and was found to have DVT on both legs and subsequently had IVC filter placed and also had left heart catheterization and stent placement to right carotid artery with a bare metal stent.  She has history of ulcerative colitis and has been on immunosuppressants.  She had colonoscopy and hasn't had any GI bleeding recently.  She was sent back to the nursing home in [facility] for rehabilitation and came back 2 days ago with respiratory failure needing intubation with hypotension and possible sepsis.  She is also known to have C. difficile colitis and has been treated for that.  She has developed renal insufficiency.      No Known Allergies nkda      Past Medical History:   Diagnosis Date   • Ulcerative colitis    CAD with stent placement to RCA 12/ 2017 at [facility]  IVC filter placement for bilateral DVT      Past Surgical History:   Procedure Laterality Date   • COLONOSCOPY     • SIGMOIDOSCOPY N/A 9/13/2017         Family History   Problem Relation Age of Onset   • Hypertension Father          Social History     Social History   • Marital status:      Spouse name: N/A   • Number of children: N/A   • Years of education: N/A     Occupational History   • Not on file.     Social History Main Topics   • Smoking status: Former Smoker   • Smokeless tobacco: Never Used   • Alcohol use No   • Drug use: No   • Sexual activity: Not Currently     Other Topics Concern   • Not on file     Social History Narrative         Current Facility-Administered Medications   Medication Dose Route Frequency Provider Last Rate Last Dose   • chlorhexidine  (PERIDEX) 0.12 % solution 15 mL  15 mL Mouth/Throat Q12H Leeroy Navarro MD   15 mL at 01/10/18 0822   • etomidate (AMIDATE) 2 MG/ML injection  - ADS Override Pull            • famotidine (PEPCID) injection 20 mg  20 mg Intravenous Q12H Leeroy Navarro MD   20 mg at 01/10/18 0822   • flumazenil (ROMAZICON) 0.5 MG/5ML injection  - ADS Override Pull            • heparin 25707 units/250 mL (100 units/mL) in 0.45 % NaCl infusion  18 Units/kg/hr Intravenous Titrated Vic Abraham MD 5.5 mL/hr at 01/10/18 1232 9 Units/kg/hr at 01/10/18 1232    And   • heparin (porcine) 5000 UNIT/ML injection 4,900 Units  80 Units/kg Intravenous PRN Vic Abraham MD        And   • heparin (porcine) 5000 UNIT/ML injection 2,400 Units  40 Units/kg Intravenous PRN Vic Abraham MD       • lidocaine (XYLOCAINE) 2 % jelly  - ADS Override Pull            • lidocaine PF 1% (XYLOCAINE) 1 % injection  - ADS Override Pull            • magic butt ointment   Topical BID Vic Abraham MD       • midazolam (VERSED) 50 mg in sodium chloride 0.9 % 100 mL (0.5 mg/mL) infusion  1-10 mg/hr Intravenous Titrated Leeroy Navarro MD 16 mL/hr at 01/10/18 1241 8 mg/hr at 01/10/18 1241   • norepinephrine (LEVOPHED) 8,000 mcg in sodium chloride 0.9 % 250 mL (32 mcg/mL) infusion  0.02-0.3 mcg/kg/min Intravenous Titrated Leeroy Navarro MD 2,065.5 mL/hr at 01/10/18 0526 18 mcg/kg/min at 01/10/18 0526   • Pharmacy to dose vancomycin   Does not apply Continuous PRN Leeroy Navarro MD       • piperacillin-tazobactam (ZOSYN) 3.375 g in iso-osmotic dextrose 50 ml (premix)  3.375 g Intravenous Q8H Sylvia Islas MD   3.375 g at 01/10/18 1111   • propofol (DIPRIVAN) infusion 10 mg/mL 100 mL  - ADS Override Pull            • sodium bicarbonate 8.4 % 75 mEq in sodium chloride 0.45 % 1,000 mL infusion (less than 75 mEq)  100 mL/hr Intravenous Continuous Sylvia Islas  mL/hr at 01/10/18 1111 150 mL/hr at 01/10/18 1111   • sodium bicarbonate injection  "8.4% 50 mEq  50 mEq Intravenous Once Sylvia Islas MD       • sodium chloride 0.9 % flush 1-10 mL  1-10 mL Intravenous PRN Leeroy Navarro MD       • sodium chloride 0.9 % flush 10 mL  10 mL Intravenous PRN Jagdish Coles MD   10 mL at 01/09/18 2031   • vancomycin 1 g/250 mL 0.9% NS (vial-mate)  1,000 mg Intravenous Q36H Vic Abraham MD       • vecuronium (NORCURON) 20 MG injection  - ADS Override Pull                  Review of Systems     Not obtainable    OBJECTIVE    /57  Pulse (!) 128  Temp 98.6 °F (37 °C) (Oral)   Resp 26  Ht 160 cm (63\")  Wt 66 kg (145 lb 8.1 oz)  SpO2 100%  BMI 25.77 kg/m2      Physical Exam     Patient sedated and intubated on the vent.     Skin-warm and dry      Head: Normocephalic     Eyes: Pupils are equal and sluggish    Neck: Neck supple. No bruit in the carotids    Cardiovascular: Jbphh in the fifth intercostal space, regular rate, and  Rhythm,  S1 greater than S2,     Pulmonary/Chest:   Air  Entry is equal on both sides       Abdominal: Soft.  Bowel sounds are present    Musculoskeletal: No kyphoscoliosis    Neurological: Patient is sedated on the vent    Extremities-no edema,             Lab Results (last 24 hours)     Procedure Component Value Units Date/Time    aPTT [[id number]]  (Abnormal) Collected:  01/09/18 1810    Specimen:  Blood Updated:  01/09/18 1855     .4 (H) seconds     Narrative:       The recommended Heparin therapeutic range is 68-97 seconds.    Basic Metabolic Panel [[id number]]  (Abnormal) Collected:  01/09/18 2147    Specimen:  Blood Updated:  01/09/18 2234     Glucose 72 mg/dL      BUN 38 (H) mg/dL      Creatinine 1.48 (H) mg/dL      Sodium 141 mmol/L      Potassium 4.0 mmol/L      Chloride 109 mmol/L      CO2 19.0 (L) mmol/L      Calcium 6.7 (L) mg/dL      eGFR Non African Amer 35 (L) mL/min/1.73      BUN/Creatinine Ratio 25.7 (H)     Anion Gap 13.0 mmol/L     Narrative:       The MDRD GFR formula is only valid for adults with stable " renal function between ages 18 and 70.    aPTT [[id number]]  (Abnormal) Collected:  01/10/18 0206    Specimen:  Blood Updated:  01/10/18 0251     .1 (H) seconds     Narrative:       The recommended Heparin therapeutic range is 68-97 seconds.    CBC (No Diff) [[id number]]  (Abnormal) Collected:  01/10/18 0206    Specimen:  Blood Updated:  01/10/18 0215     WBC 27.75 (H) 10*3/mm3      RBC 2.76 (L) 10*6/mm3      Hemoglobin 8.2 (L) g/dL      Hematocrit 26.3 (L) %      MCV 95.3 fL      MCH 29.7 pg      MCHC 31.2 (L) g/dL      RDW 20.8 (H) %      RDW-SD 71.6 (H) fl      MPV 10.2 fL      Platelets 103 (L) 10*3/mm3     Magnesium [[id number]]  (Normal) Collected:  01/10/18 0206    Specimen:  Blood Updated:  01/10/18 0220     Magnesium 1.8 mg/dL     Comprehensive Metabolic Panel [[id number]]  (Abnormal) Collected:  01/10/18 0206    Specimen:  Blood Updated:  01/10/18 0220     Glucose 77 mg/dL      BUN 38 (H) mg/dL      Creatinine 1.25 (H) mg/dL      Sodium 141 mmol/L      Potassium 3.7 mmol/L      Chloride 109 mmol/L      CO2 18.0 (L) mmol/L      Calcium 6.4 (L) mg/dL      Total Protein 4.3 (L) g/dL      Albumin 2.00 (L) g/dL      ALT (SGPT) 40 U/L      AST (SGOT) 60 (H) U/L      Alkaline Phosphatase 98 U/L      Total Bilirubin 0.4 mg/dL      eGFR Non African Amer 42 (L) mL/min/1.73      Globulin 2.3 gm/dL      A/G Ratio 0.9 (L) g/dL      BUN/Creatinine Ratio 30.4 (H)     Anion Gap 14.0 mmol/L     Narrative:       The MDRD GFR formula is only valid for adults with stable renal function between ages 18 and 70.    Blood Gas, Arterial [[id number]]  (Abnormal) Collected:  01/10/18 0358    Specimen:  Arterial Blood Updated:  01/10/18 0413     Site --      Not performed at this site.        Michael's Test --      Not performed at this site.        pH, Arterial 7.342 (L) pH units      pCO2, Arterial 32.6 (L) mm Hg      pO2, Arterial 136.9 (H) mm Hg      HCO3, Arterial 17.3 (L) mmol/L      Base Excess, Arterial -7.6 (L) mmol/L       O2 Saturation, Arterial 98.6 %      Hemoglobin, Blood Gas 8.9 (L) g/dL      Hematocrit, Blood Gas 26.0 (L) %      CO2 Content 18.3 (L)     Sodium, Arterial 140.2 mmol/L      Potassium, Arterial 3.55 (L) mmol/L      Glucose, Arterial 79 mmol/L      Barometric Pressure for Blood Gas -- mmHg       Not performed at this site.        Modality --      Not performed at this site.        Ionized Calcium 4.10 (L) mg/dL     Blood Gas, Arterial [[id number]]  (Abnormal) Collected:  01/10/18 0853    Specimen:  Arterial Blood Updated:  01/10/18 0857     Site --      Not performed at this site.        Michael's Test --      Not performed at this site.        pH, Arterial 7.354 pH units      pCO2, Arterial 34.6 (L) mm Hg      pO2, Arterial 90.6 mm Hg      HCO3, Arterial 18.8 (L) mmol/L      Base Excess, Arterial -6.0 (L) mmol/L      O2 Saturation, Arterial 96.1 %      Hemoglobin, Blood Gas 9.2 (L) g/dL      Hematocrit, Blood Gas 27.0 (L) %      CO2 Content 19.9 (L)     Sodium, Arterial 139.6 mmol/L      Potassium, Arterial 3.40 (L) mmol/L      Glucose, Arterial 79 mmol/L      Barometric Pressure for Blood Gas -- mmHg       Not performed at this site.        Modality --      Not performed at this site.        Ionized Calcium 4.00 (L) mg/dL     aPTT [[id number]]  (Abnormal) Collected:  01/10/18 1024    Specimen:  Blood Updated:  01/10/18 1057     .5 (H) seconds     Narrative:       The recommended Heparin therapeutic range is 68-97 seconds.    Vancomycin, Random [[id number]] Collected:  01/10/18 1024    Specimen:  Blood Updated:  01/10/18 1045     Vancomycin Random 12.06 mcg/mL             A/P    CAD with recent bare metal stent placement to right coronary artery at [facility] on December 2017, currently off of antiplatelet therapy.    Bilateral DVT with recent IVC filter placement on IV heparin.    Respiratory failure with pneumonia, possible C. difficile colitis on IV antibiotics and on the ventilator, requiring pressors  to maintain blood pressure secondary to hypotension.  Patient's condition is critical.    Mild renal insufficiency followed by nephrology.    Anemia-required transfusion last month at [facility].  Patient has ulcerative colitis and has been on disease modifying agents.    Had long discussion with the family and spent greater than 45 minutes            This document has been electronically signed by Barry Quiroz MD on January 10, 2018 12:46 PM           EMR Dragon/Transcription disclaimer:   Some of this note may be an electronic transcription/translation of spoken language to printed text. The electronic translation of spoken language may permit erroneous, or at times, nonsensical words or phrases to be inadvertently transcribed; Although I have reviewed the note for such errors, some may still exist.

## 2018-01-10 NOTE — CONSULTS
"Nutrition Services    Patient Name:  Damaris Sánchez  YOB: 1945  MRN: [id number]  Admit Date:  1/9/2018    Pt remains NPO on the vent.  She is still on several pressors so I am not sure EN will be started at this time.  Will leave recommendations as per nsg family had questions regarding \"feedings.\"  Rd will recommend Osmolite 1.2 with goal rate of 50cc/hr    Electronically signed by:  Candelaria Avila RD  01/10/18 11:24 AM   "

## 2018-01-10 NOTE — PLAN OF CARE
Problem: Fall Risk (Adult)  Goal: Identify Related Risk Factors and Signs and Symptoms  Outcome: Ongoing (interventions implemented as appropriate)   01/10/18 0532   Fall Risk   Fall Risk: Related Risk Factors age-related changes;fatigue/slow reaction;history of falls   Fall Risk: Signs and Symptoms presence of risk factors     Goal: Absence of Falls  Outcome: Ongoing (interventions implemented as appropriate)      Problem: Skin Integrity Impairment, Risk/Actual (Adult)  Goal: Identify Related Risk Factors and Signs and Symptoms  Outcome: Ongoing (interventions implemented as appropriate)    Goal: Skin Integrity/Wound Healing  Outcome: Ongoing (interventions implemented as appropriate)      Problem: SAFETY - NON-VIOLENT RESTRAINT  Goal: Remains free of injury from restraints (Non-Violent Restraint)  Outcome: Ongoing (interventions implemented as appropriate)    Goal: Free from restraint(s) (Non-Violent Restraint)  Outcome: Ongoing (interventions implemented as appropriate)      Problem: Mechanical Ventilation, Invasive (Adult)  Goal: Signs and Symptoms of Listed Potential Problems Will be Absent or Manageable (Mechanical Ventilation, Invasive)  Outcome: Ongoing (interventions implemented as appropriate)

## 2018-01-10 NOTE — H&P
[facility] Admission      Date of Admission: 1/9/2018      Primary Care Physician: DESTINY Arreguin      Chief Complaint:  Respiratory failure    HPI:  Patient presented in respiratory failure and was intubated in the ED.  She was recently discharged from [facility] to a nursing home.  It was reported to EMS by NH that patient was not short of breath earlier in the day, then became short of breath relatively quickly.  Patient's son seems to think that she has been short of breath for a longer time than that.    Concurrent Medical History:  has a past medical history of Ulcerative colitis.    Past Surgical History:  has a past surgical history that includes Sigmoidoscopy (N/A, 9/13/2017) and Colonoscopy.    Family History: family history includes Hypertension in her father.     Social History:  reports that she has quit smoking. She has never used smokeless tobacco. She reports that she does not drink alcohol or use illicit drugs.    Allergies: No Known Allergies    Medications:   Prescriptions Prior to Admission   Medication Sig Dispense Refill Last Dose   • acidophilus (FLORANEX) tablet tablet Take 1 tablet by mouth 3 (Three) Times a Day. 90 tablet 1    • Adalimumab (HUMIRA) 10 MG/0.2ML Prefilled Syringe Kit Inject  under the skin.      • Calcium Carbonate-Vitamin D (CALCIUM 600+D) 600-200 MG-UNIT tablet Take 1 tablet by mouth 2 (Two) Times a Day.      • famotidine (PEPCID) 40 MG tablet Take 1 tablet by mouth Daily. 30 tablet 1    • ferrous sulfate 324 (65 Fe) MG tablet delayed-release EC tablet Take 1 tablet by mouth 2 (Two) Times a Day With Meals. 60 tablet 1    • mesalamine (DELZICOL) 400 MG capsule delayed-release delayed release capsule Take 1 capsule by mouth 3 (Three) Times a Day. 90 capsule 1    • predniSONE (DELTASONE) 20 MG tablet Take 1 tablet by mouth 2 (Two) Times a Day. 60 tablet 1    • traMADol (ULTRAM) 50 MG tablet Take 50 mg by mouth Every 6  (Six) Hours As Needed for Moderate Pain .          Review of Systems:  Review of Systems   Unable to perform ROS: Intubated          Physical Exam:   Temp:  [98.6 °F (37 °C)-101.1 °F (38.4 °C)] 98.8 °F (37.1 °C)  Heart Rate:  [111-150] 111  Resp:  [16-38] 26  BP: ()/(24-84) 116/53  Arterial Line BP: ()/(50-57) 87/55  FiO2 (%):  [30 %-100 %] 30 %     Physical Exam   Constitutional: She appears well-developed and well-nourished. She appears ill. She is sedated and intubated.   HENT:   Head: Normocephalic and atraumatic.   Nose: Nose normal.   Mouth/Throat: Oropharynx is clear and moist.   Eyes: Conjunctivae, EOM and lids are normal. Pupils are equal, round, and reactive to light. No scleral icterus.   Neck: Normal range of motion. Neck supple. No JVD present. No tracheal tenderness and no spinous process tenderness present. No rigidity. No tracheal deviation, no edema and normal range of motion present.   Cardiovascular: Regular rhythm, S1 normal, S2 normal, normal heart sounds and normal pulses.  Tachycardia present.  Exam reveals no gallop and no friction rub.    No murmur heard.  Pulmonary/Chest: Effort normal. No accessory muscle usage. She is intubated. No respiratory distress. She has decreased breath sounds. She has no wheezes. She has no rales. She exhibits no tenderness.   Abdominal: Soft. Bowel sounds are normal. She exhibits no distension and no mass. There is no tenderness. There is no rebound and no guarding.   Musculoskeletal: She exhibits no edema or tenderness.   Neurological: She displays no atrophy. No sensory deficit. She displays no seizure activity.   Intubated and sedated   Skin: Skin is warm. No rash noted. No pallor.   Psychiatric:   Intubated and sedated         Results Reviewed:  I have personally reviewed current lab, radiology, and data and agree with results.  Lab Results (last 24 hours)     Procedure Component Value Units Date/Time    Blood Gas, Arterial [[id number]]   (Abnormal) Collected:  01/09/18 0336    Specimen:  Arterial Blood Updated:  01/09/18 0338     Site --      Not performed at this site.        Michael's Test --      Not performed at this site.        pH, Arterial 7.057 (L) pH units      pCO2, Arterial 45.5 (H) mm Hg      pO2, Arterial 410.2 (H) mm Hg      HCO3, Arterial 12.5 (L) mmol/L      Base Excess, Arterial -17.0 (L) mmol/L      O2 Saturation, Arterial 99.8 %      Hemoglobin, Blood Gas 9.5 (L) g/dL      Hematocrit, Blood Gas 28.0 (L) %      CO2 Content 13.9 (L)     Sodium, Arterial 136.4 (L) mmol/L      Potassium, Arterial 5.06 (H) mmol/L      Glucose, Arterial 127 mmol/L      Barometric Pressure for Blood Gas -- mmHg       Not performed at this site.        Modality --      Not performed at this site.        Ionized Calcium 4.30 (L) mg/dL     CBC Auto Differential [[id number]]  (Abnormal) Collected:  01/09/18 0336    Specimen:  Blood Updated:  01/09/18 0403     WBC 18.15 (H) 10*3/mm3      RBC 3.27 (L) 10*6/mm3      Hemoglobin 9.9 (L) g/dL      Hematocrit 31.6 (L) %      MCV 96.6 fL      MCH 30.3 pg      MCHC 31.3 (L) g/dL      RDW 20.0 (H) %      RDW-SD 69.3 (H) fl      MPV 10.7 fL      Platelets 134 (L) 10*3/mm3     CK [[id number]]  (Abnormal) Collected:  01/09/18 0336    Specimen:  Blood Updated:  01/09/18 0405     Creatine Kinase <20 (L) U/L     Lactic Acid, Plasma [[id number]]  (Abnormal) Collected:  01/09/18 0336    Specimen:  Blood Updated:  01/09/18 0408     Lactate 10.0 (C) mmol/L     Comprehensive Metabolic Panel [[id number]]  (Abnormal) Collected:  01/09/18 0336    Specimen:  Blood Updated:  01/09/18 0409     Glucose 136 (H) mg/dL      BUN 37 (H) mg/dL      Creatinine 1.51 (H) mg/dL      Sodium 138 mmol/L      Potassium 5.4 (H) mmol/L      Chloride 107 mmol/L      CO2 14.0 (L) mmol/L      Calcium 7.1 (L) mg/dL      Total Protein 4.4 (L) g/dL      Albumin 1.80 (L) g/dL      ALT (SGPT) 38 U/L      AST (SGOT) 39 (H) U/L      Alkaline Phosphatase 129 (H) U/L       Total Bilirubin 0.2 mg/dL      eGFR Non African Amer 34 (L) mL/min/1.73      Globulin 2.6 gm/dL      A/G Ratio 0.7 (L) g/dL      BUN/Creatinine Ratio 24.5     Anion Gap 17.0 (H) mmol/L     Narrative:       The MDRD GFR formula is only valid for adults with stable renal function between ages 18 and 70.    Protime-INR [[id number]]  (Abnormal) Collected:  01/09/18 0339    Specimen:  Blood Updated:  01/09/18 0411     Protime 15.9 (H) Seconds      INR 1.27 (H)    Narrative:       Therapeutic range for most indications is 2.0-3.0 INR,  or 2.5-3.5 for mechanical heart valves.    aPTT [[id number]]  (Normal) Collected:  01/09/18 0339    Specimen:  Blood Updated:  01/09/18 0411     PTT 38.8 seconds     Narrative:       The recommended Heparin therapeutic range is 68-97 seconds.    Influenza Antigen, Rapid - Swab, Nasopharynx [[id number]]  (Normal) Collected:  01/09/18 0355    Specimen:  Swab from Nasopharynx Updated:  01/09/18 0411     Influenza A Ag, EIA Negative     Influenza B Ag, EIA Negative    BNP [[id number]]  (Abnormal) Collected:  01/09/18 0336    Specimen:  Blood Updated:  01/09/18 0417     proBNP 2600.0 (H) pg/mL     CK-MB [[id number]]  (Normal) Collected:  01/09/18 0336    Specimen:  Blood Updated:  01/09/18 0417     CKMB 0.70 ng/mL     Troponin [[id number]]  (Abnormal) Collected:  01/09/18 0336    Specimen:  Blood Updated:  01/09/18 0418     Troponin I 0.045 (H) ng/mL     [place] Draw [[id number]] Collected:  01/09/18 0336    Specimen:  Blood Updated:  01/09/18 0446    Narrative:       The following orders were created for panel order [place] Draw.  Procedure                               Abnormality         Status                     ---------                               -----------         ------                     Light Blue Top[[id number]]                                                              Green Top (Gel)[[id number]]                                  Final result               Lavender Top[[id number]]                                      Final result               Gold Top - SST[[id number]]                                   Final result                 Please view results for these tests on the individual orders.    Green Top (Gel) [[id number]] Collected:  01/09/18 0336    Specimen:  Blood Updated:  01/09/18 0446     Extra Tube Hold for add-ons.      Auto resulted.       Lavender Top [[id number]] Collected:  01/09/18 0336    Specimen:  Blood Updated:  01/09/18 0446     Extra Tube hold for add-on      Auto resulted       Gold Top - SST [[id number]] Collected:  01/09/18 0336    Specimen:  Blood Updated:  01/09/18 0446     Extra Tube Hold for add-ons.      Auto resulted.       CBC & Differential [[id number]] Collected:  01/09/18 0336    Specimen:  Blood Updated:  01/09/18 0518    Narrative:       The following orders were created for panel order CBC & Differential.  Procedure                               Abnormality         Status                     ---------                               -----------         ------                     Manual Differential[[id number]]          Abnormal            Final result               Scan Slide[[id number]]                                                                  CBC Auto Differential[[id number]]        Abnormal            Final result                 Please view results for these tests on the individual orders.    Manual Differential [[id number]]  (Abnormal) Collected:  01/09/18 0336    Specimen:  Blood Updated:  01/09/18 0518     Neutrophil % 87.0 (H) %      Lymphocyte % 5.0 (L) %      Monocyte % 4.0 %      Bands %  4.0 %      Neutrophils Absolute 16.52 (H) 10*3/mm3      Lymphocytes Absolute 0.91 10*3/mm3      Monocytes Absolute 0.73 10*3/mm3      Anisocytosis Slight/1+     Hypochromia Slight/1+     Polychromasia Slight/1+     WBC Morphology Normal     Platelet Morphology Normal    Troponin [[id number]]  (Abnormal) Collected:  01/09/18 0537    Specimen:  Blood Updated:  01/09/18  0616     Troponin I 0.070 (H) ng/mL     Lactic Acid, Reflex Timer [[id number]] Collected:  01/09/18 0336    Specimen:  Blood Updated:  01/09/18 0716     Extra Tube Hold for add-ons.      Auto resulted.       Urinalysis With / Culture If Indicated - Urine, Clean Catch [[id number]]  (Abnormal) Collected:  01/09/18 0749    Specimen:  Urine from Urine, Clean Catch Updated:  01/09/18 0752     Color, UA Yellow     Appearance, UA Cloudy (A)     pH, UA <=5.0     Specific Gravity, UA 1.025     Glucose, UA Negative     Ketones, UA Negative     Bilirubin, UA Negative     Blood, UA Large (3+) (A)     Protein,  mg/dL (2+) (A)     Leuk Esterase, UA Negative     Nitrite, UA Negative     Urobilinogen, UA 0.2 E.U./dL    Lactic Acid, Reflex [[id number]]  (Abnormal) Collected:  01/09/18 0726    Specimen:  Blood Updated:  01/09/18 0754     Lactate 3.4 (C) mmol/L     Urine Culture - Urine, Urine, Clean Catch [[id number]] Collected:  01/09/18 0749    Specimen:  Urine from Urine, Clean Catch Updated:  01/09/18 0831    Urinalysis, Microscopic Only - Urine, Clean Catch [[id number]]  (Abnormal) Collected:  01/09/18 0749    Specimen:  Urine from Urine, Clean Catch Updated:  01/09/18 0838     RBC, UA 6-12 (A) /HPF      WBC, UA 3-5 /HPF      Bacteria, UA Trace (A) /HPF      Squamous Epithelial Cells, UA 0-2 /HPF      Transitional Epithelial Cells, UA 0-2 /HPF      Yeast, UA  /HPF      Moderate/2+ Budding Yeast w/Hyphae     Hyaline Casts, UA 3-6 /LPF      Methodology Manual Light Microscopy    Blood Gas, Arterial [[id number]]  (Abnormal) Collected:  01/09/18 1151    Specimen:  Arterial Blood Updated:  01/09/18 1157     Site --      Not performed at this site.        Michael's Test --      Not performed at this site.        pH, Arterial 7.315 (L) pH units      pCO2, Arterial 35.7 mm Hg      pO2, Arterial 89.1 mm Hg      HCO3, Arterial 17.8 (L) mmol/L      Base Excess, Arterial -7.6 (L) mmol/L      O2 Saturation, Arterial 95.8 %      Hemoglobin,  Blood Gas 10.6 (L) g/dL      Hematocrit, Blood Gas 31.0 %      CO2 Content 18.9 (L)     Sodium, Arterial 136.3 (L) mmol/L      Potassium, Arterial 4.84 mmol/L      Glucose, Arterial 85 mmol/L      Barometric Pressure for Blood Gas -- mmHg       Not performed at this site.        Modality --      Not performed at this site.        Ionized Calcium 4.40 (L) mg/dL     Lactic Acid, Plasma [[id number]]  (Normal) Collected:  01/09/18 1147    Specimen:  Blood Updated:  01/09/18 1213     Lactate 1.3 mmol/L     Protime-INR [[id number]]  (Abnormal) Collected:  01/09/18 1146    Specimen:  Blood Updated:  01/09/18 1223     Protime 16.1 (H) Seconds      INR 1.29 (H)    Narrative:       Therapeutic range for most indications is 2.0-3.0 INR,  or 2.5-3.5 for mechanical heart valves.    aPTT [[id number]]  (Abnormal) Collected:  01/09/18 1146    Specimen:  Blood Updated:  01/09/18 1223     PTT 41.5 (H) seconds     Narrative:       The recommended Heparin therapeutic range is 68-97 seconds.    Blood Culture - Blood, Blood, Venous Line [[id number]]  (Normal) Collected:  01/09/18 0401    Specimen:  Blood from Blood, Central Line Updated:  01/09/18 1616     Blood Culture No growth at less than 24 hours    Blood Culture - Blood, Blood, Venous Line [[id number]]  (Normal) Collected:  01/09/18 0537    Specimen:  Blood from Arm, Left Updated:  01/09/18 1746     Blood Culture No growth at less than 24 hours    aPTT [[id number]]  (Abnormal) Collected:  01/09/18 1810    Specimen:  Blood Updated:  01/09/18 1855     .4 (H) seconds     Narrative:       The recommended Heparin therapeutic range is 68-97 seconds.    Extra Tubes [[id number]] Collected:  01/09/18 1810    Specimen:  Blood from Blood, Venous Line Updated:  01/09/18 1916    Narrative:       The following orders were created for panel order Extra Tubes.  Procedure                               Abnormality         Status                     ---------                               -----------          ------                     Gold Top - [facility][[id number]]                                   Final result                 Please view results for these tests on the individual orders.    [facility] [[id number]] Collected:  01/09/18 1810    Specimen:  Blood Updated:  01/09/18 1916     Extra Tube Hold for add-ons.      Auto resulted.       Basic Metabolic Panel [[id number]]  (Abnormal) Collected:  01/09/18 2147    Specimen:  Blood Updated:  01/09/18 2234     Glucose 72 mg/dL      BUN 38 (H) mg/dL      Creatinine 1.48 (H) mg/dL      Sodium 141 mmol/L      Potassium 4.0 mmol/L      Chloride 109 mmol/L      CO2 19.0 (L) mmol/L      Calcium 6.7 (L) mg/dL      eGFR Non African Amer 35 (L) mL/min/1.73      BUN/Creatinine Ratio 25.7 (H)     Anion Gap 13.0 mmol/L     Narrative:       The MDRD GFR formula is only valid for adults with stable renal function between ages 18 and 70.        Imaging Results (last 24 hours)     Procedure Component Value Units Date/Time    XR Chest 1 View [[id number]] Collected:  01/09/18 0302     Updated:  01/09/18 0325    Narrative:       Exam: AP portable chest    INDICATION: ET tube placement    FINDINGS: The bony structures are intact. Cardiomediastinal size  unremarkable. Plaque is present in the aorta. ET tube tip is in  right mainstem bronchus. The tube was retracted to 2 cm after the  x-ray was taken. Prominence of central bronchovascular markings.  No pneumothorax or pleural effusion      Impression:       ET tube tip in the right mainstem bronchus. The ET  tube was retracted 2 cm after the x-ray.    Electronically signed by:  Alber Rahman MD  1/9/2018 3:24 AM CST  Workstation: [id number]    XR Chest Post CVA Port [[id number]] Collected:  01/09/18 0320     Updated:  01/09/18 0347    Narrative:       Exam: AP portable chest    INDICATION: Central line placement    COMPARISON: 12/8/2017    FINDINGS: AP portable chest. ET tube tip is 4.3 cm above the  rachel. Right subclavian central  venous catheter tip is at the  cavoatrial junction. No visible pneumothorax. Heart size normal.  There is prominence of central bronchovascular markings.      Impression:       1. ET tube tip is 4.3 cm above the rachel  2. Right subclavian central venous catheter tip is at the  cavoatrial junction    Electronically signed by:  Alber Rahman MD  1/9/2018 3:46 AM CST  Workstation: [id number]    XR Chest 1 View [[id number]] Collected:  01/09/18 0813     Updated:  01/09/18 0843    Narrative:         PROCEDURE: Single chest view AP    REASON FOR EXAM:Confirm ET Tube Placement, J96.01 Acute  respiratory failure with hypoxia J96.02 Acute respiratory failure  with hypercapnia A41.9 Sepsis, unspecified organism E87.5  Hyperkalemia N28.9 Disorder of kidney and ureter, unspecified  J18.9 Pneumonia, unspecified organism    FINDINGS: Comparison study dated January 9, 2018. ET tube with  tip 3.7 cm above rachel. NG tube with tip below level diaphragm.  Right subclavian central venous catheter with tip overlying the  SVC. Cardiac and pulmonary vasculature are normal. Lungs are  clear. Very small right pleural effusion. No acute osseous  abnormality.      Impression:       1.  Tubes and lines as described above.  2.  Very small right pleural effusion.  3.  Otherwise unremarkable chest.    Electronically signed by:  Daron Amaro MD  1/9/2018 8:42 AM CST  Workstation: [id number]            Assessment:    Hospital Problem List     Acute respiratory failure with hypoxia and hypercapnia                Plan:  1.  Respiratory failure:  Hypoxic and hypercapnic.  Seems likely to pneumonia.  On ventilator.  Continue support for now.  2.  Sepsis:  Secondary to pneumonia.  Continue supportive care and antibiotics.  3.  Pneumonia:  Continue broad spectrum and await cultures.  4.  Acidosis:  Combined metabolic and respiratory.  Continue treating sepsis and respiratory failure.  5.  RENU:  Creatinine up to about 1.5.  Consult nephrology  6.  Recent  diagnosis of C diff      I discussed the patients findings and my recommendations with: family        This document has been electronically signed by Leeroy Navarro MD on January 9, 2018 11:49 PM

## 2018-01-10 NOTE — PROGRESS NOTES
"Intensive Care Follow-up      LOS: 1 day     Ms. Damaris Sánchez, 72 y.o. female is followed for: <principal problem not specified>   CHIEF COMPLIANT  Weakness and shortness of breath  Subjective - Interval History     This 72-year-old lady with history of Crohn's disease on Humira, coronary artery disease with VC filter for lower extremity clotting was admitted with altered mental status was found to have metabolic acidosis respiratory failure sepsis and pneumonia.  She is currently on mechanical ventilator receiving antibiotics.  She has history of MI and now has acute kidney injury    The patient's relevant past medical, surgical and social history were reviewed and updated in Epic as appropriate.     Objective   /60  Pulse 116  Temp 98.6 °F (37 °C) (Oral)   Resp 28  Ht 160 cm (63\")  Wt 66 kg (145 lb 8.1 oz)  SpO2 96%  BMI 25.77 kg/m2      Vent Settings: Vent Mode: SIMV/VC+  FiO2 (%):  [30 %-40 %] 30 %  S RR:  [12-16] 12  PEEP/CPAP (cm H2O):  [5 cm H20] 5 cm H20  DC SUP:  [15 cm H20] 15 cm H20  MAP (cm H2O):  [10-14] 10    Physical Exam:Chronically ill-appearing female looking older than her stated age on a mechanical ventilator sedated    General Appearance:       Head:    Normocephalic, without obvious abnormality, atraumatic   Eyes:            Lids and lashes normal, conjunctivae and sclerae normal, no   icterus, no pallor, corneas clear, PERRLA   Ears:    Ears appear intact with no abnormalities noted   Throat:   No oral lesions, no thrush, oral mucosa moist   Neck:   No adenopathy, supple, trachea midline, no thyromegaly, no   carotid bruit, no JVD   Back:     No kyphosis present, no scoliosis present, no skin lesions,      erythema or scars, no tenderness to percussion or                   palpation,   range of motion normal   Lungs:   Diminished breath sounds no rales rhonchi or wheeze     Heart:    Regular rhythm and normal rate, normal S1 and S2, no            murmur, no gallop, no rub, no " click   Chest Wall:    No abnormalities observed   Abdomen:     Normal bowel sounds, no masses, no organomegaly, soft        non-tender, non-distended, no guarding, no rebound                tenderness   Rectal:     Deferred   Extremities:   Moves all extremities well, no edema, no cyanosis, no             redness   Pulses:   Pulses palpable and equal bilaterally   Skin:   No bleeding, bruising or rash   Lymph nodes:   No palpable adenopathy         LAB:    pH, Arterial   Date Value Ref Range Status   01/10/2018 7.354 7.350 - 7.450 pH units Final     pCO2, Arterial   Date Value Ref Range Status   01/10/2018 34.6 (L) 35.0 - 45.0 mm Hg Final     pO2, Arterial   Date Value Ref Range Status   01/10/2018 90.6 80.0 - 105.0 mm Hg Final     Lab Results   Component Value Date    WBC 27.75 (H) 01/10/2018    HGB 8.2 (L) 01/10/2018    HCT 26.3 (L) 01/10/2018    MCV 95.3 01/10/2018     (L) 01/10/2018     Lab Results   Component Value Date    GLUCOSE 79 01/10/2018    BUN 38 (H) 01/10/2018    CREATININE 1.25 (H) 01/10/2018    EGFRIFNONA 42 (L) 01/10/2018    BCR 30.4 (H) 01/10/2018    CO2 18.0 (L) 01/10/2018    CALCIUM 6.4 (L) 01/10/2018    ALBUMIN 2.00 (L) 01/10/2018    LABIL2 0.9 (L) 01/10/2018    AST 60 (H) 01/10/2018    ALT 40 01/10/2018         RAD:   Imaging Results (last 24 hours)     Procedure Component Value Units Date/Time    XR Chest 1 View [[id number]] Collected:  01/10/18 0529     Updated:  01/10/18 0732    Narrative:       Chest single view.        CLINICAL INDICATION: Shortness of breath. Intubated    COMPARISON: January 9, 2018.    FINDINGS: Cardiac silhouette is normal in size. Pulmonary  vascularity is unremarkable.     Infiltrative changes right upper lobe and right lower lung field.  This is a new finding since prior examination. Unfavorable  change.    Nasogastric tube in stomach. Central venous catheter right  subclavian approach catheter tip in superior vena cava in  satisfactory position. Endotracheal  tube observed with tip 4.5 cm  above the bifurcation of the rachel, in satisfactory position.      Impression:       CONCLUSION: Interval development of new infiltrative changes  right lower lobe and right midlung field, right upper lobe since  prior exam. These findings most compatible with pneumonitis.  Unfavorable change.    Electronically signed by:  Jarad Peoples MD  1/10/2018 7:30 AM CST  Workstation: [id number]         Impression      Hospital Problem List     Acute respiratory failure with hypoxia and hypercapnia               Plan        Assessment right-sided pneumonia with sepsis acidosis respiratory failure and renal insufficiency.  She also has Crohn's disease on Humira    Recommendations antibiotics with persistent ventilator management and follow    I discussed the patient's findings and my recommendations with patient and family

## 2018-01-10 NOTE — PROGRESS NOTES
"Pharmacokinetics by Pharmacy - Vancomycin    Damaris Sánchez is a 72 y.o. female  [Ht: 160 cm (63\"); Wt: 66 kg (145 lb 8.1 oz)]    Estimated Creatinine Clearance: 37.1 mL/min (by C-G formula based on Cr of 1.25).   Lab Results   Component Value Date    CREATININE 1.25 (H) 01/10/2018    CREATININE 1.48 (H) 01/09/2018    CREATININE 1.51 (H) 01/09/2018      Lab Results   Component Value Date    WBC 27.75 (H) 01/10/2018    WBC 18.15 (H) 01/09/2018    WBC 12.31 (H) 01/07/2018      Temp Readings from Last 1 Encounters:   01/10/18 98.6 °F (37 °C) (Oral)      Lab Results   Component Value Date    VANCORANDOM 12.06 01/10/2018         Culture Results:  Microbiology Results (last 10 days)       Procedure Component Value - Date/Time      Urine Culture - Urine, Urine, Clean Catch [[id number]]  (Abnormal) Collected:  01/09/18 0749    Lab Status:  Preliminary result Specimen:  Urine from Urine, Clean Catch Updated:  01/10/18 0741     Urine Culture --      >100,000 CFU/mL Yeast isolated (A)    Blood Culture - Blood, Blood, Venous Line [[id number]]  (Normal) Collected:  01/09/18 0537    Lab Status:  Preliminary result Specimen:  Blood from Arm, Left Updated:  01/10/18 0546     Blood Culture No growth at 24 hours    Blood Culture - Blood, Blood, Venous Line [[id number]]  (Normal) Collected:  01/09/18 0401    Lab Status:  Preliminary result Specimen:  Blood from Blood, Central Line Updated:  01/10/18 0416     Blood Culture No growth at 24 hours    Influenza Antigen, Rapid - Swab, Nasopharynx [[id number]]  (Normal) Collected:  01/09/18 0355    Lab Status:  Final result Specimen:  Swab from Nasopharynx Updated:  01/09/18 0411     Influenza A Ag, EIA Negative     Influenza B Ag, EIA Negative               Indication for use: Sepsis secondary to pneumonia    Current Vancomycin Dose:  1000 mg IVPB every 36 hours, day 2 of therapy.      Assessment/Plan:  Random level drawn about 24 hours after dose was 12.06.  Expect 24 hours may be too frequent " dosing based on this level.  Will increase frequency to every 36 hours and collect a peak and trough after the next dose.  If renal function continues to improve or patient dramatically worsens will draw another level sooner and consider changing frequency back to every 24 hours. Pharmacy will continue to monitor renal function and adjust dose accordingly.    Martin Rodriguez III, [facility]   01/10/18 12:45 PM

## 2018-01-10 NOTE — PROGRESS NOTES
"[facility]  [address] [place] [address]. [id number]  T - [phone]  F - [phone]     Progress Note          PATIENT  DEMOGRAPHICS   PATIENT NAME: Damaris Sánchez                      PHYSICIAN: Sylvia Islas MD  : 1945  MRN: [id number]   LOS: 1 day    Patient Care Team:  DESTINY Arreguin as PCP - General (Nurse Practitioner)  Subjective   SUBJECTIVE    cc ON bp is still low          Objective   OBJECTIVE   Vital Signs  Temp:  [98.6 °F (37 °C)-99.8 °F (37.7 °C)] 98.6 °F (37 °C)  Heart Rate:  [106-128] 128  Resp:  [20-28] 26  BP: ()/(51-62) 132/57  Arterial Line BP: ()/(50-57) 87/55  FiO2 (%):  [30 %-40 %] 30 %    Flowsheet Rows         First Filed Value    Admission Height  160 cm (63\") Documented at 2018 1840    Admission Weight  61.2 kg (134 lb 14.7 oz) Documented at 2018 0500           I/O last 3 completed shifts:  In: 5623 [I.V.:4113; Other:210; IV Piggyback:1300]  Out: 660 [Urine:510; Other:150]    PHYSICAL EXAM    Physical Exam   Constitutional: She appears well-developed.   HENT:   Head: Normocephalic.   Eyes: Pupils are equal, round, and reactive to light.   Cardiovascular: Normal rate, regular rhythm and normal heart sounds.    Pulmonary/Chest: Effort normal. She has wheezes.   Abdominal: Soft. Bowel sounds are normal.   Musculoskeletal: She exhibits edema.   Neurological: She exhibits normal muscle tone.       RESULTS   Results Review:      Results from last 7 days  Lab Units 01/10/18  0853 01/10/18  0358 01/10/18  0206 18  2147  18  0336 18  1317   SODIUM mmol/L  --   --  141 141  --  138 137   SODIUM, ARTERIAL mmol/L 139.6 140.2  --   --   < > 136.4*  --    POTASSIUM mmol/L  --   --  3.7 4.0  --  5.4* 4.3   CHLORIDE mmol/L  --   --  109 109  --  107 103   CO2 mmol/L  --   --  18.0* 19.0*  --  14.0* 27.0   BUN mg/dL  --   --  38* 38*  --  37* 22*   CREATININE mg/dL  --   --  1.25* 1.48*  --  1.51* 0.61   CALCIUM " mg/dL  --   --  6.4* 6.7*  --  7.1* 8.4   BILIRUBIN mg/dL  --   --  0.4  --   --  0.2 0.3   ALK PHOS U/L  --   --  98  --   --  129* 121   ALT (SGPT) U/L  --   --  40  --   --  38 41   AST (SGOT) U/L  --   --  60*  --   --  39* 20   GLUCOSE mg/dL  --   --  77 72  --  136* 128*   GLUCOSE, ARTERIAL mmol/L 79 79  --   --   < > 127  --    < > = values in this interval not displayed.    Estimated Creatinine Clearance: 37.1 mL/min (by C-G formula based on Cr of 1.25).      Results from last 7 days  Lab Units 01/10/18  0206   MAGNESIUM mg/dL 1.8               Results from last 7 days  Lab Units 01/10/18  0206 01/09/18  0336 01/07/18  1317   WBC 10*3/mm3 27.75* 18.15* 12.31*   HEMOGLOBIN g/dL 8.2* 9.9* 10.4*   PLATELETS 10*3/mm3 103* 134* 176         Results from last 7 days  Lab Units 01/09/18  1146 01/09/18  0339   INR  1.29* 1.27*         Imaging Results (last 24 hours)     Procedure Component Value Units Date/Time    XR Chest 1 View [[id number]] Collected:  01/10/18 0529     Updated:  01/10/18 0732    Narrative:       Chest single view.        CLINICAL INDICATION: Shortness of breath. Intubated    COMPARISON: January 9, 2018.    FINDINGS: Cardiac silhouette is normal in size. Pulmonary  vascularity is unremarkable.     Infiltrative changes right upper lobe and right lower lung field.  This is a new finding since prior examination. Unfavorable  change.    Nasogastric tube in stomach. Central venous catheter right  subclavian approach catheter tip in superior vena cava in  satisfactory position. Endotracheal tube observed with tip 4.5 cm  above the bifurcation of the rachel, in satisfactory position.      Impression:       CONCLUSION: Interval development of new infiltrative changes  right lower lobe and right midlung field, right upper lobe since  prior exam. These findings most compatible with pneumonitis.  Unfavorable change.    Electronically signed by:  Jarad Peoples MD  1/10/2018 7:30 AM CST  Workstation: [id number]            MEDICATIONS      calcium gluconate 1 g Intravenous Once   chlorhexidine 15 mL Mouth/Throat Q12H   etomidate      famotidine 20 mg Intravenous Q12H   flumazenil      lidocaine      lidocaine PF 1%      magic butt ointment  Topical BID   piperacillin-tazobactam 3.375 g Intravenous Q8H   propofol      vecuronium          heparin (porcine) 18 Units/kg/hr Last Rate: Stopped (01/10/18 1112)   midazolam 1-10 mg/hr Last Rate: 8 mg/hr (01/10/18 0518)   norepinephrine 0.02-0.3 mcg/kg/min Last Rate: 18 mcg/kg/min (01/10/18 0526)   Pharmacy to dose vancomycin     sodium bicarbonate drip less than 75 mEq/bag 150 mL/hr Last Rate: 150 mL/hr (01/10/18 1111)   sodium chloride 100 mL/hr Last Rate: Stopped (01/09/18 1823)       Assessment/Plan   ASSESSMENT / PLAN    Active Problems:    Acute respiratory failure with hypoxia and hypercapnia    1.acute kidney injury patient baseline creatinine is less than 1.  She has now creatinine up to 1.5 with hyperkalemia and metabolic acidosis in the setting of septic shock and likely has ATN. UO is 500 cc since yesterday and is better and cr is also improved along with k. Keep bicarb drip and lower the rate to 100cc/hr. Kaden FOWLER      Unable to do CT angiogram due to worsening creatinine.     2.recent diagnosis of C. difficile colitis. Repeat c.diff testing is negative     3.hypoxic hypercarbic respiratory failure currently intubated     4.septic shock patient is currently on Levophed she does have elevated lactate. On zosyn and vancomycin                This document has been electronically signed by Sylvia Islas MD on January 10, 2018 12:14 PM

## 2018-01-11 NOTE — PROGRESS NOTES
"Intensive Care Follow-up      LOS: 2 days     Ms. Damaris Sánchez, 72 y.o. female is followed for: <principal problem not specified>   CHIEF COMPLIANT  Shortness of breath  Subjective - Interval History     This lady has pneumonia sepsis respiratory failure and renal insufficiency.  She is now on assist control because of her kidney and has a white count of 24,000    The patient's relevant past medical, surgical and social history were reviewed and updated in Epic as appropriate.     Objective   /63  Pulse (!) 125  Temp 97.6 °F (36.4 °C) (Oral)   Resp (!) 29  Ht 160 cm (63\")  Wt 71.8 kg (158 lb 4.6 oz)  SpO2 100%  BMI 28.04 kg/m2      Vent Settings: Vent Mode: VC+/AC  FiO2 (%):  [30 %] 30 %  S RR:  [12] 12  PEEP/CPAP (cm H2O):  [5 cm H20] 5 cm H20  [place] SUP:  [0 cm H20-15 cm H20] 0 cm H20  MAP (cm H2O):  [10-14] 13    Physical Exam:Elderly on a clinical ill-appearing sedated white female on a ventilator    General Appearance:       Head:    Normocephalic, without obvious abnormality, atraumatic   Eyes:            Lids and lashes normal, conjunctivae and sclerae normal, no   icterus, no pallor, corneas clear, PERRLA   Ears:    Ears appear intact with no abnormalities noted   Throat:   No oral lesions, no thrush, oral mucosa moist   Neck:   No adenopathy, supple, trachea midline, no thyromegaly, no   carotid bruit, no JVD   Back:     No kyphosis present, no scoliosis present, no skin lesions,      erythema or scars, no tenderness to percussion or                   palpation,   range of motion normal   Lungs:   Diminished breath sounds without rales rhonchi or wheeze     Heart:    Regular rhythm and normal rate, normal S1 and S2, no            murmur, no gallop, no rub, no click   Chest Wall:    No abnormalities observed   Abdomen:     Normal bowel sounds, no masses, no organomegaly, soft        non-tender, non-distended, no guarding, no rebound                tenderness   Rectal:     Deferred   Extremities:  "  Moves all extremities well, no edema, no cyanosis, no             redness   Pulses:   Pulses palpable and equal bilaterally   Skin:   No bleeding, bruising or rash   Lymph nodes:   No palpable adenopathy   LAB:    pH, Arterial   Date Value Ref Range Status   01/11/2018 7.377 7.350 - 7.450 pH units Final     pCO2, Arterial   Date Value Ref Range Status   01/11/2018 36.0 35.0 - 45.0 mm Hg Final     pO2, Arterial   Date Value Ref Range Status   01/11/2018 85.6 80.0 - 105.0 mm Hg Final     Lab Results   Component Value Date    WBC 24.00 (H) 01/11/2018    HGB 8.7 (L) 01/11/2018    HCT 27.4 (L) 01/11/2018    MCV 95.8 01/11/2018    PLT 63 (L) 01/11/2018     Lab Results   Component Value Date    GLUCOSE 93 01/11/2018    BUN 42 (H) 01/11/2018    CREATININE 1.36 (H) 01/11/2018    EGFRIFNONA 38 (L) 01/11/2018    BCR 30.9 (H) 01/11/2018    CO2 20.0 (L) 01/11/2018    CALCIUM 6.5 (L) 01/11/2018    ALBUMIN 1.90 (L) 01/11/2018    LABIL2 0.8 (L) 01/11/2018    AST 61 (H) 01/11/2018    ALT 37 01/11/2018         RAD:   Imaging Results (last 24 hours)     Procedure Component Value Units Date/Time    XR Chest 1 View [[id number]] Collected:  01/11/18 0439     Updated:  01/11/18 0725    Narrative:       Exam: AP portable chest    INDICATION: Respiratory failure    COMPARISON: 1/10/2018 at 4:58 AM    FINDINGS: AP portable chest. Endotracheal tube NG tube and right  subclavian central venous catheter remain in place. Heart size is  normal. Plaque is present in the aorta. No change in patchy  infiltrate in the right upper and lower lung. Left lung is clear.  No pneumothorax or pleural effusion.      Impression:       No significant interval change    Electronically signed by:  Alber Rahman MD  1/11/2018 7:24 AM  CST Workstation: [id number]     Acute respiratory failure with hypoxia and hypercapnia               Plan        Assessment pneumonia sepsis respiratory failure and renal  insufficiency    Plan continue ventilator management, follow chest x-ray and blood gas    I discussed the patient's findings and my recommendations with patient and family

## 2018-01-11 NOTE — PROGRESS NOTES
[facility] Cardiology  INPATIENT PROGRESS NOTE    Name: Damaris Sánchez  Age/Sex: 72 y.o. female  :  1945        PCP: DESTINY Arreguin    Vital Signs  Temp:  [97.3 °F (36.3 °C)-97.6 °F (36.4 °C)] 97.6 °F (36.4 °C)  Heart Rate:  [] 140  Resp:  [23-41] 33  BP: ()/(28-86) 96/56  FiO2 (%):  [30 %-100 %] 50 %  Body mass index is 28.04 kg/(m^2).      Subjective  patient with septic shock on multiple antibiotics and pressors doing poorly.  She is been immunocompromised due to ulcerative colitis and Crohn's disease.  Currently there is no GI bleed and she is anemic.  She had PCI to the right coronary artery in 2017 at [facility] in [place].  And her renal function also is slowly deteriorating and there is candida in the urine.      Patient Active Problem List   Diagnosis   • Lower abdominal pain   • C. difficile colitis   • Weakness   • Ulcerative colitis   • Acute respiratory failure with hypoxia and hypercapnia       Past Medical History:   Diagnosis Date   • Ulcerative colitis        Current Facility-Administered Medications   Medication Dose Route Frequency Provider Last Rate Last Dose   • chlorhexidine (PERIDEX) 0.12 % solution 15 mL  15 mL Mouth/Throat Q12H Leeroy Navarro MD   15 mL at 01/10/18 2018   • etomidate (AMIDATE) 2 MG/ML injection  - ADS Override Pull            • famotidine (PEPCID) injection 20 mg  20 mg Intravenous Q12H Leeroy Navarro MD   20 mg at 18 1026   • fluconazole (DIFLUCAN) in sodium chloride 0.9% IVBP 100 mg  100 mg Intravenous Daily Vic Abraham MD   100 mg at 18 1026   • flumazenil (ROMAZICON) 0.5 MG/5ML injection  - ADS Override Pull            • heparin 28369 units/250 mL (100 units/mL) in 0.45 % NaCl infusion  18 Units/kg/hr Intravenous Titrated Vic Abraham MD   Stopped at 01/10/18 1413    And   • heparin (porcine) 5000 UNIT/ML injection 4,900 Units  80 Units/kg Intravenous PRN Vic Abraham MD         And   • heparin (porcine) 5000 UNIT/ML injection 2,400 Units  40 Units/kg Intravenous PRN iVc Abraham MD       • hydrocortisone sodium succinate (Solu-CORTEF) injection 100 mg  100 mg Intravenous Q6H Vic Abraham MD   100 mg at 01/11/18 1025   • lidocaine (XYLOCAINE) 2 % jelly  - ADS Override Pull            • lidocaine PF 1% (XYLOCAINE) 1 % injection  - ADS Override Pull            • magic butt ointment   Topical BID Vic Abraham MD       • midazolam (VERSED) 50 mg in sodium chloride 0.9 % 100 mL (0.5 mg/mL) infusion  1-10 mg/hr Intravenous Titrated Leeroy Navarro MD 12 mL/hr at 01/11/18 1133 6 mg/hr at 01/11/18 1133   • norepinephrine (LEVOPHED) 8,000 mcg in sodium chloride 0.9 % 250 mL (32 mcg/mL) infusion  0.02-0.3 mcg/kg/min Intravenous Titrated Leeroy Navarro MD 34.4 mL/hr at 01/11/18 1136 0.3 mcg/kg/min at 01/11/18 1136   • Pharmacy to dose vancomycin   Does not apply Continuous PRN Leeroy Navarro MD       • piperacillin-tazobactam (ZOSYN) 3.375 g in iso-osmotic dextrose 50 ml (premix)  3.375 g Intravenous Q8H Sylvia Islas MD   3.375 g at 01/11/18 0335   • propofol (DIPRIVAN) infusion 10 mg/mL 100 mL  - ADS Override Pull            • sodium bicarbonate 8.4 % 75 mEq in sodium chloride 0.45 % 1,000 mL infusion (less than 75 mEq)  100 mL/hr Intravenous Continuous Sylvia Islas  mL/hr at 01/11/18 0044 100 mL/hr at 01/11/18 0044   • sodium chloride 0.9 % flush 1-10 mL  1-10 mL Intravenous PRN Leeroy Navarro MD       • sodium chloride 0.9 % flush 10 mL  10 mL Intravenous PRN Jagdish Coles MD   10 mL at 01/09/18 2031   • valGANciclovir (VALCYTE) tablet 450 mg  450 mg Oral BID Vic Abraham MD   450 mg at 01/11/18 1225   • vancomycin 1 g/250 mL 0.9% NS (vial-mate)  1,000 mg Intravenous Q36H Vic Abraham MD   1,000 mg at 01/10/18 1411   • vasopressin (PITRESSIN) 20 Units in sodium chloride 0.9 % 100 mL (0.2 Units/mL) infusion  0.02-0.1 Units/min Intravenous Titrated Vic  JARAD Abraham MD 12 mL/hr at 01/11/18 1000 0.04 Units/min at 01/11/18 1000   • vecuronium (NORCURON) 20 MG injection  - ADS Override Pull                Past Surgical History:   Procedure Laterality Date   • COLONOSCOPY     • SIGMOIDOSCOPY N/A 9/13/2017       Social History     Social History   • Marital status:      Spouse name: N/A   • Number of children: N/A   • Years of education: N/A     Occupational History   • Not on file.     Social History Main Topics   • Smoking status: Former Smoker   • Smokeless tobacco: Never Used   • Alcohol use No   • Drug use: No   • Sexual activity: Not Currently     Other Topics Concern   • Not on file     Social History Narrative       O/E    Neck supple    Chest air entry equal,   Fine cups at both bases  Cardiovascular system regular rate and rythem   Abdomen soft no tenderness  CNS - patient is sedated on the vent     Extremeties  no edema ,   pulses equal on both side        Lab Results (last 24 hours)     Procedure Component Value Units Date/Time    Comprehensive Metabolic Panel [[id number]]  (Abnormal) Collected:  01/10/18 1346    Specimen:  Blood Updated:  01/10/18 1409     Glucose 93 mg/dL      BUN 40 (H) mg/dL      Creatinine 1.30 (H) mg/dL      Sodium 139 mmol/L      Potassium 3.8 mmol/L      Chloride 109 mmol/L      CO2 18.0 (L) mmol/L      Calcium 6.6 (L) mg/dL      Total Protein 4.5 (L) g/dL      Albumin 2.10 (L) g/dL      ALT (SGPT) 39 U/L      AST (SGOT) 60 (H) U/L      Alkaline Phosphatase 120 U/L      Total Bilirubin 0.4 mg/dL      eGFR Non African Amer 40 mL/min/1.73      Globulin 2.4 gm/dL      A/G Ratio 0.9 (L) g/dL      BUN/Creatinine Ratio 30.8 (H)     Anion Gap 12.0 mmol/L     Narrative:       The MDRD GFR formula is only valid for adults with stable renal function between ages 18 and 70.    CMV DNA, Quantitative, PCR [[id number]] Collected:  01/10/18 1346    Specimen:  Blood Updated:  01/10/18 1348    Occult Blood X 1, Stool - Stool, Per Rectum [[id number]]   (Abnormal) Collected:  01/10/18 1350    Specimen:  Stool from Per Rectum Updated:  01/10/18 1358     Fecal Occult Blood Positive (A)    Vancomycin, Peak [[id number]]  (Abnormal) Collected:  01/10/18 1614    Specimen:  Blood Updated:  01/10/18 1652     Vancomycin Peak 29.14 (L) mcg/mL     CBC & Differential [[id number]] Collected:  01/10/18 1614    Specimen:  Blood Updated:  01/10/18 1703    Narrative:       The following orders were created for panel order CBC & Differential.  Procedure                               Abnormality         Status                     ---------                               -----------         ------                     Manual Differential[[id number]]          Abnormal            Final result               Scan Slide[[id number]]                                                                  CBC Auto Differential[[id number]]        Abnormal            Final result                 Please view results for these tests on the individual orders.    CBC Auto Differential [[id number]]  (Abnormal) Collected:  01/10/18 1614    Specimen:  Blood Updated:  01/10/18 1621     WBC 24.90 (H) 10*3/mm3      RBC 2.78 (L) 10*6/mm3      Hemoglobin 8.4 (L) g/dL      Hematocrit 26.3 (L) %      MCV 94.6 fL      MCH 30.2 pg      MCHC 31.9 g/dL      RDW 21.1 (H) %      RDW-SD 71.9 (H) fl      MPV 10.2 fL      Platelets 80 (L) 10*3/mm3      Neutrophil % 84.3 (H) %      Lymphocyte % 7.2 (L) %      Monocyte % 2.7 %      Eosinophil % 0.0 %      Basophil % 0.1 %      Immature Grans % 5.7 (H) %      Neutrophils, Absolute 20.99 (H) 10*3/mm3      Lymphocytes, Absolute 1.79 10*3/mm3      Monocytes, Absolute 0.68 10*3/mm3      Eosinophils, Absolute 0.00 10*3/mm3      Basophils, Absolute 0.03 10*3/mm3      Immature Grans, Absolute 1.41 (H) 10*3/mm3     Manual Differential [[id number]]  (Abnormal) Collected:  01/10/18 1614    Specimen:  Blood Updated:  01/10/18 1703     Neutrophil % 87.0 (H) %      Lymphocyte % 6.0 (L) %       Bands %  5.0 %      Metamyelocyte % 2.0 (H) %      Neutrophils Absolute 22.91 (H) 10*3/mm3      Lymphocytes Absolute 1.49 10*3/mm3      nRBC 1.0 (H) /100 WBC      Anisocytosis Mod/2+     Hypochromia Mod/2+     Poikilocytes Slight/1+     Polychromasia Slight/1+     WBC Morphology Normal     Platelet Estimate Decreased    Blood Gas, Arterial [[id number]]  (Abnormal) Collected:  01/11/18 0423    Specimen:  Arterial Blood Updated:  01/11/18 0436     Site --      Not performed at this site.        Michael's Test --      Not performed at this site.        pH, Arterial 7.377 pH units      pCO2, Arterial 36.0 mm Hg      pO2, Arterial 85.6 mm Hg      HCO3, Arterial 20.7 (L) mmol/L      Base Excess, Arterial -4.0 (L) mmol/L      O2 Saturation, Arterial 95.6 %      Hemoglobin, Blood Gas 9.2 (L) g/dL      Hematocrit, Blood Gas 27.0 %      CO2 Content 21.8 (L)     Sodium, Arterial 141.7 mmol/L      Potassium, Arterial 3.47 (L) mmol/L      Glucose, Arterial 100 mmol/L      Barometric Pressure for Blood Gas -- mmHg       Not performed at this site.        Modality --      Not performed at this site.        Ionized Calcium 4.10 (L) mg/dL     CBC (No Diff) [[id number]]  (Abnormal) Collected:  01/11/18 0513    Specimen:  Blood Updated:  01/11/18 0519     WBC 24.00 (H) 10*3/mm3      RBC 2.86 (L) 10*6/mm3      Hemoglobin 8.7 (L) g/dL      Hematocrit 27.4 (L) %      MCV 95.8 fL      MCH 30.4 pg      MCHC 31.8 g/dL      RDW 21.2 (H) %      RDW-SD 72.7 (H) fl      MPV 11.9 fL      Platelets 63 (L) 10*3/mm3     Comprehensive Metabolic Panel [[id number]]  (Abnormal) Collected:  01/11/18 0513    Specimen:  Blood Updated:  01/11/18 0530     Glucose 93 mg/dL      BUN 42 (H) mg/dL      Creatinine 1.36 (H) mg/dL      Sodium 143 mmol/L      Potassium 3.6 mmol/L      Chloride 109 mmol/L      CO2 20.0 (L) mmol/L      Calcium 6.5 (L) mg/dL      Total Protein 4.2 (L) g/dL      Albumin 1.90 (L) g/dL      ALT (SGPT) 37 U/L      AST (SGOT) 61 (H) U/L       Alkaline Phosphatase 126 U/L      Total Bilirubin 0.3 mg/dL      eGFR Non African Amer 38 (L) mL/min/1.73      Globulin 2.3 gm/dL      A/G Ratio 0.8 (L) g/dL      BUN/Creatinine Ratio 30.9 (H)     Anion Gap 14.0 mmol/L     Narrative:       The MDRD GFR formula is only valid for adults with stable renal function between ages 18 and 70.    Magnesium [[id number]]  (Normal) Collected:  01/11/18 0513    Specimen:  Blood Updated:  01/11/18 0530     Magnesium 1.9 mg/dL     Blood Gas, Arterial [[id number]]  (Abnormal) Collected:  01/11/18 0910    Specimen:  Arterial Blood Updated:  01/11/18 0912     Site --      Not performed at this site.        Michael's Test --      Not performed at this site.        pH, Arterial 7.330 (L) pH units      pCO2, Arterial 32.3 (L) mm Hg      pO2, Arterial 446.2 (H) mm Hg      HCO3, Arterial 16.6 (L) mmol/L      Base Excess, Arterial -8.3 (L) mmol/L      O2 Saturation, Arterial 99.8 %      Hemoglobin, Blood Gas 10.4 (L) g/dL      Hematocrit, Blood Gas 31.0 (L) %      CO2 Content 17.6 (L)     Sodium, Arterial 140.7 mmol/L      Potassium, Arterial 4.37 mmol/L      Glucose, Arterial 115 mmol/L      Barometric Pressure for Blood Gas -- mmHg       Not performed at this site.        Modality --      Not performed at this site.        Ionized Calcium 4.10 (L) mg/dL             A/P    Septic shock with respiratory failure-on multiple pressors and antibiotics, patient doing poorly with an episode of hypotension this morning and tachycardia.  She is on maxed out Levophed and vasopressin.  Had a long discussion with the son and other family members and explained to them about the poor prognosis at this time.  Patient currently has Candida in the urine which possibly is due to her immunocompromised status which makes her prognosis poor.    CAD recent stent placement to right coronary artery-not on any antiplatelet therapy at this point stable    DVT with IVC filter placement on IV heparin.      Spent >30  minutes          This document has been electronically signed by Barry Quiroz MD on January 11, 2018 1:04 PM         EMR Dragon/Transcription disclaimer:   Some of this note may be an electronic transcription/translation of spoken language to printed text. The electronic translation of spoken language may permit erroneous, or at times, nonsensical words or phrases to be inadvertently transcribed; Although I have reviewed the note for such errors, some may still exist.

## 2018-01-11 NOTE — PROGRESS NOTES
[facility] Medicine Services  INPATIENT PROGRESS NOTE    Length of Stay: 2  Date of Admission: 1/9/2018  Primary Care Physician: DESTINY Arreguin    Subjective   Chief Complaint:   Chief Complaint   Patient presents with   • Shortness of Breath         Shortness of Breath       Respiratory distress according  .will start heparin drip cannot do CTA of chest to rule out PE as cr is high   01/010/2018 updated the family at length  Spoke to her ID physician  In [place] needs to be on valgancyclovir will drop the dose to 450 bid due to the increased  Creatinine  Will send  MCV PCR today   Grew  Yeast  In urine will treat it as she is immunosuppressed   Still intubated   Urine output low   Still on a pressor for BP   01/11/2018  Was hypotensive  Today  Had to add  Another pressor ,added  Steroids and albumin .  Updated familly   Patient  Made DNR  Poor prognosis   Had to stop  Heparin as she  Bleb       Review of Systems   Unable to perform ROS: Acuity of condition   Respiratory: Positive for shortness of breath.         All pertinent negatives and positives are as above. All other systems have been reviewed and are negative unless otherwise stated.     Objective    Temp:  [97.3 °F (36.3 °C)-100.1 °F (37.8 °C)] 100.1 °F (37.8 °C)  Heart Rate:  [] 129  Resp:  [23-41] 28  BP: ()/(28-86) 115/60  FiO2 (%):  [30 %-100 %] 50 %  Physical Exam   Constitutional: She appears well-nourished.   HENT:   Head: Normocephalic and atraumatic.   Eyes: EOM are normal. Pupils are equal, round, and reactive to light.   Neck: Normal range of motion. Neck supple.   Cardiovascular: Regular rhythm.    No murmur heard.  Pulmonary/Chest: Effort normal and breath sounds normal.   Abdominal: Soft. Bowel sounds are normal.   Neurological:   Intubated    Skin: Skin is warm and dry.   Right thigh bruising from heart cath (old) POA   Mottled skin           Results Review:  I have reviewed the  labs, radiology results, and diagnostic studies.    Laboratory Data:   Lab Results (last 24 hours)     Procedure Component Value Units Date/Time    CBC Auto Differential [[id number]]  (Abnormal) Collected:  01/10/18 1614    Specimen:  Blood Updated:  01/10/18 1621     WBC 24.90 (H) 10*3/mm3      RBC 2.78 (L) 10*6/mm3      Hemoglobin 8.4 (L) g/dL      Hematocrit 26.3 (L) %      MCV 94.6 fL      MCH 30.2 pg      MCHC 31.9 g/dL      RDW 21.1 (H) %      RDW-SD 71.9 (H) fl      MPV 10.2 fL      Platelets 80 (L) 10*3/mm3      Neutrophil % 84.3 (H) %      Lymphocyte % 7.2 (L) %      Monocyte % 2.7 %      Eosinophil % 0.0 %      Basophil % 0.1 %      Immature Grans % 5.7 (H) %      Neutrophils, Absolute 20.99 (H) 10*3/mm3      Lymphocytes, Absolute 1.79 10*3/mm3      Monocytes, Absolute 0.68 10*3/mm3      Eosinophils, Absolute 0.00 10*3/mm3      Basophils, Absolute 0.03 10*3/mm3      Immature Grans, Absolute 1.41 (H) 10*3/mm3     Vancomycin, Peak [[id number]]  (Abnormal) Collected:  01/10/18 1614    Specimen:  Blood Updated:  01/10/18 1652     Vancomycin Peak 29.14 (L) mcg/mL     CBC & Differential [[id number]] Collected:  01/10/18 1614    Specimen:  Blood Updated:  01/10/18 1703    Narrative:       The following orders were created for panel order CBC & Differential.  Procedure                               Abnormality         Status                     ---------                               -----------         ------                     Manual Differential[[id number]]          Abnormal            Final result               Scan Slide[[id number]]                                                                  CBC Auto Differential[[id number]]        Abnormal            Final result                 Please view results for these tests on the individual orders.    Manual Differential [[id number]]  (Abnormal) Collected:  01/10/18 1614    Specimen:  Blood Updated:  01/10/18 1703     Neutrophil % 87.0 (H) %      Lymphocyte % 6.0 (L)  %      Bands %  5.0 %      Metamyelocyte % 2.0 (H) %      Neutrophils Absolute 22.91 (H) 10*3/mm3      Lymphocytes Absolute 1.49 10*3/mm3      nRBC 1.0 (H) /100 WBC      Anisocytosis Mod/2+     Hypochromia Mod/2+     Poikilocytes Slight/1+     Polychromasia Slight/1+     WBC Morphology Normal     Platelet Estimate Decreased    Extra Tubes [[id number]] Collected:  01/10/18 1614    Specimen:  Blood from Blood, Venous Line Updated:  01/10/18 1716    Narrative:       The following orders were created for panel order Extra Tubes.  Procedure                               Abnormality         Status                     ---------                               -----------         ------                     Light Blue Top[[id number]]                                   Final result                 Please view results for these tests on the individual orders.    Light Blue Top [[id number]] Collected:  01/10/18 1614    Specimen:  Blood Updated:  01/10/18 1716     Extra Tube hold for add-on      Auto resulted       Blood Culture - Blood, Blood, Venous Line [[id number]]  (Normal) Collected:  01/09/18 0401    Specimen:  Blood from Blood, Central Line Updated:  01/11/18 0416     Blood Culture No growth at 2 days    Blood Gas, Arterial [[id number]]  (Abnormal) Collected:  01/11/18 0423    Specimen:  Arterial Blood Updated:  01/11/18 0436     Site --      Not performed at this site.        Michael's Test --      Not performed at this site.        pH, Arterial 7.377 pH units      pCO2, Arterial 36.0 mm Hg      pO2, Arterial 85.6 mm Hg      HCO3, Arterial 20.7 (L) mmol/L      Base Excess, Arterial -4.0 (L) mmol/L      O2 Saturation, Arterial 95.6 %      Hemoglobin, Blood Gas 9.2 (L) g/dL      Hematocrit, Blood Gas 27.0 %      CO2 Content 21.8 (L)     Sodium, Arterial 141.7 mmol/L      Potassium, Arterial 3.47 (L) mmol/L      Glucose, Arterial 100 mmol/L      Barometric Pressure for Blood Gas -- mmHg       Not performed at this site.         Modality --      Not performed at this site.        Ionized Calcium 4.10 (L) mg/dL     CBC (No Diff) [[id number]]  (Abnormal) Collected:  01/11/18 0513    Specimen:  Blood Updated:  01/11/18 0519     WBC 24.00 (H) 10*3/mm3      RBC 2.86 (L) 10*6/mm3      Hemoglobin 8.7 (L) g/dL      Hematocrit 27.4 (L) %      MCV 95.8 fL      MCH 30.4 pg      MCHC 31.8 g/dL      RDW 21.2 (H) %      RDW-SD 72.7 (H) fl      MPV 11.9 fL      Platelets 63 (L) 10*3/mm3     Comprehensive Metabolic Panel [[id number]]  (Abnormal) Collected:  01/11/18 0513    Specimen:  Blood Updated:  01/11/18 0530     Glucose 93 mg/dL      BUN 42 (H) mg/dL      Creatinine 1.36 (H) mg/dL      Sodium 143 mmol/L      Potassium 3.6 mmol/L      Chloride 109 mmol/L      CO2 20.0 (L) mmol/L      Calcium 6.5 (L) mg/dL      Total Protein 4.2 (L) g/dL      Albumin 1.90 (L) g/dL      ALT (SGPT) 37 U/L      AST (SGOT) 61 (H) U/L      Alkaline Phosphatase 126 U/L      Total Bilirubin 0.3 mg/dL      eGFR Non African Amer 38 (L) mL/min/1.73      Globulin 2.3 gm/dL      A/G Ratio 0.8 (L) g/dL      BUN/Creatinine Ratio 30.9 (H)     Anion Gap 14.0 mmol/L     Narrative:       The MDRD GFR formula is only valid for adults with stable renal function between ages 18 and 70.    Magnesium [[id number]]  (Normal) Collected:  01/11/18 0513    Specimen:  Blood Updated:  01/11/18 0530     Magnesium 1.9 mg/dL     Blood Culture - Blood, Blood, Venous Line [[id number]]  (Normal) Collected:  01/09/18 0537    Specimen:  Blood from Arm, Left Updated:  01/11/18 0546     Blood Culture No growth at 2 days    Urine Culture - Urine, Urine, Clean Catch [[id number]]  (Abnormal) Collected:  01/09/18 0749    Specimen:  Urine from Urine, Clean Catch Updated:  01/11/18 0724     Urine Culture --      >100,000 CFU/mL Candida albicans (A)      Susceptibility not indicated         Blood Gas, Arterial [[id number]]  (Abnormal) Collected:  01/11/18 0910    Specimen:  Arterial Blood Updated:  01/11/18 0912     Site  --      Not performed at this site.        Michael's Test --      Not performed at this site.        pH, Arterial 7.330 (L) pH units      pCO2, Arterial 32.3 (L) mm Hg      pO2, Arterial 446.2 (H) mm Hg      HCO3, Arterial 16.6 (L) mmol/L      Base Excess, Arterial -8.3 (L) mmol/L      O2 Saturation, Arterial 99.8 %      Hemoglobin, Blood Gas 10.4 (L) g/dL      Hematocrit, Blood Gas 31.0 (L) %      CO2 Content 17.6 (L)     Sodium, Arterial 140.7 mmol/L      Potassium, Arterial 4.37 mmol/L      Glucose, Arterial 115 mmol/L      Barometric Pressure for Blood Gas -- mmHg       Not performed at this site.        Modality --      Not performed at this site.        Ionized Calcium 4.10 (L) mg/dL           Culture Data:   Blood Culture   Date Value Ref Range Status   01/09/2018 No growth at 2 days  Preliminary   01/09/2018 No growth at 2 days  Preliminary     Urine Culture   Date Value Ref Range Status   01/09/2018 >100,000 CFU/mL Candida albicans (A)  Final     Comment:     Susceptibility not indicated       No results found for: RESPCX  No results found for: WOUNDCX  No results found for: STOOLCX  No components found for: BODYFLD    Radiology Data:   Imaging Results (last 24 hours)     Procedure Component Value Units Date/Time    XR Chest 1 View [[id number]] Collected:  01/11/18 0439     Updated:  01/11/18 0725    Narrative:       Exam: AP portable chest    INDICATION: Respiratory failure    COMPARISON: 1/10/2018 at 4:58 AM    FINDINGS: AP portable chest. Endotracheal tube NG tube and right  subclavian central venous catheter remain in place. Heart size is  normal. Plaque is present in the aorta. No change in patchy  infiltrate in the right upper and lower lung. Left lung is clear.  No pneumothorax or pleural effusion.      Impression:       No significant interval change    Electronically signed by:  Alber Rahman MD  1/11/2018 7:24 AM  CST Workstation: [id number]          I have reviewed the patient current  medications.     Assessment/Plan     Hospital Problem List     Acute respiratory failure with hypoxia and hypercapnia      fever   Hypotension  Hx of DVT s/p ivc filter   Hyperkaliemia  Hypocalcemia  RENU vs CKD  lactic acidosis   Hx of Ulcerative colitis   Septic shock   cmv colitis     Plan:  Continue iv antibiotics   Monitor blood culture   Obtain respiratory cultures  Cannot obtain CTA of chest as creatinine is elevated will start a heparin drip   Once creatinine is better will do a CTA  Repeat lactic acid   Vent management   Continue pressor   Replete calcium  Give kayexalate    give a bolus of fluids then  Fluid maintenance   Continue home medication as medical course dictates   Tylenol prn for fever   Will start valgancyclovir   DVT GI prophylaxis                   Vic Abraham MD   01/11/18   3:03 PM

## 2018-01-11 NOTE — PLAN OF CARE
Problem: Mechanical Ventilation, Invasive (Adult)  Goal: Signs and Symptoms of Listed Potential Problems Will be Absent or Manageable (Mechanical Ventilation, Invasive)  Outcome: Ongoing (interventions implemented as appropriate)      Problem: Patient Care Overview (Adult)  Goal: Plan of Care Review  Outcome: Ongoing (interventions implemented as appropriate)   01/11/18 1316   Coping/Psychosocial Response Interventions   Plan Of Care Reviewed With caregiver   Patient Care Overview   Progress no change   Outcome Evaluation   Outcome Summary/Follow up Plan Pt remains NPO on the vent. At this time, she is not stable enough for Nutrition support. Will monitor

## 2018-01-11 NOTE — CONSULTS
Adult Nutrition  Assessment    Patient Name:  Damaris Noguera Sánchez  YOB: 1945  MRN: [id number]  Admit Date:  1/9/2018    Assessment Date:  1/11/2018    Comments:    Pt remains NPO on the vent.  Pt currently maxed out on 2 pressors.  Pt is presently not stable enough to start EN.  YOMAIRA will monitor tx plans                            Electronically signed by:  Candelaria Avila RD  01/11/18 1:17 PM

## 2018-01-11 NOTE — PROGRESS NOTES
"[facility]  [address] [place] [address]. [address]  T - [phone]  F - [phone]     Progress Note          PATIENT  DEMOGRAPHICS   PATIENT NAME: Damaris Sánchez                      PHYSICIAN: Sylvia Islas MD  : 1945  MRN: [id number]   LOS: 2 days    Patient Care Team:  DESTINY Arreguin as PCP - General (Nurse Practitioner)  Subjective   SUBJECTIVE   Events reviewed, discuss with Dr Abraham, now on vasopressin as well.          Objective   OBJECTIVE   Vital Signs  Temp:  [97.3 °F (36.3 °C)-97.6 °F (36.4 °C)] 97.6 °F (36.4 °C)  Heart Rate:  [114-141] 134  Resp:  [23-35] 35  BP: ()/(50-86) 118/61  FiO2 (%):  [30 %-100 %] 100 %    Flowsheet Rows         First Filed Value    Admission Height  160 cm (63\") Documented at 2018 1840    Admission Weight  61.2 kg (134 lb 14.7 oz) Documented at 2018 0500           I/O last 3 completed shifts:  In: 3815 [I.V.:3445; Other:120; IV Piggyback:250]  Out: 745 [Urine:645; Other:100]    PHYSICAL EXAM    Physical Exam   Constitutional: She appears well-developed.   HENT:   Head: Normocephalic.   Eyes: Pupils are equal, round, and reactive to light.   Cardiovascular: Normal rate, regular rhythm and normal heart sounds.    Pulmonary/Chest: Effort normal. She has wheezes.   Abdominal: Soft. Bowel sounds are normal.   Musculoskeletal: She exhibits edema.   Neurological: She exhibits normal muscle tone.       RESULTS   Results Review:      Results from last 7 days  Lab Units 18  0910 18  0513 18  0423 01/10/18  1346  01/10/18  0206   SODIUM mmol/L  --  143  --  139  --  141   SODIUM, ARTERIAL mmol/L 140.7  --  141.7  --   < >  --    POTASSIUM mmol/L  --  3.6  --  3.8  --  3.7   CHLORIDE mmol/L  --  109  --  109  --  109   CO2 mmol/L  --  20.0*  --  18.0*  --  18.0*   BUN mg/dL  --  42*  --  40*  --  38*   CREATININE mg/dL  --  1.36*  --  1.30*  --  1.25*   CALCIUM mg/dL  --  6.5*  --  6.6*  --  6.4* "   BILIRUBIN mg/dL  --  0.3  --  0.4  --  0.4   ALK PHOS U/L  --  126  --  120  --  98   ALT (SGPT) U/L  --  37  --  39  --  40   AST (SGOT) U/L  --  61*  --  60*  --  60*   GLUCOSE mg/dL  --  93  --  93  --  77   GLUCOSE, ARTERIAL mmol/L 115  --  100  --   < >  --    < > = values in this interval not displayed.    Estimated Creatinine Clearance: 35.5 mL/min (by C-G formula based on Cr of 1.36).      Results from last 7 days  Lab Units 01/11/18  0513 01/10/18  0206   MAGNESIUM mg/dL 1.9 1.8               Results from last 7 days  Lab Units 01/11/18  0513 01/10/18  1614 01/10/18  0206 01/09/18  0336 01/07/18  1317   WBC 10*3/mm3 24.00* 24.90* 27.75* 18.15* 12.31*   HEMOGLOBIN g/dL 8.7* 8.4* 8.2* 9.9* 10.4*   PLATELETS 10*3/mm3 63* 80* 103* 134* 176         Results from last 7 days  Lab Units 01/09/18  1146 01/09/18  0339   INR  1.29* 1.27*         Imaging Results (last 24 hours)     Procedure Component Value Units Date/Time    XR Chest 1 View [[id number]] Collected:  01/11/18 0439     Updated:  01/11/18 0725    Narrative:       Exam: AP portable chest    INDICATION: Respiratory failure    COMPARISON: 1/10/2018 at 4:58 AM    FINDINGS: AP portable chest. Endotracheal tube NG tube and right  subclavian central venous catheter remain in place. Heart size is  normal. Plaque is present in the aorta. No change in patchy  infiltrate in the right upper and lower lung. Left lung is clear.  No pneumothorax or pleural effusion.      Impression:       No significant interval change    Electronically signed by:  Alber Rahman MD  1/11/2018 7:24 AM  CST Workstation: [id number]           MEDICATIONS      albumin human 25 g Intravenous Once   chlorhexidine 15 mL Mouth/Throat Q12H   etomidate      famotidine 20 mg Intravenous Q12H   fluconazole 100 mg Intravenous Daily   flumazenil      hydrocortisone sodium succinate 100 mg Intravenous Q6H   lidocaine      lidocaine PF 1%      magic butt ointment  Topical BID    piperacillin-tazobactam 3.375 g Intravenous Q8H   propofol      valGANciclovir 450 mg Oral BID   vancomycin 1,000 mg Intravenous Q36H   vecuronium          heparin (porcine) 18 Units/kg/hr Last Rate: Stopped (01/10/18 1413)   midazolam 1-10 mg/hr Last Rate: Stopped (01/11/18 0502)   norepinephrine 0.02-0.3 mcg/kg/min Last Rate: 0.14 mcg/kg/min (01/10/18 2050)   Pharmacy to dose vancomycin     sodium bicarbonate drip less than 75 mEq/bag 100 mL/hr Last Rate: 100 mL/hr (01/11/18 0044)   vasopressin 0.02-0.1 Units/min Last Rate: 0.04 Units/min (01/11/18 1000)       Assessment/Plan   ASSESSMENT / PLAN    Active Problems:    Acute respiratory failure with hypoxia and hypercapnia    1.acute kidney injury patient baseline creatinine is less than 1.  She has now creatinine up to 1.5 with hyperkalemia and metabolic acidosis in the setting of septic shock and likely has ATN. UO is close to 500-600 range. Cr is stable. k also better bicarb is better. Keep same drip for now. Agreed with vasopressin and albumin.     Long discussion with family members and Son. They like to continue full care at present but doesn't want to prolong the aggressive care. Not clear on CPR and they will discuss among themselves about it.      Unable to do CT angiogram due to worsening creatinine.     2.recent diagnosis of C. difficile colitis. Repeat c.diff testing is negative     3.hypoxic hypercarbic respiratory failure currently intubated     4.septic shock patient is currently on Levophed and vasopressin.On zosyn and vancomycin. Candida in urine and add diflucan as well poor prognosis.                 This document has been electronically signed by Sylvia Islas MD on January 11, 2018 10:59 AM

## 2018-01-12 NOTE — SIGNIFICANT NOTE
Called by nursing secondary to family wanting to change plan of care.  I discussed with all family members (25+) at length what the options for Ms Sánchez care were.  They verbalized that they wanted to make her comfort measures, extubate, and discontinue all drips.  We discussed that she would have prn morphine and ativan in the event that she became uncomfortable or anxious.  They verbalized understanding.  Everyone was in agreement.  Orders were written.  They will visit with her prior to extubation, and when they are ready she will be extubated.        This document has been electronically signed by Leeroy Navarro MD on January 11, 2018 10:33 PM

## 2018-01-12 NOTE — NURSING NOTE
At 2310 patient was terminally extubated and medications stopped to follow through with family wishes of comfort measures. 20+ family members at beside with patient.

## 2018-01-13 NOTE — DISCHARGE SUMMARY
[facility] Medicine Services  DEATH SUMMARY       Date of Admission: 1/9/2018  Date of Death:  1/13/2018 at approximately   Primary Care Physician: DESTINY Arreguin    Presenting Problem/History of Present Illness:  Hyperkalemia [E87.5]  Acute renal insufficiency [N28.9]  Acute respiratory failure with hypoxia and hypercapnia [J96.01, J96.02]  Sepsis, due to unspecified organism [A41.9]  Pneumonia due to infectious organism, unspecified laterality, unspecified part of lung [J18.9]     Final Death Diagnoses:  Hospital Problem List     Acute respiratory failure with hypoxia and hypercapnia      gi bleed   MCV colitis   Septic shock   RENU  Ulcerative colitis   pneumonia     Consults:   Consults     Date and Time Order Name Status Description    1/9/2018 1657 Inpatient Consult to Cardiology Completed     1/9/2018 1435 Inpatient Consult to Nephrology Completed           Procedures Performed:None                 Pertinent Test Results:     Hospital Course:Patient presented in respiratory failure and was intubated in the ED.  She was recently discharged from [facility] to a nursing home.  It was reported to EMS by NH that patient was not short of breath earlier in the day, then became short of breath relatively quickly.  Patient's son seems to think that she has been short of breath for a longer time than that  Patient was intubated in the ED ,she was septic  ,was given IVFluids and started on antibiotics . A CTA was not done as her creatinine was high  She was started on heparin empirically  After explaining to the family the potential risk for bleeding  But she had a high risk for clotting also .  Her Bp was low she had to be on a pressor  . Her urine out put was minimal we tried more fluids then albumin but she never responded . A nephrologist was consulted he tried a bicarb drip .  She remained anuric   The next day her BP dropped further we had to add a second pressor ,  steroids and albumin , her skin  started kevin mottled .  At that point we explained to the family that she had a very poor prognosis due to to this septic shock   They decided to make her DNR then few hours later  They came to a conclusion andterminally   Extubated her . She   After a few hours .              Vic Abraham MD  18  12:30 PM    Time: 35MIN

## 2018-01-13 NOTE — DISCHARGE SUMMARY
[facility] Medicine Services  DEATH SUMMARY       Date of Admission: 1/9/2018  Date of Death:  1/13/2018 at approximately   Primary Care Physician: DESTINY Arreguin    Presenting Problem/History of Present Illness:  Hyperkalemia [E87.5]  Acute renal insufficiency [N28.9]  Acute respiratory failure with hypoxia and hypercapnia [J96.01, J96.02]  Sepsis, due to unspecified organism [A41.9]  Pneumonia due to infectious organism, unspecified laterality, unspecified part of lung [J18.9]     Final Death Diagnoses:  Hospital Problem List     Acute respiratory failure with hypoxia and hypercapnia          Consults:   Consults     Date and Time Order Name Status Description    1/9/2018 1657 Inpatient Consult to Cardiology Completed     1/9/2018 1435 Inpatient Consult to Nephrology Completed           Procedures Performed:None                 Pertinent Test Results:     Hospital Course:Patient presented in respiratory failure and was intubated in the ED.  She was recently discharged from [facility] to a nursing home.  It was reported to EMS by NH that patient was not short of breath earlier in the day, then became short of breath relatively quickly.  Patient's son seems to think that she has been short of breath for a longer time than that  Patient was intubated in the ED ,she was septic  ,was given IVFluids and started on antibiotics . A CTA was not done as her creatinine was high  She was started on heparin empirically  After explaining to the family the potential risk for bleeding  But she had a high risk for clotting also .  Her Bp was low she had to be on a pressor  . Her urine out put was minimal we tried more fluids then albumin but she never responded . A nephrologist was consulted he tried a bicarb drip .  She remained anuric   The next day her BP dropped further we had to add a second pressor , steroids and albumin , her skin  started kevin mottled .  At that point we  explained to the family that she had a very poor prognosis due to to this septic shock   They decided to make her DNR then few hours later  They came to a conclusion andterminally   Extubated her . She   After a few hours .              Vic Abraham MD  18  12:10 PM    Time: 35MIN

## 2018-01-14 LAB
BACTERIA SPEC AEROBE CULT: NORMAL
BACTERIA SPEC AEROBE CULT: NORMAL

## 2021-07-16 NOTE — PLAN OF CARE
Problem: Inpatient Occupational Therapy  Goal: Transfer Training Goal 1 LTG- OT  Outcome: Unable to achieve outcome(s) by discharge Date Met:  12/17/17 12/06/17 1100 12/08/17 0937 12/12/17 1530   Transfer Training OT LTG   Transfer Training OT LTG, Date Established 12/06/17 --  --    Transfer Training OT LTG, Time to Achieve by discharge --  --    Transfer Training OT LTG, Activity Type all transfers --  --    Transfer Training OT LTG, [place] Level conditional independence --  --    Transfer Training OT LTG, Assist Device walker, rolling --  --    Transfer Training OT LTG, Date Goal Reviewed --  --  12/12/17   Transfer Training OT LTG, Outcome --  goal not met --    Transfer Training OT LTG, Reason Goal Not Met --  --  --      12/17/17 1457   Transfer Training OT LTG   Transfer Training OT LTG, Date Established --    Transfer Training OT LTG, Time to Achieve --    Transfer Training OT LTG, Activity Type --    Transfer Training OT LTG, [place] Level --    Transfer Training OT LTG, Assist Device --    Transfer Training OT LTG, Date Goal Reviewed --    Transfer Training OT LTG, Outcome --    Transfer Training OT LTG, Reason Goal Not Met discharged from facility       Goal: Strength Goal LTG- OT  Outcome: Unable to achieve outcome(s) by discharge Date Met:  12/17/17 12/06/17 1100 12/08/17 0937 12/12/17 1530   Strength OT LTG   Strength Goal OT LTG, Date Established 12/06/17 --  --    Strength Goal OT LTG, Time to Achieve by discharge --  --    Strength Goal OT LTG, Measure to Achieve Pt. will complete BUE strengthening exercises all planes and joints to increase BUE strength to 5/5 for ADLs.  --  --    Strength Goal OT LTG, Date Goal Reviewed --  --  12/12/17   Strength Goal OT LTG, Outcome --  goal not met --    Strength Goal OT LTG, Reason Goal Not Met --  --  --      12/17/17 1457   Strength OT LTG   Strength Goal OT LTG, Date Established --    Strength Goal OT LTG, Time to Achieve --    Strength  Goal OT LTG, Measure to Achieve --    Strength Goal OT LTG, Date Goal Reviewed --    Strength Goal OT LTG, Outcome --    Strength Goal OT LTG, Reason Goal Not Met discharged from facility       Goal: ADL Goal LTG- OT  Outcome: Unable to achieve outcome(s) by discharge Date Met:  12/17/17 12/06/17 1100 12/08/17 0937 12/12/17 1530   ADL OT LTG   ADL OT LTG, Date Established 12/06/17 --  --    ADL OT LTG, Time to Achieve by discharge --  --    ADL OT LTG, Activity Type ADL skills --  --    ADL OT LTG, [place] Level independent --  --    ADL OT LTG, Date Goal Reviewed --  --  12/12/17   ADL OT LTG, Outcome --  goal not met --    ADL OT LTG, Reason Goal Not Met --  --  --      12/17/17 1457   ADL OT LTG   ADL OT LTG, Date Established --    ADL OT LTG, Time to Achieve --    ADL OT LTG, Activity Type --    ADL OT LTG, [place] Level --    ADL OT LTG, Date Goal Reviewed --    ADL OT LTG, Outcome --    ADL OT LTG, Reason Goal Not Met discharged from facility            Mastoid Interpolation Flap Text: A decision was made to reconstruct the defect utilizing an interpolation axial flap and a staged reconstruction.  A telfa template was made of the defect.  This telfa template was then used to outline the mastoid interpolation flap.  The donor area for the pedicle flap was then injected with anesthesia.  The flap was excised through the skin and subcutaneous tissue down to the layer of the underlying musculature.  The pedicle flap was carefully excised within this deep plane to maintain its blood supply.  The edges of the donor site were undermined.   The donor site was closed in a primary fashion.  The pedicle was then rotated into position and sutured.  Once the tube was sutured into place, adequate blood supply was confirmed with blanching and refill.  The pedicle was then wrapped with xeroform gauze and dressed appropriately with a telfa and gauze bandage to ensure continued blood supply and protect the attached pedicle.

## 2021-12-03 NOTE — PROGRESS NOTES
Cardiology Progress Note:     LOS: 9 days   Patient Care Team:  DESTINY Arreguin as PCP - General (Nurse Practitioner)      Subjective:   Chart reviewed , patient seen and examined.  Patient has not had any further recurrent symptoms of chest pain.  Patient complained of abdominal discomfort.  Patient complained of generalized weakness and fatigue.  Patient hemoglobin has remained low 7.6.        Objective:     Objective:  Vitals:    12/14/17 1526   BP: 150/75   Pulse: 103   Resp: 18   Temp: 98.7 °F (37.1 °C)   SpO2: 97%     No intake or output data in the 24 hours ending 12/14/17 1726          Physical Exam:   General Appearance:    Alert, oriented, cooperative, in no acute distress   Head:    Normocephalic, atraumatic, without obvious abnormality   Eyes:           DEANGELO  Lids and lashes normal, conjunctivae and sclerae normal, no icterus, no pallor   Ears:    Ears appear intact with no abnormalities noted   Throat:   Mucous membranes pink and moist   Neck:   Supple, trachea midline, no carotid bruit, no organomegaly or JVD   Lungs:     Clear to auscultation and percussion, respirations regular, even and Unlabored. No wheezes, rales, rhonchi    Heart:    Regular rhythm and normal rate, normal S1 and S2, no            murmur, no gallop, no rub, no click   Abdomen:     Soft, non-tender, non-distended, no guarding, no rebound tenderness, Normal bowel sounds in all four quadrant, no masses, liver and spleen nonpalpable,    Genitalia:    Deferred   Extremities:   Moves all extremities well, no edema, no cyanosis, no              Redness, no clubbing   Pulses:   Pulses palpable and equal bilaterally   Skin:   Moist and warm. No bleeding, bruising or rash   Neurologic/Psychiatric:   Alert and oriented to person, place, and time.  Motor, power and tone in upper and lower extremity is grossly intact.  No focal neurological deficits. Normal cognitive function. No psychomotor reaction or tangential thought. No  Transition of Care Plan:     RUR:   13% \"low risk\"  Disposition: TBD- home with family  Follow up appointments: Has no PCP  DME needed: None  Transportation at Discharge: Wife  [address] or means to access home:   No     IM Medicare Letter: Needed at discharge  Is patient a BCPI-A Bundle: No                 If yes, was Bundle Letter given?:  N/A  Is patient a [place] and connected with the [place]? No  If yes, was [employer] transfer form completed and [facility] notified? N/A  Caregiver Contact: Wife- Bear Ovalle, [phone]  Discharge Caregiver contacted prior to discharge? CM reviewed pt's chart and will continue to follow.     Ursula Crystal  Ext 3990 depression, homicidal ideations and suicidal ideations            Results Review:    Lab Results (last 24 hours)     Procedure Component Value Units Date/Time    Potassium [[id number]]  (Normal) Collected:  12/13/17 2202    Specimen:  Blood Updated:  12/13/17 2217     Potassium 4.9 mmol/L     CBC (No Diff) [[id number]]  (Abnormal) Collected:  12/14/17 0616    Specimen:  Blood Updated:  12/14/17 0712     WBC 8.71 10*3/mm3      RBC 2.53 (L) 10*6/mm3      Hemoglobin 7.6 (L) g/dL      Hematocrit 23.2 (L) %      MCV 91.7 fL      MCH 30.0 pg      MCHC 32.8 g/dL      RDW 15.1 (H) %      RDW-SD 51.3 (H) fl      MPV 9.4 fL      Platelets 163 10*3/mm3     Comprehensive Metabolic Panel [[id number]]  (Abnormal) Collected:  12/14/17 0616    Specimen:  Blood Updated:  12/14/17 0722     Glucose 130 (H) mg/dL      BUN 11 mg/dL      Creatinine 0.62 mg/dL      Sodium 137 mmol/L      Potassium 4.3 mmol/L      Chloride 102 mmol/L      CO2 28.0 mmol/L      Calcium 7.5 (L) mg/dL      Total Protein 4.9 (L) g/dL      Albumin 2.20 (L) g/dL      ALT (SGPT) 34 U/L      AST (SGOT) 22 U/L      Alkaline Phosphatase 67 U/L      Total Bilirubin 0.2 mg/dL      eGFR Non African Amer 95 (H) mL/min/1.73      Globulin 2.7 gm/dL      A/G Ratio 0.8 (L) g/dL      BUN/Creatinine Ratio 17.7     Anion Gap 7.0 mmol/L     Narrative:       The MDRD GFR formula is only valid for adults with stable renal function between ages 18 and 70.           Medication Review:   Current Facility-Administered Medications   Medication Dose Route Frequency Provider Last Rate Last Dose   • diphenoxylate-atropine (LOMOTIL) 2.5-0.025 MG per tablet 1 tablet  1 tablet Oral Q2H PRN Nish Morales DO   1 tablet at 12/14/17 1619   • famotidine (PEPCID) tablet 40 mg  40 mg Oral Daily Neelam Saravia MD   40 mg at 12/14/17 0833   • HYDROcodone-acetaminophen (NORCO)  MG per tablet 1 tablet  1 tablet Oral Q6H PRN Neelam Saravia MD   1 tablet at 12/14/17 1619   • Magnesium  Sulfate 2 gram Bolus, followed by 8 gram infusion (total Mg dose 10 grams)- Mg less than or equal to 1mg/dL  2 g Intravenous PRN Neelam Saravia MD        Or   • Magnesium Sulfate 6 gram Infusion (2 gm x 3) -Mg 1.1 -1.5 mg/dL  2 g Intravenous PRN Neelam Saravia MD        Or   • magnesium sulfate 4 gram infusion- Mg 1.6-1.9 mg/dL  4 g Intravenous PRN Neelam Saravia MD       • mesalamine (DELZICOL) delayed release capsule 800 mg  800 mg Oral TID Nish Morales DO   800 mg at 12/14/17 1619   • methylPREDNISolone sodium succinate (SOLU-Medrol) injection 60 mg  60 mg Intravenous Daily Nish Morales DO   60 mg at 12/14/17 0833   • morphine injection 2 mg  2 mg Intravenous Q2H PRN Sony Burris MD   2 mg at 12/12/17 1040   • octreotide (sandoSTATIN) injection 100 mcg  100 mcg Subcutaneous TID Nish Morales DO   100 mcg at 12/14/17 1619   • potassium & sodium phosphates (PHOS-NAK) 280-160-250 MG packet - for Phosphorus less than 1.25 mg/dL  1 packet Oral Q6H PRN Neelam Saravia MD        Or   • potassium & sodium phosphates (PHOS-NAK) 280-160-250 MG packet - for Phosphorus 1.25 - 2.1 mg/dL  1 packet Oral Once PRN Neelam Saravia MD       • potassium chloride (KLOR-CON) packet 40 mEq  40 mEq Oral PRN Neelam Saravia MD   40 mEq at 12/13/17 1807   • potassium chloride (MICRO-K) CR capsule 40 mEq  40 mEq Oral PRN Neelam Saravia MD   40 mEq at 12/13/17 0840   • sodium chloride 0.9 % flush 1-10 mL  1-10 mL Intravenous PRN Sony Burris MD       • sodium chloride 0.9 % flush 10 mL  10 mL Intravenous PRN Carmine Ponce PA-C       • sodium chloride 0.9 % flush 10 mL  10 mL Intravenous PRN Gabe Blount MD   10 mL at 12/11/17 0955       Assessment and Plan:    Principal Problem:    Lower abdominal pain  Active Problems:    Weakness    Ulcerative colitis  1.  Indeterminate troponin.  Patient had a positive nuclear Cardiolite stress test suggestive of ischemia.  Patient  currently is not having any symptoms of substernal chest pain.  Family is considering further evaluation at [facility] or [facility] in [place].  Patient hemoglobin is low.  Patient would benefit from packed red blood cell transfusion.  2.  Echodensity noted in the left ventricular apex suggestive of a thrombus.  Patient has good left ventricular systolic function.  Patient would not be started empirically on anticoagulation secondary to the risk for gastrointestinal bleeding and anemia from ulcerative colitis.  3.  Arterial hypertension.  Patient blood pressure has remained stable.    The above plan of management were discussed with the patient and the family at length.            Gabe Blount MD  12/14/17  5:26 PM      Time: Time spent on face-to-face interaction 20 minutes    Dictated utilizing Dragon dictation.

## 2024-03-20 NOTE — PLAN OF CARE
Problem: Patient Care Overview (Adult)  Goal: Plan of Care Review  Outcome: Ongoing (interventions implemented as appropriate)    09/17/17 0810   Coping/Psychosocial Response Interventions   Plan Of Care Reviewed With patient;family   Patient Care Overview   Progress progress toward functional goals as expected   Outcome Evaluation   Outcome Summary/Follow up Plan Pt met all PT goals this date. Pt ind w/all mobility (in/out bed, amb 500 ft and up/down steps). HEP given. Pt would benefit from ?HH/OP PT once discharged from this facility.          Problem: Inpatient Physical Therapy  Goal: Stair Training Goal LTG- PT  Outcome: Outcome(s) achieved Date Met:  09/17/17 09/13/17 0955 09/17/17 0810   Stair Training PT LTG   Stair Training Goal PT LTG, Date Established 09/13/17 --    Stair Training Goal PT LTG, Time to Achieve by discharge --    Stair Training Goal PT LTG, Number of Steps 2 --    Stair Training Goal PT LTG, [place] Level conditional independence --    Stair Training Goal PT LTG, Assist Device 2 handrails --    Stair Training Goal PT LTG, Date Goal Reviewed --  09/17/17   Stair Training Goal PT LTG, Outcome --  goal met       Goal: Physical Therapy Goal LTG- PT  Outcome: Outcome(s) achieved Date Met:  09/17/17 09/13/17 0955 09/17/17 0810   Physical Therapy PT LTG   Physical Therapy PT LTG, Time to Achieve by discharge --    Physical Therapy PT LTG, Activity Type [place] fall risk assessment.  --    Physical Therapy PT LTG, Addisional Goal Score >/= 24/28 or low fall risk.  --    Physical Therapy PT LTG, Date Goal Reviewed --  09/17/17   Physical Therapy PT LTG, Outcome --  goal met            3

## (undated) DEVICE — SINGLE-USE BIOPSY FORCEPS: Brand: RADIAL JAW 4